# Patient Record
Sex: FEMALE | Race: WHITE | Employment: OTHER | URBAN - METROPOLITAN AREA
[De-identification: names, ages, dates, MRNs, and addresses within clinical notes are randomized per-mention and may not be internally consistent; named-entity substitution may affect disease eponyms.]

---

## 2017-01-10 ENCOUNTER — TELEPHONE (OUTPATIENT)
Dept: OPHTHALMOLOGY | Facility: CLINIC | Age: 77
End: 2017-01-10

## 2017-01-10 NOTE — TELEPHONE ENCOUNTER
Pt current symptoms: redness inside and outside lower lid, left eye. and slight discharge, slight irritation. LOV: 11/22. Pt requesting to spk to Rn, please advise, thank you.

## 2017-01-10 NOTE — TELEPHONE ENCOUNTER
Spoke to pt and states that she was seen 11/2016 due to a chalazion RUL; pt used warm compresses and states that it resolved. Pt states that now she has the same thing LLL with redness, slight swelling with a bump over the past few days.  Advised pt to cont

## 2017-01-14 PROBLEM — Z99.2 HEMODIALYSIS ACCESS SITE WITH ARTERIOVENOUS GRAFT (HCC): Status: ACTIVE | Noted: 2017-01-14

## 2017-01-16 ENCOUNTER — TELEPHONE (OUTPATIENT)
Dept: OPHTHALMOLOGY | Facility: CLINIC | Age: 77
End: 2017-01-16

## 2017-01-16 NOTE — TELEPHONE ENCOUNTER
See TE from 1/10, pt states her eye has not improved, pt requesting appt sooner than next available. Pls call thank you.

## 2017-01-19 ENCOUNTER — OFFICE VISIT (OUTPATIENT)
Dept: OPHTHALMOLOGY | Facility: CLINIC | Age: 77
End: 2017-01-19

## 2017-01-19 DIAGNOSIS — H01.00B BLEPHARITIS OF UPPER AND LOWER EYELIDS OF BOTH EYES, UNSPECIFIED TYPE: ICD-10-CM

## 2017-01-19 DIAGNOSIS — H00.15 CHALAZION LEFT LOWER EYELID: ICD-10-CM

## 2017-01-19 DIAGNOSIS — H01.00A BLEPHARITIS OF UPPER AND LOWER EYELIDS OF BOTH EYES, UNSPECIFIED TYPE: ICD-10-CM

## 2017-01-19 DIAGNOSIS — H00.12 CHALAZION RIGHT LOWER EYELID: Primary | ICD-10-CM

## 2017-01-19 PROCEDURE — 99213 OFFICE O/P EST LOW 20 MIN: CPT | Performed by: OPHTHALMOLOGY

## 2017-01-19 PROCEDURE — G0463 HOSPITAL OUTPT CLINIC VISIT: HCPCS | Performed by: OPHTHALMOLOGY

## 2017-01-19 NOTE — ASSESSMENT & PLAN NOTE
Recommend aggressive warm compresses; she should use them 20-30 times per day. Information on chalazia given. Told patient that it is not big enough to excise at this time.   Discussed with patient that since is still in the early stages, it may be able

## 2017-01-19 NOTE — ASSESSMENT & PLAN NOTE
Patient is instructed to use warm compresses twice a day to both eyelids forever for ocular comfort. Blepharitis info given.

## 2017-01-19 NOTE — PROGRESS NOTES
Sarah Craig is a 77year old female. HPI:     HPI     Pt was seen in November due to a bump on her RUL and went away after about 4 weeks by using warm compresses 20+ times a day. Pt called on 1/16/17 due to noticing a new bump on her RLL x 10 days. Smokeless Status: Never Used                        Alcohol Use: Yes                Comment: occasionally      Medications:    Current Outpatient Prescriptions:  tobramycin-dexamethasone 0.3-0.1 % Ophthalmic Ointment Apply to lower lids in b 20/25 -2 20/20       Correction:  Glasses      Pupils      Pupils   Right PERRL   Left PERRL               Slit Lamp and Fundus Exam     Slit Lamp Exam      Right Left    Lids/Lashes Dermatochalasis, Meibomian gland dysfunction, 1+ Scurf, small chalazion R instructions:  Return for 1-2 months for complete exam .    1/19/2017  Scribed by: Carlitos Luu MD

## 2017-01-19 NOTE — PATIENT INSTRUCTIONS
Chalazion right lower eyelid  Recommend aggressive warm compresses; she should use them 20-30 times per day. Information on chalazia given. Told patient that it is not big enough to excise at this time.   Discussed with patient that since is still in the care  If your healthcare provider finds that a chalazion is infected, he or she may prescribe an antibiotic drop or ointment. Use the medicine as directed. · Wash your hands carefully with soap and warm water before and after caring for your eye(s).  This [facility]) or higher, or as directed by your healthcare provider  Date Last Reviewed: 10/9/2015  © 2568-6526 [address], [address] [place] [address]. All rights reserved.  This information is not intended as a substitute for ess

## 2017-01-30 PROBLEM — K21.00 GASTROESOPHAGEAL REFLUX DISEASE WITH ESOPHAGITIS: Status: ACTIVE | Noted: 2017-01-30

## 2017-02-07 ENCOUNTER — OFFICE VISIT (OUTPATIENT)
Dept: OPHTHALMOLOGY | Facility: CLINIC | Age: 77
End: 2017-02-07

## 2017-02-07 DIAGNOSIS — H31.001 CHORIORETINAL SCAR OF RIGHT EYE: ICD-10-CM

## 2017-02-07 DIAGNOSIS — H01.00B BLEPHARITIS OF UPPER AND LOWER EYELIDS OF BOTH EYES, UNSPECIFIED TYPE: ICD-10-CM

## 2017-02-07 DIAGNOSIS — H43.393 VITREOUS FLOATERS OF BOTH EYES: ICD-10-CM

## 2017-02-07 DIAGNOSIS — H25.13 AGE-RELATED NUCLEAR CATARACT OF BOTH EYES: Primary | ICD-10-CM

## 2017-02-07 DIAGNOSIS — H01.00A BLEPHARITIS OF UPPER AND LOWER EYELIDS OF BOTH EYES, UNSPECIFIED TYPE: ICD-10-CM

## 2017-02-07 PROBLEM — H00.15 CHALAZION LEFT LOWER EYELID: Status: RESOLVED | Noted: 2017-01-19 | Resolved: 2017-02-07

## 2017-02-07 PROBLEM — H00.12 CHALAZION RIGHT LOWER EYELID: Status: RESOLVED | Noted: 2017-01-19 | Resolved: 2017-02-07

## 2017-02-07 PROCEDURE — 92014 COMPRE OPH EXAM EST PT 1/>: CPT | Performed by: OPHTHALMOLOGY

## 2017-02-07 PROCEDURE — 92015 DETERMINE REFRACTIVE STATE: CPT | Performed by: OPHTHALMOLOGY

## 2017-02-07 NOTE — PROGRESS NOTES
Sarah Craig is a 77year old female. HPI:     HPI     Patient is here for a complete eye exam.  Patient states that she has a decrease in her near vision for the past few months.        Last edited by Hill Abraham O.T. on 2/7/2017 10:38 AM. tablet by mouth 2 (two) times daily. X 10 days Disp: 20 tablet Rfl: 0   Esomeprazole Magnesium 20 MG Oral Capsule Delayed Release Take 1 capsule (20 mg total) by mouth daily.  Before meal Disp: 90 capsule Rfl: 3   RENVELA 800 MG Oral Tab TAKE 3 TABLETS WITH Dilation     Both eyes:  1.0% Mydriacyl and 2.5% Artemio Synephrine @ 10:56 AM    x2 Myd 1% & Artemio 2.55 added OU at 11:00am/nw            Slit Lamp and Fundus Exam     Slit Lamp Exam      Right Left    Lids/Lashes Dermatochalasis, Meibomian gland dysfunction, Follow up instructions:  Return in about 1 year (around 2/7/2018) for Complete eye exam.    2/7/2017  Scribed by: Elizabeth Santos MD

## 2017-02-07 NOTE — PATIENT INSTRUCTIONS
Age-related nuclear cataract of both eyes  Discussed early cataracts with patient. No treatment recommended at this time. New glasses RX written for both eyes.           Blepharitis of upper and lower eyelids of both eyes  Patient is instructed to use war professional's instructions. Treating Blepharitis: Self-Care  To treat the problem, keep your eyelids clean. Warm compresses can reduce redness and swelling, and help clean your eyelids, too.  You may also need to wash the area gently with an eyelid

## 2017-02-07 NOTE — ASSESSMENT & PLAN NOTE
Discussed early cataracts with patient. No treatment recommended at this time. New glasses RX written for both eyes.

## 2017-03-03 PROBLEM — S93.602A FOOT SPRAIN, LEFT, INITIAL ENCOUNTER: Status: ACTIVE | Noted: 2017-03-03

## 2017-03-14 ENCOUNTER — TELEPHONE (OUTPATIENT)
Dept: NEPHROLOGY | Facility: CLINIC | Age: 77
End: 2017-03-14

## 2017-03-14 ENCOUNTER — HOSPITAL ENCOUNTER (INPATIENT)
Facility: HOSPITAL | Age: 77
LOS: 2 days | Discharge: HOME | DRG: 371 | End: 2017-03-16
Attending: HOSPITALIST | Admitting: HOSPITALIST
Payer: MEDICARE

## 2017-03-14 ENCOUNTER — APPOINTMENT (OUTPATIENT)
Dept: CT IMAGING | Facility: HOSPITAL | Age: 77
DRG: 371 | End: 2017-03-14
Attending: HOSPITALIST
Payer: MEDICARE

## 2017-03-14 PROBLEM — K85.90 PANCREATITIS: Status: ACTIVE | Noted: 2017-03-14

## 2017-03-14 PROBLEM — K85.90 PANCREATITIS (HCC): Status: ACTIVE | Noted: 2017-03-14

## 2017-03-14 LAB
ADENOVIRUS F 40/41 PCR: NEGATIVE
ASTROVIRUS PCR: NEGATIVE
C CAYETANENSIS DNA SPEC QL NAA+PROBE: NEGATIVE
C. DIFFICILE TOXIN A/B PCR: POSITIVE
CAMPY SP DNA.DIARRHEA STL QL NAA+PROBE: NEGATIVE
CRYPTOSP DNA SPEC QL NAA+PROBE: NEGATIVE
E COLI DNA SPEC QL NAA+PROBE: NEGATIVE
EC STX1+STX2 + H7 FLIC SPEC NAA+PROBE: NEGATIVE
ENTAMOEBA HISTOLYTICA PCR: NEGATIVE
GIARDIA LAMBLIA PCR: NEGATIVE
NOROVIRUS GI/GII PCR: NEGATIVE
P SHIGELLOIDES DNA STL QL NAA+PROBE: NEGATIVE
ROTAVIRUS A PCR: POSITIVE
SALMONELLA DNA SPEC QL NAA+PROBE: NEGATIVE
SAPOVIRUS PCR: NEGATIVE
V CHOLERAE DNA SPEC QL NAA+PROBE: NEGATIVE
VIBRIO DNA SPEC NAA+PROBE: NEGATIVE
YERSINIA DNA SPEC NAA+PROBE: NEGATIVE

## 2017-03-14 PROCEDURE — 74160 CT ABDOMEN W/CONTRAST: CPT

## 2017-03-14 PROCEDURE — 99223 1ST HOSP IP/OBS HIGH 75: CPT | Performed by: INTERNAL MEDICINE

## 2017-03-14 RX ORDER — SEVELAMER CARBONATE 800 MG/1
2400 TABLET, FILM COATED ORAL
Status: DISCONTINUED | OUTPATIENT
Start: 2017-03-14 | End: 2017-03-16

## 2017-03-14 RX ORDER — AMIODARONE HYDROCHLORIDE 200 MG/1
100 TABLET ORAL
Status: DISCONTINUED | OUTPATIENT
Start: 2017-03-14 | End: 2017-03-15

## 2017-03-14 RX ORDER — METRONIDAZOLE 500 MG/100ML
500 INJECTION, SOLUTION INTRAVENOUS EVERY 8 HOURS
Status: DISCONTINUED | OUTPATIENT
Start: 2017-03-14 | End: 2017-03-15

## 2017-03-14 RX ORDER — PANTOPRAZOLE SODIUM 40 MG/1
40 TABLET, DELAYED RELEASE ORAL
Status: DISCONTINUED | OUTPATIENT
Start: 2017-03-15 | End: 2017-03-16

## 2017-03-14 RX ORDER — ONDANSETRON 2 MG/ML
4 INJECTION INTRAMUSCULAR; INTRAVENOUS EVERY 6 HOURS PRN
Status: DISCONTINUED | OUTPATIENT
Start: 2017-03-14 | End: 2017-03-16

## 2017-03-14 RX ORDER — ASPIRIN 81 MG/1
81 TABLET, CHEWABLE ORAL DAILY
Status: DISCONTINUED | OUTPATIENT
Start: 2017-03-14 | End: 2017-03-16

## 2017-03-14 RX ORDER — MORPHINE SULFATE 2 MG/ML
1 INJECTION, SOLUTION INTRAMUSCULAR; INTRAVENOUS EVERY 4 HOURS PRN
Status: DISCONTINUED | OUTPATIENT
Start: 2017-03-14 | End: 2017-03-16

## 2017-03-14 RX ORDER — METOCLOPRAMIDE HYDROCHLORIDE 5 MG/ML
5 INJECTION INTRAMUSCULAR; INTRAVENOUS EVERY 8 HOURS PRN
Status: DISCONTINUED | OUTPATIENT
Start: 2017-03-14 | End: 2017-03-16

## 2017-03-14 RX ORDER — SODIUM CHLORIDE 9 MG/ML
INJECTION, SOLUTION INTRAVENOUS CONTINUOUS
Status: DISCONTINUED | OUTPATIENT
Start: 2017-03-14 | End: 2017-03-15

## 2017-03-14 RX ORDER — METOCLOPRAMIDE HYDROCHLORIDE 5 MG/ML
10 INJECTION INTRAMUSCULAR; INTRAVENOUS EVERY 8 HOURS PRN
Status: DISCONTINUED | OUTPATIENT
Start: 2017-03-14 | End: 2017-03-14

## 2017-03-14 RX ORDER — METOPROLOL TARTRATE 50 MG/1
50 TABLET, FILM COATED ORAL 2 TIMES DAILY
Status: DISCONTINUED | OUTPATIENT
Start: 2017-03-14 | End: 2017-03-16

## 2017-03-14 RX ORDER — 0.9 % SODIUM CHLORIDE 0.9 %
VIAL (ML) INJECTION
Status: DISPENSED
Start: 2017-03-14 | End: 2017-03-15

## 2017-03-14 RX ORDER — BIOTIN 10 MG
10 TABLET ORAL DAILY
Status: DISCONTINUED | OUTPATIENT
Start: 2017-03-14 | End: 2017-03-14

## 2017-03-14 RX ORDER — ATORVASTATIN CALCIUM 20 MG/1
20 TABLET, FILM COATED ORAL NIGHTLY
Status: DISCONTINUED | OUTPATIENT
Start: 2017-03-14 | End: 2017-03-16

## 2017-03-14 RX ORDER — FLUTICASONE PROPIONATE 50 MCG
2 SPRAY, SUSPENSION (ML) NASAL DAILY
Status: DISCONTINUED | OUTPATIENT
Start: 2017-03-14 | End: 2017-03-16

## 2017-03-14 RX ORDER — ACETAMINOPHEN 325 MG/1
650 TABLET ORAL EVERY 6 HOURS PRN
Status: DISCONTINUED | OUTPATIENT
Start: 2017-03-14 | End: 2017-03-16

## 2017-03-14 RX ORDER — HEPARIN SODIUM 5000 [USP'U]/ML
5000 INJECTION, SOLUTION INTRAVENOUS; SUBCUTANEOUS EVERY 12 HOURS
Status: DISCONTINUED | OUTPATIENT
Start: 2017-03-14 | End: 2017-03-14

## 2017-03-14 RX ORDER — HEPARIN SODIUM 5000 [USP'U]/ML
5000 INJECTION, SOLUTION INTRAVENOUS; SUBCUTANEOUS EVERY 12 HOURS SCHEDULED
Status: DISCONTINUED | OUTPATIENT
Start: 2017-03-15 | End: 2017-03-16

## 2017-03-14 NOTE — CONSULTS
REFERRING PHYSICIAN: Dr. Baker ref. provider found    HPI:         Thank you very much for requesting me to see the patient.     As you know, Sarah Loza is a 69year old female who presents today with 1 day sx: n/v/diarrhea -- \"every hour to hour -- \" low-attenuation mass at the body the pancreas    although this could be artifactual related to artifact from bilateral   renal artery coils.  MRI might be limited due to metallic artifact.    Recommend initial targeted ultrasound examination of the pancreas Chloride 0.9 % solution      Metoclopramide HCl (REGLAN) injection 5 mg 5 mg Intravenous Q8H PRN     Facility-Administered Medications Ordered in Other Encounters:  ertapenem (INVANZ) 500 mg in sodium chloride 0.9 % 100 mL IVPB 500 mg Intravenous Daily

## 2017-03-14 NOTE — PROGRESS NOTES
[facility] Pharmacy Note:  Renal Dose Adjustment    Eulalio Espana has been prescribed metoclopramide (REGLAN) 10 mg intravenously every 8 hours. Estimated Creatinine Clearance: 4.6 mL/min (based on Cr of 8.53).     Her calculated creatinine clearance is <30

## 2017-03-14 NOTE — TELEPHONE ENCOUNTER
PATIENT IS A DIRECT ADMIT TODAY AND WILL BE HAVING DIALYSIS TOMORROW  DR PERDOMO IS ASKING FOR ORDERS AND TO NOTIFIED DR Brook Hawkins

## 2017-03-14 NOTE — PROGRESS NOTES
Pharmacy Note: Dietary Supplement Discontinuation Per Policy    BIOTIN has been discontinued on Helen A Celler per policy. This supplement may be restarted upon discharge using the medication reconciliation process.     Thank you,   Daniel Paiz, PharmD

## 2017-03-14 NOTE — PROGRESS NOTES
Pt seen and examined. Consult dictate. Pt with esrd on HD on a M, W, Fri scheduled now with 1 day hx of epigastric abd pain associated with N&V and diarrhea. Lipase 927. Going for CT scan.  Routine HD in am.

## 2017-03-15 ENCOUNTER — APPOINTMENT (OUTPATIENT)
Dept: GENERAL RADIOLOGY | Facility: HOSPITAL | Age: 77
DRG: 371 | End: 2017-03-15
Attending: INTERNAL MEDICINE
Payer: MEDICARE

## 2017-03-15 LAB
ALBUMIN SERPL BCP-MCNC: 2.9 G/DL (ref 3.5–4.8)
ALBUMIN/GLOB SERPL: 1.3 {RATIO} (ref 1–2)
ALP SERPL-CCNC: 67 U/L (ref 32–100)
ALT SERPL-CCNC: 20 U/L (ref 14–54)
ANION GAP SERPL CALC-SCNC: 16 MMOL/L (ref 0–18)
AST SERPL-CCNC: 24 U/L (ref 15–41)
BASOPHILS # BLD: 0 K/UL (ref 0–0.2)
BASOPHILS NFR BLD: 0 %
BILIRUB SERPL-MCNC: 0.9 MG/DL (ref 0.3–1.2)
BUN SERPL-MCNC: 78 MG/DL (ref 8–20)
BUN/CREAT SERPL: 8.1 (ref 10–20)
CALCIUM SERPL-MCNC: 8.3 MG/DL (ref 8.5–10.5)
CHLORIDE SERPL-SCNC: 101 MMOL/L (ref 95–110)
CO2 SERPL-SCNC: 22 MMOL/L (ref 22–32)
CREAT SERPL-MCNC: 9.64 MG/DL (ref 0.5–1.5)
EOSINOPHIL # BLD: 0 K/UL (ref 0–0.7)
EOSINOPHIL NFR BLD: 0 %
ERYTHROCYTE [DISTWIDTH] IN BLOOD BY AUTOMATED COUNT: 15.7 % (ref 11–15)
GLOBULIN PLAS-MCNC: 2.3 G/DL (ref 2.5–3.7)
GLUCOSE SERPL-MCNC: 98 MG/DL (ref 70–99)
HCT VFR BLD AUTO: 33.8 % (ref 35–48)
HGB BLD-MCNC: 11.2 G/DL (ref 12–16)
LIPASE SERPL-CCNC: 26 U/L (ref 22–51)
LYMPHOCYTES # BLD: 0.5 K/UL (ref 1–4)
LYMPHOCYTES NFR BLD: 8 %
MAGNESIUM SERPL-MCNC: 2 MG/DL (ref 1.8–2.5)
MCH RBC QN AUTO: 33.5 PG (ref 27–32)
MCHC RBC AUTO-ENTMCNC: 33 G/DL (ref 32–37)
MCV RBC AUTO: 101.6 FL (ref 80–100)
MONOCYTES # BLD: 0.6 K/UL (ref 0–1)
MONOCYTES NFR BLD: 11 %
NEUTROPHILS # BLD AUTO: 4.8 K/UL (ref 1.8–7.7)
NEUTROPHILS NFR BLD: 81 %
OSMOLALITY UR CALC.SUM OF ELEC: 311 MOSM/KG (ref 275–295)
PHOSPHATE SERPL-MCNC: 7.5 MG/DL (ref 2.4–4.7)
PLATELET # BLD AUTO: 104 K/UL (ref 140–400)
PMV BLD AUTO: 8.1 FL (ref 7.4–10.3)
POTASSIUM SERPL-SCNC: 5.1 MMOL/L (ref 3.3–5.1)
PROT SERPL-MCNC: 5.2 G/DL (ref 5.9–8.4)
RBC # BLD AUTO: 3.33 M/UL (ref 3.7–5.4)
SODIUM SERPL-SCNC: 139 MMOL/L (ref 136–144)
WBC # BLD AUTO: 5.9 K/UL (ref 4–11)

## 2017-03-15 PROCEDURE — 71010 XR CHEST AP PORTABLE  (CPT=71010): CPT

## 2017-03-15 PROCEDURE — 5A1D00Z PERFORMANCE OF URINARY FILTRATION, SINGLE: ICD-10-PCS | Performed by: INTERNAL MEDICINE

## 2017-03-15 PROCEDURE — 99233 SBSQ HOSP IP/OBS HIGH 50: CPT | Performed by: INTERNAL MEDICINE

## 2017-03-15 RX ORDER — AMIODARONE HYDROCHLORIDE 200 MG/1
100 TABLET ORAL DAILY
Status: COMPLETED | OUTPATIENT
Start: 2017-03-15 | End: 2017-03-15

## 2017-03-15 RX ORDER — METRONIDAZOLE 500 MG/1
500 TABLET ORAL EVERY 8 HOURS SCHEDULED
Status: DISCONTINUED | OUTPATIENT
Start: 2017-03-15 | End: 2017-03-16

## 2017-03-15 RX ORDER — 0.9 % SODIUM CHLORIDE 0.9 %
VIAL (ML) INJECTION
Status: DISPENSED
Start: 2017-03-15 | End: 2017-03-15

## 2017-03-15 RX ORDER — AMIODARONE HYDROCHLORIDE 200 MG/1
200 TABLET ORAL DAILY
Status: DISCONTINUED | OUTPATIENT
Start: 2017-03-15 | End: 2017-03-16

## 2017-03-15 RX ORDER — SODIUM CHLORIDE 9 MG/ML
INJECTION, SOLUTION INTRAVENOUS
Status: DISPENSED
Start: 2017-03-15 | End: 2017-03-16

## 2017-03-15 NOTE — DISCHARGE PLANNING
BECKY met with the pt. At bedside. The [place]. [place] The pt. Reports being independent prior to admission with adls, ambulation and driving. The pt. Has 4 children all in the general area. The pt.  Is an HD patient and goes

## 2017-03-15 NOTE — PHYSICAL THERAPY NOTE
PHYSICAL THERAPY QUICK EVALUATION - INPATIENT    Room Number: 450/450-A  Evaluation Date: 3/15/2017      Problem List  Active Problems:    Pancreatitis    ESRD on hemodialysis [facility])      Past Medical History  Past Medical History   Diagnosis Date   • ARIS does the patient currently have. ..  -   Turning over in bed (including adjusting bedclothes, sheets and blankets)?: None   -   Sitting down on and standing up from a chair with arms (e.g., wheelchair, bedside commode, etc.): None   -   Moving from lying on further skilled PT intervention but will be placed on the restorative rehab to decrease and limit the effects of decrease mobilities. She will be seen for gt training and AROM exe while seated.  Also discussed with nursing to walk pt several times on daily b

## 2017-03-15 NOTE — OCCUPATIONAL THERAPY NOTE
OCCUPATIONAL THERAPY EVALUATION - INPATIENT     Room Number: 450/450-A  Evaluation Date: 3/15/2017  Type of Evaluation: Initial  Presenting Problem:  (C-Diff, Rotavirus)    Physician Order: IP Consult to Occupational Therapy  Reason for Therapy: ADL/IADL D Dr. Tonie Crigler  Type of Home: House  Home Layout:  [place]  Lives With: Spouse     Toilet and Equipment: Comfort height toilet  Shower/Tub and Equipment: Tub-shower combo    Drives: No       Stairs in Home: 1 stair to enter home / 7 Shower Transfer: Supervision  Chair Transfer: Supervision    Bedroom Mobility: Supervision      FUNCTIONAL ADL ASSESSMENT  Grooming: Supervision - oral hygiene and facial hygiene standing at sink x4 minutes  Bathing: Not completed  Kacie-care: Supervision

## 2017-03-15 NOTE — CONSULTS
[facility]    PATIENT'S NAME: Amrita Nguyen   ATTENDING PHYSICIAN: Shu Almonte MD   CONSULTING PHYSICIAN: Nic Harrington MD   PATIENT ACCOUNT#:   [de-identified]    LOCATION:  [address] [place] [address] Est #:   [id number]       DATE OF BI without JVD or lymphadenopathy. LUNGS:  Clear to auscultation and percussion. HEART:  Regular rate and rhythm with an S4 but no other gallops or murmurs. ABDOMEN:  Soft, flat.   Mild tenderness in the epigastric region but no obvious masses or organomega

## 2017-03-15 NOTE — PROGRESS NOTES
[facility]  Nephrology Daily Progress Note    Salena Burnett  [id number]  69year old      HPI:   Salena Burnett is a 69year old female. N&V have resolved.  3 watery BMs this am.      ROS:     Constitutional:  Negative for decreased ac age    Labs:    Lab Results  Component Value Date   WBC 5.9 03/15/2017   HGB 11.2 03/15/2017   HCT 33.8 03/15/2017    03/15/2017   CREATSERUM 9.64 03/15/2017   BUN 78 03/15/2017    03/15/2017   K 5.1 03/15/2017    03/15/2017   CO2 22 03/ acetaminophen (TYLENOL) tab 650 mg, 650 mg, Oral, Q6H PRN  •  ondansetron HCl (ZOFRAN) injection 4 mg, 4 mg, Intravenous, Q6H PRN  •  morphINE sulfate (PF) 2 MG/ML injection 1 mg, 1 mg, Intravenous, Q4H PRN  •  aspirin chewable tab 81 mg, 81 mg, Oral, Andrea Vitreous floaters of both eyes     Chorioretinal scar of right eye     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

## 2017-03-15 NOTE — PROGRESS NOTES
[facility] Hospitalist Progress Note     CC: Hospital Follow up    PCP: Ashli Barrera MD       Assessment/Plan:     Active Problems:    Pancreatitis    ESRD on hemodialysis [facility])      Thomas Bonilla is a 69year old female with PMH sig for HTN, HL, ESRD (po opportunity to ask questions and note understanding and agreeing with therapeutic plan as outlined    Yasemin Campbell MD  [facility] Hospitalist    Answering Service number: [phone]       Subjective:     Still having diarrhea, no N/V    OBJECTIVE:    Blood and kidneys, compatible with polycystic kidney disease.   There is stable appearance of a peripherally enhancing cystic focus within the right kidney which may represent a mildly complex Bosniak 2 F cyst. 2.  Pancreas has a grossly normal CT appearance with

## 2017-03-16 VITALS
HEART RATE: 75 BPM | SYSTOLIC BLOOD PRESSURE: 98 MMHG | TEMPERATURE: 98 F | WEIGHT: 153.5 LBS | OXYGEN SATURATION: 93 % | DIASTOLIC BLOOD PRESSURE: 43 MMHG | RESPIRATION RATE: 18 BRPM | BODY MASS INDEX: 27 KG/M2

## 2017-03-16 PROBLEM — K85.90 PANCREATITIS: Status: RESOLVED | Noted: 2017-03-14 | Resolved: 2017-03-16

## 2017-03-16 PROBLEM — K85.90 PANCREATITIS (HCC): Status: RESOLVED | Noted: 2017-03-14 | Resolved: 2017-03-16

## 2017-03-16 PROBLEM — A04.72 C. DIFFICILE DIARRHEA: Status: ACTIVE | Noted: 2017-03-16

## 2017-03-16 LAB
ALBUMIN SERPL BCP-MCNC: 2.8 G/DL (ref 3.5–4.8)
ANION GAP SERPL CALC-SCNC: 10 MMOL/L (ref 0–18)
BUN SERPL-MCNC: 37 MG/DL (ref 8–20)
BUN/CREAT SERPL: 5.8 (ref 10–20)
CALCIUM SERPL-MCNC: 8.5 MG/DL (ref 8.5–10.5)
CHLORIDE SERPL-SCNC: 103 MMOL/L (ref 95–110)
CO2 SERPL-SCNC: 26 MMOL/L (ref 22–32)
CREAT SERPL-MCNC: 6.43 MG/DL (ref 0.5–1.5)
ERYTHROCYTE [DISTWIDTH] IN BLOOD BY AUTOMATED COUNT: 15.8 % (ref 11–15)
GLUCOSE SERPL-MCNC: 88 MG/DL (ref 70–99)
HBV SURFACE AG SERPL QL IA: NONREACTIVE
HCT VFR BLD AUTO: 32.8 % (ref 35–48)
HGB BLD-MCNC: 11 G/DL (ref 12–16)
MAGNESIUM SERPL-MCNC: 2.1 MG/DL (ref 1.8–2.5)
MCH RBC QN AUTO: 33.8 PG (ref 27–32)
MCHC RBC AUTO-ENTMCNC: 33.4 G/DL (ref 32–37)
MCV RBC AUTO: 101.2 FL (ref 80–100)
OSMOLALITY UR CALC.SUM OF ELEC: 296 MOSM/KG (ref 275–295)
PHOSPHATE SERPL-MCNC: 5.9 MG/DL (ref 2.4–4.7)
PLATELET # BLD AUTO: 101 K/UL (ref 140–400)
PMV BLD AUTO: 8.4 FL (ref 7.4–10.3)
POTASSIUM SERPL-SCNC: 4.3 MMOL/L (ref 3.3–5.1)
RBC # BLD AUTO: 3.25 M/UL (ref 3.7–5.4)
SODIUM SERPL-SCNC: 139 MMOL/L (ref 136–144)
WBC # BLD AUTO: 3.8 K/UL (ref 4–11)

## 2017-03-16 PROCEDURE — 99232 SBSQ HOSP IP/OBS MODERATE 35: CPT | Performed by: INTERNAL MEDICINE

## 2017-03-16 RX ORDER — METRONIDAZOLE 500 MG/1
500 TABLET ORAL EVERY 8 HOURS SCHEDULED
Qty: 27 TABLET | Refills: 0 | Status: SHIPPED | OUTPATIENT
Start: 2017-03-16 | End: 2017-04-03 | Stop reason: ALTCHOICE

## 2017-03-16 RX ORDER — DICYCLOMINE HYDROCHLORIDE 10 MG/1
10 CAPSULE ORAL 3 TIMES DAILY PRN
Status: DISCONTINUED | OUTPATIENT
Start: 2017-03-16 | End: 2017-03-16

## 2017-03-16 NOTE — RESTORATIVE THERAPY
RESTORATIVE CARE TREATMENT NOTE    Presenting Problem     Presenting Problem:  (C-Diff, Rotavirus)       Precautions  Precautions:  (Enteric Contact Isolation)       Weight Bearing Restriction                      SUNDAY MONDAY TUESDAY WEDNESDAY THURSDAY F

## 2017-03-16 NOTE — PROGRESS NOTES
GI  PROGRESS NOTE    SUBJECTIVE: diarrhea improved but 5 BM's today; nausea improved. Tolerating diet.        OBJECTIVE:  Temp:  [97.8 °F (36.6 °C)-99.8 °F (37.7 °C)] 97.8 °F (36.6 °C)  Pulse:  [64-90] 64  Resp:  [17-18] 18  BP: ()/(43-53) 99/43 mmH

## 2017-03-16 NOTE — PLAN OF CARE
Problem: Patient/Family Goals  Goal: Patient/Family Long Term Goal  Patient’s Long Term Goal: No more nausea or diarrhea    Interventions:  - Send down stool specimen  - Administer antiemetic medications  - Find out underlying problem  - See additional Car growth factors as ordered  - Implement neutropenic guidelines   Outcome: Progressing    Comments:   Patient tolerated HD, 1L off. Morphine given for pain at hs. PO Flagyl Q 12 hours, no loose stools overnight, renal diet in a.m.  Safety plan in placezeke

## 2017-03-16 NOTE — PROGRESS NOTES
[facility]    Progress Note      Subjective:     No new complaints - denies any diarrhea, tolerating diet well       Review of Systems:     Constitutional: negative for fevers and weight loss  Eyes: negative for irritation, redness and vi metRONIDAZOLE (FLAGYL) tab 500 mg 500 mg Oral Q8H [address]   amiodarone HCl (PACERONE) tab 200 mg 200 mg Oral Daily   acetaminophen (TYLENOL) tab 650 mg 650 mg Oral Q6H PRN   ondansetron HCl (ZOFRAN) injection 4 mg 4 mg Intravenous Q6H PRN   aspirin chewable antagonist therapy:                   Standard Dose (moderate intensity                                  therapeutic range):       2.0 - 3.0                   Higher intensity therapeutic range       2.5 - 3.5   ----------  No results for input(s): BN MD  3/16/2017

## 2017-05-11 ENCOUNTER — APPOINTMENT (OUTPATIENT)
Dept: LAB | Age: 77
End: 2017-05-11
Attending: SPECIALIST
Payer: MEDICARE

## 2017-05-11 ENCOUNTER — HOSPITAL (OUTPATIENT)
Dept: OTHER | Age: 77
End: 2017-05-11
Attending: SPECIALIST

## 2017-05-11 DIAGNOSIS — I48.20 CHRONIC ATRIAL FIBRILLATION (HCC): ICD-10-CM

## 2017-05-11 DIAGNOSIS — E78.00 PURE HYPERCHOLESTEROLEMIA: ICD-10-CM

## 2017-05-11 DIAGNOSIS — N18.5 CHRONIC KIDNEY DISEASE (CKD), STAGE V (HCC): ICD-10-CM

## 2017-05-11 LAB
ALBUMIN SERPL-MCNC: 3.7 GM/DL (ref 3.6–5.1)
ALBUMIN/GLOB SERPL: 1.2 {RATIO} (ref 1–2.4)
ALP SERPL-CCNC: 123 UNIT/L (ref 45–117)
ALT SERPL-CCNC: 26 UNIT/L
ANION GAP SERPL CALC-SCNC: 13 MMOL/L (ref 10–20)
AST SERPL-CCNC: 17 UNIT/L
BILIRUB SERPL-MCNC: 0.6 MG/DL (ref 0.2–1)
BUN SERPL-MCNC: 47 MG/DL (ref 6–20)
BUN/CREAT SERPL: 7 (ref 7–25)
CALCIUM SERPL-MCNC: 9.5 MG/DL (ref 8.4–10.2)
CHLORIDE: 103 MMOL/L (ref 98–107)
CHOLEST SERPL-MCNC: 122 MG/DL
CHOLEST/HDLC SERPL: 1.9 {RATIO}
CK SERPL-CCNC: 27 UNIT/L (ref 26–192)
CO2 SERPL-SCNC: 30 MMOL/L (ref 21–32)
CREAT SERPL-MCNC: 6.86 MG/DL (ref 0.51–0.95)
GLOBULIN SER-MCNC: 3.1 GM/DL (ref 2–4)
GLUCOSE SERPL-MCNC: 103 MG/DL (ref 65–99)
HDLC SERPL-MCNC: 64 MG/DL
LDLC SERPL CALC-MCNC: 33 MG/DL
LENGTH OF FAST TIME PATIENT: ABNORMAL HR
LENGTH OF FAST TIME PATIENT: NORMAL HR
NONHDLC SERPL-MCNC: 58 MG/DL
POTASSIUM SERPL-SCNC: 4.9 MMOL/L (ref 3.4–5.1)
PROT SERPL-MCNC: 6.8 GM/DL (ref 6.4–8.2)
SODIUM SERPL-SCNC: 141 MMOL/L (ref 135–145)
TRIGLYCERIDE (TRIGP): 126 MG/DL
TSH SERPL-ACNC: 1.55 MCUNIT/ML (ref 0.35–5)

## 2017-05-15 PROBLEM — S92.355A CLOSED NONDISPLACED FRACTURE OF FIFTH METATARSAL BONE OF LEFT FOOT, INITIAL ENCOUNTER: Status: ACTIVE | Noted: 2017-05-15

## 2017-06-26 PROBLEM — S92.355D CLOSED NONDISPLACED FRACTURE OF FIFTH METATARSAL BONE OF LEFT FOOT WITH ROUTINE HEALING, SUBSEQUENT ENCOUNTER: Status: ACTIVE | Noted: 2017-06-26

## 2017-07-11 ENCOUNTER — OFFICE VISIT (OUTPATIENT)
Dept: OTOLARYNGOLOGY | Facility: CLINIC | Age: 77
End: 2017-07-11

## 2017-07-11 VITALS — TEMPERATURE: 98 F | DIASTOLIC BLOOD PRESSURE: 80 MMHG | SYSTOLIC BLOOD PRESSURE: 124 MMHG

## 2017-07-11 DIAGNOSIS — H91.21 SUDDEN HEARING LOSS, RIGHT: ICD-10-CM

## 2017-07-11 DIAGNOSIS — H61.21 IMPACTED CERUMEN OF RIGHT EAR: Primary | ICD-10-CM

## 2017-07-11 PROCEDURE — G0463 HOSPITAL OUTPT CLINIC VISIT: HCPCS | Performed by: OTOLARYNGOLOGY

## 2017-07-11 PROCEDURE — 99203 OFFICE O/P NEW LOW 30 MIN: CPT | Performed by: OTOLARYNGOLOGY

## 2017-07-11 NOTE — PROGRESS NOTES
Keiko Priest is a 69year old female. Patient presents with:  Hearing Loss: Pt states followed up with audiologist last week and denizves the wax in her ear is interferring with her hearing aid. Pt states still not able to hear good, low hearing.      H HYPERTENSION    • Osteoporosis    • OTHER DISEASES     polycystic kidney disease familial      Social History:  Smoking status: Never Smoker                                                              Smokeless tobacco: Never Used                      Alc with a repeat office visit for any problems. 2. Sudden hearing loss, right  sudden hearing loss from 3-4 years ago.  He feels that her hearing it is helping her to some degree and I recommended that she continue for now    The patient indicates [place]

## 2017-10-05 ENCOUNTER — OFFICE VISIT (OUTPATIENT)
Dept: OTOLARYNGOLOGY | Facility: CLINIC | Age: 77
End: 2017-10-05

## 2017-10-05 VITALS
SYSTOLIC BLOOD PRESSURE: 141 MMHG | BODY MASS INDEX: 27.64 KG/M2 | DIASTOLIC BLOOD PRESSURE: 63 MMHG | HEIGHT: 63 IN | WEIGHT: 156 LBS | TEMPERATURE: 100 F

## 2017-10-05 DIAGNOSIS — J01.91 ACUTE RECURRENT SINUSITIS, UNSPECIFIED LOCATION: Primary | ICD-10-CM

## 2017-10-05 PROCEDURE — G0463 HOSPITAL OUTPT CLINIC VISIT: HCPCS | Performed by: OTOLARYNGOLOGY

## 2017-10-05 PROCEDURE — 99213 OFFICE O/P EST LOW 20 MIN: CPT | Performed by: OTOLARYNGOLOGY

## 2017-10-05 RX ORDER — METHYLPREDNISOLONE 4 MG/1
TABLET ORAL
Qty: 1 PACKAGE | Refills: 0 | Status: SHIPPED | OUTPATIENT
Start: 2017-10-05 | End: 2017-11-02

## 2017-10-06 NOTE — PROGRESS NOTES
Sierra Bansal is a 69year old female. Patient presents with:  Sinus Problem: sinus headache,sinus congestion for a month    HPI:   She has facial pressure and congestion with low-grade headache for the last week and has had congestion even prior.     [facility])    • Floaters 2001    OU   • HYPERLIPIDEMIA    • HYPERTENSION    • Osteoporosis    • OTHER DISEASES     polycystic kidney disease familial      Social History:  Smoking status: Never Smoker her problem is related to allergies. I have recommended that she continue her allergy medication.  I have recommended a course of oral steroids and with all of the difficulty that she has with antibiotics, I have recommended that we avoid any antibiotics fo

## 2017-10-09 ENCOUNTER — TELEPHONE (OUTPATIENT)
Dept: OTOLARYNGOLOGY | Facility: CLINIC | Age: 77
End: 2017-10-09

## 2017-10-09 NOTE — TELEPHONE ENCOUNTER
I would advise her to perhaps change her antihistamine to Allegra or Zyrtec and I would recommenda trial of some Mucinex over-the-counter

## 2017-10-09 NOTE — TELEPHONE ENCOUNTER
Pt informed of 's recommendation to try Allegra or Zyrtec, and trial of Mucinex. Pt verbalized understanding.

## 2017-10-09 NOTE — TELEPHONE ENCOUNTER
Per pt she started taking medrol dosepak 10/6; pt did take steroid dose for 10/9. Pt still c/o headaches, congestion. Pt is taking Tylenol for headache and is taking her OTC allergy medication (Claritin) along with her medrol dosepak.   Pt would like to k

## 2017-10-09 NOTE — TELEPHONE ENCOUNTER
pt called. Seen 10/05/17 with . Meds dont seem to be working. Not feeling any better. Please advise.

## 2017-10-12 PROBLEM — M25.531 RIGHT WRIST PAIN: Status: ACTIVE | Noted: 2017-10-12

## 2017-10-12 PROBLEM — I77.0 AVF (ARTERIOVENOUS FISTULA) (HCC): Status: ACTIVE | Noted: 2017-10-12

## 2017-12-02 ENCOUNTER — HOSPITAL ENCOUNTER (EMERGENCY)
Facility: HOSPITAL | Age: 77
Discharge: HOME | End: 2017-12-02
Attending: EMERGENCY MEDICINE
Payer: MEDICARE

## 2017-12-02 ENCOUNTER — APPOINTMENT (OUTPATIENT)
Dept: GENERAL RADIOLOGY | Facility: HOSPITAL | Age: 77
End: 2017-12-02
Attending: EMERGENCY MEDICINE
Payer: MEDICARE

## 2017-12-02 VITALS
SYSTOLIC BLOOD PRESSURE: 130 MMHG | RESPIRATION RATE: 18 BRPM | HEART RATE: 69 BPM | DIASTOLIC BLOOD PRESSURE: 65 MMHG | OXYGEN SATURATION: 99 % | TEMPERATURE: 98 F

## 2017-12-02 DIAGNOSIS — M19.031 ARTHRITIS OF RIGHT WRIST: ICD-10-CM

## 2017-12-02 DIAGNOSIS — S63.501A SPRAIN OF RIGHT WRIST, INITIAL ENCOUNTER: Primary | ICD-10-CM

## 2017-12-02 PROCEDURE — 99283 EMERGENCY DEPT VISIT LOW MDM: CPT

## 2017-12-02 PROCEDURE — 73110 X-RAY EXAM OF WRIST: CPT | Performed by: EMERGENCY MEDICINE

## 2017-12-02 RX ORDER — HYDROCODONE BITARTRATE AND ACETAMINOPHEN 5; 325 MG/1; MG/1
1 TABLET ORAL ONCE
Status: COMPLETED | OUTPATIENT
Start: 2017-12-02 | End: 2017-12-02

## 2017-12-02 RX ORDER — HYDROCODONE BITARTRATE AND ACETAMINOPHEN 5; 325 MG/1; MG/1
1 TABLET ORAL EVERY 4 HOURS PRN
Qty: 12 TABLET | Refills: 0 | Status: SHIPPED | OUTPATIENT
Start: 2017-12-02 | End: 2017-12-09

## 2017-12-02 NOTE — ED NOTES
Pt fell today while putting on [employer]iggyations. +mechanical fall. Now w/ right hand/wrist pain. Pt denies head injury. Pt aox4 w/ full rom, speaking in complete sentences.

## 2017-12-11 PROBLEM — M70.61 TROCHANTERIC BURSITIS OF RIGHT HIP: Status: ACTIVE | Noted: 2017-12-11

## 2018-01-30 PROBLEM — S92.355D CLOSED NONDISPLACED FRACTURE OF FIFTH METATARSAL BONE OF LEFT FOOT WITH ROUTINE HEALING, SUBSEQUENT ENCOUNTER: Status: RESOLVED | Noted: 2017-06-26 | Resolved: 2018-01-30

## 2018-01-30 PROBLEM — S93.602A FOOT SPRAIN, LEFT, INITIAL ENCOUNTER: Status: RESOLVED | Noted: 2017-03-03 | Resolved: 2018-01-30

## 2018-01-30 PROBLEM — M25.531 RIGHT WRIST PAIN: Status: RESOLVED | Noted: 2017-10-12 | Resolved: 2018-01-30

## 2018-01-30 PROBLEM — A04.72 C. DIFFICILE DIARRHEA: Status: RESOLVED | Noted: 2017-03-16 | Resolved: 2018-01-30

## 2018-01-30 PROBLEM — S92.355A CLOSED NONDISPLACED FRACTURE OF FIFTH METATARSAL BONE OF LEFT FOOT, INITIAL ENCOUNTER: Status: RESOLVED | Noted: 2017-05-15 | Resolved: 2018-01-30

## 2018-02-06 PROCEDURE — 87493 C DIFF AMPLIFIED PROBE: CPT | Performed by: INTERNAL MEDICINE

## 2018-02-18 ENCOUNTER — HOSPITAL ENCOUNTER (EMERGENCY)
Facility: HOSPITAL | Age: 78
Discharge: HOME | End: 2018-02-18
Attending: EMERGENCY MEDICINE
Payer: MEDICARE

## 2018-02-18 ENCOUNTER — APPOINTMENT (OUTPATIENT)
Dept: GENERAL RADIOLOGY | Facility: HOSPITAL | Age: 78
End: 2018-02-18
Attending: EMERGENCY MEDICINE
Payer: MEDICARE

## 2018-02-18 VITALS
HEIGHT: 63 IN | SYSTOLIC BLOOD PRESSURE: 139 MMHG | BODY MASS INDEX: 27.64 KG/M2 | DIASTOLIC BLOOD PRESSURE: 76 MMHG | WEIGHT: 156 LBS | TEMPERATURE: 99 F | HEART RATE: 67 BPM | OXYGEN SATURATION: 98 % | RESPIRATION RATE: 10 BRPM

## 2018-02-18 DIAGNOSIS — M66.321 NONTRAUMATIC RUPTURE OF RIGHT LONG HEAD BICEPS TENDON: Primary | ICD-10-CM

## 2018-02-18 PROCEDURE — 73030 X-RAY EXAM OF SHOULDER: CPT | Performed by: EMERGENCY MEDICINE

## 2018-02-18 PROCEDURE — 93010 ELECTROCARDIOGRAM REPORT: CPT | Performed by: EMERGENCY MEDICINE

## 2018-02-18 PROCEDURE — 99284 EMERGENCY DEPT VISIT MOD MDM: CPT

## 2018-02-18 PROCEDURE — 93005 ELECTROCARDIOGRAM TRACING: CPT

## 2018-02-19 NOTE — ED PROVIDER NOTES
Patient Seen in: [facility] AND [facility] Emergency Department    History   Patient presents with:  Pain (neurologic)    Stated Complaint: R side arm pain started 20 min ago    HPI    Patient is a 52-year-old female who presents to the emergency department with Smokeless tobacco: Never Used                      Alcohol use: Yes              Comment: occasionally      Review of Systems    Positive for stated complaint: R side arm pain started 20 min ago  Other systems are as noted in HPI.   Constitutional and vital Bertha Murphy, [address] [place] [address]  [phone]    Schedule an appointment as soon as possible for a visit      We recommend that you schedule follow up care with a primary care provider within the next three months to kattya

## 2018-02-20 PROBLEM — S46.211A BICEPS RUPTURE, PROXIMAL, RIGHT, INITIAL ENCOUNTER: Status: ACTIVE | Noted: 2018-02-20

## 2018-04-12 ENCOUNTER — HOSPITAL (OUTPATIENT)
Dept: OTHER | Age: 78
End: 2018-04-12
Attending: SPECIALIST

## 2018-04-12 LAB
CHOLEST SERPL-MCNC: 103 MG/DL
CHOLEST/HDLC SERPL: 1.7 {RATIO}
FREE T4: 3.1 NG/DL (ref 0.8–1.5)
HDLC SERPL-MCNC: 59 MG/DL
LDLC SERPL CALC-MCNC: 27 MG/DL
LENGTH OF FAST TIME PATIENT: NORMAL HR
NONHDLC SERPL-MCNC: 44 MG/DL
TRIGLYCERIDE (TRIGP): 83 MG/DL
TSH SERPL-ACNC: 0.17 MCUNIT/ML (ref 0.35–5)

## 2018-04-27 ENCOUNTER — OFFICE VISIT (OUTPATIENT)
Dept: OTOLARYNGOLOGY | Facility: CLINIC | Age: 78
End: 2018-04-27

## 2018-04-27 VITALS
WEIGHT: 148 LBS | DIASTOLIC BLOOD PRESSURE: 69 MMHG | SYSTOLIC BLOOD PRESSURE: 163 MMHG | TEMPERATURE: 98 F | HEIGHT: 63 IN | BODY MASS INDEX: 26.22 KG/M2

## 2018-04-27 DIAGNOSIS — J01.91 ACUTE RECURRENT SINUSITIS, UNSPECIFIED LOCATION: Primary | ICD-10-CM

## 2018-04-27 PROCEDURE — 99213 OFFICE O/P EST LOW 20 MIN: CPT | Performed by: OTOLARYNGOLOGY

## 2018-04-27 PROCEDURE — G0463 HOSPITAL OUTPT CLINIC VISIT: HCPCS | Performed by: OTOLARYNGOLOGY

## 2018-04-27 RX ORDER — MONTELUKAST SODIUM 10 MG/1
10 TABLET ORAL NIGHTLY
Qty: 30 TABLET | Refills: 3 | Status: SHIPPED | OUTPATIENT
Start: 2018-04-27 | End: 2018-06-21

## 2018-04-28 NOTE — PROGRESS NOTES
Antonio Drake is a 69year old female. Patient presents with:  Nose Problem: pt reports, has had recurring sinus infection, sinus pressure, headaches    HPI:   She has had 2 courses of antibiotics in the last few months for sinusitis.   She does have pro HYPERTENSION    • Osteoporosis    • OTHER DISEASES     polycystic kidney disease familial   • Right wrist pain 10/12/2017      Social History:  Smoking status: Never Smoker                                                              Smokeless tobacco: Nev recommended the regular use of antihistamines, nasal steroids, sinus irrigation and I have added Singulair to her regimen.   She is to return to see me again in 1 month if problems persist.  If that is the case, we may need to consider imaging of her sinuse

## 2018-05-08 ENCOUNTER — OFFICE VISIT (OUTPATIENT)
Dept: OPHTHALMOLOGY | Facility: CLINIC | Age: 78
End: 2018-05-08

## 2018-05-08 DIAGNOSIS — H25.13 AGE-RELATED NUCLEAR CATARACT OF BOTH EYES: Primary | ICD-10-CM

## 2018-05-08 DIAGNOSIS — H31.001 CHORIORETINAL SCAR OF RIGHT EYE: ICD-10-CM

## 2018-05-08 DIAGNOSIS — H43.393 VITREOUS FLOATERS OF BOTH EYES: ICD-10-CM

## 2018-05-08 PROCEDURE — 92014 COMPRE OPH EXAM EST PT 1/>: CPT | Performed by: OPHTHALMOLOGY

## 2018-05-08 PROCEDURE — 92015 DETERMINE REFRACTIVE STATE: CPT | Performed by: OPHTHALMOLOGY

## 2018-05-08 NOTE — PROGRESS NOTES
Madeline Baker is a 69year old female. HPI:     HPI     Pt complains of blurred vision OU at distance with her glasses and would like an updated Rx.      Last edited by Tammy Penny O.T. on 5/8/2018  7:55 AM. (History)        Patient History:  P Medications:    Current Outpatient Prescriptions:  Montelukast Sodium 10 MG Oral Tab Take 1 tablet (10 mg total) by mouth nightly.  Disp: 30 tablet Rfl: 3   metRONIDAZOLE 500 MG Oral Tab 1 tab 3x/day for another 10 days then 1 tab 2x/day x 7 days then Tonometry (Applanation, 8:07 AM)       Right Left    Pressure 15 16          Pupils       Pupils    Right PERRL    Left PERRL          Visual Fields       Left Right     Full Full          Extraocular Movement       Right Left     Full, Ortho Full Visit:    No prescriptions requested or ordered in this encounter     Follow up instructions:  Return in about 1 year (around 5/8/2019) for Complete eye exam.    5/8/2018  Scribed by: Jone Rincon MD

## 2018-05-08 NOTE — PATIENT INSTRUCTIONS
Age-related nuclear cataract of both eyes  Discussed early cataracts with patient. No treatment recommended at this time. New glasses RX written for both eyes. Vitreous floaters of both eyes  No treatment.      Chorioretinal scar of right eye  Stable

## 2018-06-19 RX ORDER — MONTELUKAST SODIUM 10 MG/1
10 TABLET ORAL NIGHTLY
Qty: 90 TABLET | Refills: 1 | Status: SHIPPED | OUTPATIENT
Start: 2018-06-19 | End: 2018-09-07

## 2018-06-19 NOTE — TELEPHONE ENCOUNTER
•  Montelukast Sodium 10 MG Oral Tab, Take 1 tablet (10 mg total) by mouth nightly., Disp: 90 tablet, Rfl: 3    90 day supply RX received from [facility]    LOV:  4-27-18    Please advise

## 2018-06-21 ENCOUNTER — OFFICE VISIT (OUTPATIENT)
Dept: OTOLARYNGOLOGY | Facility: CLINIC | Age: 78
End: 2018-06-21

## 2018-06-21 VITALS
TEMPERATURE: 99 F | DIASTOLIC BLOOD PRESSURE: 67 MMHG | WEIGHT: 150 LBS | HEIGHT: 63 IN | SYSTOLIC BLOOD PRESSURE: 154 MMHG | BODY MASS INDEX: 26.58 KG/M2

## 2018-06-21 DIAGNOSIS — J01.91 ACUTE RECURRENT SINUSITIS, UNSPECIFIED LOCATION: Primary | ICD-10-CM

## 2018-06-21 PROCEDURE — G0463 HOSPITAL OUTPT CLINIC VISIT: HCPCS | Performed by: OTOLARYNGOLOGY

## 2018-06-21 PROCEDURE — 99213 OFFICE O/P EST LOW 20 MIN: CPT | Performed by: OTOLARYNGOLOGY

## 2018-06-21 RX ORDER — ATORVASTATIN CALCIUM 10 MG/1
5 TABLET, FILM COATED ORAL
COMMUNITY
Start: 2018-06-14

## 2018-06-21 RX ORDER — MONTELUKAST SODIUM 10 MG/1
10 TABLET ORAL NIGHTLY
Qty: 90 TABLET | Refills: 3 | Status: SHIPPED | OUTPATIENT
Start: 2018-06-21 | End: 2020-04-28

## 2018-06-21 NOTE — PROGRESS NOTES
Galileo Tejeda is a 69year old female. Patient presents with: Follow - Up: regarding recurrent sinusitis, slight improvement in symptoms     HPI:   She was doing very well and then started to have problems again after she was exposed to a dog.     Antelmo Rachel Social History:  Smoking status: Never Smoker                                                              Smokeless tobacco: Never Used                      Alcohol use:  No                 REVIEW OF SYSTEMS:   GENERAL HEALTH: feels well otherwise  GENER MD  6/21/2018  2:47 PM

## 2018-09-04 PROCEDURE — 88305 TISSUE EXAM BY PATHOLOGIST: CPT | Performed by: RADIOLOGY

## 2018-09-04 PROCEDURE — 88360 TUMOR IMMUNOHISTOCHEM/MANUAL: CPT | Performed by: RADIOLOGY

## 2018-09-19 ENCOUNTER — TELEPHONE (OUTPATIENT)
Dept: NEPHROLOGY | Facility: CLINIC | Age: 78
End: 2018-09-19

## 2018-09-19 NOTE — TELEPHONE ENCOUNTER
Tonya Randall that Dr. Nadya Mitchell is out of the office this week. Will return on Monday 9/24/18. We will send him this encounter and call patient with his advice.

## 2018-09-19 NOTE — TELEPHONE ENCOUNTER
Pt states she has orders to have MRI of breast with contrast and wanted to know if this ws ok to schedule. Pls call. Thank you.

## 2018-09-24 NOTE — TELEPHONE ENCOUNTER
Contacted pt and notified her of MKK's message below.  She will contact her breast surgeon and notify her that she cannot do MRI with contrast.

## 2018-09-24 NOTE — TELEPHONE ENCOUNTER
The radiologist will not give her contrast once they know her kidney function is as the contrast can cause a life threatening rxn in pt's with chronic kidney disease.   She absolutely can not receive contrast with the MRI

## 2018-09-24 NOTE — TELEPHONE ENCOUNTER
Contacted pt and notified her MKK said she cannot receive contrast so she should do MRI of breast without contrast. She states she was told that they will not do MRI without contrast. She's wondering if she could to the MRI on a Tuesday and then have dialy

## 2018-09-27 PROCEDURE — 36415 COLL VENOUS BLD VENIPUNCTURE: CPT | Performed by: INTERNAL MEDICINE

## 2018-09-27 PROCEDURE — 86003 ALLG SPEC IGE CRUDE XTRC EA: CPT | Performed by: INTERNAL MEDICINE

## 2018-11-02 ENCOUNTER — TELEPHONE (OUTPATIENT)
Dept: NEPHROLOGY | Facility: CLINIC | Age: 78
End: 2018-11-02

## 2018-11-02 NOTE — TELEPHONE ENCOUNTER
Pt states she is having surgery on 11/15/18. Pt states per Dr. Sara Ruiz email was sent to [facility] regarding surgery.  Please call thank you [phone]

## 2018-11-02 NOTE — TELEPHONE ENCOUNTER
Pt contacted. She states she has no questions just wanted to check that you received email from Dr. Tim Mann to make sure everything was ok.

## 2018-12-31 RX ORDER — SEVELAMER CARBONATE 800 MG/1
TABLET, FILM COATED ORAL
Qty: 810 TABLET | Refills: 3 | Status: SHIPPED | OUTPATIENT
Start: 2018-12-31

## 2019-01-22 PROBLEM — C50.919 BREAST CANCER IN FEMALE (HCC): Status: ACTIVE | Noted: 2018-12-20

## 2019-01-24 ENCOUNTER — MED REC SCAN ONLY (OUTPATIENT)
Dept: NEPHROLOGY | Facility: CLINIC | Age: 79
End: 2019-01-24

## 2019-02-09 ENCOUNTER — OFFICE VISIT (OUTPATIENT)
Dept: OTOLARYNGOLOGY | Facility: CLINIC | Age: 79
End: 2019-02-09
Payer: MEDICARE

## 2019-02-09 VITALS — DIASTOLIC BLOOD PRESSURE: 70 MMHG | SYSTOLIC BLOOD PRESSURE: 130 MMHG | HEART RATE: 65 BPM | TEMPERATURE: 97 F

## 2019-02-09 DIAGNOSIS — H92.02 LEFT EAR PAIN: Primary | ICD-10-CM

## 2019-02-09 PROCEDURE — G0463 HOSPITAL OUTPT CLINIC VISIT: HCPCS | Performed by: OTOLARYNGOLOGY

## 2019-02-09 PROCEDURE — 99213 OFFICE O/P EST LOW 20 MIN: CPT | Performed by: OTOLARYNGOLOGY

## 2019-02-09 NOTE — PROGRESS NOTES
Home Gross is a 69year old female.  Patient presents with:  Ear Problem: pt c/o of pain in left ear, has hearing aid and states hearing echo, no discharge     HPI:   She has been experiencing pain in her left ear intermittently with some fullness and 10/12/2017      Social History:  Social History    Tobacco Use      Smoking status: Never Smoker      Smokeless tobacco: Never Used    Alcohol use: No    Drug use: No       REVIEW OF SYSTEMS:   GENERAL HEALTH: feels well otherwise  GENERAL : denies fever,

## 2019-02-21 ENCOUNTER — TELEPHONE (OUTPATIENT)
Dept: OPHTHALMOLOGY | Facility: CLINIC | Age: 79
End: 2019-02-21

## 2019-02-21 NOTE — TELEPHONE ENCOUNTER
Pt called stating pt is having the same problem as before. Left eye, swollen lower lid and painful.  Please call to advise

## 2019-02-21 NOTE — TELEPHONE ENCOUNTER
Lower lid swollen and painful to touch x 1 day. No conjunctival injection OS. Discussed with patient that it sounds like an early chalazion/ stye and she should use aggressive warm compresses.   I told her if it worsens or does not improve in a few days,

## 2019-02-22 ENCOUNTER — OFFICE VISIT (OUTPATIENT)
Dept: OPHTHALMOLOGY | Facility: CLINIC | Age: 79
End: 2019-02-22
Payer: MEDICARE

## 2019-02-22 DIAGNOSIS — H01.02B SQUAMOUS BLEPHARITIS OF UPPER AND LOWER EYELIDS OF BOTH EYES: ICD-10-CM

## 2019-02-22 DIAGNOSIS — H01.02A SQUAMOUS BLEPHARITIS OF UPPER AND LOWER EYELIDS OF BOTH EYES: ICD-10-CM

## 2019-02-22 DIAGNOSIS — H00.15 CHALAZION OF LEFT LOWER EYELID: Primary | ICD-10-CM

## 2019-02-22 PROCEDURE — 92012 INTRM OPH EXAM EST PATIENT: CPT | Performed by: OPHTHALMOLOGY

## 2019-02-22 RX ORDER — ERYTHROMYCIN 5 MG/G
1 OINTMENT OPHTHALMIC 2 TIMES DAILY
Qty: 1 TUBE | Refills: 1 | Status: SHIPPED | OUTPATIENT
Start: 2019-02-22 | End: 2019-03-01

## 2019-02-22 NOTE — PATIENT INSTRUCTIONS
Chalazion of left lower eyelid  Recommend aggressive warm compresses; patient should use them 4 to 6 times per day. Lid hygiene  And erythromicin ointment 2 times a day in left eye for 1 week. Information on chalazia given.       Squamous blepharitis of

## 2019-02-22 NOTE — PROGRESS NOTES
Davida Miner is a 67year old female. HPI:     HPI     EP/ 67 yr old here for an evaluation of swelling on her LLL. Pt complains of swelling on her LLL and pain tender to touch x 1 week. Pt has been using warm compresses but denies any improvement. dis   • Hypertension Mother    • Heart Disorder Mother         stroke   • Cataracts Mother    • Other (Other) Daughter         thyroid   • Other (Other) Sister         Cushing's disease   • Diabetes Sister    • Hypertension Sister    • Breast Cancer Matern Allergies:    Docosahexaenoic Aci*    HIVES  Eicosapentaenoic Ac*    HIVES  Fig                     HIVES  Levofloxacin            HIVES  Prolia                    Vitamin E               HIVES  Zithromax [Zithroma*        ROS:       PHYSICAL EXAM: Follow up instructions:  Return in about 3 months (around 5/22/2019), or if symptoms worsen or fail to improve, for EE with Dr. Pierre Cottrell . 2/22/2019  Scribed by: Chu Toscano MD

## 2019-02-22 NOTE — ASSESSMENT & PLAN NOTE
Recommend aggressive warm compresses; patient should use them 4 to 6 times per day. Lid hygiene  And erythromicin ointment 2 times a day in left eye for 1 week. Information on chalazia given.

## 2019-05-16 ENCOUNTER — OFFICE VISIT (OUTPATIENT)
Dept: OPHTHALMOLOGY | Facility: CLINIC | Age: 79
End: 2019-05-16
Payer: MEDICARE

## 2019-05-16 DIAGNOSIS — H25.13 AGE-RELATED NUCLEAR CATARACT OF BOTH EYES: Primary | ICD-10-CM

## 2019-05-16 DIAGNOSIS — H43.393 VITREOUS FLOATERS OF BOTH EYES: ICD-10-CM

## 2019-05-16 DIAGNOSIS — H31.001 CHORIORETINAL SCAR OF RIGHT EYE: ICD-10-CM

## 2019-05-16 PROCEDURE — 92014 COMPRE OPH EXAM EST PT 1/>: CPT | Performed by: OPHTHALMOLOGY

## 2019-05-16 NOTE — ASSESSMENT & PLAN NOTE
Discussed moderate cataracts with patient. No treatment recommended at this time. Continue same glasses.

## 2019-05-16 NOTE — PROGRESS NOTES
Alison Bradley is a 67year old female. HPI:     HPI     Patient is here for a complete exam.  Patient was seen on 2/22/19 for a chalazion LLL which has since resolved. Patient states her vision is good. She has no ocular complaints.       Last edite Cushing's disease   • Diabetes Sister    • Hypertension Sister    • Breast Cancer Maternal Aunt 48        x2 ages 80 and in her 52's   • Macular degeneration Other         Aunt   • Glaucoma Neg         family h/o       Social History: Social History    Tob Linear)       Right Left    Dist cc 20/30 +1 20/30 +2    Dist ph cc NI NI    Near cc 20/30 20/20    Correction:  Glasses          Tonometry (Applanation, 1:08 PM)       Right Left    Pressure 16 16          Pupils       Pupils    Right PERRL    Left PERRL year (around 5/16/2020) for Complete eye exam.    5/16/2019  Scribed by: Kellie Patino MD

## 2019-05-16 NOTE — PATIENT INSTRUCTIONS
Age-related nuclear cataract of both eyes  Discussed moderate cataracts with patient. No treatment recommended at this time. Continue same glasses. Vitreous floaters of both eyes  No treatment.      Chorioretinal scar of right eye  Stable; no treatmen

## 2019-05-22 PROBLEM — M23.92 INTERNAL DERANGEMENT OF LEFT KNEE: Status: ACTIVE | Noted: 2019-05-22

## 2019-05-28 ENCOUNTER — HOSPITAL (OUTPATIENT)
Dept: OTHER | Age: 79
End: 2019-05-28
Attending: SPECIALIST

## 2019-05-28 LAB
ALBUMIN SERPL-MCNC: 4.1 G/DL (ref 3.6–5.1)
ALBUMIN/GLOB SERPL: 1.8 {RATIO} (ref 1–2.4)
ALP SERPL-CCNC: 93 UNITS/L (ref 45–117)
ALT SERPL-CCNC: 24 UNITS/L
ANION GAP SERPL CALC-SCNC: 15 MMOL/L (ref 10–20)
AST SERPL-CCNC: 16 UNITS/L
BILIRUB SERPL-MCNC: 0.6 MG/DL (ref 0.2–1)
BUN SERPL-MCNC: 60 MG/DL (ref 6–20)
BUN/CREAT SERPL: 7 (ref 7–25)
CALCIUM SERPL-MCNC: 9.5 MG/DL (ref 8.4–10.2)
CHLORIDE SERPL-SCNC: 98 MMOL/L (ref 98–107)
CHOLEST SERPL-MCNC: 105 MG/DL
CHOLEST SERPL-MCNC: NORMAL MG/DL
CHOLEST/HDLC SERPL: 1.7 {RATIO}
CO2 SERPL-SCNC: 31 MMOL/L (ref 21–32)
CREAT SERPL-MCNC: 8.32 MG/DL (ref 0.51–0.95)
GLOBULIN SER-MCNC: 2.3 G/DL (ref 2–4)
GLUCOSE SERPL-MCNC: 93 MG/DL (ref 65–99)
HDLC SERPL-MCNC: 61 MG/DL
HDLC SERPL-MCNC: NORMAL MG/DL
LDLC SERPL CALC-MCNC: 24 MG/DL
LDLC SERPL CALC-MCNC: NORMAL MG/DL
LENGTH OF FAST TIME PATIENT: ABNORMAL HRS
LENGTH OF FAST TIME PATIENT: NORMAL HRS
MAGNESIUM SERPL-MCNC: 3 MG/DL (ref 1.7–2.4)
NONHDLC SERPL-MCNC: 44 MG/DL
NONHDLC SERPL-MCNC: NORMAL MG/DL
POTASSIUM SERPL-SCNC: 5.1 MMOL/L (ref 3.4–5.1)
PROT SERPL-MCNC: 6.4 G/DL (ref 6.4–8.2)
SODIUM SERPL-SCNC: 139 MMOL/L (ref 135–145)
T3 SERPL-MCNC: 0.7 NG/ML (ref 0.6–1.81)
T4 FREE SERPL-MCNC: 7.4 NG/DL (ref 0.8–1.5)
TRIGL SERPL-MCNC: 98 MG/DL
TRIGL SERPL-MCNC: NORMAL MG/DL
TSH SERPL-ACNC: 0.04 MCUNITS/ML (ref 0.35–5)

## 2019-06-06 PROBLEM — T46.2X5A HYPERTHYROIDISM DUE TO AMIODARONE: Status: ACTIVE | Noted: 2019-06-06

## 2019-06-06 PROBLEM — E05.80 HYPERTHYROIDISM DUE TO AMIODARONE: Status: ACTIVE | Noted: 2019-06-06

## 2019-06-28 PROCEDURE — 83520 IMMUNOASSAY QUANT NOS NONAB: CPT | Performed by: INTERNAL MEDICINE

## 2019-06-28 PROCEDURE — 86376 MICROSOMAL ANTIBODY EACH: CPT | Performed by: INTERNAL MEDICINE

## 2019-08-27 PROCEDURE — 83520 IMMUNOASSAY QUANT NOS NONAB: CPT | Performed by: INTERNAL MEDICINE

## 2019-09-11 ENCOUNTER — HOSPITAL (OUTPATIENT)
Dept: OTHER | Age: 79
End: 2019-09-11
Attending: SPECIALIST

## 2019-10-02 ENCOUNTER — HOSPITAL (OUTPATIENT)
Dept: OTHER | Age: 79
End: 2019-10-02

## 2019-10-14 ENCOUNTER — TELEPHONE (OUTPATIENT)
Dept: OTOLARYNGOLOGY | Facility: CLINIC | Age: 79
End: 2019-10-14

## 2019-10-14 RX ORDER — MONTELUKAST SODIUM 10 MG/1
10 TABLET ORAL NIGHTLY
Qty: 30 TABLET | Refills: 3 | Status: SHIPPED | OUTPATIENT
Start: 2019-10-14 | End: 2020-01-06

## 2020-01-06 RX ORDER — MONTELUKAST SODIUM 10 MG/1
TABLET ORAL
Qty: 90 TABLET | Refills: 1 | Status: SHIPPED | OUTPATIENT
Start: 2020-01-06 | End: 2020-04-28

## 2020-04-28 PROBLEM — J30.1 SEASONAL ALLERGIC RHINITIS DUE TO POLLEN: Status: ACTIVE | Noted: 2020-04-28

## 2020-05-02 ENCOUNTER — APPOINTMENT (OUTPATIENT)
Dept: CT IMAGING | Facility: HOSPITAL | Age: 80
DRG: 871 | End: 2020-05-02
Attending: EMERGENCY MEDICINE
Payer: MEDICARE

## 2020-05-02 ENCOUNTER — APPOINTMENT (OUTPATIENT)
Dept: GENERAL RADIOLOGY | Facility: HOSPITAL | Age: 80
DRG: 871 | End: 2020-05-02
Attending: EMERGENCY MEDICINE
Payer: MEDICARE

## 2020-05-02 ENCOUNTER — HOSPITAL ENCOUNTER (INPATIENT)
Facility: HOSPITAL | Age: 80
LOS: 3 days | Discharge: HOME HEALTH | DRG: 871 | End: 2020-05-06
Attending: EMERGENCY MEDICINE | Admitting: INTERNAL MEDICINE
Payer: MEDICARE

## 2020-05-02 DIAGNOSIS — A41.9 SEPSIS DUE TO UNDETERMINED ORGANISM (HCC): Primary | ICD-10-CM

## 2020-05-02 DIAGNOSIS — R11.2 NAUSEA AND VOMITING IN ADULT: ICD-10-CM

## 2020-05-02 DIAGNOSIS — E87.6 HYPOKALEMIA: ICD-10-CM

## 2020-05-02 PROCEDURE — 71045 X-RAY EXAM CHEST 1 VIEW: CPT | Performed by: EMERGENCY MEDICINE

## 2020-05-02 PROCEDURE — 74176 CT ABD & PELVIS W/O CONTRAST: CPT | Performed by: EMERGENCY MEDICINE

## 2020-05-02 RX ORDER — MORPHINE SULFATE 4 MG/ML
4 INJECTION, SOLUTION INTRAMUSCULAR; INTRAVENOUS ONCE
Status: COMPLETED | OUTPATIENT
Start: 2020-05-02 | End: 2020-05-02

## 2020-05-02 RX ORDER — POTASSIUM CHLORIDE 14.9 MG/ML
20 INJECTION INTRAVENOUS ONCE
Status: COMPLETED | OUTPATIENT
Start: 2020-05-02 | End: 2020-05-03

## 2020-05-02 RX ORDER — ACETAMINOPHEN 500 MG
1000 TABLET ORAL ONCE
Status: COMPLETED | OUTPATIENT
Start: 2020-05-02 | End: 2020-05-03

## 2020-05-03 PROBLEM — E87.6 HYPOKALEMIA: Status: ACTIVE | Noted: 2020-05-03

## 2020-05-03 PROBLEM — R11.2 NAUSEA AND VOMITING IN ADULT: Status: ACTIVE | Noted: 2020-05-03

## 2020-05-03 PROCEDURE — 99223 1ST HOSP IP/OBS HIGH 75: CPT | Performed by: INTERNAL MEDICINE

## 2020-05-03 RX ORDER — METOCLOPRAMIDE HYDROCHLORIDE 5 MG/ML
10 INJECTION INTRAMUSCULAR; INTRAVENOUS EVERY 8 HOURS PRN
Status: DISCONTINUED | OUTPATIENT
Start: 2020-05-03 | End: 2020-05-03

## 2020-05-03 RX ORDER — METOCLOPRAMIDE HYDROCHLORIDE 5 MG/ML
5 INJECTION INTRAMUSCULAR; INTRAVENOUS EVERY 8 HOURS PRN
Status: DISCONTINUED | OUTPATIENT
Start: 2020-05-03 | End: 2020-05-06

## 2020-05-03 RX ORDER — ASPIRIN 81 MG/1
81 TABLET ORAL DAILY
Status: DISCONTINUED | OUTPATIENT
Start: 2020-05-03 | End: 2020-05-06

## 2020-05-03 RX ORDER — MONTELUKAST SODIUM 10 MG/1
10 TABLET ORAL NIGHTLY
Status: DISCONTINUED | OUTPATIENT
Start: 2020-05-03 | End: 2020-05-06

## 2020-05-03 RX ORDER — LIDOCAINE AND PRILOCAINE 25; 25 MG/G; MG/G
CREAM TOPICAL AS NEEDED
Status: DISCONTINUED | OUTPATIENT
Start: 2020-05-03 | End: 2020-05-06

## 2020-05-03 RX ORDER — FLUTICASONE PROPIONATE 50 MCG
2 SPRAY, SUSPENSION (ML) NASAL DAILY
Status: DISCONTINUED | OUTPATIENT
Start: 2020-05-03 | End: 2020-05-06

## 2020-05-03 RX ORDER — METOPROLOL TARTRATE 50 MG/1
50 TABLET, FILM COATED ORAL 2 TIMES DAILY
Status: DISCONTINUED | OUTPATIENT
Start: 2020-05-03 | End: 2020-05-06

## 2020-05-03 RX ORDER — AZELASTINE 1 MG/ML
1-2 SPRAY, METERED NASAL 2 TIMES DAILY
Status: DISCONTINUED | OUTPATIENT
Start: 2020-05-03 | End: 2020-05-06

## 2020-05-03 RX ORDER — SEVELAMER CARBONATE 800 MG/1
2400 TABLET, FILM COATED ORAL
Status: DISCONTINUED | OUTPATIENT
Start: 2020-05-03 | End: 2020-05-05

## 2020-05-03 RX ORDER — ALBUMIN (HUMAN) 12.5 G/50ML
100 SOLUTION INTRAVENOUS AS NEEDED
Status: DISCONTINUED | OUTPATIENT
Start: 2020-05-03 | End: 2020-05-06

## 2020-05-03 RX ORDER — ZOLPIDEM TARTRATE 5 MG/1
5 TABLET ORAL NIGHTLY PRN
Status: DISCONTINUED | OUTPATIENT
Start: 2020-05-03 | End: 2020-05-06

## 2020-05-03 RX ORDER — ACETAMINOPHEN 325 MG/1
650 TABLET ORAL EVERY 6 HOURS PRN
Status: DISCONTINUED | OUTPATIENT
Start: 2020-05-03 | End: 2020-05-05

## 2020-05-03 RX ORDER — PANTOPRAZOLE SODIUM 40 MG/1
40 TABLET, DELAYED RELEASE ORAL
Status: DISCONTINUED | OUTPATIENT
Start: 2020-05-03 | End: 2020-05-06

## 2020-05-03 RX ORDER — VANCOMYCIN HYDROCHLORIDE 125 MG/1
125 CAPSULE ORAL DAILY
Status: DISCONTINUED | OUTPATIENT
Start: 2020-05-03 | End: 2020-05-06

## 2020-05-03 RX ORDER — AMIODARONE HYDROCHLORIDE 200 MG/1
200 TABLET ORAL
Status: DISCONTINUED | OUTPATIENT
Start: 2020-05-03 | End: 2020-05-06

## 2020-05-03 RX ORDER — HEPARIN SODIUM 1000 [USP'U]/ML
1.5 INJECTION, SOLUTION INTRAVENOUS; SUBCUTANEOUS ONCE
Status: DISCONTINUED | OUTPATIENT
Start: 2020-05-04 | End: 2020-05-06

## 2020-05-03 RX ORDER — HEPARIN SODIUM 5000 [USP'U]/ML
5000 INJECTION, SOLUTION INTRAVENOUS; SUBCUTANEOUS EVERY 8 HOURS SCHEDULED
Status: DISCONTINUED | OUTPATIENT
Start: 2020-05-03 | End: 2020-05-06

## 2020-05-03 RX ORDER — ONDANSETRON 2 MG/ML
4 INJECTION INTRAMUSCULAR; INTRAVENOUS EVERY 6 HOURS PRN
Status: DISCONTINUED | OUTPATIENT
Start: 2020-05-03 | End: 2020-05-06

## 2020-05-03 RX ORDER — SODIUM CHLORIDE 0.9 % (FLUSH) 0.9 %
3 SYRINGE (ML) INJECTION AS NEEDED
Status: DISCONTINUED | OUTPATIENT
Start: 2020-05-03 | End: 2020-05-06

## 2020-05-03 RX ORDER — ATORVASTATIN CALCIUM 10 MG/1
10 TABLET, FILM COATED ORAL DAILY
Status: DISCONTINUED | OUTPATIENT
Start: 2020-05-03 | End: 2020-05-06

## 2020-05-03 NOTE — PROGRESS NOTES
[facility]    CDI Prediction Tool Protocol (Vancomycin Initiated)    OVP (oral vancomycin prophylaxis) 125 mg PO Daily is being started in this patient based on a score of 20.1.       Score Breakdown:  History of CDI > 1 year ago AND high risk an

## 2020-05-03 NOTE — PROGRESS NOTES
[facility] Pharmacy Note:  Renal Dose Adjustment for Metoclopramide (REGLAN)    Lauri Paige has been prescribed Metoclopramide (REGLAN) 10 mg every 8 hours as needed for n/v.    Estimated Creatinine Clearance: 5.3 mL/min (A) (based on SCr of 7.08 mg/dL (H)).

## 2020-05-03 NOTE — ED NOTES
Per patient - hearing aids are in. They need to charge overnight. Rectangular box in patient's belongings includes the charging base for the hearing aids and the charging cable. Pt will require assistance.

## 2020-05-03 NOTE — ED NOTES
Orders for admission, patient is aware of plan and ready to go upstairs. Any questions, please call ED RN Ely  at extension [phone]. Pt is AOx4. Dialysis patient MWF - right mastectomy and right arm AV fistula - RIGHT ARM PRECAUTIONS. Room air. Anuric.

## 2020-05-03 NOTE — H&P
[facility] Hospitalist H&P     CC: Patient presents with:  Nausea/Vomiting/Diarrhea     PCP: Pop Azul MD    Date of Admission: 5/2/2020  6:44 PM    ASSESSMENT / PLAN:     Ms. Pranay Key is a 78 yo F with PMH of breast CA, ESRD 2/2 PCKD on HD, HTN, HL who DISEASES     polycystic kidney disease familial   • Right wrist pain 10/12/2017        PSH  Past Surgical History:   Procedure Laterality Date   • CHOLECYSTECTOMY  12/04    laparoscopic   • COLONOSCOPY  6/07    c. diff colitis-Ali   • ECHO 2D DP/CLRFL, CAR 0  aspirin 81 MG Oral Tab, Take 81 mg by mouth daily. , Disp: , Rfl:   Cholecalciferol (VITAMIN D) 2000 UNITS Oral Tab, Take 2 tablets by mouth daily. , Disp: 30 tablet, Rfl: 11  Biotin 10 MG Oral Tab, Take 10 mg by mouth daily. , Disp: , Rfl:   Metoprolol Ta Recent Labs   Lab 05/02/20  1907 05/03/20  0502   * 84   BUN 23* 29*   CREATSERUM 6.49* 7.08*   GFRAA 6* 6*   GFRNAA 6* 5*   CA 7.9* 7.7*    139   K 2.7* 3.2*   CL 96* 98   CO2 31.0 29.0     Lab Results   Component Value Date    WBC 20 and left pericolic gutters. Moderate thoracolumbar  scoliosis. Mild splenomegaly.      Dictated by (CST): Moira Ferreira MD on 5/02/2020 at 8:34 PM     Finalized by (CST): Moira Ferreira MD on 5/02/2020 at 8:50 PM          Xr Chest Ap Portable  (cpt=[id number]

## 2020-05-03 NOTE — ED NOTES
Per pt - she would like her daughter Daxa Edwards to be the first to be called with updates, as opposed to her . Pt states her  may be too confused to understand what is going on.     Daughter - Daxa Edwards [phone] Ana Paula Wilcox phone]

## 2020-05-03 NOTE — CONSULTS
[facility] Infectious Disease  Report of Consultation    Thomas Bonilla Patient Status:  Inpatient    1940 MRN [id number]   Location [facility] 4W/SW/SE Attending Bolivar Alaniz MD   Hosp Day # 0 PCP Nir Reeder MD 2018    [facility]: left breaswt seed loc partial mastectomy    • MASTECTOMY RIGHT     • NEEDLE BIOPSY LEFT     •      • OTHER SURGICAL HISTORY      right modified mastectomy   • OTHER SURGICAL HISTORY  12    cysto-Dr. Kaitlin Ordonez HCl (PACERONE) tab 200 mg, 200 mg, Oral, Daily  •  aspirin EC tab 81 mg, 81 mg, Oral, Daily  •  atorvastatin (LIPITOR) tab 10 mg, 10 mg, Oral, Daily  •  Pantoprazole Sodium (PROTONIX) EC tab 40 mg, 40 mg, Oral, QAM AC  •  zolpidem (AMBIEN) tab 5 mg, 5 mg, 8 oz (66.9 kg)   05/02/20 2300 130/64 — — 68 16 97 % — —   05/02/20 2200 125/64 — — 65 16 98 % — —   05/02/20 2130 134/63 — — 66 16 99 % — —   05/02/20 2005 — 99.8 °F (37.7 °C) Oral — — — — —   05/02/20 2000 131/62 — — 71 18 98 % — —   05/02/20 1838 143/78 is a 2.2 x 2.8 cm in size high-density cyst in the lateral segment of the left lobe which may be a hemorrhagic cyst.    The inflammatory reaction in the fat may suggest some complication or infection of the hepatic cysts versus possible omental infarction

## 2020-05-03 NOTE — ED PROVIDER NOTES
Patient Seen in: [facility] AND [facility] Emergency Department      History   Patient presents with:  Nausea/Vomiting/Diarrhea    Stated Complaint:     HPI    78-year-old female who is anuric on dialysis for the past 10 years secondary to polycystic kidney dise EPIDURAL STEROID INJECTION N/A 8/11/2016    Performed by Jammie Barrera DO at [facility]   • MASTECTOMY PARTIAL  12/20/2018    [facility]: left breaswt seed loc partial mastectomy    • MASTECTOMY RIGHT  1989   • NEEDLE BIOPSY LEFT  2016 Cardiovascular:      Rate and Rhythm: Normal rate and regular rhythm. Pulses: Normal pulses. Radial pulses are 2+ on the right side and 2+ on the left side. Dorsalis pedis pulses are 2+ on the right side and 2+ on the left side. - Abnormal; Notable for the following components:    WBC 22.8 (*)     RBC 3.10 (*)     HGB 9.1 (*)     HCT 29.1 (*)     RDW-SD 55.6 (*)     RDW 16.3 (*)     Neutrophil Absolute Prelim 20.04 (*)     Neutrophil Absolute 20.04 (*)     Lymphocyte Absolute 0.89 not meet any severe sepsis or septic shock criteria.     Imaging:   Ct Abdomen+pelvis(cpt=74176)    Result Date: 5/2/2020  PROCEDURE:   CT ABDOMEN+PELVIS(CPT=74176)  COMPARISON:   [address] [place] [address] Shruthi, 2/04/2015, 6:24 PM.  E STOMACH: No gross gastric mass, obstruction or focal abnormality. Duodenum unremarkable. PANCREAS: No lesion, fluid collection, ductal dilatation, or atrophy. ADRENALS: No defined mass or abnormal enlargement.   KIDNEYS: Again noted are multiple cysts thr appears to be unrelated to bowel in the upper abdomen. Correlate clinically and short-term follow-up study is advised. There is no drainable fluid collection or abscess. 2. Uncomplicated diverticulosis of the sigmoid colon.   Ileocolic anastomosis in the records, obtaining information from the family, and speaking with consultants, admitting doctors, nurses and medics and excludes any time spent on procedures.           Admission disposition: 5/2/2020  9:51 PM                   Disposition and Plan     Clin

## 2020-05-03 NOTE — PLAN OF CARE
Problem: PAIN - ADULT  Goal: Verbalizes/displays adequate comfort level or patient's stated pain goal  Description  INTERVENTIONS:  - Encourage pt to monitor pain and request assistance  - Assess pain using appropriate pain scale  - Administer analgesics patient's ability to be responsible for managing their own health  - Refer to Case Management Department for coordinating discharge planning if the patient needs post-hospital services based on physician/LIP order or complex needs related to functional sta Fluid restriction as ordered  - Instruct patient on fluid and nutrition restrictions as appropriate  Outcome: Progressing  Goal: Hemodynamic stability and optimal renal function maintained  Description  INTERVENTIONS:  - Monitor labs and assess for signs a

## 2020-05-04 PROCEDURE — 99233 SBSQ HOSP IP/OBS HIGH 50: CPT | Performed by: INTERNAL MEDICINE

## 2020-05-04 RX ORDER — POTASSIUM CHLORIDE 20 MEQ/1
40 TABLET, EXTENDED RELEASE ORAL ONCE
Status: COMPLETED | OUTPATIENT
Start: 2020-05-04 | End: 2020-05-04

## 2020-05-04 NOTE — PROGRESS NOTES
[facility] Hospitalist Progress Note     CC: Hospital Follow up    PCP: Cyndy Gusman MD       Assessment/Plan:     Principal Problem:    Sepsis due to undetermined organism [facility])  Active Problems:    Hypokalemia    Nausea and vomiting in adult    ESRD (end 1838 : 150 lb (68 kg)  04/28/20 1323 : 150 lb (68 kg)  11/15/19 1321 : 158 lb 3.2 oz (71.8 kg)      Exam   GEN: female in NAD  HEENT: EOMI  Neck: Supple  Pulm: CTAB, no crackles or wheezes  CV: RRR, no murmurs  ABD: Soft, epigastric TTP, non-distended, +BS advised. There is no drainable fluid collection or abscess. 2. Uncomplicated diverticulosis of the sigmoid colon. Ileocolic anastomosis in the right upper quadrant. No bowel obstruction.   Small amount of free pelvic fluid, as small amount of fluid in t

## 2020-05-04 NOTE — PLAN OF CARE
Problem: Patient Centered Care  Goal: Patient preferences are identified and integrated in the patient's plan of care  Description  Interventions:  - What would you like us to know as we care for you?   - Provide timely, complete, and accurate informatio increased pain with activity and pre-medicate as appropriate  5/4/2020 0335 by David Amaro RN  Outcome: Progressing  5/4/2020 0331 by David Amaro RN  Outcome: Progressing     Problem: SAFETY ADULT - FALL  Goal: Free from fall injury  Descriptio Achieves optimal ventilation and oxygenation  Description  INTERVENTIONS:  - Assess for changes in respiratory status  - Assess for changes in mentation and behavior  - Position to facilitate oxygenation and minimize respiratory effort  - Oxygen supplement Progressing  5/4/2020 0331 by Roberta Mcneal RN  Outcome: Progressing  Goal: Hemodynamic stability and optimal renal function maintained  Description  INTERVENTIONS:  - Monitor labs and assess for signs and symptoms of volume excess or deficit  - Monito

## 2020-05-04 NOTE — CONSULTS
[facility]    PATIENT'S NAME: Myra Guillory   ATTENDING PHYSICIAN: Bri Hanna DO   CONSULTING PHYSICIAN: Jose Corcoran.  Rodney Miner MD   PATIENT ACCOUNT#:   [id number]    LOCATION:  [address] [place] [address] #:   [id number]       DATE OF DANA pressure is 103/51. She is afebrile, pulse 64. HEENT:  Conjunctivae is pale. Sclerae anicteric. SKIN:  Clear and dry, although she does look pale. NECK:  Supple. No adenopathy. LUNGS:  Clear. HEART:  Sinus rhythm, 2/6 murmur. ABDOMEN:  Soft.   Sh

## 2020-05-04 NOTE — PROGRESS NOTES
[facility]  Nephrology Daily Progress Note    Salena Burnett  [id number]  79year old      HPI:   Salena Burnett is a 79year old female. HD in progress. VSS. Still with some abd discomfort. No diarrhea and is tolerating liquids. appropriate for age    Labs:  Lab Results   Component Value Date    WBC 15.8 05/04/2020    HGB 8.8 05/04/2020    HCT 28.7 05/04/2020    .0 05/04/2020    CREATSERUM 8.62 05/04/2020    BUN 41 05/04/2020     05/04/2020    K 3.3 05/04/2020    CL 9 obstruction. Small amount of free pelvic fluid, as small amount of fluid in the right and left pericolic gutters. Moderate thoracolumbar  scoliosis. Mild splenomegaly.      Dictated by (CST): Kylie Fernánedz MD on 5/02/2020 at 8:34 PM     Finalized by (CS Q8H PRN  •  heparin sodium 1000 UNIT/ML injection 1,500 Units, 1.5 mL, Intravenous, Once  •  lidocaine-prilocaine (EMLA) cream, , Topical, PRN  •  Albumin Human (ALBUMINAR) 25 % solution 100 mL, 100 mL, Intravenous, PRN    Allergies:    Docosahexaenoic Aci Nausea and vomiting in adult     ESRD (end stage renal disease) ([facility] [address]      Stable from renal stand point. Replace K. Next HD on Wed.   Now afeb and cultures so far are neg.  ? Ruptured liver cyst.   CPM.              5/4/2020  Madeline Dumont MD

## 2020-05-04 NOTE — PROGRESS NOTES
[facility] AND [facility] Infectious Disease  Progress Note    Ciarra Andrade Patient Status:  Inpatient    1940 MRN [id number]   Location [facility] 4W/SW/SE Attending Esteban Carmichael MD   Hosp Day # 1 PCP MD Alvaro Resendiz reaction in the fat may suggest some complication or infection of the hepatic cysts versus possible omental infarction or surrounding mesenteritis.  This appears to be unrelated to bowel in the upper abdomen.  Correlate clinically and short-term follow-up

## 2020-05-04 NOTE — HOME CARE LIAISON
Patient has history of home health services with [facility]. I spoke with patient regarding beginning home health services again at discharge. Patient would like to think about this idea, asked if I could contact her again closer to discharge.

## 2020-05-04 NOTE — CM/SW NOTE
BECKY spoke with the pt's dtrKeren Diaz via telephone. The pt. Lives with her  in a single family home with 8 steps to get to the main level where the pt. And her  reside. There are about 6-8 down to the basement where the laundry is. The pt.  R

## 2020-05-05 PROCEDURE — 99232 SBSQ HOSP IP/OBS MODERATE 35: CPT | Performed by: INTERNAL MEDICINE

## 2020-05-05 RX ORDER — MORPHINE SULFATE 2 MG/ML
2 INJECTION, SOLUTION INTRAMUSCULAR; INTRAVENOUS EVERY 2 HOUR PRN
Status: DISCONTINUED | OUTPATIENT
Start: 2020-05-05 | End: 2020-05-06

## 2020-05-05 RX ORDER — POLYETHYLENE GLYCOL 3350 17 G/17G
17 POWDER, FOR SOLUTION ORAL DAILY
Status: DISCONTINUED | OUTPATIENT
Start: 2020-05-05 | End: 2020-05-06

## 2020-05-05 RX ORDER — HYDROCODONE BITARTRATE AND ACETAMINOPHEN 5; 325 MG/1; MG/1
2 TABLET ORAL EVERY 6 HOURS PRN
Status: DISCONTINUED | OUTPATIENT
Start: 2020-05-05 | End: 2020-05-06

## 2020-05-05 RX ORDER — HYDROCODONE BITARTRATE AND ACETAMINOPHEN 5; 325 MG/1; MG/1
1 TABLET ORAL EVERY 4 HOURS PRN
Status: DISCONTINUED | OUTPATIENT
Start: 2020-05-05 | End: 2020-05-06

## 2020-05-05 RX ORDER — ACETAMINOPHEN 325 MG/1
650 TABLET ORAL EVERY 4 HOURS PRN
Status: DISCONTINUED | OUTPATIENT
Start: 2020-05-05 | End: 2020-05-06

## 2020-05-05 NOTE — PROGRESS NOTES
[facility] Hospitalist Progress Note   CC: follow-up hospital admission    SUBJECTIVE:  Interval History:   - RUQ pain returned, 7/10 pain, radiates across towards left side, same as on presentation  - reports feeling ill for 4 weeks, initially w/ diarrhea, decre neurological deficits. Musculoskeletal: Full range of motion of all extremities. No swelling noted. 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 No erythema. Psychiatric: Appropriate mood and affect.     Data Review:   CBC:   Recent Labs

## 2020-05-05 NOTE — PROGRESS NOTES
[facility]  Nephrology Daily Progress Note    Walker Singh  [id number]  79year old      HPI:   Walker Singh is a 79year old female. Still; with upper abd pain. Not hungry but tolerating liquids. No vomiting. Feels tired. age    Labs:  Lab Results   Component Value Date    WBC 12.4 05/05/2020    WBC 12.4 05/05/2020    HGB 9.0 05/05/2020    HGB 9.0 05/05/2020    HCT 30.0 05/05/2020    HCT 30.0 05/05/2020    .0 05/05/2020    .0 05/05/2020    CREATSERUM 5.28 05/0 lobe which may be a hemorrhagic cyst.  The inflammatory reaction in the fat may suggest some complication or infection of the hepatic cysts versus possible omental infarction or surrounding mesenteritis.   This appears to be unrelated to bowel in the upper ADD-vantage, 3.375 g, Intravenous, Q12H  •  amiodarone HCl (PACERONE) tab 200 mg, 200 mg, Oral, Daily  •  aspirin EC tab 81 mg, 81 mg, Oral, Daily  •  atorvastatin (LIPITOR) tab 10 mg, 10 mg, Oral, Daily  •  Pantoprazole Sodium (PROTONIX) EC tab 40 mg, 40 endocrine disorder     Complete tear of right rotator cuff     Scoliosis of lumbosacral spine, unspecified scoliosis type     Chronic left-sided low back pain with left-sided sciatica     Blepharitis of upper and lower eyelids of both eyes     Hemodialysis

## 2020-05-05 NOTE — PROGRESS NOTES
[facility] AND [facility] Infectious Disease  Progress Note    Pillo Dumas Patient Status:  Inpatient    1940 MRN [id number]   [facility] 5SW/SE Attending Kym Woo, [address] [facility] Day # 2 PCP MD Josefina Butler kidneys compatible with polycystic kidney disease and polycystic liver disease.  Of note, there is moderate inflammatory reaction in the perihepatic fat about the left   lobe of the liver laterally which is new from previous studies, and this appears to ab p.o. augmentin to complete a ~14-21 day total course. D/w patient. Erlinda Perez [facility] Infectious Disease  [phone]    5/5/2020  1:18 PM

## 2020-05-05 NOTE — PLAN OF CARE
Problem: Patient Centered Care  Goal: Patient preferences are identified and integrated in the patient's plan of care  Description  Interventions:    - Provide timely, complete, and accurate information to patient/family  - Incorporate patient and family Discharge to home or other facility with appropriate resources  Description  INTERVENTIONS:  - Identify barriers to discharge w/pt and caregiver  - Include patient/family/discharge partner in discharge planning  - Arrange for needed discharge resources and measures as appropriate  - Advance diet as tolerated, if ordered  - Obtain nutritional consult as needed  - Evaluate fluid balance  Outcome: Progressing     Problem: METABOLIC/FLUID AND ELECTROLYTES - ADULT  Goal: Electrolytes maintained within normal limi

## 2020-05-05 NOTE — CONSULTS
[facility]    Report of Consultation    Madina Neeta Patient Status:  Inpatient    1940 MRN [id number]   Location [facility] 5SW/SE Attending Sruthi Navarro, [address] [facility] Day # 2 PCP Roxanna Nugent MD     Date of Admis pain 10/12/2017       Past Surgical History  Past Surgical History:   Procedure Laterality Date   • CHOLECYSTECTOMY  12/04    laparoscopic   • COLONOSCOPY  6/07    c. diff colitis-Ali   • ECHO 2D DP/CLRFL, CARDIO [facility])  [facility]    mod MR/LAE tablet, 1 tablet, Oral, Q4H PRN    Or  HYDROcodone-acetaminophen (NORCO) 5-325 MG per tab 2 tablet, 2 tablet, Oral, Q6H PRN  morphINE sulfate (PF) 2 MG/ML injection 2 mg, 2 mg, Intravenous, Q2H PRN  PEG 3350 (MIRALAX) powder packet 17 g, 17 g, Oral, Daily Dental Gel, Use as directed  atorvastatin 10 MG Oral Tab,   loratadine 10 MG Oral Tab, Take 10 mg by mouth. amiodarone HCl 200 MG Oral Tab, Take 200 mg by mouth once daily. aspirin 81 MG Oral Tab, Take 81 mg by mouth daily.   Cholecalciferol (VITAMIN D) 05/05/2020    BUN 22 (H) 05/05/2020    BUN 22 (H) 05/05/2020     05/05/2020     05/05/2020    K 3.4 (L) 05/05/2020    K 3.4 (L) 05/05/2020    CL 98 05/05/2020    CL 98 05/05/2020    CO2 30.0 05/05/2020    CO2 30.0 05/05/2020    GLU 92 05/05/202

## 2020-05-06 VITALS
OXYGEN SATURATION: 96 % | HEIGHT: 63 IN | SYSTOLIC BLOOD PRESSURE: 127 MMHG | RESPIRATION RATE: 20 BRPM | TEMPERATURE: 98 F | DIASTOLIC BLOOD PRESSURE: 53 MMHG | WEIGHT: 148.88 LBS | HEART RATE: 78 BPM | BODY MASS INDEX: 26.38 KG/M2

## 2020-05-06 PROCEDURE — 90935 HEMODIALYSIS ONE EVALUATION: CPT | Performed by: INTERNAL MEDICINE

## 2020-05-06 RX ORDER — ACETAMINOPHEN 325 MG/1
650 TABLET ORAL EVERY 6 HOURS PRN
Refills: 0 | Status: SHIPPED | COMMUNITY
Start: 2020-05-06 | End: 2021-01-20

## 2020-05-06 RX ORDER — AMOXICILLIN AND CLAVULANATE POTASSIUM 500; 125 MG/1; MG/1
500 TABLET, FILM COATED ORAL DAILY
Status: DISCONTINUED | OUTPATIENT
Start: 2020-05-06 | End: 2020-05-06

## 2020-05-06 RX ORDER — AMOXICILLIN AND CLAVULANATE POTASSIUM 500; 125 MG/1; MG/1
500 TABLET, FILM COATED ORAL DAILY
Qty: 14 TABLET | Refills: 0 | Status: ON HOLD | OUTPATIENT
Start: 2020-05-06 | End: 2020-12-24 | Stop reason: ALTCHOICE

## 2020-05-06 RX ORDER — HYDROCODONE BITARTRATE AND ACETAMINOPHEN 5; 325 MG/1; MG/1
1 TABLET ORAL EVERY 6 HOURS PRN
Qty: 20 TABLET | Refills: 0 | Status: SHIPPED | OUTPATIENT
Start: 2020-05-06 | End: 2021-01-20

## 2020-05-06 NOTE — CM/SW NOTE
12:34pm Update- HHC orders obtained. SW completed Face to Face and sent to MD to cosign. Update given to [facility] to alert of new orders and dc to home today. --  12:18pm-DC order was entered.  Previous CM notes indicate a tentative referral w

## 2020-05-06 NOTE — PROGRESS NOTES
[facility]    Progress Note    Rosa Jose Patient Status:  Inpatient    1940 MRN [id number]   [facility] 5SW/SE Attending Uzma De La Cruz, [address] [facility] [address] Day # 3 PCP Juana Villanueva MD        Subjective:   Ave Distance Cysts: ?Hemorrhagic Cyst:  - Patient presents with left sided abdominal pain and diarrhea. Diarrhea has resolved and pain is improving.   Imaging with possible infected hepatic cyst and she presented with WBC 22.8 which has been improving while on antibio

## 2020-05-06 NOTE — PROGRESS NOTES
[facility] Infectious Disease  Progress Note    Candis Carver Patient Status:  Inpatient    1940 MRN [id number]   [facility] 5SW/SE Attending Nilay Hawthorne, [address] [facility] Day # 3 PCP MD Aram England compatible with polycystic kidney disease and polycystic liver disease.  Of note, there is moderate inflammatory reaction in the perihepatic fat about the left   lobe of the liver laterally which is new from previous studies, and this appears to abut many complete a ~21 day total course. D/w patient. Erlinda Perez [facility] Infectious Disease  [phone]    5/6/2020  10:58 AM

## 2020-05-06 NOTE — PLAN OF CARE
Patient alert and oriented and able to make her needs known. Norco given for pain upon reassessment patient was sleeping. Hemodialysis ordered for today.    Problem: Patient Centered Care  Goal: Patient preferences are identified and integrated in the patie Problem: SAFETY ADULT - FALL  Goal: Free from fall injury  Description  INTERVENTIONS:  - Assess pt frequently for physical needs  - Identify cognitive and physical deficits and behaviors that affect risk of falls.   - [place] fall precautions as indica hygiene including cough, deep breathe, Incentive Spirometry  - Assess the need for suctioning and perform as needed  - Assess and instruct to report SOB or any respiratory difficulty  - Respiratory Therapy support as indicated  - Manage/alleviate anxiety

## 2020-05-06 NOTE — DISCHARGE SUMMARY
[facility] - [facility]    Discharge Summary    Bradley Hines Patient Status:  Inpatient    1940 MRN [id number]   Location [facility] 5SW/SE Attending Ace James, [address] [facility] Day # 3 PCP Francisco Elliott MD     Date of Admission: prn  - - bowel regimen w/ miralax daily      ESRD on HD  - HD per renal   - fistula RUE w/ aneurysm w/ some skin thinning - vascular eval - ok to use, cont varying sites     HL  - lipitor      Consultants     Provider Role Specialty    Martin Turner MD Co Unchanged    TURMERIC OR  Take by mouth. Fluticasone Propionate 50 MCG/ACT Nasal Suspension  2 sprays by Each Nare route daily. Esomeprazole Magnesium 40 MG Oral Capsule Delayed Release  Take 1 capsule (40 mg total) by mouth daily.  Before meal    LELIA

## 2020-05-06 NOTE — PROGRESS NOTES
[place] [facility]  Nephrology Daily Progress Note    Georgiana Sorensen  [id number]  79year old      HPI:   Georgiana Sorensen is a 79year old female. HD in progress. VSS. Overall feels better. Eating better and abd pain improved.   In  better spi appropriate for age    Labs:  Lab Results   Component Value Date    WBC 12.0 05/06/2020    WBC 12.0 05/06/2020    HGB 8.8 05/06/2020    HGB 8.8 05/06/2020    HCT 28.3 05/06/2020    HCT 28.3 05/06/2020    .0 05/06/2020    .0 05/06/2020    CREA (PF) 2 MG/ML injection 2 mg, 2 mg, Intravenous, Q2H PRN  •  PEG 3350 (MIRALAX) powder packet 17 g, 17 g, Oral, Daily  •  Normal Saline Flush 0.9 % injection 3 mL, 3 mL, Intravenous, PRN  •  Heparin Sodium (Porcine) 5000 UNIT/ML injection 5,000 Units, 5,000 kidney, autosomal dominant     Mixed hyperlipidemia     Paroxysmal atrial fibrillation (HCC)     Left knee DJD     Spondylosis of lumbar region without myelopathy or radiculopathy     Chronic kidney disease (CKD), stage V (HCC)     Age-related nuclear evelio

## 2020-05-06 NOTE — CONSULTS
Samer F. Claudetta Sexton, [address] [facility]  Vascular and Endovascular Surgery  [address]     VASCULAR SURGERY   INPATIENT CONSULT NOTE      Name: Cecilio Cordova   :   1940  [id number]     Date of Consultation: 2020       SERGIO • OTHER SURGICAL HISTORY  6-20-12    cysto-Dr. Titi Palacios   • OTHER SURGICAL HISTORY  1/21/15     lap sigmoid colectomy    • OTHER SURGICAL HISTORY  9/21/16    Right Hemicolectomy by Dr. Roman Quinterochapin       8/2009       MEDICATIONS: •  Albumin Human (ALBUMINAR) 25 % solution 100 mL, 100 mL, Intravenous, PRN    ALLERGIES:    She is allergic to docosahexaenoic acid, dha; eicosapentaenoic acid, epa; fig; levofloxacin; prolia; vitamin e; and zithromax [zithromax].     SOCIAL HISTORY:    Pa Diagnostic Data:      LABS:  Recent Labs   Lab 05/02/20  1907 05/03/20  0502 05/04/20  0556 05/05/20  0702   WBC 22.8* 20.6* 15.8* 12.4*  12.4*   HGB 9.1* 8.6* 8.8* 9.0*  9.0*   MCV 93.9 94.9 94.4 95.5  95.5   .0 320.0 303.0 279.0  279.0       Recen PROCEDURE:   CT ABDOMEN+PELVIS(CPT=74176)  COMPARISON:   [facility], [address], 2/04/2015, 6:24 PM.  [facility], CT ABDOMEN (CONTRAST ONLY) (CPT=74160), 3/14/2017, 6:37 PM.  INDICATIONS:   Patient is having l collection, ductal dilatation, or atrophy. ADRENALS: No defined mass or abnormal enlargement. KIDNEYS: Again noted are multiple cysts throughout the bilateral kidneys of varying sizes and attenuation compatible with polycystic kidney disease.   There is n CONCLUSION:  1. There are multiple cysts redemonstrated throughout the liver and in the bilateral kidneys compatible with polycystic kidney disease and polycystic liver disease.   Of note, there is moderate inflammatory reaction in the perihepatic fat about PROCEDURE: XR CHEST AP PORTABLE  (CPT=71045) TIME: 1857 hours. COMPARISON: [facility], XR CHEST AP PORTABLE (CPT=71010), 3/15/2017, 6:27 AM.  INDICATIONS: Nausea and vomiting x1 month, abdominal pain worse today. Fever today.   TECHNIQUE: The patient is a 79year old female who who has a functioning right forearm arteriovenous fistula. There has been concern that she has enlarged areas of the fistula.   The patient has developed some areas of aneurysmal degeneration with some thinning of th

## 2020-05-20 ENCOUNTER — OFFICE VISIT (OUTPATIENT)
Dept: OPHTHALMOLOGY | Facility: CLINIC | Age: 80
End: 2020-05-20
Payer: MEDICARE

## 2020-05-20 DIAGNOSIS — H25.13 AGE-RELATED NUCLEAR CATARACT OF BOTH EYES: Primary | ICD-10-CM

## 2020-05-20 DIAGNOSIS — H43.393 VITREOUS FLOATERS OF BOTH EYES: ICD-10-CM

## 2020-05-20 DIAGNOSIS — H31.001 CHORIORETINAL SCAR OF RIGHT EYE: ICD-10-CM

## 2020-05-20 PROCEDURE — 92014 COMPRE OPH EXAM EST PT 1/>: CPT | Performed by: OPHTHALMOLOGY

## 2020-05-20 PROCEDURE — 92015 DETERMINE REFRACTIVE STATE: CPT | Performed by: OPHTHALMOLOGY

## 2020-05-20 NOTE — ASSESSMENT & PLAN NOTE
Discussed advanced cataracts in both eyes with patient. Patient is happy with their vision and is not interested in cataract surgery at this time. New glasses Rx given. Not recommended to change.

## 2020-05-20 NOTE — PROGRESS NOTES
Job Hall is a 79year old female. HPI:     HPI     Pt here for a complete exam. Pt complains of decrease in NVA with glasses on since last year. Pt would like an updated glasses Rx today.      Last edited by Seferino Ortiz OT on 5/20/2020  1:08 P disease   • Diabetes Sister    • Hypertension Sister    • Kidney Disease Brother         PCKD    • Breast Cancer Maternal Aunt 48        x2 ages 80 and in her 52's   • Macular degeneration Other         Aunt   • Glaucoma Neg         family h/o       Social Allergies:    Adhesive Tape           SWELLING  Docosahexaenoic Aci*    HIVES  Eicosapentaenoic Ac*    HIVES  Fig                     HIVES  Levofloxacin            HIVES  Prolia                    Vitamin E               HIVES  Zithromax [Zithroma* +3.00 20/30    Left +2.50 +1.75 180 20/25- +3.00 20/20    Type:  Progressive bifocal                 ASSESSMENT/PLAN:     Diagnoses and Plan:     Age-related nuclear cataract of both eyes  Discussed advanced cataracts in both eyes with patient.    Patient i

## 2020-06-04 ENCOUNTER — HOSPITAL ENCOUNTER (OUTPATIENT)
Dept: LAB | Age: 80
Discharge: HOME | End: 2020-06-04
Attending: SPECIALIST

## 2020-06-04 DIAGNOSIS — I10 ESSENTIAL HYPERTENSION, MALIGNANT: ICD-10-CM

## 2020-06-04 DIAGNOSIS — N18.4 CHRONIC KIDNEY DISEASE, STAGE IV (SEVERE) (CMD): Primary | ICD-10-CM

## 2020-06-04 DIAGNOSIS — E78.5 HYPERLIPIDEMIA, UNSPECIFIED HYPERLIPIDEMIA TYPE: ICD-10-CM

## 2020-06-04 LAB
ALBUMIN SERPL-MCNC: 2.5 G/DL (ref 3.6–5.1)
ALBUMIN/GLOB SERPL: 0.8 {RATIO} (ref 1–2.4)
ALP SERPL-CCNC: 145 UNITS/L (ref 45–117)
ALT SERPL-CCNC: 19 UNITS/L
ANION GAP SERPL CALC-SCNC: 14 MMOL/L (ref 10–20)
AST SERPL-CCNC: 19 UNITS/L
BILIRUB SERPL-MCNC: 0.6 MG/DL (ref 0.2–1)
BUN SERPL-MCNC: 30 MG/DL (ref 6–20)
BUN/CREAT SERPL: 6 (ref 7–25)
CALCIUM SERPL-MCNC: 8.4 MG/DL (ref 8.4–10.2)
CHLORIDE SERPL-SCNC: 102 MMOL/L (ref 98–107)
CHOLEST SERPL-MCNC: 110 MG/DL
CHOLEST/HDLC SERPL: 1.5 {RATIO}
CO2 SERPL-SCNC: 32 MMOL/L (ref 21–32)
CREAT SERPL-MCNC: 5.16 MG/DL (ref 0.51–0.95)
ERYTHROCYTE [DISTWIDTH] IN BLOOD: 17.8 % (ref 11–15)
GLOBULIN SER-MCNC: 3 G/DL (ref 2–4)
GLUCOSE SERPL-MCNC: 92 MG/DL (ref 65–99)
HCT VFR BLD CALC: 37.6 % (ref 36–46.5)
HDLC SERPL-MCNC: 73 MG/DL
HGB BLD-MCNC: 11.4 G/DL (ref 12–15.5)
LDLC SERPL CALC-MCNC: 17 MG/DL
MCH RBC QN AUTO: 28.9 PG (ref 26–34)
MCHC RBC AUTO-ENTMCNC: 30.3 G/DL (ref 32–36.5)
MCV RBC AUTO: 95.4 FL (ref 78–100)
NONHDLC SERPL-MCNC: 37 MG/DL
NRBC BLD MANUAL-RTO: 0 /100 WBC
PLATELET # BLD: 185 K/MCL (ref 140–450)
POTASSIUM SERPL-SCNC: 3.8 MMOL/L (ref 3.4–5.1)
PROT SERPL-MCNC: 5.5 G/DL (ref 6.4–8.2)
RBC # BLD: 3.94 MIL/MCL (ref 4–5.2)
SODIUM SERPL-SCNC: 144 MMOL/L (ref 135–145)
TRIGL SERPL-MCNC: 102 MG/DL
WBC # BLD: 7.1 K/MCL (ref 4.2–11)

## 2020-06-04 PROCEDURE — 80061 LIPID PANEL: CPT

## 2020-06-04 PROCEDURE — 36415 COLL VENOUS BLD VENIPUNCTURE: CPT

## 2020-06-04 PROCEDURE — 80053 COMPREHEN METABOLIC PANEL: CPT

## 2020-06-04 PROCEDURE — 85027 COMPLETE CBC AUTOMATED: CPT

## 2020-06-30 ENCOUNTER — PRIOR ORIGINAL RECORDS (OUTPATIENT)
Dept: HEALTH INFORMATION MANAGEMENT | Facility: OTHER | Age: 80
End: 2020-06-30

## 2020-09-01 ENCOUNTER — HOSPITAL ENCOUNTER (OUTPATIENT)
Dept: GENERAL RADIOLOGY | Age: 80
Discharge: HOME | End: 2020-09-01

## 2020-09-01 DIAGNOSIS — T46.2X5A: ICD-10-CM

## 2020-09-01 PROCEDURE — 71046 X-RAY EXAM CHEST 2 VIEWS: CPT

## 2020-09-23 ENCOUNTER — TELEPHONE (OUTPATIENT)
Dept: NEPHROLOGY | Facility: CLINIC | Age: 80
End: 2020-09-23

## 2020-09-23 NOTE — TELEPHONE ENCOUNTER
Phone call conversation. She is having excess bleeding from the fistula after needles are withdrawn. Agree that she should go to the access center. She is not allergic to IV contrast.  Okay to proceed.

## 2020-09-23 NOTE — TELEPHONE ENCOUNTER
Pt states she was referred to the Access clinic to receive contrast by the dialysis center due to bleeding. Pt requesting to speak with Dr. Pedro Velazquez.  Please call [phone]

## 2020-10-19 ENCOUNTER — HOSPITAL ENCOUNTER (OUTPATIENT)
Dept: CT IMAGING | Age: 80
Discharge: HOME | End: 2020-10-19
Attending: INTERNAL MEDICINE

## 2020-10-19 DIAGNOSIS — J98.4 LUNG DENSITY ON X-RAY: ICD-10-CM

## 2020-10-19 PROCEDURE — 71250 CT THORAX DX C-: CPT

## 2020-10-30 NOTE — H&P
[facility] Hospitalist H&P     CC: No chief complaint on file.        PCP: Rupali Gaffney MD      Assessment and Plan     Shahzad Chen is a 69year old female with PMH sig for HTN, HL, ESRD (polycystic kidney disease) on HD with Dr Dr Celia Sanchez MWF, paroxsym You,  Cecelia Gonzalez MD  [facility] Hospitalist    Answering Service number: [phone]    HPI     Bradley Hines is a 69year old female with PMH sig for HTN, HL, ESRD (polycystic kidney disease) on HD with Dr Dr Mohsen Sharma MW, paroxsymal afib on aspirin, div cysto-Dr. Hollie Newsome    OTHER SURGICAL HISTORY  1/21/15     Comment lap sigmoid colectomy     OTHER SURGICAL HISTORY  9/21/16    Comment Right Hemicolectomy by Dr. Taiwo Patino        ALL:    Docosahexaenoic Aci*    Hives  Eicosapentaenoic Ac*    Hives  Fig Father      MI   • Hypertension Mother    • Heart Disorder Mother      stroke   • Other [OTHER] Daughter      thyroid   • Other Uriel Dy Sister      Cushing's disease   • Diabetes Sister    • Hypertension Sister    • Breast Cancer Maternal Aunt      x2 ages Negative

## 2020-12-24 ENCOUNTER — HOSPITAL ENCOUNTER (OUTPATIENT)
Facility: HOSPITAL | Age: 80
Setting detail: OBSERVATION
Discharge: HOME | End: 2020-12-24
Attending: EMERGENCY MEDICINE | Admitting: HOSPITALIST
Payer: MEDICARE

## 2020-12-24 ENCOUNTER — APPOINTMENT (OUTPATIENT)
Dept: GENERAL RADIOLOGY | Facility: HOSPITAL | Age: 80
End: 2020-12-24
Attending: EMERGENCY MEDICINE
Payer: MEDICARE

## 2020-12-24 VITALS
SYSTOLIC BLOOD PRESSURE: 144 MMHG | HEART RATE: 62 BPM | WEIGHT: 134.69 LBS | OXYGEN SATURATION: 96 % | BODY MASS INDEX: 23.86 KG/M2 | DIASTOLIC BLOOD PRESSURE: 88 MMHG | RESPIRATION RATE: 18 BRPM | HEIGHT: 63 IN | TEMPERATURE: 99 F

## 2020-12-24 DIAGNOSIS — Z99.2 ESRD ON HEMODIALYSIS (HCC): ICD-10-CM

## 2020-12-24 DIAGNOSIS — R07.9 ACUTE CHEST PAIN: Primary | ICD-10-CM

## 2020-12-24 DIAGNOSIS — N18.6 ESRD ON HEMODIALYSIS (HCC): ICD-10-CM

## 2020-12-24 PROCEDURE — 71045 X-RAY EXAM CHEST 1 VIEW: CPT | Performed by: EMERGENCY MEDICINE

## 2020-12-24 PROCEDURE — 99215 OFFICE O/P EST HI 40 MIN: CPT | Performed by: INTERNAL MEDICINE

## 2020-12-24 RX ORDER — ACETAMINOPHEN 325 MG/1
650 TABLET ORAL EVERY 6 HOURS PRN
Status: DISCONTINUED | OUTPATIENT
Start: 2020-12-24 | End: 2020-12-24

## 2020-12-24 RX ORDER — AMIODARONE HYDROCHLORIDE 200 MG/1
200 TABLET ORAL
Status: DISCONTINUED | OUTPATIENT
Start: 2020-12-24 | End: 2020-12-24

## 2020-12-24 RX ORDER — MONTELUKAST SODIUM 10 MG/1
10 TABLET ORAL NIGHTLY
Status: DISCONTINUED | OUTPATIENT
Start: 2020-12-24 | End: 2020-12-24

## 2020-12-24 RX ORDER — ASPIRIN 81 MG/1
81 TABLET ORAL DAILY
Status: DISCONTINUED | OUTPATIENT
Start: 2020-12-25 | End: 2020-12-24

## 2020-12-24 RX ORDER — SEVELAMER CARBONATE 800 MG/1
800 TABLET, FILM COATED ORAL
Status: DISCONTINUED | OUTPATIENT
Start: 2020-12-24 | End: 2020-12-24

## 2020-12-24 RX ORDER — HEPARIN SODIUM 5000 [USP'U]/ML
5000 INJECTION, SOLUTION INTRAVENOUS; SUBCUTANEOUS EVERY 8 HOURS SCHEDULED
Status: DISCONTINUED | OUTPATIENT
Start: 2020-12-24 | End: 2020-12-24

## 2020-12-24 RX ORDER — PANTOPRAZOLE SODIUM 40 MG/1
40 TABLET, DELAYED RELEASE ORAL
Refills: 1 | Status: DISCONTINUED | OUTPATIENT
Start: 2020-12-24 | End: 2020-12-24

## 2020-12-24 RX ORDER — ONDANSETRON 2 MG/ML
4 INJECTION INTRAMUSCULAR; INTRAVENOUS EVERY 6 HOURS PRN
Status: DISCONTINUED | OUTPATIENT
Start: 2020-12-24 | End: 2020-12-24

## 2020-12-24 RX ORDER — SEVELAMER CARBONATE 800 MG/1
2400 TABLET, FILM COATED ORAL
Status: DISCONTINUED | OUTPATIENT
Start: 2020-12-24 | End: 2020-12-24

## 2020-12-24 RX ORDER — METOCLOPRAMIDE HYDROCHLORIDE 5 MG/ML
5 INJECTION INTRAMUSCULAR; INTRAVENOUS EVERY 8 HOURS PRN
Status: DISCONTINUED | OUTPATIENT
Start: 2020-12-24 | End: 2020-12-24

## 2020-12-24 RX ORDER — ATORVASTATIN CALCIUM 10 MG/1
5 TABLET, FILM COATED ORAL NIGHTLY
Status: DISCONTINUED | OUTPATIENT
Start: 2020-12-24 | End: 2020-12-24

## 2020-12-24 NOTE — ED NOTES
Pt's daughter at bedside. Daughter reports pt has been under a lot of stress lately related to 's health.

## 2020-12-24 NOTE — ED INITIAL ASSESSMENT (HPI)
Pt arrived via EMS from dialysis, she was there for 10mins when she began feeling chest pressure not pain, the pressure went up to her chin, upon arrival pt is no longer feeling this pressure.  EMS gave pt 4 baby ASA in route, held nitro due to pts BP

## 2020-12-24 NOTE — PROGRESS NOTES
Patient discharged home with daughter. RN gave DC papers and reviewed them with the patient. IV and tele box removed. Vital signs stable. RN wheeled patient down via wheelchair to meet daughter and ER entrance.

## 2020-12-24 NOTE — CONSULTS
ASSESSMENT/PLAN:     Impression: 1. Chest pain in a 15-year-old female while receiving dialysis. Her EKG and troponins are normal.  2.  Chronic atrial fibrillation on amiodarone and metoprolol. She is not anticoagulated.   3.  Chronic kidney injury du Olopatadine HCl 0.6 % Nasal Solution 1 spray by Nasal route daily. 3 Bottle 3   • acetaminophen (TYLENOL) 325 MG Oral Tab Take 2 tablets (650 mg total) by mouth every 6 (six) hours as needed.   0   • HYDROcodone-acetaminophen 5-325 MG Oral Tab Take 1 tablet • Other (Other) Father         polycystic kidney dis   • Hypertension Mother    • Heart Disorder Mother         stroke   • Cataracts Mother    • Other (Other) Daughter         thyroid   • Other (Other) Sister         Cushing's disease   • Diabetes Sister edema.      LABORATORY DATA:   EKG atrial fibrillation left axis.   No acute change  Labs reviewed: Jessika Contreras MD

## 2020-12-24 NOTE — H&P
[facility] Hospitalist H&P       CC: Patient presents with:  Chest Pain Angina       PCP: Alexandra Tucker MD    Date of Admission: 12/24/2020  5:48 AM    ASSESSMENT / PLAN:       Ms. 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 , Disp: , Rfl: 0    •  HYDROcodone-acetaminophen 5-325 MG Oral Tab, Take 1 tablet by mouth every 6 (six) hours as needed. , Disp: 20 tablet, Rfl: 0    •  TURMERIC OR, Take by mouth., Disp: , Rfl:     •  Montelukast Sod Resp 16   Ht 5' 3\" (1.6 m)   Wt 134 lb 11.2 oz (61.1 kg)   SpO2 98%   BMI 23.86 kg/m²     GEN: female in NAD  HEENT: EOMI, PERRLA  Neck: Supple  Pulm: CTAB, no crackles or wheezes  CV: RRR, no murmurs,  ABD: Soft, non-tender, non-distended, +BS  Neuro: Gr

## 2020-12-24 NOTE — CONSULTS
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 Matthias Whaley   • OTHER SURGICAL HISTORY  1/21/15     lap sigmoid colectomy    • OTHER SURGICAL HISTORY  16    Right H 40 mg, 40 mg, Oral, QAM AC    •  metoprolol Tartrate (LOPRESSOR) tab 25 mg, 25 mg, Oral, BID    •  Montelukast Sodium (SINGULAIR) tab 10 mg, 10 mg, Oral, Nightly    •  sevelamer (RENVELA) tab 800 mg, 2,400 mg, Oral, TID CC        Allergies    Adhesive Tape and cooperative  Head: Normocephalic, atraumatic  Eyes: conjunctivae/corneas clear  Throat: lips, mucosa, and tongue normal; teeth and gums normal  Neck:  no JVD, supple, symmetrical  Pulmonary:  clear to auscultation bilaterally  Cardiovascular: S1, S2 no

## 2020-12-24 NOTE — ED NOTES
Orders for admission, patient is aware of plan and ready to go upstairs. Any questions, please call ED RN Gretel Dobson  at extension [phone]. Pt comes from dialysis this morning with c/o chest pressure that radiated up to chin.    Pt is AOx4, lives at home, is in

## 2020-12-24 NOTE — DISCHARGE SUMMARY
General Medicine Discharge Summary     Patient ID:  Walker Singh  79year old  2/13/1940    Admit date: 12/24/2020    Discharge date and time: 12/24/20    Attending Physician: Aurelio Epperson MD     Primary Care Physician: Giovani Mendoza MD Atherosclerosis. 4. Scarring/atelectasis. 5. Demineralization. 6. Scoliosis. 7. Osteoarthritis. 8. Postop changes right axilla/left breast. 9. Embolization coils in the abdomen. Dictated by (CST):  Sarahy Montaño MD on 12/24/2020 at 7:38 AM     Viviana Lubin Care: Greater than 30 minutes    Patient had opportunity to ask questions and state understand and agree with therapeutic plan as outlined      Clare De Guzman MD  [facility] Hospitalist

## 2020-12-24 NOTE — ED PROVIDER NOTES
Patient Seen in: [facility] AND [facility] Emergency Department      History   Patient presents with:  Chest Pain Angina    Stated Complaint: chest pressure    HPI    The patient is a 67-year-old female with a history of breast cancer and chronic kidney disease mastectomy    • MASTECTOMY RIGHT     • NEEDLE BIOPSY LEFT     •      • OTHER SURGICAL HISTORY      right modified mastectomy   • OTHER SURGICAL HISTORY  12    cysto-Dr. Abel Elaine   • OTHER SURGICAL HISTORY  1/21/15     lap sigmoid colecto Palpations: Abdomen is soft. There is no mass. Tenderness: There is no abdominal tenderness. There is no guarding or rebound. Musculoskeletal: Normal range of motion. Right lower leg: She exhibits no tenderness. No edema.       Left lower leg: S GOLD     EKG    Rate, intervals and axes as noted on EKG Report.   Rate: 59  Rhythm: Atrial Fibrillation  Reading: Atrial fibrillation rate controlled  EKG #2  Rate rhythm and axes as noted on EKG report  Rate: 63  Rhythm: Sinus with frequent PACs  Reading: Present on Admission  Date Reviewed: 9/11/2020          ICD-10-CM Noted POA    Acute chest pain R07.9 12/24/2020 Unknown

## 2021-01-13 ENCOUNTER — HOSPITAL ENCOUNTER (EMERGENCY)
Facility: HOSPITAL | Age: 81
Discharge: HOME | End: 2021-01-13
Attending: EMERGENCY MEDICINE
Payer: MEDICARE

## 2021-01-13 VITALS
OXYGEN SATURATION: 100 % | RESPIRATION RATE: 16 BRPM | TEMPERATURE: 98 F | HEART RATE: 60 BPM | DIASTOLIC BLOOD PRESSURE: 62 MMHG | BODY MASS INDEX: 24.45 KG/M2 | SYSTOLIC BLOOD PRESSURE: 173 MMHG | WEIGHT: 138 LBS | HEIGHT: 63 IN

## 2021-01-13 DIAGNOSIS — T82.9XXA COMPLICATION OF ARTERIOVENOUS DIALYSIS FISTULA, INITIAL ENCOUNTER: Primary | ICD-10-CM

## 2021-01-13 PROCEDURE — 99283 EMERGENCY DEPT VISIT LOW MDM: CPT

## 2021-01-13 NOTE — ED PROVIDER NOTES
Patient Seen in: [facility] AND [facility] Emergency Department      History   Patient presents with:  Bleeding    Stated Complaint:     HPI/Subjective:   HPI    Patient presents to the emergency department complaining of bleeding from her right AV fistula after BIOPSY LEFT     •      • OTHER SURGICAL HISTORY      right modified mastectomy   • OTHER SURGICAL HISTORY  12    cysto-Dr. Kaitlin Ordonez   • OTHER SURGICAL HISTORY  1/21/15     lap sigmoid colectomy    • OTHER SURGICAL HISTORY  16    Right H Findings: No rash. Neurological:      General: No focal deficit present. Mental Status: She is alert and oriented to person, place, and time.                ED Course   Labs Reviewed - No data to display                MDM      Pulse Ox: 100%, Normal

## 2021-01-13 NOTE — ED INITIAL ASSESSMENT (HPI)
Patient arrived via EMS from home for bleeding from her AV fistula. Patient completed hemodialysis this morning at 8 am and when she woke up from a nap this afternoon noted bleeding at the fistula site. Patient denies pain at this time.

## 2021-01-20 RX ORDER — SODIUM CHLORIDE, SODIUM LACTATE, POTASSIUM CHLORIDE, CALCIUM CHLORIDE 600; 310; 30; 20 MG/100ML; MG/100ML; MG/100ML; MG/100ML
INJECTION, SOLUTION INTRAVENOUS CONTINUOUS
Status: CANCELLED | OUTPATIENT
Start: 2021-01-20

## 2021-01-21 ENCOUNTER — LAB ENCOUNTER (OUTPATIENT)
Dept: LAB | Age: 81
End: 2021-01-21
Attending: SURGERY
Payer: MEDICARE

## 2021-01-21 DIAGNOSIS — Z01.818 PREOP TESTING: ICD-10-CM

## 2021-01-21 LAB
ANTIBODY SCREEN: NEGATIVE
RH BLOOD TYPE: NEGATIVE
SARS-COV-2 RNA RESP QL NAA+PROBE: NOT DETECTED

## 2021-01-21 PROCEDURE — 86901 BLOOD TYPING SEROLOGIC RH(D): CPT

## 2021-01-21 PROCEDURE — 86850 RBC ANTIBODY SCREEN: CPT

## 2021-01-21 PROCEDURE — 86900 BLOOD TYPING SEROLOGIC ABO: CPT

## 2021-01-21 PROCEDURE — 36415 COLL VENOUS BLD VENIPUNCTURE: CPT

## 2021-01-23 ENCOUNTER — ANESTHESIA (OUTPATIENT)
Dept: SURGERY | Facility: HOSPITAL | Age: 81
End: 2021-01-23
Payer: MEDICARE

## 2021-01-23 ENCOUNTER — ANESTHESIA EVENT (OUTPATIENT)
Dept: SURGERY | Facility: HOSPITAL | Age: 81
End: 2021-01-23
Payer: MEDICARE

## 2021-01-23 ENCOUNTER — HOSPITAL ENCOUNTER (OUTPATIENT)
Facility: HOSPITAL | Age: 81
Discharge: HOME | End: 2021-01-23
Attending: SURGERY | Admitting: SURGERY
Payer: MEDICARE

## 2021-01-23 VITALS
WEIGHT: 132 LBS | HEIGHT: 63 IN | RESPIRATION RATE: 18 BRPM | DIASTOLIC BLOOD PRESSURE: 59 MMHG | TEMPERATURE: 98 F | SYSTOLIC BLOOD PRESSURE: 158 MMHG | BODY MASS INDEX: 23.39 KG/M2 | OXYGEN SATURATION: 94 % | HEART RATE: 66 BPM

## 2021-01-23 DIAGNOSIS — Z01.818 PREOP TESTING: Primary | ICD-10-CM

## 2021-01-23 PROBLEM — T82.838A BLEEDING PSEUDOANEURYSM OF LEFT BRACHIOCEPHALIC AV FISTULA (HCC): Status: ACTIVE | Noted: 2021-01-23

## 2021-01-23 PROBLEM — T82.858A AV FISTULA STENOSIS (HCC): Status: ACTIVE | Noted: 2021-01-23

## 2021-01-23 PROBLEM — T82.898A HEMODIALYSIS AV FISTULA ANEURYSM (HCC): Status: ACTIVE | Noted: 2021-01-23

## 2021-01-23 LAB — POTASSIUM SERPL-SCNC: 4.4 MMOL/L (ref 3.5–5.1)

## 2021-01-23 PROCEDURE — 84132 ASSAY OF SERUM POTASSIUM: CPT | Performed by: SURGERY

## 2021-01-23 PROCEDURE — 031B0ZF BYPASS RIGHT RADIAL ARTERY TO LOWER ARM VEIN, OPEN APPROACH: ICD-10-PCS | Performed by: SURGERY

## 2021-01-23 PROCEDURE — 88304 TISSUE EXAM BY PATHOLOGIST: CPT | Performed by: SURGERY

## 2021-01-23 PROCEDURE — 36415 COLL VENOUS BLD VENIPUNCTURE: CPT | Performed by: SURGERY

## 2021-01-23 RX ORDER — HYDROCODONE BITARTRATE AND ACETAMINOPHEN 5; 325 MG/1; MG/1
1 TABLET ORAL AS NEEDED
Status: DISCONTINUED | OUTPATIENT
Start: 2021-01-23 | End: 2021-01-23 | Stop reason: HOSPADM

## 2021-01-23 RX ORDER — METOPROLOL TARTRATE 5 MG/5ML
2.5 INJECTION INTRAVENOUS ONCE
Status: DISCONTINUED | OUTPATIENT
Start: 2021-01-23 | End: 2021-01-23 | Stop reason: HOSPADM

## 2021-01-23 RX ORDER — LIDOCAINE HYDROCHLORIDE 10 MG/ML
INJECTION, SOLUTION EPIDURAL; INFILTRATION; INTRACAUDAL; PERINEURAL AS NEEDED
Status: DISCONTINUED | OUTPATIENT
Start: 2021-01-23 | End: 2021-01-23 | Stop reason: SURG

## 2021-01-23 RX ORDER — HYDROCODONE BITARTRATE AND ACETAMINOPHEN 5; 325 MG/1; MG/1
2 TABLET ORAL AS NEEDED
Status: DISCONTINUED | OUTPATIENT
Start: 2021-01-23 | End: 2021-01-23 | Stop reason: HOSPADM

## 2021-01-23 RX ORDER — ONDANSETRON 2 MG/ML
4 INJECTION INTRAMUSCULAR; INTRAVENOUS ONCE AS NEEDED
Status: DISCONTINUED | OUTPATIENT
Start: 2021-01-23 | End: 2021-01-23 | Stop reason: HOSPADM

## 2021-01-23 RX ORDER — ONDANSETRON 2 MG/ML
INJECTION INTRAMUSCULAR; INTRAVENOUS AS NEEDED
Status: DISCONTINUED | OUTPATIENT
Start: 2021-01-23 | End: 2021-01-23 | Stop reason: SURG

## 2021-01-23 RX ORDER — SODIUM CHLORIDE, SODIUM LACTATE, POTASSIUM CHLORIDE, CALCIUM CHLORIDE 600; 310; 30; 20 MG/100ML; MG/100ML; MG/100ML; MG/100ML
INJECTION, SOLUTION INTRAVENOUS CONTINUOUS
Status: DISCONTINUED | OUTPATIENT
Start: 2021-01-23 | End: 2021-01-23 | Stop reason: HOSPADM

## 2021-01-23 RX ORDER — HYDROCODONE BITARTRATE AND ACETAMINOPHEN 10; 325 MG/1; MG/1
1 TABLET ORAL EVERY 4 HOURS PRN
Status: DISCONTINUED | OUTPATIENT
Start: 2021-01-23 | End: 2021-01-24

## 2021-01-23 RX ORDER — EPHEDRINE SULFATE 50 MG/ML
INJECTION INTRAVENOUS AS NEEDED
Status: DISCONTINUED | OUTPATIENT
Start: 2021-01-23 | End: 2021-01-23 | Stop reason: SURG

## 2021-01-23 RX ORDER — HYDROMORPHONE HYDROCHLORIDE 1 MG/ML
0.4 INJECTION, SOLUTION INTRAMUSCULAR; INTRAVENOUS; SUBCUTANEOUS EVERY 5 MIN PRN
Status: DISCONTINUED | OUTPATIENT
Start: 2021-01-23 | End: 2021-01-23 | Stop reason: HOSPADM

## 2021-01-23 RX ORDER — SODIUM CHLORIDE 9 MG/ML
INJECTION, SOLUTION INTRAVENOUS CONTINUOUS
Status: DISCONTINUED | OUTPATIENT
Start: 2021-01-23 | End: 2021-01-24

## 2021-01-23 RX ORDER — ONDANSETRON 2 MG/ML
4 INJECTION INTRAMUSCULAR; INTRAVENOUS EVERY 6 HOURS PRN
Status: CANCELLED | OUTPATIENT
Start: 2021-01-23

## 2021-01-23 RX ORDER — DEXAMETHASONE SODIUM PHOSPHATE 4 MG/ML
VIAL (ML) INJECTION AS NEEDED
Status: DISCONTINUED | OUTPATIENT
Start: 2021-01-23 | End: 2021-01-23 | Stop reason: SURG

## 2021-01-23 RX ORDER — MORPHINE SULFATE 4 MG/ML
4 INJECTION, SOLUTION INTRAMUSCULAR; INTRAVENOUS EVERY 10 MIN PRN
Status: DISCONTINUED | OUTPATIENT
Start: 2021-01-23 | End: 2021-01-23 | Stop reason: HOSPADM

## 2021-01-23 RX ORDER — MORPHINE SULFATE 4 MG/ML
2 INJECTION, SOLUTION INTRAMUSCULAR; INTRAVENOUS EVERY 10 MIN PRN
Status: DISCONTINUED | OUTPATIENT
Start: 2021-01-23 | End: 2021-01-23 | Stop reason: HOSPADM

## 2021-01-23 RX ORDER — ACETAMINOPHEN 500 MG
1000 TABLET ORAL ONCE
Status: COMPLETED | OUTPATIENT
Start: 2021-01-23 | End: 2021-01-23

## 2021-01-23 RX ORDER — HYDROCODONE BITARTRATE AND ACETAMINOPHEN 10; 325 MG/1; MG/1
1 TABLET ORAL EVERY 6 HOURS PRN
Qty: 20 TABLET | Refills: 0 | Status: SHIPPED | OUTPATIENT
Start: 2021-01-23 | End: 2021-01-28

## 2021-01-23 RX ORDER — HYDROMORPHONE HYDROCHLORIDE 1 MG/ML
0.2 INJECTION, SOLUTION INTRAMUSCULAR; INTRAVENOUS; SUBCUTANEOUS EVERY 5 MIN PRN
Status: DISCONTINUED | OUTPATIENT
Start: 2021-01-23 | End: 2021-01-23 | Stop reason: HOSPADM

## 2021-01-23 RX ORDER — HEPARIN SODIUM 1000 [USP'U]/ML
INJECTION, SOLUTION INTRAVENOUS; SUBCUTANEOUS AS NEEDED
Status: DISCONTINUED | OUTPATIENT
Start: 2021-01-23 | End: 2021-01-23 | Stop reason: SURG

## 2021-01-23 RX ORDER — MORPHINE SULFATE 10 MG/ML
6 INJECTION, SOLUTION INTRAMUSCULAR; INTRAVENOUS EVERY 10 MIN PRN
Status: DISCONTINUED | OUTPATIENT
Start: 2021-01-23 | End: 2021-01-23 | Stop reason: HOSPADM

## 2021-01-23 RX ORDER — NALOXONE HYDROCHLORIDE 0.4 MG/ML
80 INJECTION, SOLUTION INTRAMUSCULAR; INTRAVENOUS; SUBCUTANEOUS AS NEEDED
Status: DISCONTINUED | OUTPATIENT
Start: 2021-01-23 | End: 2021-01-23 | Stop reason: HOSPADM

## 2021-01-23 RX ORDER — HYDROMORPHONE HYDROCHLORIDE 1 MG/ML
0.6 INJECTION, SOLUTION INTRAMUSCULAR; INTRAVENOUS; SUBCUTANEOUS EVERY 5 MIN PRN
Status: DISCONTINUED | OUTPATIENT
Start: 2021-01-23 | End: 2021-01-23 | Stop reason: HOSPADM

## 2021-01-23 RX ORDER — BUPIVACAINE HYDROCHLORIDE 2.5 MG/ML
INJECTION, SOLUTION EPIDURAL; INFILTRATION; INTRACAUDAL AS NEEDED
Status: DISCONTINUED | OUTPATIENT
Start: 2021-01-23 | End: 2021-01-23 | Stop reason: HOSPADM

## 2021-01-23 RX ADMIN — EPHEDRINE SULFATE 5 MG: 50 INJECTION INTRAVENOUS at 19:17:00

## 2021-01-23 RX ADMIN — HEPARIN SODIUM 3000 UNITS: 1000 INJECTION, SOLUTION INTRAVENOUS; SUBCUTANEOUS at 19:09:00

## 2021-01-23 RX ADMIN — SODIUM CHLORIDE: 9 INJECTION, SOLUTION INTRAVENOUS at 20:32:00

## 2021-01-23 RX ADMIN — LIDOCAINE HYDROCHLORIDE 50 MG: 10 INJECTION, SOLUTION EPIDURAL; INFILTRATION; INTRACAUDAL; PERINEURAL at 18:46:00

## 2021-01-23 RX ADMIN — EPHEDRINE SULFATE 5 MG: 50 INJECTION INTRAVENOUS at 19:47:00

## 2021-01-23 RX ADMIN — EPHEDRINE SULFATE 5 MG: 50 INJECTION INTRAVENOUS at 20:34:00

## 2021-01-23 RX ADMIN — ONDANSETRON 4 MG: 2 INJECTION INTRAMUSCULAR; INTRAVENOUS at 20:30:00

## 2021-01-23 RX ADMIN — DEXAMETHASONE SODIUM PHOSPHATE 4 MG: 4 MG/ML VIAL (ML) INJECTION at 19:32:00

## 2021-01-23 RX ADMIN — EPHEDRINE SULFATE 5 MG: 50 INJECTION INTRAVENOUS at 19:08:00

## 2021-01-23 NOTE — OR PREOP
Dr. Mirian Malagon notified of patients blood pressure in pre-op and reviewed patients home medications. No new orders.

## 2021-01-24 NOTE — H&P
History & Physical Examination    Patient Name: Tracey Modi  MRN: [id number]  [facility]: [id number]  YOB: 1940    Diagnosis: complication of dialysis access device end stage renal disease    History Present Illness: Patient is a 79year old Oral Tab, Take 2 tablets by mouth daily. , Disp: 30 tablet, Rfl: 11, 1/22/2021 at 2100    •  Metoprolol Tartrate (LOPRESSOR) 50 MG Oral Tab, Take 25 mg by mouth 2 (two) times daily.   , Disp: , Rfl: , 1/23/2021 at 0700    •  TURMERIC OR, Take by mouth., Disp 2016    Performed by Mara Rey DO at [facility]   • LUMPECTOMY RIGHT     • MASTECTOMY PARTIAL  2018    u [facility]: left breaswt seed loc partial mastectomy    • MASTECTOMY RIGHT     •      • OTHER SURGICAL HIS outcomes. The patient acknowledges the risks of the procedure.       Agustin Lucia  1/23/2021  6:20 PM

## 2021-01-24 NOTE — ANESTHESIA PREPROCEDURE EVALUATION
Anesthesia PreOp Note    HPI:     Garrison Graves is a 79year old female who presents for preoperative consultation requested by: Marisol Tripathi MD    Date of Surgery: 1/23/2021    Procedure(s):  A.V. FISTULA  Indication: complication of dialysis acces Blepharitis of upper and lower eyelids of both eyes         Date Noted: 11/22/2016      Scoliosis of lumbosacral spine, unspecified scoliosis type         Date Noted: 08/05/2016      Chronic left-sided low back pain with left-sided sciatica         Date • CHOLECYSTECTOMY  12/04    laparoscopic   • COLONOSCOPY  6/07    c. diff colitis-Ali   • ECHO 2D DP/CLRFL, CARDIO (INTERNAL)  2016 [facility]    mod MR/LAE; border systolic LVEF   • ECHO 2D, CARDIO ([facility])  06/30/2020    [facility] cardio: no change except mod m •  Cholecalciferol (VITAMIN D) 2000 UNITS Oral Tab, Take 2 tablets by mouth daily. , Disp: 30 tablet, Rfl: 11, 1/22/2021 at 2100    •  Metoprolol Tartrate (LOPRESSOR) 50 MG Oral Tab, Take 25 mg by mouth 2 (two) times daily.   , Disp: , Rfl: , 1/23/2021 at 07 Occupation: retired real estate    Social Needs      Financial resource strain: Not on file      Food insecurity        Worry: Not on file        Inability: Not on file      Transportation needs        Medical: Not on file        Non-medical: Not on fi MCHC 30.6 (L) 12/24/2020    RDW 16.9 (H) 12/24/2020    .0 12/24/2020     Lab Results   Component Value Date     12/24/2020    K 4.4 01/23/2021     12/24/2020    CO2 31.0 12/24/2020    BUN 44 (H) 12/24/2020    CREATSERUM 7.14 (H) 12/24/2 I have informed Shelba Shoulders and/or legal guardian or family member of the nature of the anesthetic plan, benefits, risks including possible dental damage if relevant, major complications, and any alternative forms of anesthetic management.    All of

## 2021-01-24 NOTE — ANESTHESIA PROCEDURE NOTES
Airway  Date/Time: 1/23/2021 6:51 PM  Urgency: elective    Airway not difficult    General Information and Staff    Patient location during procedure: OR  Anesthesiologist: Neli Hawthorne MD  Performed: anesthesiologist     Indications and Patient Condi

## 2021-01-24 NOTE — BRIEF OP NOTE
[facility] POST ANESTHESIA CARE UNIT  Brief Op Note       Patients Name: Panda Hook  Attending Physician: Rishi Leija MD  Operating Physician: Annie Kendall MD  CSN: [id number]     Location:  OR  MRN: [id number]    YOB: 1940

## 2021-01-24 NOTE — PROGRESS NOTES
Patient received from PACU. Patient  alert and oriented X4, son at bedside. Vital signs stable. Patient tolerating general diet. Patient denies any nausea, no vomiting. Patient denies any pain. Belongs at bedside. Discharge instructions given.

## 2021-01-24 NOTE — ANESTHESIA POSTPROCEDURE EVALUATION
Patient: Jared Espana    Procedure Summary     Date: 01/23/21 Room / Location: [address] [place] [address] MAIN OR 04 / 300 [facility] MAIN OR    Anesthesia Start: 0136 Anesthesia Stop: 2056    Procedure:  A.V. FISTULA (Right ) Diagnosis: (complication of dialysis access device end sta

## 2021-01-25 NOTE — OPERATIVE REPORT
[facility]    PATIENT'S NAME: Xander Mercer   ATTENDING PHYSICIAN: Ephraim Mascorro MD   OPERATING PHYSICIAN: Ephraim Mascorro MD   PATIENT ACCOUNT#:   [id number]    LOCATION:  [address] [place] [address] #:   [id number]       DATE OF BIR for further operations were discussed with the patient and she told us to proceed. OPERATIVE TECHNIQUE:  The patient was identified and properly marked and brought to the operating room and placed in the supine position. General anesthesia was given. hypertrophied ridge and then opened the vein length about 4 cm. I then used this section of the AV fistula vein from the aneurysm and used this as a vein patch to repair this high-grade stenosis with standard technique and 6-0 Prolene suture.   I flushed t

## 2021-02-01 ENCOUNTER — HOSPITAL ENCOUNTER (EMERGENCY)
Facility: HOSPITAL | Age: 81
Discharge: HOME | End: 2021-02-01
Attending: EMERGENCY MEDICINE
Payer: MEDICARE

## 2021-02-01 ENCOUNTER — APPOINTMENT (OUTPATIENT)
Dept: GENERAL RADIOLOGY | Facility: HOSPITAL | Age: 81
End: 2021-02-01
Payer: MEDICARE

## 2021-02-01 VITALS
HEIGHT: 63 IN | OXYGEN SATURATION: 92 % | TEMPERATURE: 97 F | WEIGHT: 135 LBS | DIASTOLIC BLOOD PRESSURE: 79 MMHG | BODY MASS INDEX: 23.92 KG/M2 | RESPIRATION RATE: 23 BRPM | SYSTOLIC BLOOD PRESSURE: 191 MMHG | HEART RATE: 58 BPM

## 2021-02-01 DIAGNOSIS — J81.0 ACUTE PULMONARY EDEMA (HCC): ICD-10-CM

## 2021-02-01 DIAGNOSIS — Z99.2 ESRD ON HEMODIALYSIS (HCC): Primary | ICD-10-CM

## 2021-02-01 DIAGNOSIS — N18.6 ESRD ON HEMODIALYSIS (HCC): Primary | ICD-10-CM

## 2021-02-01 LAB
ALBUMIN SERPL-MCNC: 3.5 G/DL (ref 3.4–5)
ALBUMIN/GLOB SERPL: 1 {RATIO} (ref 1–2)
ALP LIVER SERPL-CCNC: 216 U/L
ALT SERPL-CCNC: 20 U/L
ANION GAP SERPL CALC-SCNC: 9 MMOL/L (ref 0–18)
AST SERPL-CCNC: 14 U/L (ref 15–37)
BASOPHILS # BLD AUTO: 0.05 X10(3) UL (ref 0–0.2)
BASOPHILS NFR BLD AUTO: 0.3 %
BILIRUB SERPL-MCNC: 0.5 MG/DL (ref 0.1–2)
BUN BLD-MCNC: 67 MG/DL (ref 7–18)
BUN/CREAT SERPL: 6.8 (ref 10–20)
CALCIUM BLD-MCNC: 7.3 MG/DL (ref 8.5–10.1)
CHLORIDE SERPL-SCNC: 100 MMOL/L (ref 98–112)
CO2 SERPL-SCNC: 29 MMOL/L (ref 21–32)
CREAT BLD-MCNC: 9.83 MG/DL
DEPRECATED RDW RBC AUTO: 57.9 FL (ref 35.1–46.3)
EOSINOPHIL # BLD AUTO: 0.28 X10(3) UL (ref 0–0.7)
EOSINOPHIL NFR BLD AUTO: 1.9 %
ERYTHROCYTE [DISTWIDTH] IN BLOOD BY AUTOMATED COUNT: 17 % (ref 11–15)
GLOBULIN PLAS-MCNC: 3.4 G/DL (ref 2.8–4.4)
GLUCOSE BLD-MCNC: 118 MG/DL (ref 70–99)
HAV IGM SER QL: 2.6 MG/DL (ref 1.6–2.6)
HCT VFR BLD AUTO: 37.6 %
HGB BLD-MCNC: 11.3 G/DL
IMM GRANULOCYTES # BLD AUTO: 0.04 X10(3) UL (ref 0–1)
IMM GRANULOCYTES NFR BLD: 0.3 %
LYMPHOCYTES # BLD AUTO: 1.26 X10(3) UL (ref 1–4)
LYMPHOCYTES NFR BLD AUTO: 8.8 %
M PROTEIN MFR SERPL ELPH: 6.9 G/DL (ref 6.4–8.2)
MCH RBC QN AUTO: 28.4 PG (ref 26–34)
MCHC RBC AUTO-ENTMCNC: 30.1 G/DL (ref 31–37)
MCV RBC AUTO: 94.5 FL
MONOCYTES # BLD AUTO: 1 X10(3) UL (ref 0.1–1)
MONOCYTES NFR BLD AUTO: 6.9 %
NEUTROPHILS # BLD AUTO: 11.76 X10 (3) UL (ref 1.5–7.7)
NEUTROPHILS # BLD AUTO: 11.76 X10(3) UL (ref 1.5–7.7)
NEUTROPHILS NFR BLD AUTO: 81.8 %
NT-PROBNP SERPL-MCNC: ABNORMAL PG/ML (ref ?–450)
OSMOLALITY SERPL CALC.SUM OF ELEC: 306 MOSM/KG (ref 275–295)
PLATELET # BLD AUTO: 183 10(3)UL (ref 150–450)
POTASSIUM SERPL-SCNC: 5.5 MMOL/L (ref 3.5–5.1)
RBC # BLD AUTO: 3.98 X10(6)UL
SARS-COV-2 RNA RESP QL NAA+PROBE: NOT DETECTED
SODIUM SERPL-SCNC: 138 MMOL/L (ref 136–145)
TROPONIN I SERPL-MCNC: <0.045 NG/ML (ref ?–0.04)
WBC # BLD AUTO: 14.4 X10(3) UL (ref 4–11)

## 2021-02-01 PROCEDURE — 83735 ASSAY OF MAGNESIUM: CPT | Performed by: EMERGENCY MEDICINE

## 2021-02-01 PROCEDURE — 71045 X-RAY EXAM CHEST 1 VIEW: CPT | Performed by: EMERGENCY MEDICINE

## 2021-02-01 PROCEDURE — 85025 COMPLETE CBC W/AUTO DIFF WBC: CPT | Performed by: EMERGENCY MEDICINE

## 2021-02-01 PROCEDURE — 93010 ELECTROCARDIOGRAM REPORT: CPT | Performed by: EMERGENCY MEDICINE

## 2021-02-01 PROCEDURE — 93005 ELECTROCARDIOGRAM TRACING: CPT

## 2021-02-01 PROCEDURE — 80053 COMPREHEN METABOLIC PANEL: CPT | Performed by: EMERGENCY MEDICINE

## 2021-02-01 PROCEDURE — 96374 THER/PROPH/DIAG INJ IV PUSH: CPT

## 2021-02-01 PROCEDURE — 83880 ASSAY OF NATRIURETIC PEPTIDE: CPT | Performed by: EMERGENCY MEDICINE

## 2021-02-01 PROCEDURE — 99285 EMERGENCY DEPT VISIT HI MDM: CPT

## 2021-02-01 PROCEDURE — 84484 ASSAY OF TROPONIN QUANT: CPT | Performed by: EMERGENCY MEDICINE

## 2021-02-01 RX ORDER — FUROSEMIDE 10 MG/ML
40 INJECTION INTRAMUSCULAR; INTRAVENOUS ONCE
Status: DISCONTINUED | OUTPATIENT
Start: 2021-02-01 | End: 2021-02-01

## 2021-02-01 RX ORDER — NITROGLYCERIN 0.4 MG/1
0.4 TABLET SUBLINGUAL ONCE
Status: COMPLETED | OUTPATIENT
Start: 2021-02-01 | End: 2021-02-01

## 2021-02-01 NOTE — ED PROVIDER NOTES
Patient Seen in: [facility] Emergency Department    History   Patient presents with:  Difficulty Breathing      HPI    The patient presents to the ED after feeling short of breath when she woke this morning.   She called 911 and was found to be 90% LUMPECTOMY RIGHT  2016   • MASTECTOMY PARTIAL  2018    [facility]: left breaswt seed loc partial mastectomy    • MASTECTOMY RIGHT     •      • OTHER SURGICAL HISTORY      right modified mastectomy   • OTHER SURGICAL HISTORY  12 Concern: Yes        Special Diet: Yes        Seat Belt: Yes        Self-Exams: Yes      ROS  Pertinent Positives: Shortness of breath  All other organ systems are reviewed and are negative. Constitutional and vital signs reviewed.       Social History an following components:       Result Value    Glucose 118 (*)     Potassium 5.5 (*)     BUN 67 (*)     Creatinine 9.83 (*)     BUN/CREA Ratio 6.8 (*)     Calcium, Total 7.3 (*)     Calculated Osmolality 306 (*)     GFR, Non- 3 (*)     GFR, Af [facility].     Dictated by (CST): Jaylen Bautista MD on 2/01/2021 at 6:26 AM     Finalized by (CST): Jaylen Bautista MD on 2/01/2021 at 6:27 AM          ED Medications Administered:   Medications   furosemide (LASIX) injection 40 mg ( patient and/or caregiver.       Condition upon leaving the department: Stable    Disposition and Plan     Clinical Impression:  ESRD on hemodialysis ([facility] [address]  (primary encounter diagnosis)  Acute pulmonary edema ([facility] [address]    Disposition:  Discharge    Follow-up:

## 2021-02-01 NOTE — ED NOTES
Pt provided and explained d/c instructions, at-home care, follow-up. Pt in nad at this time. Iv access d/c. Vss. Payan. Ambulatory. A&ox3. Belongings with pt. All questions and concerns addressed.

## 2021-02-01 NOTE — ED INITIAL ASSESSMENT (HPI)
Pt awoken from sleep with sob. Pt 90% on room air with ems, placed on 4l nc with max spo2 of 95-96%. Pt 88% on room air at bedside. Pt placed on 2l nc for support.

## 2021-02-02 DIAGNOSIS — Z23 NEED FOR VACCINATION: ICD-10-CM

## 2021-02-02 PROBLEM — M70.61 TROCHANTERIC BURSITIS OF RIGHT HIP: Status: RESOLVED | Noted: 2017-12-11 | Resolved: 2021-02-02

## 2021-02-02 PROBLEM — E87.6 HYPOKALEMIA: Status: RESOLVED | Noted: 2020-05-03 | Resolved: 2021-02-02

## 2021-02-02 PROBLEM — T82.858A AV FISTULA STENOSIS (HCC): Status: RESOLVED | Noted: 2021-01-23 | Resolved: 2021-02-02

## 2021-02-02 PROBLEM — S46.211A BICEPS RUPTURE, PROXIMAL, RIGHT, INITIAL ENCOUNTER: Status: RESOLVED | Noted: 2018-02-20 | Resolved: 2021-02-02

## 2021-02-02 PROBLEM — A41.9 SEPSIS DUE TO UNDETERMINED ORGANISM (HCC): Status: RESOLVED | Noted: 2020-05-02 | Resolved: 2021-02-02

## 2021-02-09 ENCOUNTER — IMMUNIZATION (OUTPATIENT)
Dept: LAB | Age: 81
End: 2021-02-09
Attending: HOSPITALIST
Payer: MEDICARE

## 2021-02-09 DIAGNOSIS — Z23 NEED FOR VACCINATION: Primary | ICD-10-CM

## 2021-02-09 PROCEDURE — 0001A SARSCOV2 VAC 30MCG/0.3ML IM: CPT

## 2021-02-11 ENCOUNTER — PRIOR ORIGINAL RECORDS (OUTPATIENT)
Dept: HEALTH INFORMATION MANAGEMENT | Facility: OTHER | Age: 81
End: 2021-02-11

## 2021-03-02 ENCOUNTER — IMMUNIZATION (OUTPATIENT)
Dept: LAB | Age: 81
End: 2021-03-02
Attending: HOSPITALIST
Payer: MEDICARE

## 2021-03-02 DIAGNOSIS — Z23 NEED FOR VACCINATION: Primary | ICD-10-CM

## 2021-03-02 PROCEDURE — 0002A SARSCOV2 VAC 30MCG/0.3ML IM: CPT

## 2021-03-28 NOTE — ASSESSMENT & PLAN NOTE
Discussed early cataracts with patient. No treatment recommended at this time. New glasses RX written for both eyes.
No treatment.
Stable; no treatment.
90

## 2021-07-02 ENCOUNTER — HOSPITAL ENCOUNTER (EMERGENCY)
Facility: HOSPITAL | Age: 81
Discharge: HOME | End: 2021-07-02
Attending: EMERGENCY MEDICINE
Payer: MEDICARE

## 2021-07-02 VITALS
HEART RATE: 60 BPM | SYSTOLIC BLOOD PRESSURE: 168 MMHG | DIASTOLIC BLOOD PRESSURE: 60 MMHG | RESPIRATION RATE: 18 BRPM | TEMPERATURE: 98 F | OXYGEN SATURATION: 99 % | WEIGHT: 134.5 LBS | BODY MASS INDEX: 24 KG/M2

## 2021-07-02 DIAGNOSIS — T82.838A BLEEDING DUE TO DIALYSIS CATHETER PLACEMENT, INITIAL ENCOUNTER (HCC): Primary | ICD-10-CM

## 2021-07-02 PROCEDURE — 99282 EMERGENCY DEPT VISIT SF MDM: CPT

## 2021-07-02 NOTE — ED PROVIDER NOTES
Patient Seen in: [facility] AND [facility] Emergency Department    History   Patient presents with:  Bleeding    Stated Complaint: bleeding fistula    HPI    Patient presents to the emergency department with complaint of bleeding to fistula.   She has been gettin regurg mild now   • HX BREAST CANCER     • LEXISCAN NUC STRESS TST, CARDIO (INTERNAL)  [facility] ; [facility] 54%   • LEXISCAN NUC STRESS TST, CARDIO (INTERNAL)  02/11/2021    [place] cardiology; EF57%; normal wall motion; fixed perf complaint: bleeding fistula  Other systems are as noted in HPI. Constitutional and vital signs reviewed. All other systems reviewed and negative except as noted above.       Physical Exam     ED Triage Vitals [07/02/21 1321]   BP (!) 174/66   Pulse 66 well.    Patient later after a couple of hours observation had no bleeding. I discussed with her to take great care with her arm and she was clear on understanding on follow-up.     ED Course   Labs Reviewed - No data to display     MDM     98% Normal  Puls

## 2021-07-08 ENCOUNTER — OFFICE VISIT (OUTPATIENT)
Dept: OPHTHALMOLOGY | Facility: CLINIC | Age: 81
End: 2021-07-08
Payer: MEDICARE

## 2021-07-08 DIAGNOSIS — H43.393 VITREOUS FLOATERS OF BOTH EYES: ICD-10-CM

## 2021-07-08 DIAGNOSIS — H25.13 AGE-RELATED NUCLEAR CATARACT OF BOTH EYES: Primary | ICD-10-CM

## 2021-07-08 PROCEDURE — 92014 COMPRE OPH EXAM EST PT 1/>: CPT | Performed by: OPHTHALMOLOGY

## 2021-07-08 PROCEDURE — 92015 DETERMINE REFRACTIVE STATE: CPT | Performed by: OPHTHALMOLOGY

## 2021-07-08 NOTE — PROGRESS NOTES
Ana Rosa Hernandez is a [de-identified]year old female. HPI:     HPI     Here for an EE and refraction. Pt complains of blurred vision at distance when driving. Pt states that her glasses are 3years old. Pt is not using any eye drops.      Last edited by Fernando Monae mod mitral regurg mild now); lumpectomy right (2016); and lexiscan nuc stress tst, cardio (internal) (02/11/2021) [facility] [place] [facility] INPAT[facility] cardiology; EF57%; normal wall motion; fixed perfusion defect inf. wall).     Family History   Problem Relation Age of Onset   • Heart Tartrate (LOPRESSOR) 50 MG Oral Tab Take 25 mg by mouth 2 (two) times daily. • Fluticasone Propionate 50 MCG/ACT Nasal Suspension 2 sprays by Each Nare route daily.  3 Bottle 3       Allergies:    Adhesive Tape           SWELLING  Docosahexaenoic Aci* Vessels Normal Normal    Periphery Normal Normal            Refraction     Wearing Rx       Sphere Cylinder Axis Add    Right +3.25 +1.25 015 +3.00    Left +2.75 +1.50 180 +3.00    Type: Progressive bifocal          Manifest Refraction       Sphere Cyli

## 2021-07-08 NOTE — PATIENT INSTRUCTIONS
Vitreous floaters of both eyes  No treatment. Age-related nuclear cataract of both eyes  Discussed with patient that cataracts in both eyes are advanced enough at this time to consider surgery; it would be patient's choice.   Discussed options such as s

## 2021-07-13 ENCOUNTER — LAB ENCOUNTER (OUTPATIENT)
Dept: LAB | Age: 81
End: 2021-07-13
Attending: SURGERY
Payer: MEDICARE

## 2021-07-13 DIAGNOSIS — Z01.818 PRE-OP TESTING: ICD-10-CM

## 2021-07-13 LAB
ALBUMIN SERPL-MCNC: 4.1 G/DL (ref 3.4–5)
ALBUMIN/GLOB SERPL: 1.2 {RATIO} (ref 1–2)
ALP LIVER SERPL-CCNC: 143 U/L
ALT SERPL-CCNC: 26 U/L
ANION GAP SERPL CALC-SCNC: 13 MMOL/L (ref 0–18)
AST SERPL-CCNC: 20 U/L (ref 15–37)
BILIRUB SERPL-MCNC: 0.5 MG/DL (ref 0.1–2)
BUN BLD-MCNC: 71 MG/DL (ref 7–18)
BUN/CREAT SERPL: 8.5 (ref 10–20)
CALCIUM BLD-MCNC: 7.8 MG/DL (ref 8.5–10.1)
CHLORIDE SERPL-SCNC: 95 MMOL/L (ref 98–112)
CO2 SERPL-SCNC: 28 MMOL/L (ref 21–32)
CREAT BLD-MCNC: 8.38 MG/DL
GLOBULIN PLAS-MCNC: 3.3 G/DL (ref 2.8–4.4)
GLUCOSE BLD-MCNC: 100 MG/DL (ref 70–99)
M PROTEIN MFR SERPL ELPH: 7.4 G/DL (ref 6.4–8.2)
OSMOLALITY SERPL CALC.SUM OF ELEC: 303 MOSM/KG (ref 275–295)
POTASSIUM SERPL-SCNC: 4.7 MMOL/L (ref 3.5–5.1)
SODIUM SERPL-SCNC: 136 MMOL/L (ref 136–145)

## 2021-07-13 PROCEDURE — 36415 COLL VENOUS BLD VENIPUNCTURE: CPT

## 2021-07-13 PROCEDURE — 80053 COMPREHEN METABOLIC PANEL: CPT

## 2021-07-17 ENCOUNTER — ANESTHESIA EVENT (OUTPATIENT)
Dept: SURGERY | Facility: HOSPITAL | Age: 81
End: 2021-07-17
Payer: MEDICARE

## 2021-07-17 ENCOUNTER — ANESTHESIA (OUTPATIENT)
Dept: SURGERY | Facility: HOSPITAL | Age: 81
End: 2021-07-17
Payer: MEDICARE

## 2021-07-17 ENCOUNTER — HOSPITAL ENCOUNTER (OUTPATIENT)
Facility: HOSPITAL | Age: 81
Setting detail: HOSPITAL OUTPATIENT SURGERY
Discharge: HOME | End: 2021-07-17
Attending: SURGERY | Admitting: SURGERY
Payer: MEDICARE

## 2021-07-17 VITALS
OXYGEN SATURATION: 94 % | SYSTOLIC BLOOD PRESSURE: 115 MMHG | HEIGHT: 63 IN | RESPIRATION RATE: 16 BRPM | TEMPERATURE: 97 F | DIASTOLIC BLOOD PRESSURE: 43 MMHG | HEART RATE: 61 BPM | BODY MASS INDEX: 23.39 KG/M2 | WEIGHT: 132 LBS

## 2021-07-17 DIAGNOSIS — Z01.818 PRE-OP TESTING: Primary | ICD-10-CM

## 2021-07-17 PROBLEM — T82.858A AV FISTULA STENOSIS (HCC): Status: ACTIVE | Noted: 2021-07-17

## 2021-07-17 LAB
BASOPHILS # BLD AUTO: 0.04 X10(3) UL (ref 0–0.2)
BASOPHILS NFR BLD AUTO: 0.5 %
DEPRECATED RDW RBC AUTO: 61.2 FL (ref 35.1–46.3)
EOSINOPHIL # BLD AUTO: 0.1 X10(3) UL (ref 0–0.7)
EOSINOPHIL NFR BLD AUTO: 1.1 %
ERYTHROCYTE [DISTWIDTH] IN BLOOD BY AUTOMATED COUNT: 17.1 % (ref 11–15)
HCT VFR BLD AUTO: 41.7 %
HGB BLD-MCNC: 13 G/DL
IMM GRANULOCYTES # BLD AUTO: 0.03 X10(3) UL (ref 0–1)
IMM GRANULOCYTES NFR BLD: 0.3 %
LYMPHOCYTES # BLD AUTO: 1.37 X10(3) UL (ref 1–4)
LYMPHOCYTES NFR BLD AUTO: 15.7 %
MCH RBC QN AUTO: 30.1 PG (ref 26–34)
MCHC RBC AUTO-ENTMCNC: 31.2 G/DL (ref 31–37)
MCV RBC AUTO: 96.5 FL
MONOCYTES # BLD AUTO: 1.06 X10(3) UL (ref 0.1–1)
MONOCYTES NFR BLD AUTO: 12.1 %
NEUTROPHILS # BLD AUTO: 6.15 X10 (3) UL (ref 1.5–7.7)
NEUTROPHILS # BLD AUTO: 6.15 X10(3) UL (ref 1.5–7.7)
NEUTROPHILS NFR BLD AUTO: 70.3 %
PLATELET # BLD AUTO: 199 10(3)UL (ref 150–450)
POTASSIUM SERPL-SCNC: 4.6 MMOL/L (ref 3.5–5.1)
RBC # BLD AUTO: 4.32 X10(6)UL
WBC # BLD AUTO: 8.8 X10(3) UL (ref 4–11)

## 2021-07-17 PROCEDURE — 84132 ASSAY OF SERUM POTASSIUM: CPT | Performed by: SURGERY

## 2021-07-17 PROCEDURE — 05WY07Z REVISION OF AUTOLOGOUS TISSUE SUBSTITUTE IN UPPER VEIN, OPEN APPROACH: ICD-10-PCS | Performed by: SURGERY

## 2021-07-17 PROCEDURE — 85025 COMPLETE CBC W/AUTO DIFF WBC: CPT | Performed by: SURGERY

## 2021-07-17 PROCEDURE — 88304 TISSUE EXAM BY PATHOLOGIST: CPT | Performed by: SURGERY

## 2021-07-17 PROCEDURE — 36415 COLL VENOUS BLD VENIPUNCTURE: CPT | Performed by: SURGERY

## 2021-07-17 RX ORDER — MORPHINE SULFATE 4 MG/ML
4 INJECTION, SOLUTION INTRAMUSCULAR; INTRAVENOUS EVERY 10 MIN PRN
Status: DISCONTINUED | OUTPATIENT
Start: 2021-07-17 | End: 2021-07-17

## 2021-07-17 RX ORDER — PROCHLORPERAZINE EDISYLATE 5 MG/ML
5 INJECTION INTRAMUSCULAR; INTRAVENOUS ONCE AS NEEDED
Status: DISCONTINUED | OUTPATIENT
Start: 2021-07-17 | End: 2021-07-17

## 2021-07-17 RX ORDER — SODIUM CHLORIDE, SODIUM LACTATE, POTASSIUM CHLORIDE, CALCIUM CHLORIDE 600; 310; 30; 20 MG/100ML; MG/100ML; MG/100ML; MG/100ML
INJECTION, SOLUTION INTRAVENOUS CONTINUOUS
Status: DISCONTINUED | OUTPATIENT
Start: 2021-07-17 | End: 2021-07-17

## 2021-07-17 RX ORDER — LIDOCAINE HYDROCHLORIDE 10 MG/ML
INJECTION, SOLUTION EPIDURAL; INFILTRATION; INTRACAUDAL; PERINEURAL AS NEEDED
Status: DISCONTINUED | OUTPATIENT
Start: 2021-07-17 | End: 2021-07-17 | Stop reason: SURG

## 2021-07-17 RX ORDER — EPHEDRINE SULFATE 50 MG/ML
INJECTION INTRAVENOUS AS NEEDED
Status: DISCONTINUED | OUTPATIENT
Start: 2021-07-17 | End: 2021-07-17 | Stop reason: SURG

## 2021-07-17 RX ORDER — HYDROCODONE BITARTRATE AND ACETAMINOPHEN 10; 325 MG/1; MG/1
1 TABLET ORAL EVERY 6 HOURS PRN
Qty: 20 TABLET | Refills: 0 | Status: SHIPPED | OUTPATIENT
Start: 2021-07-17 | End: 2021-07-22

## 2021-07-17 RX ORDER — HYDROMORPHONE HYDROCHLORIDE 1 MG/ML
0.6 INJECTION, SOLUTION INTRAMUSCULAR; INTRAVENOUS; SUBCUTANEOUS EVERY 5 MIN PRN
Status: DISCONTINUED | OUTPATIENT
Start: 2021-07-17 | End: 2021-07-17

## 2021-07-17 RX ORDER — ACETAMINOPHEN 500 MG
1000 TABLET ORAL ONCE
Status: COMPLETED | OUTPATIENT
Start: 2021-07-17 | End: 2021-07-17

## 2021-07-17 RX ORDER — MORPHINE SULFATE 10 MG/ML
6 INJECTION, SOLUTION INTRAMUSCULAR; INTRAVENOUS EVERY 10 MIN PRN
Status: DISCONTINUED | OUTPATIENT
Start: 2021-07-17 | End: 2021-07-17

## 2021-07-17 RX ORDER — HEPARIN SODIUM 1000 [USP'U]/ML
INJECTION, SOLUTION INTRAVENOUS; SUBCUTANEOUS AS NEEDED
Status: DISCONTINUED | OUTPATIENT
Start: 2021-07-17 | End: 2021-07-17 | Stop reason: SURG

## 2021-07-17 RX ORDER — SODIUM CHLORIDE 9 MG/ML
INJECTION, SOLUTION INTRAVENOUS ONCE
Status: COMPLETED | OUTPATIENT
Start: 2021-07-17 | End: 2021-07-17

## 2021-07-17 RX ORDER — BUPIVACAINE HYDROCHLORIDE 2.5 MG/ML
INJECTION, SOLUTION EPIDURAL; INFILTRATION; INTRACAUDAL AS NEEDED
Status: DISCONTINUED | OUTPATIENT
Start: 2021-07-17 | End: 2021-07-17 | Stop reason: HOSPADM

## 2021-07-17 RX ORDER — HYDROCODONE BITARTRATE AND ACETAMINOPHEN 10; 325 MG/1; MG/1
1 TABLET ORAL EVERY 4 HOURS PRN
Status: DISCONTINUED | OUTPATIENT
Start: 2021-07-17 | End: 2021-07-17

## 2021-07-17 RX ORDER — MORPHINE SULFATE 4 MG/ML
2 INJECTION, SOLUTION INTRAMUSCULAR; INTRAVENOUS EVERY 10 MIN PRN
Status: DISCONTINUED | OUTPATIENT
Start: 2021-07-17 | End: 2021-07-17

## 2021-07-17 RX ORDER — HYDROCODONE BITARTRATE AND ACETAMINOPHEN 5; 325 MG/1; MG/1
2 TABLET ORAL AS NEEDED
Status: DISCONTINUED | OUTPATIENT
Start: 2021-07-17 | End: 2021-07-17

## 2021-07-17 RX ORDER — HYDROCODONE BITARTRATE AND ACETAMINOPHEN 5; 325 MG/1; MG/1
1 TABLET ORAL AS NEEDED
Status: DISCONTINUED | OUTPATIENT
Start: 2021-07-17 | End: 2021-07-17

## 2021-07-17 RX ORDER — HALOPERIDOL 5 MG/ML
0.25 INJECTION INTRAMUSCULAR ONCE AS NEEDED
Status: DISCONTINUED | OUTPATIENT
Start: 2021-07-17 | End: 2021-07-17

## 2021-07-17 RX ORDER — HYDROMORPHONE HYDROCHLORIDE 1 MG/ML
0.4 INJECTION, SOLUTION INTRAMUSCULAR; INTRAVENOUS; SUBCUTANEOUS EVERY 5 MIN PRN
Status: DISCONTINUED | OUTPATIENT
Start: 2021-07-17 | End: 2021-07-17

## 2021-07-17 RX ORDER — NALOXONE HYDROCHLORIDE 0.4 MG/ML
80 INJECTION, SOLUTION INTRAMUSCULAR; INTRAVENOUS; SUBCUTANEOUS AS NEEDED
Status: DISCONTINUED | OUTPATIENT
Start: 2021-07-17 | End: 2021-07-17

## 2021-07-17 RX ORDER — ONDANSETRON 2 MG/ML
4 INJECTION INTRAMUSCULAR; INTRAVENOUS ONCE AS NEEDED
Status: COMPLETED | OUTPATIENT
Start: 2021-07-17 | End: 2021-07-17

## 2021-07-17 RX ORDER — HYDROMORPHONE HYDROCHLORIDE 1 MG/ML
0.2 INJECTION, SOLUTION INTRAMUSCULAR; INTRAVENOUS; SUBCUTANEOUS EVERY 5 MIN PRN
Status: DISCONTINUED | OUTPATIENT
Start: 2021-07-17 | End: 2021-07-17

## 2021-07-17 RX ORDER — SODIUM CHLORIDE 9 MG/ML
INJECTION, SOLUTION INTRAVENOUS CONTINUOUS PRN
Status: DISCONTINUED | OUTPATIENT
Start: 2021-07-17 | End: 2021-07-17 | Stop reason: SURG

## 2021-07-17 RX ORDER — ONDANSETRON 2 MG/ML
4 INJECTION INTRAMUSCULAR; INTRAVENOUS EVERY 6 HOURS PRN
Status: CANCELLED | OUTPATIENT
Start: 2021-07-17

## 2021-07-17 RX ORDER — METOPROLOL TARTRATE 5 MG/5ML
2.5 INJECTION INTRAVENOUS ONCE
Status: DISCONTINUED | OUTPATIENT
Start: 2021-07-17 | End: 2021-07-17

## 2021-07-17 RX ADMIN — SODIUM CHLORIDE: 9 INJECTION, SOLUTION INTRAVENOUS at 08:09:00

## 2021-07-17 RX ADMIN — EPHEDRINE SULFATE 10 MG: 50 INJECTION INTRAVENOUS at 09:32:00

## 2021-07-17 RX ADMIN — SODIUM CHLORIDE: 9 INJECTION, SOLUTION INTRAVENOUS at 10:01:00

## 2021-07-17 RX ADMIN — EPHEDRINE SULFATE 10 MG: 50 INJECTION INTRAVENOUS at 08:09:00

## 2021-07-17 RX ADMIN — LIDOCAINE HYDROCHLORIDE 50 MG: 10 INJECTION, SOLUTION EPIDURAL; INFILTRATION; INTRACAUDAL; PERINEURAL at 08:05:00

## 2021-07-17 RX ADMIN — HEPARIN SODIUM 3000 UNITS: 1000 INJECTION, SOLUTION INTRAVENOUS; SUBCUTANEOUS at 09:05:00

## 2021-07-17 RX ADMIN — EPHEDRINE SULFATE 10 MG: 50 INJECTION INTRAVENOUS at 08:20:00

## 2021-07-17 NOTE — ANESTHESIA PREPROCEDURE EVALUATION
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 cardio: no change except mod mitral regurg mild now   • HX BREAST CANCER     • LEXISCAN NUC STRESS TST, CARDIO (INTERNAL)  05/15 Tartrate (LOPRESSOR) 50 MG Oral Tab, Take 25 mg by mouth 2 (two) times daily.   , Disp: , Rfl: , 7/17/2021 at 0400      lactated ringers infusion, , Intravenous, Continuous, Em Muro MD  metoprolol tartrate (LOPRESSOR) tab 25 mg, 25 mg, Oral, Once PRN Substance and Sexual Activity      Alcohol use: No      Drug use: No      Sexual activity: Yes        Partners: Male        Comment:  december 2020-he had CHF     Other Topics      Concerns:         Service: No        Blood Transfusions:  Yes BUN 71 (H) 07/13/2021    CREATSERUM 8.38 (H) 07/13/2021     (H) 07/13/2021    CA 7.8 (L) 07/13/2021          Vital Signs: Body mass index is 23.38 kg/m². height is 1.6 m (5' 3\") and weight is 59.9 kg (132 lb).  Her oral temperature is 97.7 °F (36

## 2021-07-17 NOTE — ANESTHESIA PROCEDURE NOTES
Airway  Date/Time: 7/17/2021 8:05 AM  Urgency: Elective      General Information and Staff    Patient location during procedure: OR  Anesthesiologist: Hasmukh Carrasco MD  Performed: anesthesiologist     Indications and Patient Condition  Indications for ai

## 2021-07-17 NOTE — BRIEF OP NOTE
[facility] POST ANESTHESIA CARE UNIT  Brief Op Note       Patients Name: Panda Hook  Attending Physician: Rishi Leija MD  Operating Physician: Annie Kendall MD  CSN: [id number]     Location:  OR  MRN: [id number]    YOB: 1940

## 2021-07-17 NOTE — H&P
History & Physical Examination    Patient Name: Lori Mohr  MRN: [id number]  CSN: [id number]  YOB: 1940    Diagnosis: stenosis and aneurysm right arm, arteriovenous fistula, end stage renal disease, dialysis dependent    History Prese daily.  , Disp: , Rfl: 0, 7/17/2021 at 0400  aspirin 81 MG Oral Tab, Take 81 mg by mouth every other day.  , Disp: , Rfl: , 7/15/2021  Cholecalciferol (VITAMIN D) 2000 UNITS Oral Tab, Take 2 tablets by mouth daily. , Disp: 30 tablet, Rfl: 11, 7/16/2021 at 0 • ECHO 2D, CARDIO (DMG)  06/30/2020    [facility] cardio: no change except mod mitral regurg mild now   • HX BREAST CANCER     • LEXISCAN NUC STRESS TST, CARDIO (INTERNAL)  05/15/2018    [place] cardiology; normal ; EF 54%   • LEXISCAN NUC STRESS TST, CARDIO (I further operation with the patient, and the patient desires to proceed. I have made no guarantees or promises of outcomes. The patient acknowledges the risks of the procedure.  Plan repair of av fistula aneurysm and stenosis right forearm      Rowan Pringle

## 2021-07-17 NOTE — ANESTHESIA POSTPROCEDURE EVALUATION
Patient: Antwan [place] Malorie    Procedure Summary     Date: 07/17/21 Room / Location: [address] [place] [address] MAIN OR 05 / 300 [facility] OR    Anesthesia Start: 8328 Anesthesia Stop:     Procedure: repair and resection right arteriovenous fistula aneurysm and right arteriovenous st

## 2021-07-19 NOTE — OPERATIVE REPORT
[facility]    PATIENT'S NAME: Felicity Alfred BAILEY   ATTENDING PHYSICIAN: Manav Charles MD   OPERATING PHYSICIAN: Manav Charles MD   PATIENT ACCOUNT#:   [de-identified]    LOCATION:  [place] [address]  MEDICAL RECORD #:   [id number]       DATE midforearm. We made an incision obliquely over the aneurysm, exposed it, dissected it free, ligated the side branches to the point where it was relatively normal size. I had initially given 3000 units of heparin IV.   We then exposed the area of the steno in stable condition. Good thrill in the fistula. Good flow to the hand.      Dictated By Solitario Aragon MD  d: 07/17/2021 11:15:15  t: 07/17/2021 22:05:11  Job [id number]  FSW/

## 2021-09-07 ENCOUNTER — OFFICE VISIT (OUTPATIENT)
Dept: OTOLARYNGOLOGY | Facility: CLINIC | Age: 81
End: 2021-09-07
Payer: MEDICARE

## 2021-09-07 VITALS — HEIGHT: 63 IN | WEIGHT: 138 LBS | TEMPERATURE: 99 F | BODY MASS INDEX: 24.45 KG/M2

## 2021-09-07 DIAGNOSIS — H61.23 BILATERAL IMPACTED CERUMEN: Primary | ICD-10-CM

## 2021-09-07 PROCEDURE — 99213 OFFICE O/P EST LOW 20 MIN: CPT | Performed by: OTOLARYNGOLOGY

## 2021-09-07 NOTE — PROGRESS NOTES
Dong Gonzales is a [de-identified]year old female. Patient presents with:  Cerumen Impaction: patient is here for ear cleaning        HISTORY OF PRESENT ILLNESS  9/7/2021   Here for evaluation of   hearing loss.  Patient feels this has worsened over the las months Exercise per Session:   Stress:       Feeling of Stress :   Social Connections:       Frequency of Communication with Friends and Family:       Frequency of Social Gatherings with Friends and Family:       Attends [employer] Services:       Active Member of 12/04    laparoscopic   • COLONOSCOPY  6/07    c. diff colitis-Ali   • ECHO 2D DP/CLRFL, CARDIO (INTERNAL)  2016 [facility] estee    mod MR/LAE; border systolic LVEF   • ECHO 2D, CARDIO (DMG)  06/30/2020    [facility] cardio: no change except mod mitral regurg mild n Cranial nerves II through XII grossly intact. Neck Exam Normal  normal to inspection   Psychiatric Normal   Appropriate mood and affect. Eyes Normal Conjunctiva - Right: Normal, Left: Normal. Pupil - Right: Normal, Left: Normal. EOMI.  COY   Ear PLAN  1. Bilateral impacted cerumen  Patient noted improved hearing  to baseline after wax was removed      This note was partially prepared using [employer] voice recognition dictation software. As a result, errors may occur.  When identified, these er

## 2021-10-07 PROBLEM — R11.2 NAUSEA AND VOMITING IN ADULT: Status: RESOLVED | Noted: 2020-05-03 | Resolved: 2021-10-07

## 2021-10-07 PROBLEM — T82.858A AV FISTULA STENOSIS (HCC): Status: RESOLVED | Noted: 2021-07-17 | Resolved: 2021-10-07

## 2021-10-07 PROBLEM — R07.9 ACUTE CHEST PAIN: Status: RESOLVED | Noted: 2020-12-24 | Resolved: 2021-10-07

## 2021-10-07 PROBLEM — T82.838A BLEEDING PSEUDOANEURYSM OF LEFT BRACHIOCEPHALIC AV FISTULA (HCC): Status: RESOLVED | Noted: 2021-01-23 | Resolved: 2021-10-07

## 2021-10-07 PROBLEM — C50.412 MALIGNANT NEOPLASM OF UPPER-OUTER QUADRANT OF LEFT BREAST IN FEMALE, ESTROGEN RECEPTOR POSITIVE (HCC): Status: ACTIVE | Noted: 2018-12-20

## 2021-10-07 PROBLEM — Z17.0 MALIGNANT NEOPLASM OF UPPER-OUTER QUADRANT OF LEFT BREAST IN FEMALE, ESTROGEN RECEPTOR POSITIVE (HCC): Status: ACTIVE | Noted: 2018-12-20

## 2021-10-07 PROBLEM — H00.15 CHALAZION OF LEFT LOWER EYELID: Status: RESOLVED | Noted: 2017-01-19 | Resolved: 2021-10-07

## 2021-10-07 PROBLEM — Z17.0 MALIGNANT NEOPLASM OF UPPER-OUTER QUADRANT OF LEFT BREAST IN FEMALE, ESTROGEN RECEPTOR POSITIVE: Status: ACTIVE | Noted: 2018-12-20

## 2021-10-07 PROBLEM — Z17.0 MALIGNANT NEOPLASM OF UPPER-OUTER QUADRANT OF LEFT BREAST IN FEMALE, ESTROGEN RECEPTOR POSITIVE  (HCC): Status: ACTIVE | Noted: 2018-12-20

## 2021-10-07 PROBLEM — C50.412 MALIGNANT NEOPLASM OF UPPER-OUTER QUADRANT OF LEFT BREAST IN FEMALE, ESTROGEN RECEPTOR POSITIVE: Status: ACTIVE | Noted: 2018-12-20

## 2021-10-07 PROBLEM — C50.412 MALIGNANT NEOPLASM OF UPPER-OUTER QUADRANT OF LEFT BREAST IN FEMALE, ESTROGEN RECEPTOR POSITIVE  (HCC): Status: ACTIVE | Noted: 2018-12-20

## 2021-10-07 PROBLEM — T82.898A HEMODIALYSIS AV FISTULA ANEURYSM (HCC): Status: RESOLVED | Noted: 2021-01-23 | Resolved: 2021-10-07

## 2021-10-30 ENCOUNTER — LAB REQUISITION (OUTPATIENT)
Dept: SURGERY | Age: 81
End: 2021-10-30
Payer: MEDICARE

## 2021-10-30 DIAGNOSIS — Z01.818 PREOP EXAMINATION: ICD-10-CM

## 2021-11-02 ENCOUNTER — LAB REQUISITION (OUTPATIENT)
Dept: LAB | Facility: HOSPITAL | Age: 81
End: 2021-11-02
Payer: MEDICARE

## 2021-11-02 ENCOUNTER — LAB ENCOUNTER (OUTPATIENT)
Dept: LAB | Facility: HOSPITAL | Age: 81
End: 2021-11-02
Attending: ANESTHESIOLOGY
Payer: MEDICARE

## 2021-11-02 DIAGNOSIS — Z20.828 CONTACT WITH AND (SUSPECTED) EXPOSURE TO OTHER VIRAL COMMUNICABLE DISEASES: ICD-10-CM

## 2021-11-02 DIAGNOSIS — Z01.810 PREOP CARDIOVASCULAR EXAM: Primary | ICD-10-CM

## 2021-11-02 PROCEDURE — 36415 COLL VENOUS BLD VENIPUNCTURE: CPT

## 2021-11-02 PROCEDURE — 84132 ASSAY OF SERUM POTASSIUM: CPT

## 2021-12-26 ENCOUNTER — LAB REQUISITION (OUTPATIENT)
Dept: SURGERY | Age: 81
End: 2021-12-26
Payer: MEDICARE

## 2021-12-26 DIAGNOSIS — Z01.818 PREOP EXAMINATION: ICD-10-CM

## 2021-12-28 ENCOUNTER — LAB REQUISITION (OUTPATIENT)
Dept: LAB | Facility: HOSPITAL | Age: 81
End: 2021-12-28
Payer: MEDICARE

## 2021-12-28 DIAGNOSIS — Z20.828 CONTACT WITH AND (SUSPECTED) EXPOSURE TO OTHER VIRAL COMMUNICABLE DISEASES: ICD-10-CM

## 2021-12-28 LAB — POTASSIUM SERPL-SCNC: 4.8 MMOL/L (ref 3.5–5.1)

## 2021-12-28 PROCEDURE — 84132 ASSAY OF SERUM POTASSIUM: CPT

## 2022-05-10 ENCOUNTER — APPOINTMENT (OUTPATIENT)
Dept: ULTRASOUND IMAGING | Age: 82
DRG: 871 | End: 2022-05-10
Attending: HOSPITALIST

## 2022-05-10 ENCOUNTER — HOSPITAL ENCOUNTER (INPATIENT)
Age: 82
LOS: 3 days | Discharge: HOME | DRG: 871 | End: 2022-05-13
Attending: EMERGENCY MEDICINE | Admitting: HOSPITALIST

## 2022-05-10 ENCOUNTER — APPOINTMENT (OUTPATIENT)
Dept: GENERAL RADIOLOGY | Age: 82
DRG: 871 | End: 2022-05-10
Attending: EMERGENCY MEDICINE

## 2022-05-10 DIAGNOSIS — J22 ACUTE LOWER RESPIRATORY INFECTION: ICD-10-CM

## 2022-05-10 DIAGNOSIS — N18.6 END STAGE RENAL DISEASE (CMD): ICD-10-CM

## 2022-05-10 DIAGNOSIS — R79.89 ELEVATED LACTIC ACID LEVEL: Primary | ICD-10-CM

## 2022-05-10 LAB
ALBUMIN SERPL-MCNC: 3.6 G/DL (ref 3.6–5.1)
ALBUMIN/GLOB SERPL: 1 {RATIO} (ref 1–2.4)
ALP SERPL-CCNC: 302 UNITS/L (ref 45–117)
ALT SERPL-CCNC: 110 UNITS/L
ANION GAP SERPL CALC-SCNC: 16 MMOL/L (ref 10–20)
AST SERPL-CCNC: 88 UNITS/L
ATRIAL RATE (BPM): 82
BASOPHILS # BLD: 0 K/MCL (ref 0–0.3)
BASOPHILS NFR BLD: 0 %
BILIRUB SERPL-MCNC: 1.4 MG/DL (ref 0.2–1)
BUN SERPL-MCNC: 42 MG/DL (ref 6–20)
BUN/CREAT SERPL: 6 (ref 7–25)
CALCIUM SERPL-MCNC: 8.4 MG/DL (ref 8.4–10.2)
CHLORIDE SERPL-SCNC: 93 MMOL/L (ref 98–107)
CO2 SERPL-SCNC: 29 MMOL/L (ref 21–32)
CREAT SERPL-MCNC: 7.05 MG/DL (ref 0.51–0.95)
DEPRECATED RDW RBC: 60 FL (ref 39–50)
EOSINOPHIL # BLD: 0 K/MCL (ref 0–0.5)
EOSINOPHIL NFR BLD: 0 %
ERYTHROCYTE [DISTWIDTH] IN BLOOD: 17.2 % (ref 11–15)
FASTING DURATION TIME PATIENT: ABNORMAL H
FLUAV RNA RESP QL NAA+PROBE: NOT DETECTED
FLUBV RNA RESP QL NAA+PROBE: NOT DETECTED
GFR SERPLBLD BASED ON 1.73 SQ M-ARVRAT: 5 ML/MIN
GLOBULIN SER-MCNC: 3.7 G/DL (ref 2–4)
GLUCOSE SERPL-MCNC: 124 MG/DL (ref 70–99)
HCT VFR BLD CALC: 37.9 % (ref 36–46.5)
HGB BLD-MCNC: 11.6 G/DL (ref 12–15.5)
IMM GRANULOCYTES # BLD AUTO: 0.1 K/MCL (ref 0–0.2)
IMM GRANULOCYTES # BLD: 1 %
LACTATE BLDV-SCNC: 2 MMOL/L (ref 0–2)
LACTATE BLDV-SCNC: 2.4 MMOL/L
LYMPHOCYTES # BLD: 0.3 K/MCL (ref 1–4)
LYMPHOCYTES NFR BLD: 2 %
MCH RBC QN AUTO: 29.1 PG (ref 26–34)
MCHC RBC AUTO-ENTMCNC: 30.6 G/DL (ref 32–36.5)
MCV RBC AUTO: 95.2 FL (ref 78–100)
MONOCYTES # BLD: 1.1 K/MCL (ref 0.3–0.9)
MONOCYTES NFR BLD: 7 %
NEUTROPHILS # BLD: 13.1 K/MCL (ref 1.8–7.7)
NEUTROPHILS NFR BLD: 90 %
NRBC BLD MANUAL-RTO: 0 /100 WBC
P AXIS (DEGREES): 47
PLATELET # BLD AUTO: 145 K/MCL (ref 140–450)
POTASSIUM SERPL-SCNC: 5.9 MMOL/L (ref 3.4–5.1)
PR-INTERVAL (MSEC): 110
PROCALCITONIN SERPL IA-MCNC: 6.98 NG/ML
PROT SERPL-MCNC: 7.3 G/DL (ref 6.4–8.2)
QRS-INTERVAL (MSEC): 124
QT-INTERVAL (MSEC): 452
QTC: 528
R AXIS (DEGREES): -55
RAINBOW EXTRA TUBES HOLD SPECIMEN: NORMAL
RAINBOW EXTRA TUBES HOLD SPECIMEN: NORMAL
RBC # BLD: 3.98 MIL/MCL (ref 4–5.2)
REPORT TEXT: NORMAL
RSV AG NPH QL IA.RAPID: NOT DETECTED
SARS-COV-2 RNA RESP QL NAA+PROBE: NOT DETECTED
SERVICE CMNT-IMP: NORMAL
SERVICE CMNT-IMP: NORMAL
SODIUM SERPL-SCNC: 132 MMOL/L (ref 135–145)
T AXIS (DEGREES): 62
TROPONIN I SERPL DL<=0.01 NG/ML-MCNC: 37 NG/L
VENTRICULAR RATE EKG/MIN (BPM): 82
WBC # BLD: 14.6 K/MCL (ref 4.2–11)

## 2022-05-10 PROCEDURE — 10002800 HB RX 250 W HCPCS: Performed by: EMERGENCY MEDICINE

## 2022-05-10 PROCEDURE — 96365 THER/PROPH/DIAG IV INF INIT: CPT

## 2022-05-10 PROCEDURE — 10002807 HB RX 258: Performed by: INTERNAL MEDICINE

## 2022-05-10 PROCEDURE — 10002801 HB RX 250 W/O HCPCS: Performed by: EMERGENCY MEDICINE

## 2022-05-10 PROCEDURE — 10002803 HB RX 637: Performed by: HOSPITALIST

## 2022-05-10 PROCEDURE — 71045 X-RAY EXAM CHEST 1 VIEW: CPT

## 2022-05-10 PROCEDURE — 84484 ASSAY OF TROPONIN QUANT: CPT | Performed by: EMERGENCY MEDICINE

## 2022-05-10 PROCEDURE — 10003585 HB ROOM CHARGE INTERMEDIATE CARE

## 2022-05-10 PROCEDURE — 87340 HEPATITIS B SURFACE AG IA: CPT | Performed by: INTERNAL MEDICINE

## 2022-05-10 PROCEDURE — 10002807 HB RX 258: Performed by: EMERGENCY MEDICINE

## 2022-05-10 PROCEDURE — 85025 COMPLETE CBC W/AUTO DIFF WBC: CPT | Performed by: EMERGENCY MEDICINE

## 2022-05-10 PROCEDURE — 83605 ASSAY OF LACTIC ACID: CPT

## 2022-05-10 PROCEDURE — 76705 ECHO EXAM OF ABDOMEN: CPT

## 2022-05-10 PROCEDURE — 36415 COLL VENOUS BLD VENIPUNCTURE: CPT

## 2022-05-10 PROCEDURE — 84145 PROCALCITONIN (PCT): CPT | Performed by: HOSPITALIST

## 2022-05-10 PROCEDURE — 10004651 HB RX, NO CHARGE ITEM: Performed by: HOSPITALIST

## 2022-05-10 PROCEDURE — 90935 HEMODIALYSIS ONE EVALUATION: CPT

## 2022-05-10 PROCEDURE — 93005 ELECTROCARDIOGRAM TRACING: CPT | Performed by: EMERGENCY MEDICINE

## 2022-05-10 PROCEDURE — 5A1D70Z PERFORMANCE OF URINARY FILTRATION, INTERMITTENT, LESS THAN 6 HOURS PER DAY: ICD-10-PCS | Performed by: INTERNAL MEDICINE

## 2022-05-10 PROCEDURE — 87040 BLOOD CULTURE FOR BACTERIA: CPT | Performed by: EMERGENCY MEDICINE

## 2022-05-10 PROCEDURE — 10002800 HB RX 250 W HCPCS: Performed by: HOSPITALIST

## 2022-05-10 PROCEDURE — 10002801 HB RX 250 W/O HCPCS: Performed by: HOSPITALIST

## 2022-05-10 PROCEDURE — 80053 COMPREHEN METABOLIC PANEL: CPT | Performed by: EMERGENCY MEDICINE

## 2022-05-10 PROCEDURE — 99284 EMERGENCY DEPT VISIT MOD MDM: CPT

## 2022-05-10 PROCEDURE — 96375 TX/PRO/DX INJ NEW DRUG ADDON: CPT

## 2022-05-10 PROCEDURE — C9803 HOPD COVID-19 SPEC COLLECT: HCPCS

## 2022-05-10 PROCEDURE — 0241U COVID/FLU/RSV PANEL: CPT | Performed by: EMERGENCY MEDICINE

## 2022-05-10 PROCEDURE — 84443 ASSAY THYROID STIM HORMONE: CPT | Performed by: SPECIALIST

## 2022-05-10 RX ORDER — POLYETHYLENE GLYCOL 3350 17 G/17G
17 POWDER, FOR SOLUTION ORAL DAILY PRN
Status: DISCONTINUED | OUTPATIENT
Start: 2022-05-10 | End: 2022-05-13 | Stop reason: HOSPADM

## 2022-05-10 RX ORDER — ESOMEPRAZOLE MAGNESIUM 40 MG/1
40 CAPSULE, DELAYED RELEASE ORAL
COMMUNITY
Start: 2022-04-07

## 2022-05-10 RX ORDER — LORATADINE 10 MG/1
10 TABLET ORAL DAILY
Status: DISCONTINUED | OUTPATIENT
Start: 2022-05-10 | End: 2022-05-13 | Stop reason: HOSPADM

## 2022-05-10 RX ORDER — ACETAMINOPHEN 500 MG
1000 TABLET ORAL EVERY 6 HOURS PRN
COMMUNITY

## 2022-05-10 RX ORDER — HEPARIN SODIUM 5000 [USP'U]/ML
5000 INJECTION, SOLUTION INTRAVENOUS; SUBCUTANEOUS EVERY 8 HOURS SCHEDULED
Status: DISCONTINUED | OUTPATIENT
Start: 2022-05-10 | End: 2022-05-13

## 2022-05-10 RX ORDER — WARFARIN SODIUM 1 MG/1
2 TABLET ORAL
COMMUNITY
End: 2022-05-10

## 2022-05-10 RX ORDER — MAGNESIUM HYDROXIDE/ALUMINUM HYDROXICE/SIMETHICONE 120; 1200; 1200 MG/30ML; MG/30ML; MG/30ML
30 SUSPENSION ORAL EVERY 4 HOURS PRN
Status: DISCONTINUED | OUTPATIENT
Start: 2022-05-10 | End: 2022-05-13 | Stop reason: HOSPADM

## 2022-05-10 RX ORDER — ACETAMINOPHEN 500 MG
1000 TABLET ORAL EVERY 6 HOURS PRN
Status: DISCONTINUED | OUTPATIENT
Start: 2022-05-10 | End: 2022-05-13 | Stop reason: HOSPADM

## 2022-05-10 RX ORDER — ONDANSETRON 2 MG/ML
4 INJECTION INTRAMUSCULAR; INTRAVENOUS ONCE
Status: COMPLETED | OUTPATIENT
Start: 2022-05-10 | End: 2022-05-10

## 2022-05-10 RX ORDER — ATORVASTATIN CALCIUM 10 MG/1
5 TABLET, FILM COATED ORAL DAILY
COMMUNITY
End: 2022-06-14 | Stop reason: SDUPTHER

## 2022-05-10 RX ORDER — SEVELAMER CARBONATE 800 MG/1
1600 TABLET, FILM COATED ORAL
Status: DISCONTINUED | OUTPATIENT
Start: 2022-05-10 | End: 2022-05-13 | Stop reason: HOSPADM

## 2022-05-10 RX ORDER — 0.9 % SODIUM CHLORIDE 0.9 %
2 VIAL (ML) INJECTION EVERY 12 HOURS SCHEDULED
Status: DISCONTINUED | OUTPATIENT
Start: 2022-05-10 | End: 2022-05-13 | Stop reason: HOSPADM

## 2022-05-10 RX ORDER — AMIODARONE HYDROCHLORIDE 200 MG/1
200 TABLET ORAL DAILY
Status: ON HOLD | COMMUNITY
End: 2022-05-13 | Stop reason: HOSPADM

## 2022-05-10 RX ORDER — CHOLECALCIFEROL (VITAMIN D3) 125 MCG
50 TABLET ORAL DAILY
COMMUNITY

## 2022-05-10 RX ORDER — LORATADINE 10 MG/1
10 TABLET ORAL DAILY
COMMUNITY

## 2022-05-10 RX ORDER — ASPIRIN 81 MG/1
81 TABLET ORAL DAILY
Status: ON HOLD | COMMUNITY
End: 2022-05-13 | Stop reason: HOSPADM

## 2022-05-10 RX ORDER — ONDANSETRON 2 MG/ML
4 INJECTION INTRAMUSCULAR; INTRAVENOUS 2 TIMES DAILY PRN
Status: DISCONTINUED | OUTPATIENT
Start: 2022-05-10 | End: 2022-05-13 | Stop reason: HOSPADM

## 2022-05-10 RX ORDER — AMLODIPINE BESYLATE 5 MG/1
5 TABLET ORAL DAILY
Status: ON HOLD | COMMUNITY
End: 2022-05-13 | Stop reason: HOSPADM

## 2022-05-10 RX ORDER — CEFAZOLIN SODIUM/WATER 1 G/10 ML
1000 SYRINGE (ML) INTRAVENOUS ONCE
Status: COMPLETED | OUTPATIENT
Start: 2022-05-10 | End: 2022-05-10

## 2022-05-10 RX ORDER — AMIODARONE HYDROCHLORIDE 200 MG/1
200 TABLET ORAL DAILY
Status: DISCONTINUED | OUTPATIENT
Start: 2022-05-11 | End: 2022-05-11

## 2022-05-10 RX ORDER — MONTELUKAST SODIUM 10 MG/1
10 TABLET ORAL NIGHTLY
Status: DISCONTINUED | OUTPATIENT
Start: 2022-05-10 | End: 2022-05-13 | Stop reason: HOSPADM

## 2022-05-10 RX ORDER — AMOXICILLIN 250 MG
2 CAPSULE ORAL DAILY PRN
Status: DISCONTINUED | OUTPATIENT
Start: 2022-05-10 | End: 2022-05-13 | Stop reason: HOSPADM

## 2022-05-10 RX ORDER — PANTOPRAZOLE SODIUM 40 MG/1
40 TABLET, DELAYED RELEASE ORAL
Status: DISCONTINUED | OUTPATIENT
Start: 2022-05-11 | End: 2022-05-13 | Stop reason: HOSPADM

## 2022-05-10 RX ORDER — BISACODYL 10 MG
10 SUPPOSITORY, RECTAL RECTAL DAILY PRN
Status: DISCONTINUED | OUTPATIENT
Start: 2022-05-10 | End: 2022-05-13 | Stop reason: HOSPADM

## 2022-05-10 RX ORDER — LEVOTHYROXINE SODIUM 0.03 MG/1
25 TABLET ORAL DAILY
COMMUNITY
End: 2022-08-15

## 2022-05-10 RX ORDER — CEFAZOLIN SODIUM/WATER 1 G/10 ML
1000 SYRINGE (ML) INTRAVENOUS DAILY
Status: DISCONTINUED | OUTPATIENT
Start: 2022-05-11 | End: 2022-05-13 | Stop reason: HOSPADM

## 2022-05-10 RX ORDER — PROCHLORPERAZINE EDISYLATE 5 MG/ML
5 INJECTION INTRAMUSCULAR; INTRAVENOUS EVERY 4 HOURS PRN
Status: DISCONTINUED | OUTPATIENT
Start: 2022-05-10 | End: 2022-05-13 | Stop reason: HOSPADM

## 2022-05-10 RX ORDER — DOXYCYCLINE HYCLATE 100 MG/1
100 CAPSULE ORAL EVERY 12 HOURS SCHEDULED
Status: DISCONTINUED | OUTPATIENT
Start: 2022-05-10 | End: 2022-05-13 | Stop reason: HOSPADM

## 2022-05-10 RX ORDER — SEVELAMER CARBONATE 800 MG/1
1600 TABLET, FILM COATED ORAL
COMMUNITY

## 2022-05-10 RX ORDER — MONTELUKAST SODIUM 10 MG/1
1 TABLET ORAL NIGHTLY
COMMUNITY
Start: 2021-11-16

## 2022-05-10 RX ORDER — LEVOTHYROXINE SODIUM 0.03 MG/1
25 TABLET ORAL
Status: DISCONTINUED | OUTPATIENT
Start: 2022-05-11 | End: 2022-05-13 | Stop reason: HOSPADM

## 2022-05-10 RX ORDER — AMLODIPINE BESYLATE 5 MG/1
5 TABLET ORAL DAILY
Status: DISCONTINUED | OUTPATIENT
Start: 2022-05-10 | End: 2022-05-12

## 2022-05-10 RX ORDER — FLUTICASONE PROPIONATE 44 UG/1
2 AEROSOL, METERED RESPIRATORY (INHALATION) DAILY
COMMUNITY
End: 2022-05-10

## 2022-05-10 RX ORDER — SODIUM CHLORIDE 9 MG/ML
INJECTION, SOLUTION INTRAVENOUS CONTINUOUS
Status: DISCONTINUED | OUTPATIENT
Start: 2022-05-10 | End: 2022-05-11

## 2022-05-10 RX ADMIN — ONDANSETRON 4 MG: 2 INJECTION INTRAMUSCULAR; INTRAVENOUS at 12:10

## 2022-05-10 RX ADMIN — SODIUM CHLORIDE, PRESERVATIVE FREE 2 ML: 5 INJECTION INTRAVENOUS at 21:59

## 2022-05-10 RX ADMIN — ATORVASTATIN CALCIUM 5 MG: 10 TABLET, FILM COATED ORAL at 21:39

## 2022-05-10 RX ADMIN — HEPARIN SODIUM 5000 UNITS: 5000 INJECTION INTRAVENOUS; SUBCUTANEOUS at 21:39

## 2022-05-10 RX ADMIN — CEFTRIAXONE SODIUM 1000 MG: 10 INJECTION, POWDER, FOR SOLUTION INTRAVENOUS at 12:55

## 2022-05-10 RX ADMIN — DOXYCYCLINE 100 MG: 100 INJECTION, POWDER, LYOPHILIZED, FOR SOLUTION INTRAVENOUS at 13:18

## 2022-05-10 RX ADMIN — METOPROLOL TARTRATE 25 MG: 25 TABLET, FILM COATED ORAL at 21:39

## 2022-05-10 RX ADMIN — ACETAMINOPHEN 1000 MG: 500 TABLET ORAL at 21:45

## 2022-05-10 RX ADMIN — DOXYCYCLINE 100 MG: 100 CAPSULE ORAL at 21:39

## 2022-05-10 RX ADMIN — METRONIDAZOLE 500 MG: 500 INJECTION, SOLUTION INTRAVENOUS at 21:52

## 2022-05-10 RX ADMIN — MONTELUKAST SODIUM 10 MG: 10 TABLET, FILM COATED ORAL at 21:39

## 2022-05-10 RX ADMIN — SODIUM CHLORIDE: 9 INJECTION, SOLUTION INTRAVENOUS at 21:50

## 2022-05-10 ASSESSMENT — ACTIVITIES OF DAILY LIVING (ADL)
ADL_BEFORE_ADMISSION: INDEPENDENT
CHRONIC_PAIN_PRESENT: NO
RECENT_DECLINE_ADL: NO
ADL_SCORE: 12
ADL_SHORT_OF_BREATH: YES

## 2022-05-10 ASSESSMENT — COGNITIVE AND FUNCTIONAL STATUS - GENERAL
ARE YOU BLIND OR DO YOU HAVE SERIOUS DIFFICULTY SEEING, EVEN WHEN WEARING GLASSES: NO
DO YOU HAVE DIFFICULTY DRESSING OR BATHING: NO
BECAUSE OF A PHYSICAL, MENTAL, OR EMOTIONAL CONDITION, DO YOU HAVE DIFFICULTY DOING ERRANDS ALONE: NO
BECAUSE OF A PHYSICAL, MENTAL, OR EMOTIONAL CONDITION, DO YOU HAVE SERIOUS DIFFICULTY CONCENTRATING, REMEMBERING OR MAKING DECISIONS: NO
ARE YOU DEAF OR DO YOU HAVE SERIOUS DIFFICULTY  HEARING: YES
DO YOU HAVE SERIOUS DIFFICULTY WALKING OR CLIMBING STAIRS: NO

## 2022-05-10 ASSESSMENT — COLUMBIA-SUICIDE SEVERITY RATING SCALE - C-SSRS
IS THE PATIENT ABLE TO COMPLETE C-SSRS: YES
1. WITHIN THE PAST MONTH, HAVE YOU WISHED YOU WERE DEAD OR WISHED YOU COULD GO TO SLEEP AND NOT WAKE UP?: NO
2. HAVE YOU ACTUALLY HAD ANY THOUGHTS OF KILLING YOURSELF?: NO
6. HAVE YOU EVER DONE ANYTHING, STARTED TO DO ANYTHING, OR PREPARED TO DO ANYTHING TO END YOUR LIFE?: NO

## 2022-05-10 ASSESSMENT — PATIENT HEALTH QUESTIONNAIRE - PHQ9
SUM OF ALL RESPONSES TO PHQ9 QUESTIONS 1 AND 2: 0
IS PATIENT ABLE TO COMPLETE PHQ2 OR PHQ9: YES
1. LITTLE INTEREST OR PLEASURE IN DOING THINGS: NOT AT ALL
SUM OF ALL RESPONSES TO PHQ9 QUESTIONS 1 AND 2: 0
CLINICAL INTERPRETATION OF PHQ2 SCORE: NO FURTHER SCREENING NEEDED
2. FEELING DOWN, DEPRESSED OR HOPELESS: NOT AT ALL

## 2022-05-10 ASSESSMENT — ENCOUNTER SYMPTOMS
ACTIVITY CHANGE: 1
PSYCHIATRIC NEGATIVE: 1
CHILLS: 1
APPETITE CHANGE: 1
COUGH: 1
EYES NEGATIVE: 1
NEUROLOGICAL NEGATIVE: 1
NAUSEA: 1
VOMITING: 1

## 2022-05-10 ASSESSMENT — PAIN SCALES - GENERAL
PAINLEVEL_OUTOF10: 5
PAINLEVEL_OUTOF10: 0
PAINLEVEL_OUTOF10: 0

## 2022-05-10 ASSESSMENT — LIFESTYLE VARIABLES
AUDIT-C TOTAL SCORE: 0
ALCOHOL_USE_STATUS: NO OR LOW RISK WITH VALIDATED TOOL
HOW OFTEN DO YOU HAVE A DRINK CONTAINING ALCOHOL: NEVER
HOW MANY STANDARD DRINKS CONTAINING ALCOHOL DO YOU HAVE ON A TYPICAL DAY: 0,1 OR 2
HOW OFTEN DO YOU HAVE 6 OR MORE DRINKS ON ONE OCCASION: NEVER

## 2022-05-11 ENCOUNTER — APPOINTMENT (OUTPATIENT)
Dept: CARDIOLOGY | Age: 82
DRG: 871 | End: 2022-05-11
Attending: SPECIALIST

## 2022-05-11 ENCOUNTER — APPOINTMENT (OUTPATIENT)
Dept: DIALYSIS | Age: 82
DRG: 871 | End: 2022-05-11
Attending: INTERNAL MEDICINE

## 2022-05-11 ENCOUNTER — APPOINTMENT (OUTPATIENT)
Dept: GENERAL RADIOLOGY | Age: 82
DRG: 871 | End: 2022-05-11
Attending: HOSPITALIST

## 2022-05-11 LAB
ALBUMIN SERPL-MCNC: 2.8 G/DL (ref 3.6–5.1)
ALBUMIN/GLOB SERPL: 0.8 {RATIO} (ref 1–2.4)
ALP SERPL-CCNC: 220 UNITS/L (ref 45–117)
ALT SERPL-CCNC: 70 UNITS/L
ANION GAP SERPL CALC-SCNC: 11 MMOL/L (ref 10–20)
AST SERPL-CCNC: 45 UNITS/L
BASOPHILS # BLD: 0 K/MCL (ref 0–0.3)
BASOPHILS NFR BLD: 0 %
BILIRUB SERPL-MCNC: 0.8 MG/DL (ref 0.2–1)
BUN SERPL-MCNC: 25 MG/DL (ref 6–20)
BUN/CREAT SERPL: 5 (ref 7–25)
CALCIUM SERPL-MCNC: 8.2 MG/DL (ref 8.4–10.2)
CHLORIDE SERPL-SCNC: 103 MMOL/L (ref 98–107)
CO2 SERPL-SCNC: 29 MMOL/L (ref 21–32)
CREAT SERPL-MCNC: 4.58 MG/DL (ref 0.51–0.95)
DEPRECATED RDW RBC: 62.4 FL (ref 39–50)
EOSINOPHIL # BLD: 0 K/MCL (ref 0–0.5)
EOSINOPHIL NFR BLD: 0 %
ERYTHROCYTE [DISTWIDTH] IN BLOOD: 17.3 % (ref 11–15)
FASTING DURATION TIME PATIENT: ABNORMAL H
GFR SERPLBLD BASED ON 1.73 SQ M-ARVRAT: 9 ML/MIN
GLOBULIN SER-MCNC: 3.4 G/DL (ref 2–4)
GLUCOSE SERPL-MCNC: 114 MG/DL (ref 70–99)
HBV SURFACE AG SER QL: NEGATIVE
HCT VFR BLD CALC: 36.1 % (ref 36–46.5)
HGB BLD-MCNC: 10.7 G/DL (ref 12–15.5)
IMM GRANULOCYTES # BLD AUTO: 0.1 K/MCL (ref 0–0.2)
IMM GRANULOCYTES # BLD: 1 %
LACTATE BLDV-SCNC: 1.3 MMOL/L (ref 0–2)
LYMPHOCYTES # BLD: 0.6 K/MCL (ref 1–4)
LYMPHOCYTES NFR BLD: 7 %
MAGNESIUM SERPL-MCNC: 2.2 MG/DL (ref 1.7–2.4)
MCH RBC QN AUTO: 28.9 PG (ref 26–34)
MCHC RBC AUTO-ENTMCNC: 29.6 G/DL (ref 32–36.5)
MCV RBC AUTO: 97.6 FL (ref 78–100)
MONOCYTES # BLD: 1.1 K/MCL (ref 0.3–0.9)
MONOCYTES NFR BLD: 13 %
NEUTROPHILS # BLD: 6.9 K/MCL (ref 1.8–7.7)
NEUTROPHILS NFR BLD: 79 %
NRBC BLD MANUAL-RTO: 0 /100 WBC
PLATELET # BLD AUTO: 104 K/MCL (ref 140–450)
POTASSIUM SERPL-SCNC: 4.7 MMOL/L (ref 3.4–5.1)
PROCALCITONIN SERPL IA-MCNC: 36.67 NG/ML
PROT SERPL-MCNC: 6.2 G/DL (ref 6.4–8.2)
RBC # BLD: 3.7 MIL/MCL (ref 4–5.2)
SODIUM SERPL-SCNC: 138 MMOL/L (ref 135–145)
WBC # BLD: 8.7 K/MCL (ref 4.2–11)

## 2022-05-11 PROCEDURE — 10002803 HB RX 637: Performed by: HOSPITALIST

## 2022-05-11 PROCEDURE — 80053 COMPREHEN METABOLIC PANEL: CPT | Performed by: HOSPITALIST

## 2022-05-11 PROCEDURE — 83735 ASSAY OF MAGNESIUM: CPT | Performed by: HOSPITALIST

## 2022-05-11 PROCEDURE — 36415 COLL VENOUS BLD VENIPUNCTURE: CPT | Performed by: HOSPITALIST

## 2022-05-11 PROCEDURE — 87641 MR-STAPH DNA AMP PROBE: CPT | Performed by: HOSPITALIST

## 2022-05-11 PROCEDURE — 13003289 HB OXYGEN THERAPY DAILY

## 2022-05-11 PROCEDURE — 84145 PROCALCITONIN (PCT): CPT | Performed by: HOSPITALIST

## 2022-05-11 PROCEDURE — 74230 X-RAY XM SWLNG FUNCJ C+: CPT

## 2022-05-11 PROCEDURE — 10002801 HB RX 250 W/O HCPCS: Performed by: HOSPITALIST

## 2022-05-11 PROCEDURE — 90935 HEMODIALYSIS ONE EVALUATION: CPT

## 2022-05-11 PROCEDURE — 85025 COMPLETE CBC W/AUTO DIFF WBC: CPT | Performed by: HOSPITALIST

## 2022-05-11 PROCEDURE — 10002800 HB RX 250 W HCPCS: Performed by: HOSPITALIST

## 2022-05-11 PROCEDURE — 92610 EVALUATE SWALLOWING FUNCTION: CPT

## 2022-05-11 PROCEDURE — 83605 ASSAY OF LACTIC ACID: CPT | Performed by: HOSPITALIST

## 2022-05-11 PROCEDURE — 10004651 HB RX, NO CHARGE ITEM: Performed by: HOSPITALIST

## 2022-05-11 PROCEDURE — 92611 MOTION FLUOROSCOPY/SWALLOW: CPT

## 2022-05-11 PROCEDURE — 10002803 HB RX 637: Performed by: SPECIALIST

## 2022-05-11 PROCEDURE — 93325 DOPPLER ECHO COLOR FLOW MAPG: CPT

## 2022-05-11 PROCEDURE — 10003585 HB ROOM CHARGE INTERMEDIATE CARE

## 2022-05-11 RX ORDER — METOPROLOL TARTRATE 50 MG/1
50 TABLET, FILM COATED ORAL 2 TIMES DAILY
Status: DISCONTINUED | OUTPATIENT
Start: 2022-05-11 | End: 2022-05-12

## 2022-05-11 RX ADMIN — HEPARIN SODIUM 5000 UNITS: 5000 INJECTION INTRAVENOUS; SUBCUTANEOUS at 22:14

## 2022-05-11 RX ADMIN — METOPROLOL TARTRATE 50 MG: 50 TABLET, FILM COATED ORAL at 11:35

## 2022-05-11 RX ADMIN — CEFTRIAXONE SODIUM 1000 MG: 10 INJECTION, POWDER, FOR SOLUTION INTRAVENOUS at 08:49

## 2022-05-11 RX ADMIN — DOXYCYCLINE 100 MG: 100 CAPSULE ORAL at 20:02

## 2022-05-11 RX ADMIN — DOXYCYCLINE 100 MG: 100 CAPSULE ORAL at 08:50

## 2022-05-11 RX ADMIN — MONTELUKAST SODIUM 10 MG: 10 TABLET, FILM COATED ORAL at 20:02

## 2022-05-11 RX ADMIN — METOPROLOL TARTRATE 50 MG: 50 TABLET, FILM COATED ORAL at 20:02

## 2022-05-11 RX ADMIN — METRONIDAZOLE 500 MG: 500 INJECTION, SOLUTION INTRAVENOUS at 06:08

## 2022-05-11 RX ADMIN — ACETAMINOPHEN 1000 MG: 500 TABLET ORAL at 08:49

## 2022-05-11 RX ADMIN — SODIUM CHLORIDE, PRESERVATIVE FREE 2 ML: 5 INJECTION INTRAVENOUS at 08:52

## 2022-05-11 RX ADMIN — AMLODIPINE BESYLATE 5 MG: 5 TABLET ORAL at 10:04

## 2022-05-11 RX ADMIN — ASPIRIN 81 MG: 81 TABLET, COATED ORAL at 08:50

## 2022-05-11 RX ADMIN — LORATADINE 10 MG: 10 TABLET ORAL at 08:50

## 2022-05-11 RX ADMIN — ATORVASTATIN CALCIUM 5 MG: 10 TABLET, FILM COATED ORAL at 20:02

## 2022-05-11 RX ADMIN — SEVELAMER CARBONATE 1600 MG: 800 TABLET, FILM COATED ORAL at 18:26

## 2022-05-11 RX ADMIN — SODIUM CHLORIDE, PRESERVATIVE FREE 2 ML: 5 INJECTION INTRAVENOUS at 20:02

## 2022-05-11 RX ADMIN — METRONIDAZOLE 500 MG: 500 INJECTION, SOLUTION INTRAVENOUS at 22:14

## 2022-05-11 RX ADMIN — SEVELAMER CARBONATE 1600 MG: 800 TABLET, FILM COATED ORAL at 12:14

## 2022-05-11 RX ADMIN — ACETAMINOPHEN 1000 MG: 500 TABLET ORAL at 19:52

## 2022-05-11 RX ADMIN — METRONIDAZOLE 500 MG: 500 INJECTION, SOLUTION INTRAVENOUS at 15:39

## 2022-05-11 ASSESSMENT — COGNITIVE AND FUNCTIONAL STATUS - GENERAL
APPLIED_COGNITIVE_RAW_SCORE: 22
UNDERSTANDING 10 TO 15 MIN SPEECH: A LITTLE
FOLLOWS FAMILIAR CONVERSATION: A LITTLE
APPLIED_COGNITIVE_CONVERTED_SCORE: 47.83

## 2022-05-11 ASSESSMENT — PAIN SCALES - GENERAL
PAINLEVEL_OUTOF10: 0
PAINLEVEL_OUTOF10: 7
PAINLEVEL_OUTOF10: 1
PAINLEVEL_OUTOF10: 7

## 2022-05-12 ENCOUNTER — CASE MANAGEMENT (OUTPATIENT)
Dept: CARE COORDINATION | Age: 82
End: 2022-05-12

## 2022-05-12 ENCOUNTER — APPOINTMENT (OUTPATIENT)
Dept: GENERAL RADIOLOGY | Age: 82
DRG: 871 | End: 2022-05-12
Attending: HOSPITALIST

## 2022-05-12 LAB
ANION GAP SERPL CALC-SCNC: 11 MMOL/L (ref 10–20)
BASOPHILS # BLD: 0 K/MCL (ref 0–0.3)
BASOPHILS NFR BLD: 0 %
BUN SERPL-MCNC: 23 MG/DL (ref 6–20)
BUN/CREAT SERPL: 6 (ref 7–25)
CALCIUM SERPL-MCNC: 8.6 MG/DL (ref 8.4–10.2)
CHLORIDE SERPL-SCNC: 102 MMOL/L (ref 98–107)
CO2 SERPL-SCNC: 29 MMOL/L (ref 21–32)
CREAT SERPL-MCNC: 3.63 MG/DL (ref 0.51–0.95)
DEPRECATED RDW RBC: 60.7 FL (ref 39–50)
EOSINOPHIL # BLD: 0.1 K/MCL (ref 0–0.5)
EOSINOPHIL NFR BLD: 2 %
ERYTHROCYTE [DISTWIDTH] IN BLOOD: 16.9 % (ref 11–15)
FASTING DURATION TIME PATIENT: ABNORMAL H
GFR SERPLBLD BASED ON 1.73 SQ M-ARVRAT: 12 ML/MIN
GLUCOSE SERPL-MCNC: 117 MG/DL (ref 70–99)
HCT VFR BLD CALC: 35.2 % (ref 36–46.5)
HGB BLD-MCNC: 10.4 G/DL (ref 12–15.5)
IMM GRANULOCYTES # BLD AUTO: 0 K/MCL (ref 0–0.2)
IMM GRANULOCYTES # BLD: 1 %
LYMPHOCYTES # BLD: 0.5 K/MCL (ref 1–4)
LYMPHOCYTES NFR BLD: 8 %
MCH RBC QN AUTO: 28.5 PG (ref 26–34)
MCHC RBC AUTO-ENTMCNC: 29.5 G/DL (ref 32–36.5)
MCV RBC AUTO: 96.4 FL (ref 78–100)
MONOCYTES # BLD: 0.9 K/MCL (ref 0.3–0.9)
MONOCYTES NFR BLD: 12 %
MRSA DNA SPEC QL NAA+PROBE: NOT DETECTED
NEUTROPHILS # BLD: 5.5 K/MCL (ref 1.8–7.7)
NEUTROPHILS NFR BLD: 77 %
NRBC BLD MANUAL-RTO: 0 /100 WBC
PLATELET # BLD AUTO: 120 K/MCL (ref 140–450)
POTASSIUM SERPL-SCNC: 4 MMOL/L (ref 3.4–5.1)
PROCALCITONIN SERPL IA-MCNC: 38.73 NG/ML
RBC # BLD: 3.65 MIL/MCL (ref 4–5.2)
SODIUM SERPL-SCNC: 138 MMOL/L (ref 135–145)
WBC # BLD: 7.1 K/MCL (ref 4.2–11)

## 2022-05-12 PROCEDURE — 10002803 HB RX 637: Performed by: HOSPITALIST

## 2022-05-12 PROCEDURE — 10002800 HB RX 250 W HCPCS: Performed by: HOSPITALIST

## 2022-05-12 PROCEDURE — 71046 X-RAY EXAM CHEST 2 VIEWS: CPT

## 2022-05-12 PROCEDURE — 92526 ORAL FUNCTION THERAPY: CPT

## 2022-05-12 PROCEDURE — 84145 PROCALCITONIN (PCT): CPT | Performed by: HOSPITALIST

## 2022-05-12 PROCEDURE — 36415 COLL VENOUS BLD VENIPUNCTURE: CPT | Performed by: HOSPITALIST

## 2022-05-12 PROCEDURE — 10002803 HB RX 637: Performed by: SPECIALIST

## 2022-05-12 PROCEDURE — 10003585 HB ROOM CHARGE INTERMEDIATE CARE

## 2022-05-12 PROCEDURE — 13003289 HB OXYGEN THERAPY DAILY

## 2022-05-12 PROCEDURE — 10004651 HB RX, NO CHARGE ITEM: Performed by: HOSPITALIST

## 2022-05-12 PROCEDURE — 85025 COMPLETE CBC W/AUTO DIFF WBC: CPT | Performed by: HOSPITALIST

## 2022-05-12 PROCEDURE — 80048 BASIC METABOLIC PNL TOTAL CA: CPT | Performed by: HOSPITALIST

## 2022-05-12 PROCEDURE — 10002801 HB RX 250 W/O HCPCS: Performed by: HOSPITALIST

## 2022-05-12 RX ORDER — CODEINE PHOSPHATE AND GUAIFENESIN 10; 100 MG/5ML; MG/5ML
5 SOLUTION ORAL EVERY 8 HOURS PRN
Status: DISCONTINUED | OUTPATIENT
Start: 2022-05-12 | End: 2022-05-13 | Stop reason: HOSPADM

## 2022-05-12 RX ORDER — GUAIFENESIN 600 MG/1
600 TABLET, EXTENDED RELEASE ORAL EVERY 12 HOURS SCHEDULED
Status: DISCONTINUED | OUTPATIENT
Start: 2022-05-12 | End: 2022-05-13 | Stop reason: HOSPADM

## 2022-05-12 RX ORDER — LISINOPRIL 5 MG/1
2.5 TABLET ORAL DAILY
Status: DISCONTINUED | OUTPATIENT
Start: 2022-05-12 | End: 2022-05-13

## 2022-05-12 RX ORDER — BENZONATATE 100 MG/1
100 CAPSULE ORAL 3 TIMES DAILY PRN
Status: DISCONTINUED | OUTPATIENT
Start: 2022-05-12 | End: 2022-05-13 | Stop reason: HOSPADM

## 2022-05-12 RX ADMIN — BENZONATATE 100 MG: 100 CAPSULE ORAL at 11:17

## 2022-05-12 RX ADMIN — METOPROLOL TARTRATE 75 MG: 25 TABLET, FILM COATED ORAL at 20:51

## 2022-05-12 RX ADMIN — ASPIRIN 81 MG: 81 TABLET, COATED ORAL at 08:26

## 2022-05-12 RX ADMIN — DOXYCYCLINE 100 MG: 100 CAPSULE ORAL at 08:26

## 2022-05-12 RX ADMIN — PANTOPRAZOLE SODIUM 40 MG: 40 TABLET, DELAYED RELEASE ORAL at 05:11

## 2022-05-12 RX ADMIN — GUAIFENESIN 600 MG: 600 TABLET, EXTENDED RELEASE ORAL at 11:17

## 2022-05-12 RX ADMIN — CEFTRIAXONE SODIUM 1000 MG: 10 INJECTION, POWDER, FOR SOLUTION INTRAVENOUS at 08:29

## 2022-05-12 RX ADMIN — LISINOPRIL 2.5 MG: 5 TABLET ORAL at 12:25

## 2022-05-12 RX ADMIN — SEVELAMER CARBONATE 1600 MG: 800 TABLET, FILM COATED ORAL at 17:10

## 2022-05-12 RX ADMIN — HEPARIN SODIUM 5000 UNITS: 5000 INJECTION INTRAVENOUS; SUBCUTANEOUS at 13:47

## 2022-05-12 RX ADMIN — AMLODIPINE BESYLATE 5 MG: 5 TABLET ORAL at 08:26

## 2022-05-12 RX ADMIN — SODIUM CHLORIDE, PRESERVATIVE FREE 2 ML: 5 INJECTION INTRAVENOUS at 20:53

## 2022-05-12 RX ADMIN — SEVELAMER CARBONATE 1600 MG: 800 TABLET, FILM COATED ORAL at 12:25

## 2022-05-12 RX ADMIN — LEVOTHYROXINE SODIUM 25 MCG: 0.03 TABLET ORAL at 05:11

## 2022-05-12 RX ADMIN — ATORVASTATIN CALCIUM 5 MG: 10 TABLET, FILM COATED ORAL at 20:51

## 2022-05-12 RX ADMIN — SODIUM CHLORIDE, PRESERVATIVE FREE 2 ML: 5 INJECTION INTRAVENOUS at 08:27

## 2022-05-12 RX ADMIN — ACETAMINOPHEN 1000 MG: 500 TABLET ORAL at 14:15

## 2022-05-12 RX ADMIN — GUAIFENESIN 600 MG: 600 TABLET, EXTENDED RELEASE ORAL at 20:50

## 2022-05-12 RX ADMIN — ACETAMINOPHEN 1000 MG: 500 TABLET ORAL at 20:49

## 2022-05-12 RX ADMIN — HEPARIN SODIUM 5000 UNITS: 5000 INJECTION INTRAVENOUS; SUBCUTANEOUS at 06:32

## 2022-05-12 RX ADMIN — METRONIDAZOLE 500 MG: 500 INJECTION, SOLUTION INTRAVENOUS at 05:15

## 2022-05-12 RX ADMIN — HEPARIN SODIUM 5000 UNITS: 5000 INJECTION INTRAVENOUS; SUBCUTANEOUS at 21:46

## 2022-05-12 RX ADMIN — MONTELUKAST SODIUM 10 MG: 10 TABLET, FILM COATED ORAL at 20:50

## 2022-05-12 RX ADMIN — DOXYCYCLINE 100 MG: 100 CAPSULE ORAL at 20:50

## 2022-05-12 RX ADMIN — LORATADINE 10 MG: 10 TABLET ORAL at 08:26

## 2022-05-12 RX ADMIN — METOPROLOL TARTRATE 25 MG: 25 TABLET, FILM COATED ORAL at 11:17

## 2022-05-12 RX ADMIN — SEVELAMER CARBONATE 1600 MG: 800 TABLET, FILM COATED ORAL at 08:26

## 2022-05-12 RX ADMIN — METOPROLOL TARTRATE 50 MG: 50 TABLET, FILM COATED ORAL at 08:26

## 2022-05-12 ASSESSMENT — PAIN SCALES - GENERAL
PAINLEVEL_OUTOF10: 0
PAINLEVEL_OUTOF10: 0
PAINLEVEL_OUTOF10: 5
PAINLEVEL_OUTOF10: 4
PAINLEVEL_OUTOF10: 7
PAINLEVEL_OUTOF10: 0

## 2022-05-12 ASSESSMENT — PAIN SCALES - WONG BAKER
WONGBAKER_NUMERICALRESPONSE: 1
WONGBAKER_NUMERICALRESPONSE: 0

## 2022-05-13 ENCOUNTER — APPOINTMENT (OUTPATIENT)
Dept: DIALYSIS | Age: 82
DRG: 871 | End: 2022-05-13
Attending: INTERNAL MEDICINE

## 2022-05-13 VITALS
HEART RATE: 71 BPM | SYSTOLIC BLOOD PRESSURE: 117 MMHG | OXYGEN SATURATION: 97 % | DIASTOLIC BLOOD PRESSURE: 79 MMHG | HEIGHT: 63 IN | RESPIRATION RATE: 15 BRPM | TEMPERATURE: 97.2 F | WEIGHT: 131.39 LBS | BODY MASS INDEX: 23.28 KG/M2

## 2022-05-13 LAB
ANION GAP SERPL CALC-SCNC: 13 MMOL/L (ref 10–20)
BASOPHILS # BLD: 0 K/MCL (ref 0–0.3)
BASOPHILS NFR BLD: 1 %
BUN SERPL-MCNC: 41 MG/DL (ref 6–20)
BUN/CREAT SERPL: 8 (ref 7–25)
CALCIUM SERPL-MCNC: 8.4 MG/DL (ref 8.4–10.2)
CHLORIDE SERPL-SCNC: 100 MMOL/L (ref 98–107)
CO2 SERPL-SCNC: 25 MMOL/L (ref 21–32)
CREAT SERPL-MCNC: 5.37 MG/DL (ref 0.51–0.95)
DEPRECATED RDW RBC: 59.1 FL (ref 39–50)
EOSINOPHIL # BLD: 0.2 K/MCL (ref 0–0.5)
EOSINOPHIL NFR BLD: 3 %
ERYTHROCYTE [DISTWIDTH] IN BLOOD: 16.9 % (ref 11–15)
FASTING DURATION TIME PATIENT: ABNORMAL H
GFR SERPLBLD BASED ON 1.73 SQ M-ARVRAT: 8 ML/MIN
GLUCOSE SERPL-MCNC: 111 MG/DL (ref 70–99)
HCT VFR BLD CALC: 36.2 % (ref 36–46.5)
HGB BLD-MCNC: 10.8 G/DL (ref 12–15.5)
IMM GRANULOCYTES # BLD AUTO: 0 K/MCL (ref 0–0.2)
IMM GRANULOCYTES # BLD: 1 %
LYMPHOCYTES # BLD: 0.9 K/MCL (ref 1–4)
LYMPHOCYTES NFR BLD: 13 %
MCH RBC QN AUTO: 28.4 PG (ref 26–34)
MCHC RBC AUTO-ENTMCNC: 29.8 G/DL (ref 32–36.5)
MCV RBC AUTO: 95.3 FL (ref 78–100)
MONOCYTES # BLD: 0.8 K/MCL (ref 0.3–0.9)
MONOCYTES NFR BLD: 13 %
NEUTROPHILS # BLD: 4.5 K/MCL (ref 1.8–7.7)
NEUTROPHILS NFR BLD: 69 %
NRBC BLD MANUAL-RTO: 0 /100 WBC
PLATELET # BLD AUTO: 127 K/MCL (ref 140–450)
POTASSIUM SERPL-SCNC: 4.1 MMOL/L (ref 3.4–5.1)
PROCALCITONIN SERPL IA-MCNC: 30.84 NG/ML
RBC # BLD: 3.8 MIL/MCL (ref 4–5.2)
SODIUM SERPL-SCNC: 134 MMOL/L (ref 135–145)
TSH SERPL-ACNC: 1.98 MCUNITS/ML (ref 0.35–5)
WBC # BLD: 6.4 K/MCL (ref 4.2–11)

## 2022-05-13 PROCEDURE — 10002800 HB RX 250 W HCPCS: Performed by: HOSPITALIST

## 2022-05-13 PROCEDURE — 84145 PROCALCITONIN (PCT): CPT | Performed by: HOSPITALIST

## 2022-05-13 PROCEDURE — 90935 HEMODIALYSIS ONE EVALUATION: CPT

## 2022-05-13 PROCEDURE — 36415 COLL VENOUS BLD VENIPUNCTURE: CPT | Performed by: HOSPITALIST

## 2022-05-13 PROCEDURE — 10002803 HB RX 637: Performed by: SPECIALIST

## 2022-05-13 PROCEDURE — 10004651 HB RX, NO CHARGE ITEM: Performed by: HOSPITALIST

## 2022-05-13 PROCEDURE — 80048 BASIC METABOLIC PNL TOTAL CA: CPT | Performed by: HOSPITALIST

## 2022-05-13 PROCEDURE — 85025 COMPLETE CBC W/AUTO DIFF WBC: CPT | Performed by: HOSPITALIST

## 2022-05-13 PROCEDURE — 10002803 HB RX 637: Performed by: HOSPITALIST

## 2022-05-13 RX ORDER — METOPROLOL TARTRATE 50 MG/1
50 TABLET, FILM COATED ORAL 3 TIMES DAILY
Qty: 90 TABLET | Refills: 1 | Status: SHIPPED | OUTPATIENT
Start: 2022-05-13 | End: 2022-06-14 | Stop reason: SDUPTHER

## 2022-05-13 RX ORDER — GUAIFENESIN 600 MG/1
600 TABLET, EXTENDED RELEASE ORAL EVERY 12 HOURS SCHEDULED
Qty: 14 TABLET | Refills: 0 | Status: SHIPPED | OUTPATIENT
Start: 2022-05-13 | End: 2022-05-20

## 2022-05-13 RX ORDER — DOXYCYCLINE HYCLATE 100 MG/1
100 CAPSULE ORAL EVERY 12 HOURS SCHEDULED
Qty: 8 CAPSULE | Refills: 0 | Status: SHIPPED | OUTPATIENT
Start: 2022-05-13 | End: 2022-05-17

## 2022-05-13 RX ORDER — CEFDINIR 300 MG/1
300 CAPSULE ORAL DAILY
Qty: 4 CAPSULE | Refills: 0 | Status: SHIPPED | OUTPATIENT
Start: 2022-05-13 | End: 2022-05-17

## 2022-05-13 RX ORDER — BENZONATATE 100 MG/1
100 CAPSULE ORAL 3 TIMES DAILY PRN
Qty: 30 CAPSULE | Refills: 0 | Status: SHIPPED | OUTPATIENT
Start: 2022-05-13 | End: 2023-06-13

## 2022-05-13 RX ADMIN — BENZONATATE 100 MG: 100 CAPSULE ORAL at 05:43

## 2022-05-13 RX ADMIN — ACETAMINOPHEN 1000 MG: 500 TABLET ORAL at 07:32

## 2022-05-13 RX ADMIN — LORATADINE 20 MG: 10 TABLET ORAL at 14:10

## 2022-05-13 RX ADMIN — HEPARIN SODIUM 5000 UNITS: 5000 INJECTION INTRAVENOUS; SUBCUTANEOUS at 05:36

## 2022-05-13 RX ADMIN — PANTOPRAZOLE SODIUM 40 MG: 40 TABLET, DELAYED RELEASE ORAL at 05:35

## 2022-05-13 RX ADMIN — SEVELAMER CARBONATE 1600 MG: 800 TABLET, FILM COATED ORAL at 14:09

## 2022-05-13 RX ADMIN — LEVOTHYROXINE SODIUM 25 MCG: 0.03 TABLET ORAL at 05:35

## 2022-05-13 RX ADMIN — SODIUM CHLORIDE, PRESERVATIVE FREE 2 ML: 5 INJECTION INTRAVENOUS at 08:59

## 2022-05-13 RX ADMIN — CEFTRIAXONE SODIUM 1000 MG: 10 INJECTION, POWDER, FOR SOLUTION INTRAVENOUS at 14:11

## 2022-05-13 RX ADMIN — APIXABAN 2.5 MG: 2.5 TABLET, FILM COATED ORAL at 14:09

## 2022-05-13 ASSESSMENT — PAIN SCALES - WONG BAKER: WONGBAKER_NUMERICALRESPONSE: 0

## 2022-05-13 ASSESSMENT — PAIN SCALES - GENERAL
PAINLEVEL_OUTOF10: 7
PAINLEVEL_OUTOF10: 8

## 2022-05-15 LAB
BACTERIA BLD CULT: NORMAL
BACTERIA BLD CULT: NORMAL

## 2022-05-16 ENCOUNTER — CASE MANAGEMENT (OUTPATIENT)
Dept: CARE COORDINATION | Age: 82
End: 2022-05-16

## 2022-05-23 ENCOUNTER — PRIOR ORIGINAL RECORDS (OUTPATIENT)
Dept: HEALTH INFORMATION MANAGEMENT | Facility: OTHER | Age: 82
End: 2022-05-23

## 2022-05-23 RX ORDER — SEVELAMER CARBONATE 800 MG/1
TABLET, FILM COATED ORAL
Qty: 810 TABLET | Refills: 4 | Status: SHIPPED | OUTPATIENT
Start: 2022-05-23

## 2022-06-06 PROBLEM — E78.5 DYSLIPIDEMIA: Status: ACTIVE | Noted: 2022-06-06

## 2022-06-06 PROBLEM — I10 HYPERTENSION: Status: ACTIVE | Noted: 2022-06-06

## 2022-06-06 PROBLEM — Z86.79 HISTORY OF CHF (CONGESTIVE HEART FAILURE): Status: ACTIVE | Noted: 2022-06-06

## 2022-06-06 PROBLEM — Z99.2 STAGE 5 CHRONIC KIDNEY DISEASE ON CHRONIC DIALYSIS (CMD): Status: RESOLVED | Noted: 2022-06-06 | Resolved: 2022-06-06

## 2022-06-06 PROBLEM — I65.29 CAROTID STENOSIS: Status: ACTIVE | Noted: 2022-06-06

## 2022-06-06 PROBLEM — N18.6 STAGE 5 CHRONIC KIDNEY DISEASE ON CHRONIC DIALYSIS (CMD): Status: ACTIVE | Noted: 2022-06-06

## 2022-06-06 PROBLEM — Z86.79 HISTORY OF ATRIAL FIBRILLATION: Status: ACTIVE | Noted: 2022-06-06

## 2022-06-06 PROBLEM — Z99.2 STAGE 5 CHRONIC KIDNEY DISEASE ON CHRONIC DIALYSIS (CMD): Status: ACTIVE | Noted: 2022-06-06

## 2022-06-06 PROBLEM — N18.9 CKD (CHRONIC KIDNEY DISEASE): Status: ACTIVE | Noted: 2022-06-06

## 2022-06-06 PROBLEM — I34.0 MITRAL REGURGITATION: Status: ACTIVE | Noted: 2022-06-06

## 2022-06-06 PROBLEM — N18.6 STAGE 5 CHRONIC KIDNEY DISEASE ON CHRONIC DIALYSIS (CMD): Status: RESOLVED | Noted: 2022-06-06 | Resolved: 2022-06-06

## 2022-06-06 RX ORDER — AMIODARONE HYDROCHLORIDE 200 MG/1
200 TABLET ORAL DAILY
COMMUNITY
End: 2022-06-22 | Stop reason: ALTCHOICE

## 2022-06-14 ENCOUNTER — OFFICE VISIT (OUTPATIENT)
Dept: CARDIOLOGY | Age: 82
End: 2022-06-14

## 2022-06-14 VITALS
WEIGHT: 131 LBS | HEART RATE: 80 BPM | DIASTOLIC BLOOD PRESSURE: 70 MMHG | SYSTOLIC BLOOD PRESSURE: 110 MMHG | HEIGHT: 63 IN | BODY MASS INDEX: 23.21 KG/M2

## 2022-06-14 DIAGNOSIS — I34.0 NONRHEUMATIC MITRAL VALVE REGURGITATION: ICD-10-CM

## 2022-06-14 DIAGNOSIS — Z86.79 HISTORY OF ATRIAL FIBRILLATION: Primary | ICD-10-CM

## 2022-06-14 DIAGNOSIS — E78.5 DYSLIPIDEMIA: ICD-10-CM

## 2022-06-14 DIAGNOSIS — I10 PRIMARY HYPERTENSION: ICD-10-CM

## 2022-06-14 PROBLEM — E03.8 OTHER SPECIFIED HYPOTHYROIDISM: Status: ACTIVE | Noted: 2022-06-14

## 2022-06-14 PROBLEM — N18.6 STAGE 5 CHRONIC KIDNEY DISEASE ON CHRONIC DIALYSIS  (CMD): Status: RESOLVED | Noted: 2022-06-06 | Resolved: 2022-06-14

## 2022-06-14 PROBLEM — Z99.2 STAGE 5 CHRONIC KIDNEY DISEASE ON CHRONIC DIALYSIS (CMD): Status: RESOLVED | Noted: 2022-06-06 | Resolved: 2022-06-14

## 2022-06-14 PROCEDURE — 99214 OFFICE O/P EST MOD 30 MIN: CPT | Performed by: SPECIALIST

## 2022-06-14 PROCEDURE — 93000 ELECTROCARDIOGRAM COMPLETE: CPT | Performed by: SPECIALIST

## 2022-06-14 RX ORDER — METOPROLOL TARTRATE 50 MG/1
50 TABLET, FILM COATED ORAL 3 TIMES DAILY
Qty: 90 TABLET | Refills: 1 | Status: SHIPPED | OUTPATIENT
Start: 2022-06-14 | End: 2022-08-01 | Stop reason: SDUPTHER

## 2022-06-14 RX ORDER — ATORVASTATIN CALCIUM 10 MG/1
5 TABLET, FILM COATED ORAL DAILY
Qty: 15 TABLET | Refills: 2 | Status: SHIPPED | OUTPATIENT
Start: 2022-06-14 | End: 2022-09-28 | Stop reason: SDUPTHER

## 2022-06-14 SDOH — HEALTH STABILITY: PHYSICAL HEALTH: ON AVERAGE, HOW MANY DAYS PER WEEK DO YOU ENGAGE IN MODERATE TO STRENUOUS EXERCISE (LIKE A BRISK WALK)?: 7 DAYS

## 2022-06-14 SDOH — HEALTH STABILITY: PHYSICAL HEALTH: ON AVERAGE, HOW MANY MINUTES DO YOU ENGAGE IN EXERCISE AT THIS LEVEL?: 60 MIN

## 2022-06-14 ASSESSMENT — PATIENT HEALTH QUESTIONNAIRE - PHQ9
1. LITTLE INTEREST OR PLEASURE IN DOING THINGS: NOT AT ALL
SUM OF ALL RESPONSES TO PHQ9 QUESTIONS 1 AND 2: 0
2. FEELING DOWN, DEPRESSED OR HOPELESS: NOT AT ALL
CLINICAL INTERPRETATION OF PHQ2 SCORE: NO FURTHER SCREENING NEEDED
SUM OF ALL RESPONSES TO PHQ9 QUESTIONS 1 AND 2: 0

## 2022-06-22 ENCOUNTER — TELEPHONE (OUTPATIENT)
Dept: CARDIOLOGY | Age: 82
End: 2022-06-22

## 2022-06-22 RX ORDER — AMLODIPINE BESYLATE 5 MG/1
5 TABLET ORAL PRN
COMMUNITY
End: 2022-10-20 | Stop reason: SDUPTHER

## 2022-07-05 ENCOUNTER — APPOINTMENT (OUTPATIENT)
Dept: CARDIOLOGY | Age: 82
End: 2022-07-05

## 2022-07-12 ENCOUNTER — OFFICE VISIT (OUTPATIENT)
Dept: OTOLARYNGOLOGY | Facility: CLINIC | Age: 82
End: 2022-07-12
Payer: MEDICARE

## 2022-07-12 VITALS — BODY MASS INDEX: 24.45 KG/M2 | WEIGHT: 138 LBS | HEIGHT: 63 IN

## 2022-07-12 DIAGNOSIS — H61.23 BILATERAL IMPACTED CERUMEN: Primary | ICD-10-CM

## 2022-07-12 PROCEDURE — 69210 REMOVE IMPACTED EAR WAX UNI: CPT | Performed by: SPECIALIST

## 2022-07-12 RX ORDER — AMLODIPINE BESYLATE 5 MG/1
5 TABLET ORAL DAILY
COMMUNITY
Start: 2022-04-19

## 2022-07-12 RX ORDER — APIXABAN 2.5 MG/1
2.5 TABLET, FILM COATED ORAL EVERY 12 HOURS
COMMUNITY
Start: 2022-07-05

## 2022-07-12 NOTE — PATIENT INSTRUCTIONS
Cerumen was fully cleaned from both your ears. Follow-up if there are any additional questions or issues.

## 2022-07-12 NOTE — PROGRESS NOTES
Ears = bilateral cerumen occlussions. Fully cleaned under microscope using instrumentation and suctioning. Normal tympanic membranes. Follow-up if there are additional questions or issues.

## 2022-08-01 RX ORDER — METOPROLOL TARTRATE 50 MG/1
50 TABLET, FILM COATED ORAL 3 TIMES DAILY
Qty: 270 TABLET | Refills: 2 | Status: SHIPPED | OUTPATIENT
Start: 2022-08-01 | End: 2022-11-16 | Stop reason: DRUGHIGH

## 2022-08-01 RX ORDER — METOPROLOL TARTRATE 50 MG/1
50 TABLET, FILM COATED ORAL 3 TIMES DAILY
Qty: 90 TABLET | Refills: 1 | Status: CANCELLED | OUTPATIENT
Start: 2022-08-01 | End: 2022-09-30

## 2022-08-15 ENCOUNTER — OFFICE VISIT (OUTPATIENT)
Dept: CARDIOLOGY | Age: 82
End: 2022-08-15

## 2022-08-15 VITALS
BODY MASS INDEX: 22.27 KG/M2 | HEART RATE: 74 BPM | WEIGHT: 125.66 LBS | DIASTOLIC BLOOD PRESSURE: 70 MMHG | SYSTOLIC BLOOD PRESSURE: 115 MMHG | HEIGHT: 63 IN

## 2022-08-15 DIAGNOSIS — I10 PRIMARY HYPERTENSION: ICD-10-CM

## 2022-08-15 DIAGNOSIS — E78.5 DYSLIPIDEMIA: ICD-10-CM

## 2022-08-15 DIAGNOSIS — I48.0 PAROXYSMAL ATRIAL FIBRILLATION (CMD): ICD-10-CM

## 2022-08-15 DIAGNOSIS — N18.4 STAGE 4 CHRONIC KIDNEY DISEASE (CMD): ICD-10-CM

## 2022-08-15 DIAGNOSIS — I34.0 NONRHEUMATIC MITRAL VALVE REGURGITATION: ICD-10-CM

## 2022-08-15 DIAGNOSIS — Z86.79 HISTORY OF CHF (CONGESTIVE HEART FAILURE): ICD-10-CM

## 2022-08-15 DIAGNOSIS — E03.8 OTHER SPECIFIED HYPOTHYROIDISM: ICD-10-CM

## 2022-08-15 DIAGNOSIS — R00.2 PALPITATIONS: Primary | ICD-10-CM

## 2022-08-15 PROCEDURE — 99214 OFFICE O/P EST MOD 30 MIN: CPT | Performed by: SPECIALIST

## 2022-08-15 SDOH — HEALTH STABILITY: PHYSICAL HEALTH: ON AVERAGE, HOW MANY MINUTES DO YOU ENGAGE IN EXERCISE AT THIS LEVEL?: 30 MIN

## 2022-08-15 SDOH — HEALTH STABILITY: PHYSICAL HEALTH: ON AVERAGE, HOW MANY DAYS PER WEEK DO YOU ENGAGE IN MODERATE TO STRENUOUS EXERCISE (LIKE A BRISK WALK)?: 2 DAYS

## 2022-08-15 ASSESSMENT — PATIENT HEALTH QUESTIONNAIRE - PHQ9
1. LITTLE INTEREST OR PLEASURE IN DOING THINGS: NOT AT ALL
2. FEELING DOWN, DEPRESSED OR HOPELESS: NOT AT ALL
SUM OF ALL RESPONSES TO PHQ9 QUESTIONS 1 AND 2: 0
SUM OF ALL RESPONSES TO PHQ9 QUESTIONS 1 AND 2: 0
CLINICAL INTERPRETATION OF PHQ2 SCORE: NO FURTHER SCREENING NEEDED

## 2022-08-16 ENCOUNTER — CASE MANAGEMENT (OUTPATIENT)
Dept: CARE COORDINATION | Age: 82
End: 2022-08-16

## 2022-08-27 ENCOUNTER — TELEPHONE (OUTPATIENT)
Dept: CARDIOLOGY | Age: 82
End: 2022-08-27

## 2022-09-28 RX ORDER — ATORVASTATIN CALCIUM 10 MG/1
5 TABLET, FILM COATED ORAL DAILY
Qty: 45 TABLET | Refills: 1 | Status: SHIPPED | OUTPATIENT
Start: 2022-09-28 | End: 2023-02-14 | Stop reason: SDUPTHER

## 2022-10-04 ENCOUNTER — OFFICE VISIT (OUTPATIENT)
Dept: CARDIOLOGY | Age: 82
End: 2022-10-04

## 2022-10-04 VITALS
BODY MASS INDEX: 21.88 KG/M2 | SYSTOLIC BLOOD PRESSURE: 128 MMHG | HEIGHT: 63 IN | HEART RATE: 70 BPM | WEIGHT: 123.46 LBS | DIASTOLIC BLOOD PRESSURE: 75 MMHG

## 2022-10-04 DIAGNOSIS — Z86.79 HISTORY OF CHF (CONGESTIVE HEART FAILURE): ICD-10-CM

## 2022-10-04 DIAGNOSIS — Z99.2 STAGE 5 CHRONIC KIDNEY DISEASE ON CHRONIC DIALYSIS (CMD): ICD-10-CM

## 2022-10-04 DIAGNOSIS — Z86.79 HISTORY OF ATRIAL FIBRILLATION: ICD-10-CM

## 2022-10-04 DIAGNOSIS — I34.0 NONRHEUMATIC MITRAL VALVE REGURGITATION: ICD-10-CM

## 2022-10-04 DIAGNOSIS — I65.29 STENOSIS OF CAROTID ARTERY, UNSPECIFIED LATERALITY: ICD-10-CM

## 2022-10-04 DIAGNOSIS — N18.6 STAGE 5 CHRONIC KIDNEY DISEASE ON CHRONIC DIALYSIS (CMD): ICD-10-CM

## 2022-10-04 DIAGNOSIS — E78.5 DYSLIPIDEMIA: ICD-10-CM

## 2022-10-04 DIAGNOSIS — I48.0 PAROXYSMAL ATRIAL FIBRILLATION (CMD): ICD-10-CM

## 2022-10-04 DIAGNOSIS — I10 PRIMARY HYPERTENSION: Primary | ICD-10-CM

## 2022-10-04 PROCEDURE — 99214 OFFICE O/P EST MOD 30 MIN: CPT | Performed by: SPECIALIST

## 2022-10-04 SDOH — HEALTH STABILITY: PHYSICAL HEALTH: ON AVERAGE, HOW MANY MINUTES DO YOU ENGAGE IN EXERCISE AT THIS LEVEL?: 30 MIN

## 2022-10-04 SDOH — HEALTH STABILITY: PHYSICAL HEALTH: ON AVERAGE, HOW MANY DAYS PER WEEK DO YOU ENGAGE IN MODERATE TO STRENUOUS EXERCISE (LIKE A BRISK WALK)?: 2 DAYS

## 2022-10-04 ASSESSMENT — PATIENT HEALTH QUESTIONNAIRE - PHQ9
SUM OF ALL RESPONSES TO PHQ9 QUESTIONS 1 AND 2: 0
2. FEELING DOWN, DEPRESSED OR HOPELESS: NOT AT ALL
1. LITTLE INTEREST OR PLEASURE IN DOING THINGS: NOT AT ALL
CLINICAL INTERPRETATION OF PHQ2 SCORE: NO FURTHER SCREENING NEEDED
SUM OF ALL RESPONSES TO PHQ9 QUESTIONS 1 AND 2: 0

## 2022-10-20 RX ORDER — AMLODIPINE BESYLATE 5 MG/1
5 TABLET ORAL PRN
Qty: 90 TABLET | Refills: 1 | Status: SHIPPED | OUTPATIENT
Start: 2022-10-20 | End: 2023-02-14 | Stop reason: SDUPTHER

## 2022-10-25 ENCOUNTER — OFFICE VISIT (OUTPATIENT)
Dept: OTOLARYNGOLOGY | Facility: CLINIC | Age: 82
End: 2022-10-25
Payer: MEDICARE

## 2022-10-25 VITALS — BODY MASS INDEX: 24.45 KG/M2 | TEMPERATURE: 98 F | HEIGHT: 63 IN | WEIGHT: 138 LBS

## 2022-10-25 DIAGNOSIS — H61.23 BILATERAL IMPACTED CERUMEN: Primary | ICD-10-CM

## 2022-10-25 PROCEDURE — 69210 REMOVE IMPACTED EAR WAX UNI: CPT | Performed by: SPECIALIST

## 2022-10-25 NOTE — PATIENT INSTRUCTIONS
Cerumen was fully cleaned from both of your ears. Follow-up in 3 to 4 months time, sooner if problems.

## 2022-10-25 NOTE — PROGRESS NOTES
Ears = bilateral cerumen occlussions. Fully cleaned under microscope using instrumentation and suctioning. Normal tympanic membranes. Follow-up in 3 to 4 months time, sooner if problems.

## 2022-11-16 ENCOUNTER — TELEPHONE (OUTPATIENT)
Dept: CARDIOLOGY | Age: 82
End: 2022-11-16

## 2022-11-16 RX ORDER — METOPROLOL TARTRATE 100 MG/1
100 TABLET ORAL 2 TIMES DAILY
Qty: 60 TABLET | Refills: 4 | Status: SHIPPED | OUTPATIENT
Start: 2022-11-16 | End: 2022-12-08 | Stop reason: SDUPTHER

## 2022-11-25 NOTE — DISCHARGE SUMMARY
General Medicine Discharge Summary     Patient ID:  Yenny Ayers  69year old  2/13/1940    Admit date: 3/14/2017    Discharge date and time: 03/16/2017    Attending Physician: Maria Teresa Degroot MD     Primary Care Physician: Rosaura Gardiner MD     R QUICK CARE VIDEO VISIT PROGRESS NOTE      CHIEF COMPLAINT  Chief Complaint   Patient presents with   • Fever   • Video Visit   • Video Visit         HISTORY OF PRESENT ILLNESS:   Tamia Smallwood is a 32 year old female who is requesting medical consultation and care by technological facilitation, Video Visit (V-Visit), for evaluation of a fever of 102.1F, headaches, rhinorrhea, nasal congestion, decreased appetite, shortness of breath with coughing, a non-productive cough, and body aches since yesterday. She reports taking Maryam-seltzer with minimal improvement. She denies having any chest pain, nausea, vomiting, diarrhea, or loss of taste or smell. She states her son and mother in law were recently ill. She states she took an at home COVID-19 test yesterday which was negative. She states her mother in law tested positive for influenza. She is vaccinated against influenza. She denies any chance of pregnancy and is not breastfeeding.       The patient denies any chest pain, palpitations, shortness of breath or abdominal pain.     The patient has not had two negative COVID tests 48 hours apart.      [facility] [employer] COVID-19 Questionnaire    1. Has the patient been in close contact with a person known to have a positive test for COVID-19? No      2. When was the onset of the patient's symptoms?  11/24/2022    3. Treated for COVID in the lat 90 days? no    4..  Does the patient have a chronic health condition for which they are being actively treated for (e.g., Asthma, COPD, Heart Disease, Diabetes, Cancer, Autoimmune Disease, or Pregnancy) yes, asthma and rheumatoid arthritis      Oral Antiviral Questionnaire:  Response   Positive COVID test in the last 5 days?   No   Patient meets qualifications for oral antiviral treatment and is at risk for developing severe COVID? yes criteria: asthma, RA, elevated BMI   Pregnant of breastfeeding? (if yes consider paxlovid)  No   Currently taking birth control? (Paxlovid decreases  2/2 C.  Diff and rotavirus; started on metronidazole; will complete 10 day course of flagyl, follow-up PCP  - CLD, advance as tolerated  - GI consulted, appreciate recs  - CT with normal pancreas, no enteritis/colitis  - antiemetics    Mild epigastric pain effectiveness of birth control, use barrier contraception while taking paxlovid. Molnupiravir -Women-avoid getting pregnant for 4 days after taking the last dose of this medication.-Men-use contraception for at least 3 months after last dose).  Yes   History of HIV or concern about potentially having HIV?    The use of Paxlovid may lead to a risk for resistance for HIV treatment in those with uncontrolled or undiagnosed HIV.     No       Current Patient Symptoms:    Symptom Response   Fever > 100.0F or >37.8C?   yes   Headache?  yes   Runny Nose or Nasal Congestion?   yes   Sore throat? yes   Myalgias?  yes   Fatigue?  yes   Cough?  yes   If cough is present, is it dry?  yes   Dyspnea at Rest? no   Evidence of Respiratory Distress? no   Nausea/Vomiting? no   Diarrhea? no         REVIEW OF SYSTEMS:  A complete 10 pt. Review of systems was done and was NEG except for what was mentioned in the HPI.     HISTORIES  I have personally reviewed and updated the following electronic medical record sections: Current medications, Allergies, Problem list, Past Medical History, Past Surgical   History, and Social History    ALLERGIES:   ALLERGIES:   Allergen Reactions   • Penicillins HEADACHES and VISUAL DISTURBANCE          MEDICATIONS:   Current Outpatient Medications   Medication Sig   • pantoprazole (PROTONIX) 40 MG tablet Take 1 tablet by mouth daily.   • metoPROLOL succinate (TOPROL-XL) 25 MG 24 hr tablet TAKE 1 TABLET BY MOUTH EVERY DAY   • Vitamin D, Ergocalciferol, 1.25 mg (50,000 units) capsule Take 1 capsule by mouth 1 day a week.   • SUMAtriptan (IMITREX) 50 MG tablet Take 1 tablet by mouth daily as needed for Migraine. Take 1 tablet by mouth at onset of migraine. May repeat after 2 hours if needed.   • medroxyPROGESTERone (DEPO-PROVERA) 150 MG/ML injectable suspension Inject 1 mL into the muscle every 3 months.   • acetaminophen (TYLENOL) 325 MG tablet Take 2 tablets by mouth every 4 hours as needed for Pain (pain).    • ProAir  (90 Base) MCG/ACT inhaler Inhale 2 puffs into the lungs 4 times daily.   • ipratropium-albuterol (DUONEB) 0.5-2.5 (3) MG/3ML nebulizer solution Take 3 mLs by nebulization every 6 hours as needed for Wheezing.   • budesonide-formoterol (SYMBICORT) 160-4.5 MCG/ACT inhaler Inhale 2 puffs into the lungs 2 times daily.     No current facility-administered medications for this visit.          OBJECTIVE:    PHYSICAL EXAM:   Information acquired with patient assistance, demonstration and feedback due to two-way video visit method of visit.    CONSTITUATIONAL: Alert and oriented. Appears well-developed and well-nourished. No acute distress and non-ill appearing.     HENT: Lips appear moist. Pupils are equal, round. Conjunctivae are not injected.      LUNGS: Effort is normal. Able to speak in full sentences. No evidence of respiratory distress.    SKIN: Appears pink and dry.     NEUROLOGICAL: Speech is fluent, coordination grossly intact.       ASSESSMENT:   Tamia was seen today for fever, video visit and video visit.    Diagnoses and all orders for this visit:    Viral URI with cough  -     POST VIRTUAL VISIT TESTING; Future  -     POCT INFLUENZA A/B; Future    Suspected COVID-19 virus infection  -     POST VIRTUAL VISIT TESTING; Future  -     2019 NOVEL CORONAVIRUS (SARS-COV-2); Future    Rest and push fluids   Tylenol/ibuprofen as needed for pain/fevers   Zyrtec daily   Flonase nasal spray, 2 sprays each nostril daily x 10 days  Nasal sinus rinse daily   Warm compresses over the sinuses   Steam inhalation   Cepacol throat lozenges  Chloraseptic sore throat spray   Tea with honey       PLAN:      After evaluating the patient, the presentation seems to be suggestive of a viral illness including but not limited to the common cold, viral sinusitis, viral URI, influenza, or COVID-19.    Patient was referred for Post Virtual Visit testing for COVID-19 and Influenza.Scheduling instructions given.  She was  site. 5.  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 Xr Chest Ap Portable  (cpt=71010)    3/15/2017  CONCLUSION:  1. Unchanged chest. No acute appearing disease.            Disposition: home    Home Medication Changes:          Medication List      START taking Hospitalist advised to isolate until she knows her COVID-19 results and if positive for 5 days from the onset of symptoms and continue wearing a mask for an additional 5 days after isolation ends.  Supportive treatment measures discussed as listed above.  I advised if she develops any chest pain or difficulty breathing to be evaluated in person immediately.  _______________________________________________________________________________________________________________________________________      Patient would like to be treated with oral antiviral treatment if test is positive yes     If the patient is positive for influenza she would like to start Tamiflu.    Covid 19 oral antiviral treatment was discussed and EUA fact sheet has been included in the AVS.     Last GFR : N/A    Medication interaction noted between Paxlovid  yes medication: Depo Provera (may decrease effectiveness), Symbicort (may increase serum concentration of budesonide)    FOLLOW UP  Return for As needed .    PATIENT INSTRUCTIONS: Attached in after visit summary.     The patient was advised to follow up with primary physician or to go to the ER sooner if symptoms get worse or if new symptoms not discussed during today's visit appear.    An understanding of the diagnosis was indicated, and agreement with the plan of care. The patient has no questions, comments, or concerns. I verified there were no other concerns, questions, or comments that they wanted to address at today's visit.Contact the [employer] RN with any questions at [phone].    CONSENT:  This visit is being performed virtually via Video visit using [employer].     Clinical Location: Home  Replaced by [facility] location [place] and is physically present in   the [facility] at the time of this visit.     ORLANDO Horner  11/25/2022  10:21 AM

## 2022-11-29 ENCOUNTER — TELEPHONE (OUTPATIENT)
Dept: CARDIOLOGY | Age: 82
End: 2022-11-29

## 2022-11-29 DIAGNOSIS — I48.0 PAROXYSMAL ATRIAL FIBRILLATION (CMD): ICD-10-CM

## 2022-11-29 DIAGNOSIS — Z86.79 HISTORY OF ATRIAL FIBRILLATION: Primary | ICD-10-CM

## 2022-12-02 ENCOUNTER — ANCILLARY PROCEDURE (OUTPATIENT)
Dept: CARDIOLOGY | Age: 82
End: 2022-12-02
Attending: SPECIALIST

## 2022-12-02 DIAGNOSIS — Z86.79 HISTORY OF ATRIAL FIBRILLATION: ICD-10-CM

## 2022-12-02 DIAGNOSIS — I48.0 PAROXYSMAL ATRIAL FIBRILLATION (CMD): ICD-10-CM

## 2022-12-02 PROCEDURE — 93000 ELECTROCARDIOGRAM COMPLETE: CPT | Performed by: SPECIALIST

## 2022-12-08 RX ORDER — METOPROLOL TARTRATE 100 MG/1
100 TABLET ORAL 2 TIMES DAILY
Qty: 180 TABLET | Refills: 1 | Status: SHIPPED | OUTPATIENT
Start: 2022-12-08 | End: 2023-06-09 | Stop reason: SDUPTHER

## 2022-12-15 ENCOUNTER — TELEPHONE (OUTPATIENT)
Dept: CARDIOLOGY | Age: 82
End: 2022-12-15

## 2023-02-04 ENCOUNTER — LAB SERVICES (OUTPATIENT)
Dept: LAB | Age: 83
End: 2023-02-04

## 2023-02-04 DIAGNOSIS — Z99.2 STAGE 5 CHRONIC KIDNEY DISEASE ON CHRONIC DIALYSIS (CMD): ICD-10-CM

## 2023-02-04 DIAGNOSIS — I65.29 STENOSIS OF CAROTID ARTERY, UNSPECIFIED LATERALITY: ICD-10-CM

## 2023-02-04 DIAGNOSIS — I34.0 NONRHEUMATIC MITRAL VALVE REGURGITATION: ICD-10-CM

## 2023-02-04 DIAGNOSIS — I10 PRIMARY HYPERTENSION: ICD-10-CM

## 2023-02-04 DIAGNOSIS — N18.6 STAGE 5 CHRONIC KIDNEY DISEASE ON CHRONIC DIALYSIS (CMD): ICD-10-CM

## 2023-02-04 DIAGNOSIS — E78.5 DYSLIPIDEMIA: ICD-10-CM

## 2023-02-04 DIAGNOSIS — Z86.79 HISTORY OF CHF (CONGESTIVE HEART FAILURE): ICD-10-CM

## 2023-02-04 DIAGNOSIS — Z86.79 HISTORY OF ATRIAL FIBRILLATION: ICD-10-CM

## 2023-02-04 DIAGNOSIS — I48.0 PAROXYSMAL ATRIAL FIBRILLATION (CMD): ICD-10-CM

## 2023-02-04 LAB
ALBUMIN SERPL-MCNC: 4 G/DL (ref 3.6–5.1)
ALBUMIN/GLOB SERPL: 1.4 {RATIO} (ref 1–2.4)
ALP SERPL-CCNC: 214 UNITS/L (ref 45–117)
ALT SERPL-CCNC: 25 UNITS/L
ANION GAP SERPL CALC-SCNC: 13 MMOL/L (ref 7–19)
AST SERPL-CCNC: 20 UNITS/L
BASOPHILS # BLD: 0 K/MCL (ref 0–0.3)
BASOPHILS NFR BLD: 1 %
BILIRUB SERPL-MCNC: 0.6 MG/DL (ref 0.2–1)
BUN SERPL-MCNC: 52 MG/DL (ref 6–20)
BUN/CREAT SERPL: 11 (ref 7–25)
CALCIUM SERPL-MCNC: 8.3 MG/DL (ref 8.4–10.2)
CHLORIDE SERPL-SCNC: 97 MMOL/L (ref 97–110)
CHOLEST SERPL-MCNC: 140 MG/DL
CHOLEST/HDLC SERPL: 2.1 {RATIO}
CO2 SERPL-SCNC: 33 MMOL/L (ref 21–32)
CREAT SERPL-MCNC: 4.8 MG/DL (ref 0.51–0.95)
DEPRECATED RDW RBC: 59.7 FL (ref 39–50)
EOSINOPHIL # BLD: 0.1 K/MCL (ref 0–0.5)
EOSINOPHIL NFR BLD: 1 %
ERYTHROCYTE [DISTWIDTH] IN BLOOD: 15.8 % (ref 11–15)
FASTING DURATION TIME PATIENT: 12 HOURS (ref 0–999)
FASTING DURATION TIME PATIENT: 12 HOURS (ref 0–999)
GFR SERPLBLD BASED ON 1.73 SQ M-ARVRAT: 9 ML/MIN
GLOBULIN SER-MCNC: 2.8 G/DL (ref 2–4)
GLUCOSE SERPL-MCNC: 119 MG/DL (ref 70–99)
HCT VFR BLD CALC: 38.2 % (ref 36–46.5)
HDLC SERPL-MCNC: 67 MG/DL
HGB BLD-MCNC: 11.5 G/DL (ref 12–15.5)
IMM GRANULOCYTES # BLD AUTO: 0 K/MCL (ref 0–0.2)
IMM GRANULOCYTES # BLD: 0 %
LDLC SERPL CALC-MCNC: 51 MG/DL
LYMPHOCYTES # BLD: 1.4 K/MCL (ref 1–4)
LYMPHOCYTES NFR BLD: 18 %
MCH RBC QN AUTO: 30.8 PG (ref 26–34)
MCHC RBC AUTO-ENTMCNC: 30.1 G/DL (ref 32–36.5)
MCV RBC AUTO: 102.4 FL (ref 78–100)
MONOCYTES # BLD: 0.9 K/MCL (ref 0.3–0.9)
MONOCYTES NFR BLD: 11 %
NEUTROPHILS # BLD: 5.7 K/MCL (ref 1.8–7.7)
NEUTROPHILS NFR BLD: 69 %
NONHDLC SERPL-MCNC: 73 MG/DL
NRBC BLD MANUAL-RTO: 0 /100 WBC
PLATELET # BLD AUTO: 184 K/MCL (ref 140–450)
POTASSIUM SERPL-SCNC: 4.6 MMOL/L (ref 3.4–5.1)
PROT SERPL-MCNC: 6.8 G/DL (ref 6.4–8.2)
RBC # BLD: 3.73 MIL/MCL (ref 4–5.2)
SODIUM SERPL-SCNC: 138 MMOL/L (ref 135–145)
TRIGL SERPL-MCNC: 110 MG/DL
WBC # BLD: 8.2 K/MCL (ref 4.2–11)

## 2023-02-04 PROCEDURE — 80053 COMPREHEN METABOLIC PANEL: CPT | Performed by: CLINICAL MEDICAL LABORATORY

## 2023-02-04 PROCEDURE — 80061 LIPID PANEL: CPT | Performed by: CLINICAL MEDICAL LABORATORY

## 2023-02-04 PROCEDURE — 85025 COMPLETE CBC W/AUTO DIFF WBC: CPT | Performed by: CLINICAL MEDICAL LABORATORY

## 2023-02-04 PROCEDURE — 36415 COLL VENOUS BLD VENIPUNCTURE: CPT | Performed by: CLINICAL MEDICAL LABORATORY

## 2023-02-14 ENCOUNTER — OFFICE VISIT (OUTPATIENT)
Dept: CARDIOLOGY | Age: 83
End: 2023-02-14

## 2023-02-14 VITALS
BODY MASS INDEX: 23.05 KG/M2 | HEIGHT: 63 IN | DIASTOLIC BLOOD PRESSURE: 78 MMHG | HEART RATE: 90 BPM | OXYGEN SATURATION: 100 % | SYSTOLIC BLOOD PRESSURE: 120 MMHG | WEIGHT: 130.07 LBS

## 2023-02-14 DIAGNOSIS — E03.8 OTHER SPECIFIED HYPOTHYROIDISM: ICD-10-CM

## 2023-02-14 DIAGNOSIS — I65.29 STENOSIS OF CAROTID ARTERY, UNSPECIFIED LATERALITY: ICD-10-CM

## 2023-02-14 DIAGNOSIS — Z86.79 HISTORY OF CHF (CONGESTIVE HEART FAILURE): ICD-10-CM

## 2023-02-14 DIAGNOSIS — E78.5 DYSLIPIDEMIA: ICD-10-CM

## 2023-02-14 DIAGNOSIS — I48.0 PAROXYSMAL ATRIAL FIBRILLATION (CMD): ICD-10-CM

## 2023-02-14 DIAGNOSIS — I34.0 NONRHEUMATIC MITRAL VALVE REGURGITATION: ICD-10-CM

## 2023-02-14 DIAGNOSIS — I10 PRIMARY HYPERTENSION: Primary | ICD-10-CM

## 2023-02-14 PROCEDURE — 99214 OFFICE O/P EST MOD 30 MIN: CPT | Performed by: SPECIALIST

## 2023-02-14 RX ORDER — AMLODIPINE BESYLATE 5 MG/1
5 TABLET ORAL PRN
Qty: 90 TABLET | Refills: 1 | Status: SHIPPED | OUTPATIENT
Start: 2023-02-14 | End: 2023-07-27 | Stop reason: ALTCHOICE

## 2023-02-14 RX ORDER — ATORVASTATIN CALCIUM 10 MG/1
5 TABLET, FILM COATED ORAL DAILY
Qty: 45 TABLET | Refills: 1 | Status: SHIPPED | OUTPATIENT
Start: 2023-02-14 | End: 2023-06-13 | Stop reason: SDUPTHER

## 2023-02-14 SDOH — HEALTH STABILITY: PHYSICAL HEALTH: ON AVERAGE, HOW MANY DAYS PER WEEK DO YOU ENGAGE IN MODERATE TO STRENUOUS EXERCISE (LIKE A BRISK WALK)?: 0 DAYS

## 2023-02-14 SDOH — HEALTH STABILITY: PHYSICAL HEALTH: ON AVERAGE, HOW MANY MINUTES DO YOU ENGAGE IN EXERCISE AT THIS LEVEL?: 0 MIN

## 2023-02-14 ASSESSMENT — PATIENT HEALTH QUESTIONNAIRE - PHQ9
SUM OF ALL RESPONSES TO PHQ9 QUESTIONS 1 AND 2: 0
1. LITTLE INTEREST OR PLEASURE IN DOING THINGS: NOT AT ALL
SUM OF ALL RESPONSES TO PHQ9 QUESTIONS 1 AND 2: 0
2. FEELING DOWN, DEPRESSED OR HOPELESS: NOT AT ALL
CLINICAL INTERPRETATION OF PHQ2 SCORE: NO FURTHER SCREENING NEEDED

## 2023-03-16 ENCOUNTER — ANESTHESIA (OUTPATIENT)
Dept: GASTROENTEROLOGY | Age: 83
End: 2023-03-16

## 2023-03-16 ENCOUNTER — ANESTHESIA EVENT (OUTPATIENT)
Dept: GASTROENTEROLOGY | Age: 83
End: 2023-03-16

## 2023-03-16 ENCOUNTER — HOSPITAL ENCOUNTER (OUTPATIENT)
Dept: GASTROENTEROLOGY | Age: 83
Discharge: HOME | End: 2023-03-16
Attending: INTERNAL MEDICINE

## 2023-03-16 VITALS
DIASTOLIC BLOOD PRESSURE: 56 MMHG | HEIGHT: 63 IN | TEMPERATURE: 97.7 F | WEIGHT: 131.84 LBS | RESPIRATION RATE: 7 BRPM | OXYGEN SATURATION: 96 % | HEART RATE: 98 BPM | SYSTOLIC BLOOD PRESSURE: 113 MMHG | BODY MASS INDEX: 23.36 KG/M2

## 2023-03-16 DIAGNOSIS — K44.9 HIATAL HERNIA: ICD-10-CM

## 2023-03-16 DIAGNOSIS — R13.10 DYSPHAGIA: ICD-10-CM

## 2023-03-16 PROCEDURE — 88305 TISSUE EXAM BY PATHOLOGIST: CPT | Performed by: INTERNAL MEDICINE

## 2023-03-16 PROCEDURE — 13000028 HB GI MAJOR COMPLEX CASE S/U + 1ST 15 MIN

## 2023-03-16 PROCEDURE — C1726 CATH, BAL DIL, NON-VASCULAR: HCPCS

## 2023-03-16 PROCEDURE — 13000008 HB ANESTHESIA MAC OUTSIDE OR

## 2023-03-16 PROCEDURE — 10002801 HB RX 250 W/O HCPCS

## 2023-03-16 PROCEDURE — 10004451 HB PACU RECOVERY 1ST 30 MINUTES

## 2023-03-16 PROCEDURE — 10002800 HB RX 250 W HCPCS

## 2023-03-16 PROCEDURE — 13000001 HB PHASE II RECOVERY EA 30 MINUTES

## 2023-03-16 PROCEDURE — 10002807 HB RX 258: Performed by: INTERNAL MEDICINE

## 2023-03-16 PROCEDURE — 10006023 HB SUPPLY 272

## 2023-03-16 RX ORDER — SODIUM CHLORIDE 9 MG/ML
INJECTION, SOLUTION INTRAVENOUS CONTINUOUS
Status: DISCONTINUED | OUTPATIENT
Start: 2023-03-16 | End: 2023-03-17 | Stop reason: HOSPADM

## 2023-03-16 RX ORDER — GLYCOPYRROLATE 0.2 MG/ML
INJECTION, SOLUTION INTRAMUSCULAR; INTRAVENOUS PRN
Status: DISCONTINUED | OUTPATIENT
Start: 2023-03-16 | End: 2023-03-16

## 2023-03-16 RX ORDER — PROPOFOL 10 MG/ML
INJECTION, EMULSION INTRAVENOUS PRN
Status: DISCONTINUED | OUTPATIENT
Start: 2023-03-16 | End: 2023-03-16

## 2023-03-16 RX ADMIN — PROPOFOL 30 MG: 10 INJECTION, EMULSION INTRAVENOUS at 07:04

## 2023-03-16 RX ADMIN — GLYCOPYRROLATE 0.2 MG: 0.2 INJECTION, SOLUTION INTRAMUSCULAR; INTRAVENOUS at 07:03

## 2023-03-16 RX ADMIN — PROPOFOL 25 MCG/KG/MIN: 10 INJECTION, EMULSION INTRAVENOUS at 07:04

## 2023-03-16 RX ADMIN — SODIUM CHLORIDE: 9 INJECTION, SOLUTION INTRAVENOUS at 06:43

## 2023-03-16 SDOH — SOCIAL STABILITY: SOCIAL INSECURITY: RISK FACTORS: AGE

## 2023-03-16 ASSESSMENT — ACTIVITIES OF DAILY LIVING (ADL)
ADL_SCORE: 12
ADL_BEFORE_ADMISSION: INDEPENDENT
HISTORY OF FALLING IN THE LAST YEAR (PRIOR TO ADMISSION): NO
CHRONIC_PAIN_PRESENT: NO
SENSORY_SUPPORT_DEVICES: EYEGLASSES
RECENT_DECLINE_ADL: NO
NEEDS_ASSIST: NO
ADL_SHORT_OF_BREATH: NO

## 2023-03-16 ASSESSMENT — PAIN SCALES - GENERAL
PAINLEVEL_OUTOF10: 0

## 2023-03-16 ASSESSMENT — PAIN SCALES - WONG BAKER
WONGBAKER_NUMERICALRESPONSE: 0
WONGBAKER_NUMERICALRESPONSE: 0

## 2023-03-16 ASSESSMENT — COGNITIVE AND FUNCTIONAL STATUS - GENERAL
ARE YOU DEAF OR DO YOU HAVE SERIOUS DIFFICULTY  HEARING: NO
ARE YOU BLIND OR DO YOU HAVE SERIOUS DIFFICULTY SEEING, EVEN WHEN WEARING GLASSES: NO

## 2023-03-17 LAB
ASR DISCLAIMER: NORMAL
CASE RPRT: NORMAL
CLINICAL INFO: NORMAL
PATH REPORT.FINAL DX SPEC: NORMAL
PATH REPORT.GROSS SPEC: NORMAL

## 2023-06-09 RX ORDER — METOPROLOL TARTRATE 100 MG/1
100 TABLET ORAL 2 TIMES DAILY
Qty: 180 TABLET | Refills: 1 | Status: SHIPPED | OUTPATIENT
Start: 2023-06-09 | End: 2023-07-27 | Stop reason: SDUPTHER

## 2023-06-13 ENCOUNTER — OFFICE VISIT (OUTPATIENT)
Dept: CARDIOLOGY | Age: 83
End: 2023-06-13

## 2023-06-13 VITALS
SYSTOLIC BLOOD PRESSURE: 103 MMHG | WEIGHT: 130.07 LBS | DIASTOLIC BLOOD PRESSURE: 68 MMHG | HEIGHT: 63 IN | HEART RATE: 78 BPM | BODY MASS INDEX: 23.05 KG/M2

## 2023-06-13 DIAGNOSIS — I48.20 CHRONIC ATRIAL FIBRILLATION (CMD): ICD-10-CM

## 2023-06-13 DIAGNOSIS — I10 PRIMARY HYPERTENSION: Primary | ICD-10-CM

## 2023-06-13 DIAGNOSIS — Z86.79 HISTORY OF CHF (CONGESTIVE HEART FAILURE): ICD-10-CM

## 2023-06-13 DIAGNOSIS — I65.29 STENOSIS OF CAROTID ARTERY, UNSPECIFIED LATERALITY: ICD-10-CM

## 2023-06-13 DIAGNOSIS — Z99.2 STAGE 5 CHRONIC KIDNEY DISEASE ON CHRONIC DIALYSIS (CMD): ICD-10-CM

## 2023-06-13 DIAGNOSIS — N18.6 STAGE 5 CHRONIC KIDNEY DISEASE ON CHRONIC DIALYSIS (CMD): ICD-10-CM

## 2023-06-13 DIAGNOSIS — E78.5 DYSLIPIDEMIA: ICD-10-CM

## 2023-06-13 PROCEDURE — 99214 OFFICE O/P EST MOD 30 MIN: CPT | Performed by: SPECIALIST

## 2023-06-13 RX ORDER — ATORVASTATIN CALCIUM 10 MG/1
5 TABLET, FILM COATED ORAL DAILY
Qty: 45 TABLET | Refills: 1 | Status: SHIPPED | OUTPATIENT
Start: 2023-06-13 | End: 2023-11-16

## 2023-06-13 SDOH — HEALTH STABILITY: PHYSICAL HEALTH: ON AVERAGE, HOW MANY DAYS PER WEEK DO YOU ENGAGE IN MODERATE TO STRENUOUS EXERCISE (LIKE A BRISK WALK)?: 5 DAYS

## 2023-06-13 SDOH — HEALTH STABILITY: PHYSICAL HEALTH: ON AVERAGE, HOW MANY MINUTES DO YOU ENGAGE IN EXERCISE AT THIS LEVEL?: 30 MIN

## 2023-06-13 ASSESSMENT — PATIENT HEALTH QUESTIONNAIRE - PHQ9
2. FEELING DOWN, DEPRESSED OR HOPELESS: NOT AT ALL
SUM OF ALL RESPONSES TO PHQ9 QUESTIONS 1 AND 2: 0
SUM OF ALL RESPONSES TO PHQ9 QUESTIONS 1 AND 2: 0
1. LITTLE INTEREST OR PLEASURE IN DOING THINGS: NOT AT ALL
CLINICAL INTERPRETATION OF PHQ2 SCORE: NO FURTHER SCREENING NEEDED

## 2023-06-20 ENCOUNTER — OFFICE VISIT (OUTPATIENT)
Dept: OTOLARYNGOLOGY | Facility: CLINIC | Age: 83
End: 2023-06-20

## 2023-06-20 VITALS — WEIGHT: 135 LBS | HEIGHT: 63 IN | BODY MASS INDEX: 23.92 KG/M2

## 2023-06-20 DIAGNOSIS — H61.23 CERUMEN DEBRIS ON TYMPANIC MEMBRANE OF BOTH EARS: Primary | ICD-10-CM

## 2023-06-20 PROCEDURE — 69210 REMOVE IMPACTED EAR WAX UNI: CPT | Performed by: SPECIALIST

## 2023-06-22 ENCOUNTER — ANCILLARY PROCEDURE (OUTPATIENT)
Dept: CARDIOLOGY | Age: 83
End: 2023-06-22
Attending: SPECIALIST

## 2023-06-22 DIAGNOSIS — N18.6 STAGE 5 CHRONIC KIDNEY DISEASE ON CHRONIC DIALYSIS (CMD): ICD-10-CM

## 2023-06-22 DIAGNOSIS — I10 PRIMARY HYPERTENSION: ICD-10-CM

## 2023-06-22 DIAGNOSIS — Z99.2 STAGE 5 CHRONIC KIDNEY DISEASE ON CHRONIC DIALYSIS (CMD): ICD-10-CM

## 2023-06-22 DIAGNOSIS — E78.5 DYSLIPIDEMIA: ICD-10-CM

## 2023-06-22 DIAGNOSIS — I48.20 CHRONIC ATRIAL FIBRILLATION (CMD): ICD-10-CM

## 2023-06-22 DIAGNOSIS — I65.29 STENOSIS OF CAROTID ARTERY, UNSPECIFIED LATERALITY: ICD-10-CM

## 2023-06-22 DIAGNOSIS — Z86.79 HISTORY OF CHF (CONGESTIVE HEART FAILURE): ICD-10-CM

## 2023-06-28 PROCEDURE — 93227 XTRNL ECG REC<48 HR R&I: CPT | Performed by: INTERNAL MEDICINE

## 2023-06-29 ENCOUNTER — TELEPHONE (OUTPATIENT)
Dept: CARDIOLOGY | Age: 83
End: 2023-06-29

## 2023-06-29 RX ORDER — MIDODRINE HYDROCHLORIDE 5 MG/1
5 TABLET ORAL 3 TIMES DAILY
Qty: 270 TABLET | Refills: 1 | Status: SHIPPED | OUTPATIENT
Start: 2023-06-29

## 2023-06-30 ENCOUNTER — TELEPHONE (OUTPATIENT)
Dept: CARDIOLOGY | Age: 83
End: 2023-06-30

## 2023-07-13 ENCOUNTER — APPOINTMENT (OUTPATIENT)
Dept: CARDIOLOGY | Age: 83
End: 2023-07-13
Attending: SPECIALIST

## 2023-07-16 ENCOUNTER — APPOINTMENT (OUTPATIENT)
Dept: GENERAL RADIOLOGY | Facility: HOSPITAL | Age: 83
End: 2023-07-16
Attending: STUDENT IN AN ORGANIZED HEALTH CARE EDUCATION/TRAINING PROGRAM
Payer: MEDICARE

## 2023-07-16 ENCOUNTER — HOSPITAL ENCOUNTER (INPATIENT)
Facility: HOSPITAL | Age: 83
LOS: 4 days | Discharge: HOME HEALTH | DRG: 308 | End: 2023-07-20
Attending: STUDENT IN AN ORGANIZED HEALTH CARE EDUCATION/TRAINING PROGRAM | Admitting: INTERNAL MEDICINE
Payer: MEDICARE

## 2023-07-16 ENCOUNTER — APPOINTMENT (OUTPATIENT)
Dept: GENERAL RADIOLOGY | Facility: HOSPITAL | Age: 83
DRG: 308 | End: 2023-07-16
Attending: STUDENT IN AN ORGANIZED HEALTH CARE EDUCATION/TRAINING PROGRAM
Payer: MEDICARE

## 2023-07-16 ENCOUNTER — HOSPITAL ENCOUNTER (INPATIENT)
Facility: HOSPITAL | Age: 83
LOS: 4 days | Discharge: HOME HEALTH | End: 2023-07-20
Attending: STUDENT IN AN ORGANIZED HEALTH CARE EDUCATION/TRAINING PROGRAM | Admitting: INTERNAL MEDICINE
Payer: MEDICARE

## 2023-07-16 DIAGNOSIS — R00.1 SYMPTOMATIC BRADYCARDIA: Primary | ICD-10-CM

## 2023-07-16 LAB
ANION GAP SERPL CALC-SCNC: 11 MMOL/L (ref 0–18)
ATRIAL RATE: 42 BPM
BASE EXCESS BLD CALC-SCNC: 4.1 MMOL/L (ref ?–2)
BASOPHILS # BLD AUTO: 0.03 X10(3) UL (ref 0–0.2)
BASOPHILS NFR BLD AUTO: 0.5 %
BUN BLD-MCNC: 62 MG/DL (ref 7–18)
BUN/CREAT SERPL: 8.2 (ref 10–20)
CA-I BLD-SCNC: 0.92 MMOL/L (ref 0.95–1.32)
CALCIUM BLD-MCNC: 7.5 MG/DL (ref 8.5–10.1)
CHLORIDE SERPL-SCNC: 101 MMOL/L (ref 98–112)
CO2 SERPL-SCNC: 26 MMOL/L (ref 21–32)
COHGB MFR BLD: 2.3 % (ref 0–3)
CREAT BLD-MCNC: 7.52 MG/DL
DEPRECATED RDW RBC AUTO: 57.1 FL (ref 35.1–46.3)
EOSINOPHIL # BLD AUTO: 0.09 X10(3) UL (ref 0–0.7)
EOSINOPHIL NFR BLD AUTO: 1.4 %
ERYTHROCYTE [DISTWIDTH] IN BLOOD BY AUTOMATED COUNT: 15.4 % (ref 11–15)
GFR SERPLBLD BASED ON 1.73 SQ M-ARVRAT: 5 ML/MIN/1.73M2 (ref 60–?)
GLUCOSE BLD-MCNC: 117 MG/DL (ref 70–99)
GLUCOSE BLDC GLUCOMTR-MCNC: 104 MG/DL (ref 70–99)
GLUCOSE BLDC GLUCOMTR-MCNC: 165 MG/DL (ref 70–99)
HCO3 BLDA-SCNC: 28 MEQ/L (ref 21–27)
HCT VFR BLD AUTO: 36.7 %
HGB BLD-MCNC: 11.2 G/DL
HGB BLD-MCNC: 11.8 G/DL
IMM GRANULOCYTES # BLD AUTO: 0.02 X10(3) UL (ref 0–1)
IMM GRANULOCYTES NFR BLD: 0.3 %
LACTATE BLD-SCNC: 1.9 MMOL/L (ref 0.5–2)
LYMPHOCYTES # BLD AUTO: 1.41 X10(3) UL (ref 1–4)
LYMPHOCYTES NFR BLD AUTO: 21.7 %
MCH RBC QN AUTO: 30.8 PG (ref 26–34)
MCHC RBC AUTO-ENTMCNC: 30.5 G/DL (ref 31–37)
MCV RBC AUTO: 100.8 FL
METHGB MFR BLD: 2.1 % SAT (ref 0.4–1.5)
MONOCYTES # BLD AUTO: 1 X10(3) UL (ref 0.1–1)
MONOCYTES NFR BLD AUTO: 15.4 %
NEUTROPHILS # BLD AUTO: 3.96 X10 (3) UL (ref 1.5–7.7)
NEUTROPHILS # BLD AUTO: 3.96 X10(3) UL (ref 1.5–7.7)
NEUTROPHILS NFR BLD AUTO: 60.7 %
O2 CT BLD-SCNC: 15.2 VOL% (ref 15–23)
OSMOLALITY SERPL CALC.SUM OF ELEC: 305 MOSM/KG (ref 275–295)
P AXIS: 18 DEGREES
P-R INTERVAL: 172 MS
PCO2 BLDA: 35 MM HG (ref 35–45)
PH BLDA: 7.5 [PH] (ref 7.35–7.45)
PLATELET # BLD AUTO: 126 10(3)UL (ref 150–450)
PO2 BLDA: 68 MM HG (ref 80–100)
POTASSIUM BLD-SCNC: 6 MMOL/L (ref 3.6–5.1)
POTASSIUM SERPL-SCNC: 5.7 MMOL/L (ref 3.5–5.1)
POTASSIUM SERPL-SCNC: 5.8 MMOL/L (ref 3.5–5.1)
PUNCTURE CHARGE: YES
Q-T INTERVAL: 568 MS
QRS DURATION: 88 MS
QTC CALCULATION (BEZET): 474 MS
R AXIS: -35 DEGREES
RBC # BLD AUTO: 3.64 X10(6)UL
SAO2 % BLDA: 95.8 % (ref 94–100)
SARS-COV-2 RNA RESP QL NAA+PROBE: NOT DETECTED
SODIUM BLD-SCNC: 132 MMOL/L (ref 135–145)
SODIUM SERPL-SCNC: 138 MMOL/L (ref 136–145)
T AXIS: -2 DEGREES
TROPONIN I HIGH SENSITIVITY: 11 NG/L
TSI SER-ACNC: 1.45 MIU/ML (ref 0.36–3.74)
VENTRICULAR RATE: 42 BPM
WBC # BLD AUTO: 6.5 X10(3) UL (ref 4–11)

## 2023-07-16 PROCEDURE — 71045 X-RAY EXAM CHEST 1 VIEW: CPT | Performed by: STUDENT IN AN ORGANIZED HEALTH CARE EDUCATION/TRAINING PROGRAM

## 2023-07-16 PROCEDURE — 99223 1ST HOSP IP/OBS HIGH 75: CPT | Performed by: INTERNAL MEDICINE

## 2023-07-16 RX ORDER — ONDANSETRON 2 MG/ML
4 INJECTION INTRAMUSCULAR; INTRAVENOUS ONCE
Status: COMPLETED | OUTPATIENT
Start: 2023-07-16 | End: 2023-07-16

## 2023-07-16 RX ORDER — ACETAMINOPHEN 500 MG
500 TABLET ORAL EVERY 4 HOURS PRN
Status: DISCONTINUED | OUTPATIENT
Start: 2023-07-16 | End: 2023-07-20

## 2023-07-16 RX ORDER — MONTELUKAST SODIUM 10 MG/1
10 TABLET ORAL NIGHTLY
Status: DISCONTINUED | OUTPATIENT
Start: 2023-07-16 | End: 2023-07-20

## 2023-07-16 RX ORDER — PANTOPRAZOLE SODIUM 40 MG/1
40 TABLET, DELAYED RELEASE ORAL
Status: DISCONTINUED | OUTPATIENT
Start: 2023-07-17 | End: 2023-07-20

## 2023-07-16 RX ORDER — METOPROLOL TARTRATE 100 MG/1
100 TABLET ORAL 2 TIMES DAILY
COMMUNITY
End: 2023-07-20

## 2023-07-16 RX ORDER — ONDANSETRON 2 MG/ML
4 INJECTION INTRAMUSCULAR; INTRAVENOUS EVERY 6 HOURS PRN
Status: DISCONTINUED | OUTPATIENT
Start: 2023-07-16 | End: 2023-07-20

## 2023-07-16 RX ORDER — DEXTROSE MONOHYDRATE 25 G/50ML
50 INJECTION, SOLUTION INTRAVENOUS ONCE
Status: COMPLETED | OUTPATIENT
Start: 2023-07-16 | End: 2023-07-16

## 2023-07-16 RX ORDER — TRAMADOL HYDROCHLORIDE 50 MG/1
50 TABLET ORAL EVERY 12 HOURS PRN
Status: DISCONTINUED | OUTPATIENT
Start: 2023-07-16 | End: 2023-07-20

## 2023-07-16 RX ORDER — ALBUMIN (HUMAN) 12.5 G/50ML
100 SOLUTION INTRAVENOUS AS NEEDED
Status: DISCONTINUED | OUTPATIENT
Start: 2023-07-16 | End: 2023-07-20

## 2023-07-16 RX ORDER — DOPAMINE HYDROCHLORIDE 320 MG/100ML
INJECTION, SOLUTION INTRAVENOUS CONTINUOUS
Status: DISCONTINUED | OUTPATIENT
Start: 2023-07-16 | End: 2023-07-18

## 2023-07-16 RX ORDER — CALCIUM GLUCONATE 10 MG/ML
1 INJECTION, SOLUTION INTRAVENOUS ONCE
Status: COMPLETED | OUTPATIENT
Start: 2023-07-16 | End: 2023-07-16

## 2023-07-16 RX ORDER — SEVELAMER CARBONATE 800 MG/1
2400 TABLET, FILM COATED ORAL 3 TIMES DAILY
Status: DISCONTINUED | OUTPATIENT
Start: 2023-07-16 | End: 2023-07-18

## 2023-07-16 NOTE — ED INITIAL ASSESSMENT (HPI)
82 y/o female here for eval of x 2 hours dizziness. EMS reports upon arrival rhythm was afib, pt was dizzy and hypotensive systolics in 54J. 286JR NS given and bp normotensive. Sinus bradycardia upon arrival to ER. Denies CP/SOB.

## 2023-07-16 NOTE — ED QUICK NOTES
Orders for admission, patient is aware of plan and ready to go upstairs. Any questions, please call ED RN Marie Rush at extension [phone].      Patient Covid vaccination status: Fully vaccinated     COVID Test Ordered in ED: None    COVID Suspicion at Admission: N/A    Running Infusions:  None    Mental Status/LOC at time of transport: AAOx4    Other pertinent information:   CIWA score: N/A   NIH score:  N/A

## 2023-07-17 ENCOUNTER — APPOINTMENT (OUTPATIENT)
Dept: CV DIAGNOSTICS | Facility: HOSPITAL | Age: 83
DRG: 308 | End: 2023-07-17
Attending: INTERNAL MEDICINE
Payer: MEDICARE

## 2023-07-17 ENCOUNTER — APPOINTMENT (OUTPATIENT)
Dept: CV DIAGNOSTICS | Facility: HOSPITAL | Age: 83
End: 2023-07-17
Attending: INTERNAL MEDICINE
Payer: MEDICARE

## 2023-07-17 LAB
ANION GAP SERPL CALC-SCNC: 12 MMOL/L (ref 0–18)
BASOPHILS # BLD AUTO: 0.04 X10(3) UL (ref 0–0.2)
BASOPHILS NFR BLD AUTO: 0.4 %
BUN BLD-MCNC: 65 MG/DL (ref 7–18)
BUN/CREAT SERPL: 7.8 (ref 10–20)
CALCIUM BLD-MCNC: 8.1 MG/DL (ref 8.5–10.1)
CHLORIDE SERPL-SCNC: 97 MMOL/L (ref 98–112)
CO2 SERPL-SCNC: 22 MMOL/L (ref 21–32)
CREAT BLD-MCNC: 8.29 MG/DL
DEPRECATED RDW RBC AUTO: 51.9 FL (ref 35.1–46.3)
EOSINOPHIL # BLD AUTO: 0.03 X10(3) UL (ref 0–0.7)
EOSINOPHIL NFR BLD AUTO: 0.3 %
ERYTHROCYTE [DISTWIDTH] IN BLOOD BY AUTOMATED COUNT: 14.9 % (ref 11–15)
GFR SERPLBLD BASED ON 1.73 SQ M-ARVRAT: 4 ML/MIN/1.73M2 (ref 60–?)
GLUCOSE BLD-MCNC: 114 MG/DL (ref 70–99)
GLUCOSE BLDC GLUCOMTR-MCNC: 147 MG/DL (ref 70–99)
HBV SURFACE AG SER-ACNC: <0.1 [IU]/L
HBV SURFACE AG SERPL QL IA: NONREACTIVE
HCT VFR BLD AUTO: 37.5 %
HGB BLD-MCNC: 12.1 G/DL
IMM GRANULOCYTES # BLD AUTO: 0.02 X10(3) UL (ref 0–1)
IMM GRANULOCYTES NFR BLD: 0.2 %
LYMPHOCYTES # BLD AUTO: 1.01 X10(3) UL (ref 1–4)
LYMPHOCYTES NFR BLD AUTO: 9.6 %
MAGNESIUM SERPL-MCNC: 2.3 MG/DL (ref 1.6–2.6)
MCH RBC QN AUTO: 30.9 PG (ref 26–34)
MCHC RBC AUTO-ENTMCNC: 32.3 G/DL (ref 31–37)
MCV RBC AUTO: 95.9 FL
MONOCYTES # BLD AUTO: 1.67 X10(3) UL (ref 0.1–1)
MONOCYTES NFR BLD AUTO: 16 %
NEUTROPHILS # BLD AUTO: 7.7 X10 (3) UL (ref 1.5–7.7)
NEUTROPHILS # BLD AUTO: 7.7 X10(3) UL (ref 1.5–7.7)
NEUTROPHILS NFR BLD AUTO: 73.5 %
OSMOLALITY SERPL CALC.SUM OF ELEC: 292 MOSM/KG (ref 275–295)
PHOSPHATE SERPL-MCNC: 3.8 MG/DL (ref 2.5–4.9)
PLATELET # BLD AUTO: 163 10(3)UL (ref 150–450)
POTASSIUM SERPL-SCNC: 4.5 MMOL/L (ref 3.5–5.1)
POTASSIUM SERPL-SCNC: 6.3 MMOL/L (ref 3.5–5.1)
RBC # BLD AUTO: 3.91 X10(6)UL
SODIUM SERPL-SCNC: 131 MMOL/L (ref 136–145)
WBC # BLD AUTO: 10.5 X10(3) UL (ref 4–11)

## 2023-07-17 PROCEDURE — 93306 TTE W/DOPPLER COMPLETE: CPT | Performed by: INTERNAL MEDICINE

## 2023-07-17 PROCEDURE — 5A1D70Z PERFORMANCE OF URINARY FILTRATION, INTERMITTENT, LESS THAN 6 HOURS PER DAY: ICD-10-PCS | Performed by: INTERNAL MEDICINE

## 2023-07-17 PROCEDURE — 99233 SBSQ HOSP IP/OBS HIGH 50: CPT | Performed by: INTERNAL MEDICINE

## 2023-07-17 RX ORDER — CETIRIZINE HYDROCHLORIDE 5 MG/1
5 TABLET ORAL DAILY
Status: DISCONTINUED | OUTPATIENT
Start: 2023-07-17 | End: 2023-07-20

## 2023-07-17 NOTE — PLAN OF CARE
Problem: Patient Centered Care  Goal: Patient preferences are identified and integrated in the patient's plan of care  Description: Interventions:  - What would you like us to know as we care for you?   - Provide timely, complete, and accurate information to patient/family  - Incorporate patient and family knowledge, values, beliefs, and cultural backgrounds into the planning and delivery of care  - Encourage patient/family to participate in care and decision-making at the level they choose  - Honor patient and family perspectives and choices  Outcome: Progressing     Problem: Patient/Family Goals  Goal: Patient/Family Long Term Goal  Description: Patient's Long Term Goal:     Interventions:  -   - See additional Care Plan goals for specific interventions  Outcome: Progressing  Goal: Patient/Family Short Term Goal  Description: Patient's Short Term Goal:     Interventions:   -   - See additional Care Plan goals for specific interventions  Outcome: Progressing     Problem: CARDIOVASCULAR - ADULT  Goal: Maintains optimal cardiac output and hemodynamic stability  Description: INTERVENTIONS:  - Monitor vital signs, rhythm, and trends  - Monitor for bleeding, hypotension and signs of decreased cardiac output  - Evaluate effectiveness of vasoactive medications to optimize hemodynamic stability  - Monitor arterial and/or venous puncture sites for bleeding and/or hematoma  - Assess quality of pulses, skin color and temperature  - Assess for signs of decreased coronary artery perfusion - ex.  Angina  - Evaluate fluid balance, assess for edema, trend weights  Outcome: Progressing  Goal: Absence of cardiac arrhythmias or at baseline  Description: INTERVENTIONS:  - Continuous cardiac monitoring, monitor vital signs, obtain 12 lead EKG if indicated  - Evaluate effectiveness of antiarrhythmic and heart rate control medications as ordered  - Initiate emergency measures for life threatening arrhythmias  - Monitor electrolytes and administer replacement therapy as ordered  Outcome: Progressing     Problem: RESPIRATORY - ADULT  Goal: Achieves optimal ventilation and oxygenation  Description: INTERVENTIONS:  - Assess for changes in respiratory status  - Assess for changes in mentation and behavior  - Position to facilitate oxygenation and minimize respiratory effort  - Oxygen supplementation based on oxygen saturation or ABGs  - Provide Smoking Cessation handout, if applicable  - Encourage broncho-pulmonary hygiene including cough, deep breathe, Incentive Spirometry  - Assess the need for suctioning and perform as needed  - Assess and instruct to report SOB or any respiratory difficulty  - Respiratory Therapy support as indicated  - Manage/alleviate anxiety  - Monitor for signs/symptoms of CO2 retention  Outcome: Not Progressing   Patient continues on BIPAP, lasix drip, unable to respond. Continue with current tx.

## 2023-07-17 NOTE — PHYSICAL THERAPY NOTE
Attempted to see patient for eval via functional mobility screening. ECHO and lab both at bedside, will follow up as pt status permits. Thank you.        Luci Grullon, PT

## 2023-07-17 NOTE — PLAN OF CARE
Problem: Patient Centered Care  Goal: Patient preferences are identified and integrated in the patient's plan of care  Description: Interventions:  - What would you like us to know as we care for you?   - Provide timely, complete, and accurate information to patient/family  - Incorporate patient and family knowledge, values, beliefs, and cultural backgrounds into the planning and delivery of care  - Encourage patient/family to participate in care and decision-making at the level they choose  - Honor patient and family perspectives and choices  7/17/2023 1837 by Jean Hernandez RN  Outcome: Progressing  7/17/2023 1026 by Jean Hernandez RN  Outcome: Progressing     Problem: Patient/Family Goals  Goal: Patient/Family Long Term Goal  Description: Patient's Long Term Goal:     Interventions:  - - See additional Care Plan goals for specific interventions  7/17/2023 1837 by Jean Hernandez RN  Outcome: Progressing  7/17/2023 1026 by Jean Hernandez RN  Outcome: Progressing  Goal: Patient/Family Short Term Goal  Description: Patient's Short Term Goal:     Interventions:   -   - See additional Care Plan goals for specific interventions  7/17/2023 1837 by Jean Hernandez RN  Outcome: Progressing  7/17/2023 1026 by Jean Hernandez RN  Outcome: Progressing     Problem: CARDIOVASCULAR - ADULT  Goal: Maintains optimal cardiac output and hemodynamic stability  Description: INTERVENTIONS:  - Monitor vital signs, rhythm, and trends  - Monitor for bleeding, hypotension and signs of decreased cardiac output  - Evaluate effectiveness of vasoactive medications to optimize hemodynamic stability  - Monitor arterial and/or venous puncture sites for bleeding and/or hematoma  - Assess quality of pulses, skin color and temperature  - Assess for signs of decreased coronary artery perfusion - ex.  Angina  - Evaluate fluid balance, assess for edema, trend weights  7/17/2023 1837 by Jean Hernandez RN  Outcome: Progressing  7/17/2023 1026 by Tiffanie Banegas RN  Outcome: Progressing  Goal: Absence of cardiac arrhythmias or at baseline  Description: INTERVENTIONS:  - Continuous cardiac monitoring, monitor vital signs, obtain 12 lead EKG if indicated  - Evaluate effectiveness of antiarrhythmic and heart rate control medications as ordered  - Initiate emergency measures for life threatening arrhythmias  - Monitor electrolytes and administer replacement therapy as ordered  7/17/2023 1837 by Tiffanie Banegas RN  Outcome: Progressing  7/17/2023 1026 by Tiffanie Banegas RN  Outcome: Progressing     Problem: RESPIRATORY - ADULT  Goal: Achieves optimal ventilation and oxygenation  Description: INTERVENTIONS:  - Assess for changes in respiratory status  - Assess for changes in mentation and behavior  - Position to facilitate oxygenation and minimize respiratory effort  - Oxygen supplementation based on oxygen saturation or ABGs  - Provide Smoking Cessation handout, if applicable  - Encourage broncho-pulmonary hygiene including cough, deep breathe, Incentive Spirometry  - Assess the need for suctioning and perform as needed  - Assess and instruct to report SOB or any respiratory difficulty  - Respiratory Therapy support as indicated  - Manage/alleviate anxiety  - Monitor for signs/symptoms of CO2 retention  7/17/2023 1837 by Tiffanie Banegas RN  Outcome: Progressing  7/17/2023 1026 by Tiffanie Banegas RN  Outcome: Not Progressing   Patient had dialysis today, 1000 ml removed, Dopamine drip off at 5 pm. Up in a chair.

## 2023-07-17 NOTE — CONSULTS
Spiritual Care Visit Note    Visited With: Patient    Writer report consulting with RN. Patient is alert and decisional. Writer attempted to help patient complete HCPoA. Patient declined to complete HCPoA. Writer mentioned to patient to let RN know if patient wants to complete HCPoA. Follow up as requested. Spiritual Care Taxonomy:    Intended Effects: Establish rapport and connectedness    Methods: Collaborate with care team member;Offer support    Interventions: Active listening; Ask guided questions;Assist someone with Advance Directives;Silent prayer     Papo Estevez, [facility]   [id number]     On call  services [phone]

## 2023-07-17 NOTE — PLAN OF CARE
Received patient from ER at 1800. Patient remains of Dopamine drip. Patient safety maintained. Patient and family updated on plan of care.       Problem: Patient Centered Care  Goal: Patient preferences are identified and integrated in the patient's plan of care  Description: Interventions:  - What would you like us to know as we care for you?  - Provide timely, complete, and accurate information to patient/family  - Incorporate patient and family knowledge, values, beliefs, and cultural backgrounds into the planning and delivery of care  - Encourage patient/family to participate in care and decision-making at the level they choose  - Honor patient and family perspectives and choices  7/16/2023 1901 by Zay Perera RN  Outcome: Progressing  7/16/2023 1901 by Zay Perera RN  Outcome: Progressing     Problem: Patient/Family Goals  Goal: Patient/Family Long Term Goal  Description: Patient's Long Term Goal:     Interventions:  -   - See additional Care Plan goals for specific interventions  7/16/2023 1901 by Zay Perera RN  Outcome: Progressing  7/16/2023 1901 by Zay Perera RN  Outcome: Progressing  Goal: Patient/Family Short Term Goal  Description: Patient's Short Term Goal:     Interventions:   -   - See additional Care Plan goals for specific interventions  7/16/2023 1901 by Zay Perera RN  Outcome: Progressing  7/16/2023 1901 by Zay Perera RN  Outcome: Progressing     Problem: CARDIOVASCULAR - ADULT  Goal: Maintains optimal cardiac output and hemodynamic stability  Description: INTERVENTIONS:  - Monitor vital signs, rhythm, and trends  - Monitor for bleeding, hypotension and signs of decreased cardiac output  - Evaluate effectiveness of vasoactive medications to optimize hemodynamic stability  - Monitor arterial and/or venous puncture sites for bleeding and/or hematoma  - Assess quality of pulses, skin color and temperature  - Assess for signs of decreased coronary artery perfusion - ex.  Angina  - Evaluate fluid balance, assess for edema, trend weights  Outcome: Progressing  Goal: Absence of cardiac arrhythmias or at baseline  Description: INTERVENTIONS:  - Continuous cardiac monitoring, monitor vital signs, obtain 12 lead EKG if indicated  - Evaluate effectiveness of antiarrhythmic and heart rate control medications as ordered  - Initiate emergency measures for life threatening arrhythmias  - Monitor electrolytes and administer replacement therapy as ordered  Outcome: Progressing     Problem: RESPIRATORY - ADULT  Goal: Achieves optimal ventilation and oxygenation  Description: INTERVENTIONS:  - Assess for changes in respiratory status  - Assess for changes in mentation and behavior  - Position to facilitate oxygenation and minimize respiratory effort  - Oxygen supplementation based on oxygen saturation or ABGs  - Provide Smoking Cessation handout, if applicable  - Encourage broncho-pulmonary hygiene including cough, deep breathe, Incentive Spirometry  - Assess the need for suctioning and perform as needed  - Assess and instruct to report SOB or any respiratory difficulty  - Respiratory Therapy support as indicated  - Manage/alleviate anxiety  - Monitor for signs/symptoms of CO2 retention  Outcome: Progressing

## 2023-07-17 NOTE — PLAN OF CARE
Pt remains on dopamine gtt. HR and B/p stabilized throughout the night. Intermittent headache noted with some nausea. PRN pain medication and zofran given with fair relief. Dialysis scheduled this AM.    Problem: Patient Centered Care  Goal: Patient preferences are identified and integrated in the patient's plan of care  Description: Interventions:  - What would you like us to know as we care for you? \"I want to go home\"  - Provide timely, complete, and accurate information to patient/family  - Incorporate patient and family knowledge, values, beliefs, and cultural backgrounds into the planning and delivery of care  - Encourage patient/family to participate in care and decision-making at the level they choose  - Honor patient and family perspectives and choices  Outcome: Progressing     Problem: Patient/Family Goals  Goal: Patient/Family Long Term Goal  Description: Patient's Long Term Goal: Discharge to home    Interventions:  - Follow MD orders  - Improve HR  - See additional Care Plan goals for specific interventions  Outcome: Progressing  Goal: Patient/Family Short Term Goal  Description: Patient's Short Term Goal: Pain management    Interventions:   - PRN medications  - Repositioning as needed  - See additional Care Plan goals for specific interventions  Outcome: Progressing     Problem: CARDIOVASCULAR - ADULT  Goal: Maintains optimal cardiac output and hemodynamic stability  Description: INTERVENTIONS:  - Monitor vital signs, rhythm, and trends  - Monitor for bleeding, hypotension and signs of decreased cardiac output  - Evaluate effectiveness of vasoactive medications to optimize hemodynamic stability  - Monitor arterial and/or venous puncture sites for bleeding and/or hematoma  - Assess quality of pulses, skin color and temperature  - Assess for signs of decreased coronary artery perfusion - ex.  Angina  - Evaluate fluid balance, assess for edema, trend weights  Outcome: Progressing  Goal: Absence of cardiac arrhythmias or at baseline  Description: INTERVENTIONS:  - Continuous cardiac monitoring, monitor vital signs, obtain 12 lead EKG if indicated  - Evaluate effectiveness of antiarrhythmic and heart rate control medications as ordered  - Initiate emergency measures for life threatening arrhythmias  - Monitor electrolytes and administer replacement therapy as ordered  Outcome: Progressing     Problem: RESPIRATORY - ADULT  Goal: Achieves optimal ventilation and oxygenation  Description: INTERVENTIONS:  - Assess for changes in respiratory status  - Assess for changes in mentation and behavior  - Position to facilitate oxygenation and minimize respiratory effort  - Oxygen supplementation based on oxygen saturation or ABGs  - Provide Smoking Cessation handout, if applicable  - Encourage broncho-pulmonary hygiene including cough, deep breathe, Incentive Spirometry  - Assess the need for suctioning and perform as needed  - Assess and instruct to report SOB or any respiratory difficulty  - Respiratory Therapy support as indicated  - Manage/alleviate anxiety  - Monitor for signs/symptoms of CO2 retention  Outcome: Progressing

## 2023-07-17 NOTE — PROGRESS NOTES
[facility] Pharmacy Note:  Renal Dose Adjustment for Cetirizine (ZYRTEC)    Devora Schirmer has been prescribed Cetirizine (Zyrtec) 10 mg orally daily. Estimated Creatinine Clearance: 4.8 mL/min (A) (based on SCr of 7.52 mg/dL (H)). The dose has been changed to 5 mg orally daily per P&T approved protocol. Pharmacy will follow and resume original order if renal function improves.       Thank you,  Ranjith Lamar, PharmD  7/17/2023  8:43 AM  [address]: [phone]

## 2023-07-18 LAB
ALBUMIN SERPL-MCNC: 3.1 G/DL (ref 3.4–5)
ALBUMIN/GLOB SERPL: 1.2 {RATIO} (ref 1–2)
ALP LIVER SERPL-CCNC: 203 U/L
ALT SERPL-CCNC: 15 U/L
ANION GAP SERPL CALC-SCNC: 8 MMOL/L (ref 0–18)
AST SERPL-CCNC: 18 U/L (ref 15–37)
BASOPHILS # BLD AUTO: 0.02 X10(3) UL (ref 0–0.2)
BASOPHILS NFR BLD AUTO: 0.3 %
BILIRUB SERPL-MCNC: 0.8 MG/DL (ref 0.1–2)
BUN BLD-MCNC: 33 MG/DL (ref 7–18)
BUN/CREAT SERPL: 5.9 (ref 10–20)
CALCIUM BLD-MCNC: 7.8 MG/DL (ref 8.5–10.1)
CHLORIDE SERPL-SCNC: 100 MMOL/L (ref 98–112)
CO2 SERPL-SCNC: 28 MMOL/L (ref 21–32)
CREAT BLD-MCNC: 5.6 MG/DL
DEPRECATED RDW RBC AUTO: 54 FL (ref 35.1–46.3)
EOSINOPHIL # BLD AUTO: 0.11 X10(3) UL (ref 0–0.7)
EOSINOPHIL NFR BLD AUTO: 1.7 %
ERYTHROCYTE [DISTWIDTH] IN BLOOD BY AUTOMATED COUNT: 15.1 % (ref 11–15)
GFR SERPLBLD BASED ON 1.73 SQ M-ARVRAT: 7 ML/MIN/1.73M2 (ref 60–?)
GLOBULIN PLAS-MCNC: 2.6 G/DL (ref 2.8–4.4)
GLUCOSE BLD-MCNC: 97 MG/DL (ref 70–99)
HCT VFR BLD AUTO: 35.7 %
HGB BLD-MCNC: 11.2 G/DL
IMM GRANULOCYTES # BLD AUTO: 0.01 X10(3) UL (ref 0–1)
IMM GRANULOCYTES NFR BLD: 0.2 %
LYMPHOCYTES # BLD AUTO: 0.86 X10(3) UL (ref 1–4)
LYMPHOCYTES NFR BLD AUTO: 13.5 %
MAGNESIUM SERPL-MCNC: 2.1 MG/DL (ref 1.6–2.6)
MCH RBC QN AUTO: 30.9 PG (ref 26–34)
MCHC RBC AUTO-ENTMCNC: 31.4 G/DL (ref 31–37)
MCV RBC AUTO: 98.3 FL
MONOCYTES # BLD AUTO: 0.84 X10(3) UL (ref 0.1–1)
MONOCYTES NFR BLD AUTO: 13.2 %
NEUTROPHILS # BLD AUTO: 4.51 X10 (3) UL (ref 1.5–7.7)
NEUTROPHILS # BLD AUTO: 4.51 X10(3) UL (ref 1.5–7.7)
NEUTROPHILS NFR BLD AUTO: 71.1 %
OSMOLALITY SERPL CALC.SUM OF ELEC: 289 MOSM/KG (ref 275–295)
PHOSPHATE SERPL-MCNC: 3.4 MG/DL (ref 2.5–4.9)
PLATELET # BLD AUTO: 128 10(3)UL (ref 150–450)
POTASSIUM SERPL-SCNC: 4.6 MMOL/L (ref 3.5–5.1)
PROT SERPL-MCNC: 5.7 G/DL (ref 6.4–8.2)
RBC # BLD AUTO: 3.63 X10(6)UL
SODIUM SERPL-SCNC: 136 MMOL/L (ref 136–145)
WBC # BLD AUTO: 6.4 X10(3) UL (ref 4–11)

## 2023-07-18 PROCEDURE — 99233 SBSQ HOSP IP/OBS HIGH 50: CPT | Performed by: INTERNAL MEDICINE

## 2023-07-18 RX ORDER — METOPROLOL TARTRATE 50 MG/1
50 TABLET, FILM COATED ORAL
Status: DISCONTINUED | OUTPATIENT
Start: 2023-07-18 | End: 2023-07-20

## 2023-07-18 RX ORDER — SEVELAMER CARBONATE 800 MG/1
2400 TABLET, FILM COATED ORAL 3 TIMES DAILY
Status: DISCONTINUED | OUTPATIENT
Start: 2023-07-18 | End: 2023-07-20

## 2023-07-18 RX ORDER — MIDODRINE HYDROCHLORIDE 5 MG/1
5 TABLET ORAL 3 TIMES DAILY
Status: DISCONTINUED | OUTPATIENT
Start: 2023-07-18 | End: 2023-07-20

## 2023-07-18 NOTE — PLAN OF CARE
Patient A&Ox4. VSS. Afib on monitor- restarted on metoprolol and midodrine for BP support. HD scheduled for tomorrow. Up with PT ambulating in room. Denies any pain. Transferred to room 239- report given to [facility]. Skin intact. Right AV fistula with +bruit/thrill. See assessments. Safety maintained. Problem: Patient Centered Care  Goal: Patient preferences are identified and integrated in the patient's plan of care  Description: Interventions:  - What would you like us to know as we care for you?   - Provide timely, complete, and accurate information to patient/family  - Incorporate patient and family knowledge, values, beliefs, and cultural backgrounds into the planning and delivery of care  - Encourage patient/family to participate in care and decision-making at the level they choose  - Honor patient and family perspectives and choices  Outcome: Progressing          Problem: CARDIOVASCULAR - ADULT  Goal: Maintains optimal cardiac output and hemodynamic stability  Description: INTERVENTIONS:  - Monitor vital signs, rhythm, and trends  - Monitor for bleeding, hypotension and signs of decreased cardiac output  - Evaluate effectiveness of vasoactive medications to optimize hemodynamic stability  - Monitor arterial and/or venous puncture sites for bleeding and/or hematoma  - Assess quality of pulses, skin color and temperature  - Assess for signs of decreased coronary artery perfusion - ex.  Angina  - Evaluate fluid balance, assess for edema, trend weights  Outcome: Progressing  Goal: Absence of cardiac arrhythmias or at baseline  Description: INTERVENTIONS:  - Continuous cardiac monitoring, monitor vital signs, obtain 12 lead EKG if indicated  - Evaluate effectiveness of antiarrhythmic and heart rate control medications as ordered  - Initiate emergency measures for life threatening arrhythmias  - Monitor electrolytes and administer replacement therapy as ordered  Outcome: Progressing     Problem: RESPIRATORY - ADULT  Goal: Achieves optimal ventilation and oxygenation  Description: INTERVENTIONS:  - Assess for changes in respiratory status  - Assess for changes in mentation and behavior  - Position to facilitate oxygenation and minimize respiratory effort  - Oxygen supplementation based on oxygen saturation or ABGs  - Provide Smoking Cessation handout, if applicable  - Encourage broncho-pulmonary hygiene including cough, deep breathe, Incentive Spirometry  - Assess the need for suctioning and perform as needed  - Assess and instruct to report SOB or any respiratory difficulty  - Respiratory Therapy support as indicated  - Manage/alleviate anxiety  - Monitor for signs/symptoms of CO2 retention  Outcome: Progressing

## 2023-07-18 NOTE — PLAN OF CARE
NSR with HR 90s with dopamine gtt turned off. RA. Ambulates with stand by assist. BM x1. Daughter updated on pt's condition. Problem: Patient Centered Care  Goal: Patient preferences are identified and integrated in the patient's plan of care  Description: Interventions:  - What would you like us to know as we care for you? Pt [place], HA x2  - Provide timely, complete, and accurate information to patient/family  - Incorporate patient and family knowledge, values, beliefs, and cultural backgrounds into the planning and delivery of care  - Encourage patient/family to participate in care and decision-making at the level they choose  - Honor patient and family perspectives and choices  Outcome: Progressing     Problem: Patient/Family Goals  Goal: Patient/Family Long Term Goal  Description: Patient's Long Term Goal: med/HD compliance    Interventions:  - support, education  - See additional Care Plan goals for specific interventions  Outcome: Progressing  Goal: Patient/Family Short Term Goal  Description: Patient's Short Term Goal: maintain stable heart rhythm     Interventions:   - monitor tele  - See additional Care Plan goals for specific interventions  Outcome: Progressing     Problem: CARDIOVASCULAR - ADULT  Goal: Maintains optimal cardiac output and hemodynamic stability  Description: INTERVENTIONS:  - Monitor vital signs, rhythm, and trends  - Monitor for bleeding, hypotension and signs of decreased cardiac output  - Evaluate effectiveness of vasoactive medications to optimize hemodynamic stability  - Monitor arterial and/or venous puncture sites for bleeding and/or hematoma  - Assess quality of pulses, skin color and temperature  - Assess for signs of decreased coronary artery perfusion - ex.  Angina  - Evaluate fluid balance, assess for edema, trend weights  Outcome: Progressing  Goal: Absence of cardiac arrhythmias or at baseline  Description: INTERVENTIONS:  - Continuous cardiac monitoring, monitor vital signs, obtain 12 lead EKG if indicated  - Evaluate effectiveness of antiarrhythmic and heart rate control medications as ordered  - Initiate emergency measures for life threatening arrhythmias  - Monitor electrolytes and administer replacement therapy as ordered  Outcome: Progressing     Problem: RESPIRATORY - ADULT  Goal: Achieves optimal ventilation and oxygenation  Description: INTERVENTIONS:  - Assess for changes in respiratory status  - Assess for changes in mentation and behavior  - Position to facilitate oxygenation and minimize respiratory effort  - Oxygen supplementation based on oxygen saturation or ABGs  - Provide Smoking Cessation handout, if applicable  - Encourage broncho-pulmonary hygiene including cough, deep breathe, Incentive Spirometry  - Assess the need for suctioning and perform as needed  - Assess and instruct to report SOB or any respiratory difficulty  - Respiratory Therapy support as indicated  - Manage/alleviate anxiety  - Monitor for signs/symptoms of CO2 retention  Outcome: Progressing

## 2023-07-18 NOTE — PHYSICAL THERAPY NOTE
PHYSICAL THERAPY EVALUATION - INPATIENT     Room Number: 222/222-A  Evaluation Date: 7/18/2023  Type of Evaluation: Initial   Physician Order: PT Eval and Treat    Presenting Problem: symptomatic bradycardia  Reason for Therapy: Mobility Dysfunction and Discharge Planning    PHYSICAL THERAPY ASSESSMENT   Orders received and chart reviewed. GERARDO Robert approved participation in physical therapy. Session coordinated with OT, gait belt donned. PPE worn by therapist: mask and gloves. Patient was not wearing a mask during session. Patient is a 81year old female admitted 7/16/2023 for Presenting Problem: symptomatic bradycardia. Patient presented in bedside chair with 0/10 pain. Education provided on Physical therapy plan of care and physiological benefits of out of bed mobility. Patient with good carryover. Patient on room air, heart rate 118 when ambulating. Patient currently with CGA for mobility during the session with rolling walker, may need one for home. Patient with soreness in her thighs. Patient oxygen sat 97% and heart rate 100. Patient with hearing aids. Patient is currently functioning below baseline with bed mobility, transfers, gait, and stair negotiation as a result of the following impairments: pain and medical status. Patient history and/or personal factors that may impact the plan of care include home accessibility concerns. Based on the physical therapy examination of the noted systems and functional activity/participation limitations, the patient presentation is stable given the patient demonstrates worsening of previously stable condition and demonstrates worsening of co-morbidities. Patient would benefit from continued skilled physical therapy interventions to maximize patient safety and functional independence. Updated nurse on results of session, patient left in bedside chair, all lines intact, all needs met with call light in reach.      Bed mobility:  not tested  Transfers: Contact guard assist  Gait Assistance: Contact guard assist  Distance (ft): 60ft  Assistive Device: Rolling walker             Patient was left in bedside chair at end of session with all needs in reach. Patient's current functional deficits include bed mobility, transfers, gait and stair navigation. Patient will likely have the physical skills to return to prior living environment upon D/C from the hospital. Anticipate patient will benefit from continued skilled physical therapy while in the hospital and upon D/C from the hospital with home health and 24 hour supervision. The patient's Approx Degree of Impairment: 46.58% has been calculated based on documentation in the [facility] '6 clicks' Inpatient Basic Mobility Short Form. Research supports that patients with this level of impairment may benefit from Home with home health PT. RN aware of patient status post session. Patient will benefit from continued IP PT services to address these deficits in preparation for discharge. DISCHARGE RECOMMENDATIONS  PT Discharge Recommendations: 24 hour care/supervision;Home with home health PT    PLAN  PT Treatment Plan: Bed mobility; Body mechanics; Patient education;Gait training;Balance training;Stair training;Transfer training;Strengthening  Rehab Potential : Good  Frequency (Obs): 5x/week       PHYSICAL THERAPY MEDICAL/SOCIAL HISTORY   Problem List  Principal Problem:    Symptomatic bradycardia    Past Medical History  Past Medical History:   Diagnosis Date    Arrhythmia     Afib    AVF (arteriovenous fistula) ([address] 10/12/2017    Biceps rupture, proximal, right, initial encounter 2/20/2018    Breast cancer ([address] 1998    Breast cancer in female [facility]) 12/20/2018    CANCER 1989    right breast mastectomy    Cataract 2001    OU    Clostridioides difficile infection     Cup to disc asymmetry 2001    OU    Dialysis patient ([address]     HD M, W, F    Essential hypertension     Floaters 2001    OU    Hearing impairment     hearing aides 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 Marquez Lorenzo @ [address] [place] [address] Left 2021    Dr. Marquez Lorenzo @ [place] [address]      laparoscopic    COLONOSCOPY      c. diff colitis-Ali    ECHO 2D DP/CLRFL, CARDIO (INTERNAL)   [facility] estee    mod MR/LAE; border systolic LVEF    ECHO 2D, CARDIO (DMG)  2020    [facility] cardio: no change except mod mitral regurg mild now    HX BREAST CANCER      LEXISCAN NUC STRESS TST, CARDIO (INTERNAL)  05/15/2018    [facility]; normal ; EF 54%    LEXISCAN NUC STRESS TST, CARDIO (INTERNAL)  2021    [facility]; EF57%; normal wall motion; fixed perfusion defect inf. wall    LUMPECTOMY RIGHT      MASTECTOMY PARTIAL  2018    u [facility]: left breaswt seed loc partial mastectomy     MASTECTOMY RIGHT            OTHER SURGICAL HISTORY  12    cysto-Dr. Candy Horner    OTHER SURGICAL HISTORY  1/21/15     lap sigmoid colectomy     OTHER SURGICAL HISTORY  17    Right Hemicolectomy by Dr. Yulissa Simpson      2009     HOME SITUATION  Type of Home: House   Home Layout: Two level  Stairs to Enter : 0  Railing: No  Stairs to Bedroom: 7  Railing: Yes  Lives With:  (lives with grandson)  Drives: Yes  Patient Owned Equipment: Rose Mary Arteaga (uses cane out in the community, may need rolling walker for D/C)  Patient Regularly Uses: None    Prior Level of [place]: Patient reports being independent in ADL's and ambulation with cane when out of the home prior to admission to the hospital.    SUBJECTIVE  \"I can use the walker\"    PHYSICAL THERAPY EXAMINATION     OBJECTIVE  Precautions: None  Fall Risk: Standard fall risk    WEIGHT BEARING RESTRICTION  Weight Bearing Restriction: None                PAIN ASSESSMENT  Ratin          COGNITION  Overall Cognitive Status:  WFL - within functional limits    RANGE OF MOTION AND STRENGTH ASSESSMENT    Lower extremity ROM is within functional limits  BLE WNL    Lower extremity strength is within functional limits  BLE WNL    BALANCE  Static Sitting: Good  Dynamic Sitting: Fair +  Static Standing: Fair  Dynamic Standing: Fair -    AM-PAC '6-Clicks' INPATIENT SHORT FORM - BASIC MOBILITY  How much difficulty does the patient currently have. .. Patient Difficulty: Turning over in bed (including adjusting bedclothes, sheets and blankets)?: A Little   Patient Difficulty: Sitting down on and standing up from a chair with arms (e.g., wheelchair, bedside commode, etc.): A Little   Patient Difficulty: Moving from lying on back to sitting on the side of the bed?: A Little   How much help from another person does the patient currently need. .. Help from Another: Moving to and from a bed to a chair (including a wheelchair)?: A Little   Help from Another: Need to walk in hospital room?: A Little   Help from Another: Climbing 3-5 steps with a railing?: A Little     AM-PAC Score:  Raw Score: 18   Approx Degree of Impairment: 46.58%   Standardized Score (AM-PAC Scale): 43.63   CMS Modifier (G-Code): CK    Patient End of Session: Up in chair;Call light within reach; Needs met;RN aware of session/findings; All patient questions and concerns addressed    CURRENT GOALS  Goals to be met by: 23  Patient Goal Patient's self-stated goal is: to go home   Goal #1 Patient is able to demonstrate supine - sit EOB @ level: supervision     Goal #1   Current Status    Goal #2 Patient is able to demonstrate transfers Sit to/from Stand at assistance level: supervision with walker - rolling     Goal #2  Current Status    Goal #3 Patient is able to ambulate 100 feet with assist device: walker - rolling at assistance level: modified independent   Goal #3   Current Status    Goal #4 Patient will negotiate 7 stairs/one curb w/ assistive device and supervision   Goal #4   Current Status    Goal #5 Patient to demonstrate independence with home activity/exercise instructions provided to patient in preparation for discharge.    Goal #5   Current Status    Goal #6    Goal #6  Current Status     Patient Evaluation Complexity Level:  History Low - no personal factors and/or co-morbidities   Examination of body systems Low - addressing 1-2 elements   Clinical Presentation Low - Stable   Clinical Decision Making Low Complexity     Gait Training: 15 minutes

## 2023-07-19 LAB
ALBUMIN SERPL-MCNC: 3.1 G/DL (ref 3.4–5)
ANION GAP SERPL CALC-SCNC: 10 MMOL/L (ref 0–18)
BUN BLD-MCNC: 44 MG/DL (ref 7–18)
BUN/CREAT SERPL: 6 (ref 10–20)
CALCIUM BLD-MCNC: 7.9 MG/DL (ref 8.5–10.1)
CHLORIDE SERPL-SCNC: 98 MMOL/L (ref 98–112)
CO2 SERPL-SCNC: 25 MMOL/L (ref 21–32)
CREAT BLD-MCNC: 7.35 MG/DL
GFR SERPLBLD BASED ON 1.73 SQ M-ARVRAT: 5 ML/MIN/1.73M2 (ref 60–?)
GLUCOSE BLD-MCNC: 98 MG/DL (ref 70–99)
MAGNESIUM SERPL-MCNC: 2.2 MG/DL (ref 1.6–2.6)
OSMOLALITY SERPL CALC.SUM OF ELEC: 287 MOSM/KG (ref 275–295)
PHOSPHATE SERPL-MCNC: 3.9 MG/DL (ref 2.5–4.9)
POTASSIUM SERPL-SCNC: 5.2 MMOL/L (ref 3.5–5.1)
SODIUM SERPL-SCNC: 133 MMOL/L (ref 136–145)

## 2023-07-19 PROCEDURE — 90935 HEMODIALYSIS ONE EVALUATION: CPT | Performed by: INTERNAL MEDICINE

## 2023-07-19 NOTE — CM/SW NOTE
07/19/23 1300   CM/SW Referral Data   Referral Source Social Work (self-referral)   Reason for Referral Discharge planning   Informant Patient   Medical Hx   Does patient have an established PCP? Yes   Significant Past Medical/Mental Health Hx ESRD, Afib, HTN, HLD, CHF   Patient Info   Advanced directives? Yes   Patient's Current Mental Status at Time of Assessment Oriented   Patient's [address]   Number of Levels in Home 2   Number of Stair in Home 7   Patient lives with Grandchild   Patient Status Prior to Admission   Independent with ADLs and Mobility Yes   Services in place prior to admission DME/Supplies at home   Type of DME/Supplies Wheeled [place]   Discharge Needs   Anticipated D/C needs Home health care   Choice of Post-Acute Provider   Informed patient of right to choose their preferred provider Yes   List of appropriate post-acute services provided to patient/family with quality data Yes   Information given to Patient     The pt is a 81year old female admitted for symptomatic bradycardia. The patient lives in a two story home with 1 stair outside and 7 stairs inside. The pts grandson lives with her and he is there for support if needed. The pt is usually independent with all ADLs at baseline. The pt uses a cane and/or walker when she goes out. PT/OT recs are for Home Health. The patient is agreeable to [place] recommendations. Residential  shared the home health list with the patient. The pt has no further questions at this time. SW/CM to remain available for support and/or discharge planning.      Francisco DUONG, [facility]  Discharge Planner [id number]

## 2023-07-19 NOTE — PLAN OF CARE
Problem: Patient Centered Care  Goal: Patient preferences are identified and integrated in the patient's plan of care  Description: Interventions:  - Provide timely, complete, and accurate information to patient/family  - Incorporate patient and family knowledge, values, beliefs, and cultural backgrounds into the planning and delivery of care  - Encourage patient/family to participate in care and decision-making at the level they choose  - Honor patient and family perspectives and choices  Outcome: Progressing     Problem: Patient/Family Goals  Goal: Patient/Family Long Term Goal  Description: Patient's Long Term Goal: to be discharged home    Interventions:  - See additional Care Plan goals for specific interventions  Outcome: Progressing  Goal: Patient/Family Short Term Goal  Description: Patient's Short Term Goal: to get up to the chair during mealtimes    Interventions:   - Assess mobility  - Up to chair at mealtimes as tolerated and as indicate  - Monitor pain management  - See additional Care Plan goals for specific interventions  Outcome: Progressing     Problem: CARDIOVASCULAR - ADULT  Goal: Maintains optimal cardiac output and hemodynamic stability  Description: INTERVENTIONS:  - Monitor vital signs, rhythm, and trends  - Monitor for bleeding, hypotension and signs of decreased cardiac output  - Evaluate effectiveness of vasoactive medications to optimize hemodynamic stability  - Monitor arterial and/or venous puncture sites for bleeding and/or hematoma  - Assess quality of pulses, skin color and temperature  - Assess for signs of decreased coronary artery perfusion - ex.  Angina  - Evaluate fluid balance, assess for edema, trend weights  Outcome: Progressing  Goal: Absence of cardiac arrhythmias or at baseline  Description: INTERVENTIONS:  - Continuous cardiac monitoring, monitor vital signs, obtain 12 lead EKG if indicated  - Evaluate effectiveness of antiarrhythmic and heart rate control medications as ordered  - Initiate emergency measures for life threatening arrhythmias  - Monitor electrolytes and administer replacement therapy as ordered  Outcome: Progressing     Problem: RESPIRATORY - ADULT  Goal: Achieves optimal ventilation and oxygenation  Description: INTERVENTIONS:  - Assess for changes in respiratory status  - Assess for changes in mentation and behavior  - Position to facilitate oxygenation and minimize respiratory effort  - Oxygen supplementation based on oxygen saturation or ABGs  - Provide Smoking Cessation handout, if applicable  - Encourage broncho-pulmonary hygiene including cough, deep breathe, Incentive Spirometry  - Assess the need for suctioning and perform as needed  - Assess and instruct to report SOB or any respiratory difficulty  - Respiratory Therapy support as indicated  - Manage/alleviate anxiety  - Monitor for signs/symptoms of CO2 retention  Outcome: Progressing     Problem: Impaired Functional Mobility  Goal: Achieve highest/safest level of mobility/gait  Description: Interventions:  - Assess patient's functional ability and stability  - Promote increasing activity/tolerance for mobility and gait  - Educate and engage patient/family in tolerated activity level and precautions  - Recommend use of chair position in bed 3 times per day  Outcome: Progressing     Problem: PAIN - ADULT  Goal: Verbalizes/displays adequate comfort level or patient's stated pain goal  Description: INTERVENTIONS:  - Encourage pt to monitor pain and request assistance  - Assess pain using appropriate pain scale  - Administer analgesics based on type and severity of pain and evaluate response  - Implement non-pharmacological measures as appropriate and evaluate response  - Consider cultural and social influences on pain and pain management  - Manage/alleviate anxiety  - Utilize distraction and/or relaxation techniques  - Monitor for opioid side effects  - Notify MD/LIP if interventions unsuccessful or patient reports new pain  - Anticipate increased pain with activity and pre-medicate as appropriate  Outcome: Progressing     Problem: SAFETY ADULT - FALL  Goal: Free from fall injury  Description: INTERVENTIONS:  - Assess pt frequently for physical needs  - Identify cognitive and physical deficits and behaviors that affect risk of falls. - [place] fall precautions as indicated by assessment.  - Educate pt/family on patient safety including physical limitations  - Instruct pt to call for assistance with activity based on assessment  - Modify environment to reduce risk of injury  - Provide assistive devices as appropriate  - Consider OT/PT consult to assist with strengthening/mobility  - Encourage toileting schedule  Outcome: Progressing     Problem: DISCHARGE PLANNING  Goal: Discharge to home or other facility with appropriate resources  Description: INTERVENTIONS:  - Identify barriers to discharge w/pt and caregiver  - Include patient/family/discharge partner in discharge planning  - Arrange for needed discharge resources and transportation as appropriate  - Identify discharge learning needs (meds, wound care, etc)  - Arrange for interpreters to assist at discharge as needed  - Consider post-discharge preferences of patient/family/discharge partner  - Complete POLST form as appropriate  - Assess patient's ability to be responsible for managing their own health  - Refer to Case Management Department for coordinating discharge planning if the patient needs post-hospital services based on physician/LIP order or complex needs related to functional status, cognitive ability or social support system  Outcome: Progressing    Oriented x 3, on room air. Received HD today with 2L removed per Dialysis RN. Up to chair with walker x 1 assist. Tolerating PO diet, denied any pain/discomfort at this time. Anuric. Bed locked and in lowest position, call light within reach. Patient updated in plan of care.

## 2023-07-19 NOTE — CM/SW NOTE
Department  notified of request for [facility], aidin referrals started. Assigned CM/SW to follow up with pt/family on further discharge planning.      Tiera Leiva   July 19, 2023   10:43

## 2023-07-20 VITALS
OXYGEN SATURATION: 96 % | WEIGHT: 133.38 LBS | SYSTOLIC BLOOD PRESSURE: 113 MMHG | HEART RATE: 91 BPM | HEIGHT: 63 IN | DIASTOLIC BLOOD PRESSURE: 70 MMHG | TEMPERATURE: 97 F | RESPIRATION RATE: 18 BRPM | BODY MASS INDEX: 23.63 KG/M2

## 2023-07-20 LAB
ANION GAP SERPL CALC-SCNC: 7 MMOL/L (ref 0–18)
BASOPHILS # BLD AUTO: 0.04 X10(3) UL (ref 0–0.2)
BASOPHILS NFR BLD AUTO: 0.7 %
BUN BLD-MCNC: 30 MG/DL (ref 7–18)
BUN/CREAT SERPL: 5.8 (ref 10–20)
CALCIUM BLD-MCNC: 8.3 MG/DL (ref 8.5–10.1)
CHLORIDE SERPL-SCNC: 99 MMOL/L (ref 98–112)
CO2 SERPL-SCNC: 28 MMOL/L (ref 21–32)
CREAT BLD-MCNC: 5.18 MG/DL
DEPRECATED RDW RBC AUTO: 52.5 FL (ref 35.1–46.3)
EOSINOPHIL # BLD AUTO: 0.14 X10(3) UL (ref 0–0.7)
EOSINOPHIL NFR BLD AUTO: 2.4 %
ERYTHROCYTE [DISTWIDTH] IN BLOOD BY AUTOMATED COUNT: 15.1 % (ref 11–15)
GFR SERPLBLD BASED ON 1.73 SQ M-ARVRAT: 8 ML/MIN/1.73M2 (ref 60–?)
GLUCOSE BLD-MCNC: 87 MG/DL (ref 70–99)
HCT VFR BLD AUTO: 33 %
HGB BLD-MCNC: 10.6 G/DL
IMM GRANULOCYTES # BLD AUTO: 0.02 X10(3) UL (ref 0–1)
IMM GRANULOCYTES NFR BLD: 0.3 %
LYMPHOCYTES # BLD AUTO: 1.29 X10(3) UL (ref 1–4)
LYMPHOCYTES NFR BLD AUTO: 21.9 %
MAGNESIUM SERPL-MCNC: 2.2 MG/DL (ref 1.6–2.6)
MCH RBC QN AUTO: 30.9 PG (ref 26–34)
MCHC RBC AUTO-ENTMCNC: 32.1 G/DL (ref 31–37)
MCV RBC AUTO: 96.2 FL
MONOCYTES # BLD AUTO: 0.81 X10(3) UL (ref 0.1–1)
MONOCYTES NFR BLD AUTO: 13.7 %
NEUTROPHILS # BLD AUTO: 3.6 X10 (3) UL (ref 1.5–7.7)
NEUTROPHILS # BLD AUTO: 3.6 X10(3) UL (ref 1.5–7.7)
NEUTROPHILS NFR BLD AUTO: 61 %
OSMOLALITY SERPL CALC.SUM OF ELEC: 284 MOSM/KG (ref 275–295)
PHOSPHATE SERPL-MCNC: 3.4 MG/DL (ref 2.5–4.9)
PLATELET # BLD AUTO: 153 10(3)UL (ref 150–450)
POTASSIUM SERPL-SCNC: 4.7 MMOL/L (ref 3.5–5.1)
RBC # BLD AUTO: 3.43 X10(6)UL
SODIUM SERPL-SCNC: 134 MMOL/L (ref 136–145)
WBC # BLD AUTO: 5.9 X10(3) UL (ref 4–11)

## 2023-07-20 PROCEDURE — 99232 SBSQ HOSP IP/OBS MODERATE 35: CPT | Performed by: INTERNAL MEDICINE

## 2023-07-20 RX ORDER — MIDODRINE HYDROCHLORIDE 5 MG/1
5 TABLET ORAL 3 TIMES DAILY
Qty: 90 TABLET | Refills: 0 | Status: SHIPPED | OUTPATIENT
Start: 2023-07-20

## 2023-07-20 RX ORDER — METOPROLOL TARTRATE 50 MG/1
50 TABLET, FILM COATED ORAL
Qty: 60 TABLET | Refills: 0 | Status: SHIPPED | OUTPATIENT
Start: 2023-07-20

## 2023-07-20 NOTE — HOME CARE LIAISON
Received referral via Aidin for Home Health services. Spoke w/ patient and provided with list of [facility] providers from [address] [place] [address], choice is [address] (pt has hx of [facility]). Agency reserved in [address] [place] [address] and contact information placed on AVS.Financial interest disclosure provided. Notified CM/Cintia & BECKY/Nati.

## 2023-07-20 NOTE — OCCUPATIONAL THERAPY NOTE
Ongoing OT follow up- will defer treatment; pt is scheduled for DC this afternoon with [facility] services; will leave on OT schedule in case change in d/c occurs.

## 2023-07-20 NOTE — DISCHARGE SUMMARY
General Medicine Discharge Summary     Patient ID:  Lon Sandy  81year old  2/13/1940    Admit date: 7/16/2023    Discharge date and time: No discharge date for patient encounter. 7/20/23    Attending Physician: Angelina Johnson MD     Consults: IP CONSULT TO NEPHROLOGY  IP CONSULT TO CARDIOLOGY  IP CONSULT TO [address]    Primary Care Physician: Chuck Villatoro MD     Reason for admission: Symptomatic bradycardia     Risk For Readmission: low    Discharge Diagnoses: Symptomatic bradycardia [R00.1]  See Additional Discharge Diagnoses in Hospital Course    Discharged Condition: stable    Follow-up with labs/images appointments: PCP, Cardiology     ACO Reach:  PCP follow-up at discharge with care coordinator tempted. Next available in October. High priority sent. Exam  General: Alert, no acute distress  Lungs: clear to ausculation bilaterally  Heart: tachycardic, irregular   Abdomen: soft, non tender  Extremities: No edema  Skin: no new rash, normal color    HPI: per chart  41-year-old female with a history of ESRD, paroxysmal atrial fibrillation, carotid stenosis, hypertension, hyperlipidemia, diastolic congestive heart failure, presents to the hospital for evaluation of dizziness. She states that she woke up this morning feeling dizzy. Yesterday in the last few days she was feeling okay. She did go to dialysis last on Friday. She normally dialyzes on Monday Wednesday Friday. Of note she states that she is currently on metoprolol and diltiazem. Currently she denies any shortness of breath. She does feel nauseated. She denies any chest pain. She denies any worsening lower extremity edema. Hospital Course:   Patient is an 41-year-old female with a history of ESRD, paroxysmal atrial fibrillation, carotid stenosis, hypertension, hyperlipidemia, diastolic congestive heart failure, presents to the hospital for evaluation of dizziness with symptomatic bradycardia which is now resolved. Dobutamine drip completed. Added midodrine for better BP support and lower dose metoprolol added back. Will hold off on diltiazem for now. Follow up with cardiology in 2 weeks. Dizziness  Symptomatic bradycardia, resolved  -Patient with history of paroxysmal atrial fibrillation. She was on rate control agents at home including Lopressor 100 mg twice daily and diltiazem 2-3 times a day  -Given her symptoms she was started on dopamine drip in the emergency room. -weaned off dopamine drip 7/17  -trial lower dose of metoprolol 50mg BID (at home was on 100mg BID)  -was started on midodrine per cardiology. ESRD  -Nephrology consulted  -MWF HD     Paroxysmal atrial fibrillation  -Follows with [facility]  -She is on Eliquis 2.5 mg twice daily  -Normally on metoprolol and diltiazem, holding dilt      Hypertension  -She takes a Norvasc as needed but not daily  -resume lopressor    Operative Procedures:      Imaging: No results found. Disposition: home    Activity: as tolerated   Diet: Renal   Wound Care: no needs  Code Status: Full Code  O2: no needs    Home Medication Changes: as below   All discharge medications have been reconciled with current medication list.     Med list     Medication List        START taking these medications      midodrine 5 MG Tabs  Commonly known as: ProAmatine  Take 1 tablet (5 mg total) by mouth in the morning and 1 tablet (5 mg total) at noon and 1 tablet (5 mg total) in the evening. CHANGE how you take these medications      metoprolol tartrate 50 MG Tabs  Commonly known as: Lopressor  Take 1 tablet (50 mg total) by mouth 2x Daily(Beta Blocker).   What changed:   medication strength  how much to take  when to take this            CONTINUE taking these medications      amLODIPine 5 MG Tabs  Commonly known as: Norvasc     atorvastatin 10 MG Tabs  Commonly known as: Lipitor     Eliquis 2.5 MG Tabs  Generic drug: apixaban     Ergocalciferol 50 MCG (2000 UT) Tabs     Esomeprazole Magnesium 40 MG Cpdr  Commonly known as: NEXIUM  TAKE 1 CAPSULE BY MOUTH DAILY. BEFORE MEAL     loratadine 10 MG Tabs  Commonly known as: Claritin     montelukast 10 MG Tabs  Commonly known as: Singulair  TAKE 1 TABLET BY MOUTH EVERY DAY AT NIGHT     sevelamer carbonate 800 MG Tabs  Commonly known as: Renvela  TAKE 3 TABLETS BY MOUTH 3 TIMES DAILY     TURMERIC OR     Vitamin D 50 MCG (2000 UT) Tabs            STOP taking these medications      dilTIAZem 30 MG Tabs  Commonly known as: cardIZEM               Where to Get Your Medications        These medications were sent to [facility]/pharmacy #[id number] - [place], [place] - [address] AT across from [address], [phone], [phone]  [address], [address] [facility]      Phone: [phone]   metoprolol tartrate 50 MG Tabs  midodrine 5 MG Tabs         FU   Follow-up Information       Zaid Pete MD Follow up in 1 week(s). Specialties: Internal Medicine, PEDIATRICS  Contact information:  [address] [place], [place] [address]               [facility] Follow up in 1 month(s). Specialty: NEPHROLOGY  Contact information:  [address] [phone]               Geovani Daniel MD Follow up in 2 week(s). Specialty: CARDIOLOGY  Contact information:  [address]  [phone]                             DC instructions: Other Discharge Instructions:         Sometimes managing your health at home requires assistance. The [place] team has recognized your preference to use [facility]. They can be reached by phone at [phone]. The fax number for your reference is [phone]. A representative from the home health agency will contact you or your family to schedule your first visit. Keep all follow up appointments and continue current medication changes.            Patient had opportunity to ask questions and state understand and agree with therapeutic plan as outlined    Thank You,    Sherri Moreno. Stanislaw LeighCleveland Clinic [facility]

## 2023-07-20 NOTE — DISCHARGE INSTRUCTIONS
Sometimes managing your health at home requires assistance. The [place] team has recognized your preference to use [facility]. They can be reached by phone at [phone]. The fax number for your reference is [phone]. A representative from the home health agency will contact you or your family to schedule your first visit. Keep all follow up appointments and continue current medication changes.

## 2023-07-20 NOTE — CM/SW NOTE
07/20/23 1300   Discharge disposition   Expected discharge disposition [facility] Provider Home   Discharge transportation Private car     The patient will be getting discharged home via private car. The patient is reserved with [facility]. [facility] is notified of patients discharge. SW/CM to remain available for support and/or discharge planning.        Linh DUONG, [facility]  Discharge Planner [id number]

## 2023-07-27 ENCOUNTER — OFFICE VISIT (OUTPATIENT)
Dept: CARDIOLOGY | Age: 83
End: 2023-07-27

## 2023-07-27 VITALS
WEIGHT: 127.87 LBS | BODY MASS INDEX: 22.66 KG/M2 | HEIGHT: 63 IN | HEART RATE: 88 BPM | SYSTOLIC BLOOD PRESSURE: 106 MMHG | DIASTOLIC BLOOD PRESSURE: 64 MMHG

## 2023-07-27 DIAGNOSIS — N18.6 STAGE 5 CHRONIC KIDNEY DISEASE ON CHRONIC DIALYSIS (CMD): ICD-10-CM

## 2023-07-27 DIAGNOSIS — I95.3 HEMODIALYSIS-ASSOCIATED HYPOTENSION: ICD-10-CM

## 2023-07-27 DIAGNOSIS — E78.5 DYSLIPIDEMIA: ICD-10-CM

## 2023-07-27 DIAGNOSIS — I48.0 PAROXYSMAL ATRIAL FIBRILLATION (CMD): Primary | ICD-10-CM

## 2023-07-27 DIAGNOSIS — Z86.79 HISTORY OF CHF (CONGESTIVE HEART FAILURE): ICD-10-CM

## 2023-07-27 DIAGNOSIS — I34.0 NONRHEUMATIC MITRAL VALVE REGURGITATION: ICD-10-CM

## 2023-07-27 DIAGNOSIS — Z99.2 STAGE 5 CHRONIC KIDNEY DISEASE ON CHRONIC DIALYSIS (CMD): ICD-10-CM

## 2023-07-27 DIAGNOSIS — I10 PRIMARY HYPERTENSION: ICD-10-CM

## 2023-07-27 DIAGNOSIS — I65.29 STENOSIS OF CAROTID ARTERY, UNSPECIFIED LATERALITY: ICD-10-CM

## 2023-07-27 PROCEDURE — 99214 OFFICE O/P EST MOD 30 MIN: CPT | Performed by: SPECIALIST

## 2023-07-27 RX ORDER — METOPROLOL TARTRATE 50 MG/1
50 TABLET, FILM COATED ORAL 3 TIMES DAILY
Qty: 270 TABLET | Refills: 3 | Status: SHIPPED | OUTPATIENT
Start: 2023-07-27

## 2023-07-27 SDOH — HEALTH STABILITY: PHYSICAL HEALTH: ON AVERAGE, HOW MANY MINUTES DO YOU ENGAGE IN EXERCISE AT THIS LEVEL?: 0 MIN

## 2023-07-27 SDOH — HEALTH STABILITY: PHYSICAL HEALTH: ON AVERAGE, HOW MANY DAYS PER WEEK DO YOU ENGAGE IN MODERATE TO STRENUOUS EXERCISE (LIKE A BRISK WALK)?: 0 DAYS

## 2023-07-27 ASSESSMENT — PATIENT HEALTH QUESTIONNAIRE - PHQ9
2. FEELING DOWN, DEPRESSED OR HOPELESS: NOT AT ALL
SUM OF ALL RESPONSES TO PHQ9 QUESTIONS 1 AND 2: 0
CLINICAL INTERPRETATION OF PHQ2 SCORE: NO FURTHER SCREENING NEEDED
SUM OF ALL RESPONSES TO PHQ9 QUESTIONS 1 AND 2: 0
1. LITTLE INTEREST OR PLEASURE IN DOING THINGS: NOT AT ALL

## 2023-11-15 PROBLEM — I48.0 PAROXYSMAL ATRIAL FIBRILLATION  (CMD): Status: ACTIVE | Noted: 2023-11-15

## 2023-11-16 ENCOUNTER — OFFICE VISIT (OUTPATIENT)
Dept: CARDIOLOGY | Age: 83
End: 2023-11-16

## 2023-11-16 VITALS
HEART RATE: 74 BPM | HEIGHT: 63 IN | SYSTOLIC BLOOD PRESSURE: 132 MMHG | WEIGHT: 130.07 LBS | BODY MASS INDEX: 23.05 KG/M2 | DIASTOLIC BLOOD PRESSURE: 74 MMHG

## 2023-11-16 DIAGNOSIS — I95.3 HEMODIALYSIS-ASSOCIATED HYPOTENSION: ICD-10-CM

## 2023-11-16 DIAGNOSIS — Z86.79 HISTORY OF CHF (CONGESTIVE HEART FAILURE): ICD-10-CM

## 2023-11-16 DIAGNOSIS — E78.5 DYSLIPIDEMIA: ICD-10-CM

## 2023-11-16 DIAGNOSIS — I10 PRIMARY HYPERTENSION: Primary | ICD-10-CM

## 2023-11-16 DIAGNOSIS — I65.29 STENOSIS OF CAROTID ARTERY, UNSPECIFIED LATERALITY: ICD-10-CM

## 2023-11-16 DIAGNOSIS — N18.6 STAGE 5 CHRONIC KIDNEY DISEASE ON CHRONIC DIALYSIS (CMD): ICD-10-CM

## 2023-11-16 DIAGNOSIS — I48.20 CHRONIC ATRIAL FIBRILLATION (CMD): ICD-10-CM

## 2023-11-16 DIAGNOSIS — Z99.2 STAGE 5 CHRONIC KIDNEY DISEASE ON CHRONIC DIALYSIS (CMD): ICD-10-CM

## 2023-11-16 DIAGNOSIS — I34.0 NONRHEUMATIC MITRAL VALVE REGURGITATION: ICD-10-CM

## 2023-11-16 PROCEDURE — 99214 OFFICE O/P EST MOD 30 MIN: CPT | Performed by: SPECIALIST

## 2023-11-16 SDOH — HEALTH STABILITY: PHYSICAL HEALTH: ON AVERAGE, HOW MANY MINUTES DO YOU ENGAGE IN EXERCISE AT THIS LEVEL?: 30 MIN

## 2023-11-16 SDOH — HEALTH STABILITY: PHYSICAL HEALTH: ON AVERAGE, HOW MANY DAYS PER WEEK DO YOU ENGAGE IN MODERATE TO STRENUOUS EXERCISE (LIKE A BRISK WALK)?: 3 DAYS

## 2023-11-16 ASSESSMENT — PATIENT HEALTH QUESTIONNAIRE - PHQ9
SUM OF ALL RESPONSES TO PHQ9 QUESTIONS 1 AND 2: 0
2. FEELING DOWN, DEPRESSED OR HOPELESS: NOT AT ALL
SUM OF ALL RESPONSES TO PHQ9 QUESTIONS 1 AND 2: 0
1. LITTLE INTEREST OR PLEASURE IN DOING THINGS: NOT AT ALL
CLINICAL INTERPRETATION OF PHQ2 SCORE: NO FURTHER SCREENING NEEDED

## 2023-12-01 RX ORDER — ATORVASTATIN CALCIUM 10 MG/1
5 TABLET, FILM COATED ORAL DAILY
Qty: 45 TABLET | Refills: 1 | Status: SHIPPED | OUTPATIENT
Start: 2023-12-01 | End: 2024-05-29

## 2023-12-05 RX ORDER — MIDODRINE HYDROCHLORIDE 5 MG/1
5 TABLET ORAL 3 TIMES DAILY
Qty: 270 TABLET | Refills: 3 | Status: SHIPPED | OUTPATIENT
Start: 2023-12-05

## 2024-01-25 ENCOUNTER — HOSPITAL ENCOUNTER (INPATIENT)
Facility: HOSPITAL | Age: 84
LOS: 9 days | Discharge: NURSING, REHAB OR LONG-TERM CARE | End: 2024-02-08
Attending: EMERGENCY MEDICINE | Admitting: HOSPITALIST
Payer: MEDICARE

## 2024-01-25 ENCOUNTER — HOSPITAL ENCOUNTER (INPATIENT)
Facility: HOSPITAL | Age: 84
LOS: 9 days | Discharge: NURSING, REHAB OR LONG-TERM CARE | DRG: 553 | End: 2024-02-08
Attending: EMERGENCY MEDICINE | Admitting: HOSPITALIST
Payer: MEDICARE

## 2024-01-25 DIAGNOSIS — R26.2 INABILITY TO WALK: Primary | ICD-10-CM

## 2024-01-25 DIAGNOSIS — Z99.2 ESRD (END STAGE RENAL DISEASE) ON DIALYSIS (HCC): ICD-10-CM

## 2024-01-25 DIAGNOSIS — S46.011A TRAUMATIC COMPLETE TEAR OF RIGHT ROTATOR CUFF, INITIAL ENCOUNTER: ICD-10-CM

## 2024-01-25 DIAGNOSIS — E34.9 OSTEOPOROSIS OF MULTIPLE SITES ASSOCIATED WITH ENDOCRINE DISORDER: ICD-10-CM

## 2024-01-25 DIAGNOSIS — M81.8 OSTEOPOROSIS OF MULTIPLE SITES ASSOCIATED WITH ENDOCRINE DISORDER: ICD-10-CM

## 2024-01-25 DIAGNOSIS — N18.6 ESRD (END STAGE RENAL DISEASE) ON DIALYSIS (HCC): ICD-10-CM

## 2024-01-25 DIAGNOSIS — M41.9 SCOLIOSIS OF LUMBOSACRAL SPINE, UNSPECIFIED SCOLIOSIS TYPE: ICD-10-CM

## 2024-01-25 RX ORDER — MORPHINE SULFATE 4 MG/ML
4 INJECTION, SOLUTION INTRAMUSCULAR; INTRAVENOUS ONCE
Status: COMPLETED | OUTPATIENT
Start: 2024-01-25 | End: 2024-01-25

## 2024-01-25 RX ORDER — METHYLPREDNISOLONE SODIUM SUCCINATE 40 MG/ML
40 INJECTION, POWDER, LYOPHILIZED, FOR SOLUTION INTRAMUSCULAR; INTRAVENOUS ONCE
Status: COMPLETED | OUTPATIENT
Start: 2024-01-25 | End: 2024-01-25

## 2024-01-26 ENCOUNTER — APPOINTMENT (OUTPATIENT)
Dept: GENERAL RADIOLOGY | Facility: HOSPITAL | Age: 84
DRG: 553 | End: 2024-01-26
Attending: HOSPITALIST
Payer: MEDICARE

## 2024-01-26 ENCOUNTER — APPOINTMENT (OUTPATIENT)
Dept: GENERAL RADIOLOGY | Facility: HOSPITAL | Age: 84
End: 2024-01-26
Attending: STUDENT IN AN ORGANIZED HEALTH CARE EDUCATION/TRAINING PROGRAM
Payer: MEDICARE

## 2024-01-26 ENCOUNTER — APPOINTMENT (OUTPATIENT)
Dept: GENERAL RADIOLOGY | Facility: HOSPITAL | Age: 84
End: 2024-01-26
Attending: HOSPITALIST
Payer: MEDICARE

## 2024-01-26 ENCOUNTER — APPOINTMENT (OUTPATIENT)
Dept: GENERAL RADIOLOGY | Facility: HOSPITAL | Age: 84
DRG: 553 | End: 2024-01-26
Attending: STUDENT IN AN ORGANIZED HEALTH CARE EDUCATION/TRAINING PROGRAM
Payer: MEDICARE

## 2024-01-26 PROBLEM — M16.9 OSTEOARTHRITIS OF HIP: Status: ACTIVE | Noted: 2024-01-26

## 2024-01-26 PROBLEM — N18.6 ANEMIA DUE TO CHRONIC KIDNEY DISEASE, ON CHRONIC DIALYSIS (HCC): Status: ACTIVE | Noted: 2024-01-26

## 2024-01-26 PROBLEM — N18.6 ESRD (END STAGE RENAL DISEASE) ON DIALYSIS (HCC): Status: ACTIVE | Noted: 2024-01-26

## 2024-01-26 PROBLEM — R26.2 INABILITY TO WALK: Status: ACTIVE | Noted: 2024-01-26

## 2024-01-26 PROBLEM — Z99.2 ANEMIA DUE TO CHRONIC KIDNEY DISEASE, ON CHRONIC DIALYSIS (HCC): Status: ACTIVE | Noted: 2024-01-26

## 2024-01-26 PROBLEM — D63.1 ANEMIA DUE TO CHRONIC KIDNEY DISEASE, ON CHRONIC DIALYSIS (HCC): Status: ACTIVE | Noted: 2024-01-26

## 2024-01-26 PROBLEM — Z99.2 ESRD (END STAGE RENAL DISEASE) ON DIALYSIS (HCC): Status: ACTIVE | Noted: 2024-01-26

## 2024-01-26 LAB
ANION GAP SERPL CALC-SCNC: 16 MMOL/L (ref 0–18)
BASOPHILS # BLD AUTO: 0.03 X10(3) UL (ref 0–0.2)
BASOPHILS NFR BLD AUTO: 0.3 %
BUN BLD-MCNC: 46 MG/DL (ref 9–23)
BUN/CREAT SERPL: 8.1 (ref 10–20)
CALCIUM BLD-MCNC: 7.3 MG/DL (ref 8.7–10.4)
CHLORIDE SERPL-SCNC: 101 MMOL/L (ref 98–112)
CO2 SERPL-SCNC: 24 MMOL/L (ref 21–32)
CREAT BLD-MCNC: 5.67 MG/DL
DEPRECATED RDW RBC AUTO: 57.9 FL (ref 35.1–46.3)
EGFRCR SERPLBLD CKD-EPI 2021: 7 ML/MIN/1.73M2 (ref 60–?)
EOSINOPHIL # BLD AUTO: 0.1 X10(3) UL (ref 0–0.7)
EOSINOPHIL NFR BLD AUTO: 1.1 %
ERYTHROCYTE [DISTWIDTH] IN BLOOD BY AUTOMATED COUNT: 16.7 % (ref 11–15)
GLUCOSE BLD-MCNC: 112 MG/DL (ref 70–99)
HBV SURFACE AG SER-ACNC: <0.1 [IU]/L
HBV SURFACE AG SERPL QL IA: NONREACTIVE
HCT VFR BLD AUTO: 33.7 %
HGB BLD-MCNC: 10.8 G/DL
IMM GRANULOCYTES # BLD AUTO: 0.04 X10(3) UL (ref 0–1)
IMM GRANULOCYTES NFR BLD: 0.5 %
LYMPHOCYTES # BLD AUTO: 1.21 X10(3) UL (ref 1–4)
LYMPHOCYTES NFR BLD AUTO: 13.8 %
MCH RBC QN AUTO: 30.9 PG (ref 26–34)
MCHC RBC AUTO-ENTMCNC: 32 G/DL (ref 31–37)
MCV RBC AUTO: 96.3 FL
MONOCYTES # BLD AUTO: 1.15 X10(3) UL (ref 0.1–1)
MONOCYTES NFR BLD AUTO: 13.1 %
NEUTROPHILS # BLD AUTO: 6.24 X10 (3) UL (ref 1.5–7.7)
NEUTROPHILS # BLD AUTO: 6.24 X10(3) UL (ref 1.5–7.7)
NEUTROPHILS NFR BLD AUTO: 71.2 %
OSMOLALITY SERPL CALC.SUM OF ELEC: 305 MOSM/KG (ref 275–295)
PLATELET # BLD AUTO: 124 10(3)UL (ref 150–450)
POTASSIUM SERPL-SCNC: 4.1 MMOL/L (ref 3.5–5.1)
RBC # BLD AUTO: 3.5 X10(6)UL
SODIUM SERPL-SCNC: 141 MMOL/L (ref 136–145)
WBC # BLD AUTO: 8.8 X10(3) UL (ref 4–11)

## 2024-01-26 PROCEDURE — 73502 X-RAY EXAM HIP UNI 2-3 VIEWS: CPT | Performed by: STUDENT IN AN ORGANIZED HEALTH CARE EDUCATION/TRAINING PROGRAM

## 2024-01-26 PROCEDURE — 99223 1ST HOSP IP/OBS HIGH 75: CPT | Performed by: INTERNAL MEDICINE

## 2024-01-26 PROCEDURE — 90935 HEMODIALYSIS ONE EVALUATION: CPT | Performed by: INTERNAL MEDICINE

## 2024-01-26 PROCEDURE — 72110 X-RAY EXAM L-2 SPINE 4/>VWS: CPT | Performed by: HOSPITALIST

## 2024-01-26 RX ORDER — ACETAMINOPHEN 325 MG/1
650 TABLET ORAL EVERY 6 HOURS PRN
Status: DISCONTINUED | OUTPATIENT
Start: 2024-01-26 | End: 2024-02-04

## 2024-01-26 RX ORDER — MORPHINE SULFATE 4 MG/ML
4 INJECTION, SOLUTION INTRAMUSCULAR; INTRAVENOUS EVERY 2 HOUR PRN
Status: DISCONTINUED | OUTPATIENT
Start: 2024-01-26 | End: 2024-01-26

## 2024-01-26 RX ORDER — HYDROCODONE BITARTRATE AND ACETAMINOPHEN 10; 325 MG/1; MG/1
1 TABLET ORAL EVERY 4 HOURS PRN
Status: DISCONTINUED | OUTPATIENT
Start: 2024-01-26 | End: 2024-02-07

## 2024-01-26 RX ORDER — ATORVASTATIN CALCIUM 10 MG/1
5 TABLET, FILM COATED ORAL DAILY
Status: DISCONTINUED | OUTPATIENT
Start: 2024-01-26 | End: 2024-01-26

## 2024-01-26 RX ORDER — POLYETHYLENE GLYCOL 3350 17 G/17G
17 POWDER, FOR SOLUTION ORAL DAILY PRN
Status: DISCONTINUED | OUTPATIENT
Start: 2024-01-26 | End: 2024-02-08

## 2024-01-26 RX ORDER — SEVELAMER CARBONATE 800 MG/1
2400 TABLET, FILM COATED ORAL 3 TIMES DAILY
Status: DISCONTINUED | OUTPATIENT
Start: 2024-01-26 | End: 2024-02-08

## 2024-01-26 RX ORDER — PREDNISONE 20 MG/1
40 TABLET ORAL ONCE
Status: COMPLETED | OUTPATIENT
Start: 2024-01-26 | End: 2024-01-26

## 2024-01-26 RX ORDER — MORPHINE SULFATE 2 MG/ML
1 INJECTION, SOLUTION INTRAMUSCULAR; INTRAVENOUS EVERY 2 HOUR PRN
Status: DISCONTINUED | OUTPATIENT
Start: 2024-01-26 | End: 2024-01-26

## 2024-01-26 RX ORDER — MIDODRINE HYDROCHLORIDE 5 MG/1
5 TABLET ORAL 3 TIMES DAILY
Status: DISCONTINUED | OUTPATIENT
Start: 2024-01-26 | End: 2024-02-05

## 2024-01-26 RX ORDER — SENNOSIDES 8.6 MG
17.2 TABLET ORAL NIGHTLY
Status: DISCONTINUED | OUTPATIENT
Start: 2024-01-26 | End: 2024-02-08

## 2024-01-26 RX ORDER — BISACODYL 10 MG
10 SUPPOSITORY, RECTAL RECTAL
Status: DISCONTINUED | OUTPATIENT
Start: 2024-01-26 | End: 2024-02-08

## 2024-01-26 RX ORDER — HYDROCODONE BITARTRATE AND ACETAMINOPHEN 5; 325 MG/1; MG/1
1 TABLET ORAL EVERY 6 HOURS PRN
Status: DISCONTINUED | OUTPATIENT
Start: 2024-01-26 | End: 2024-01-26

## 2024-01-26 RX ORDER — MORPHINE SULFATE 2 MG/ML
2 INJECTION, SOLUTION INTRAMUSCULAR; INTRAVENOUS EVERY 2 HOUR PRN
Status: DISCONTINUED | OUTPATIENT
Start: 2024-01-26 | End: 2024-01-26

## 2024-01-26 RX ORDER — AMLODIPINE BESYLATE 5 MG/1
5 TABLET ORAL DAILY PRN
Status: DISCONTINUED | OUTPATIENT
Start: 2024-01-26 | End: 2024-02-08

## 2024-01-26 RX ORDER — ALBUMIN (HUMAN) 12.5 G/50ML
25 SOLUTION INTRAVENOUS
Status: DISPENSED | OUTPATIENT
Start: 2024-01-26 | End: 2024-01-28

## 2024-01-26 RX ORDER — ENEMA 19; 7 G/133ML; G/133ML
1 ENEMA RECTAL ONCE AS NEEDED
Status: DISCONTINUED | OUTPATIENT
Start: 2024-01-26 | End: 2024-02-08

## 2024-01-26 RX ORDER — METOPROLOL TARTRATE 50 MG/1
50 TABLET, FILM COATED ORAL
Status: DISCONTINUED | OUTPATIENT
Start: 2024-01-26 | End: 2024-02-01

## 2024-01-26 RX ORDER — SENNOSIDES 8.6 MG
17.2 TABLET ORAL NIGHTLY PRN
Status: DISCONTINUED | OUTPATIENT
Start: 2024-01-26 | End: 2024-01-26

## 2024-01-26 RX ORDER — ATORVASTATIN CALCIUM 10 MG/1
5 TABLET, FILM COATED ORAL NIGHTLY
Status: DISCONTINUED | OUTPATIENT
Start: 2024-01-26 | End: 2024-02-08

## 2024-01-26 RX ORDER — GABAPENTIN 100 MG/1
100 CAPSULE ORAL 2 TIMES DAILY
Status: DISCONTINUED | OUTPATIENT
Start: 2024-01-26 | End: 2024-01-28

## 2024-01-26 RX ORDER — MONTELUKAST SODIUM 10 MG/1
10 TABLET ORAL NIGHTLY
Status: DISCONTINUED | OUTPATIENT
Start: 2024-01-26 | End: 2024-02-08

## 2024-01-26 RX ORDER — MORPHINE SULFATE 4 MG/ML
4 INJECTION, SOLUTION INTRAMUSCULAR; INTRAVENOUS ONCE
Status: COMPLETED | OUTPATIENT
Start: 2024-01-26 | End: 2024-01-26

## 2024-01-26 NOTE — CONSULTS
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 shift.  Last hemodialysis was on Wednesday    Per patient she has worsening pain and difficulty walking for last 2 weeks-she came to the emergency room because she was not getting answers and noticed worsening over last few days.  Denies any fall, trauma, numbness in legs, bladder or bowel incontinence.  Denies any chest pain, dizziness, shortness of breath    Past Medical History  Past Medical History:   Diagnosis Date    Arrhythmia     Afib    AVF (arteriovenous fistula) ([facility]) 10/12/2017    Biceps rupture, proximal, right, initial encounter 2/20/2018    Breast cancer ([facility]) 1998    Breast cancer in female ([facility]) 12/20/2018    CANCER 1989    right breast mastectomy    Cataract 2001    OU    Clostridioides difficile infection     Cup to disc asymmetry 2001    OU    Dialysis patient (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)     Right wrist pain 10/12/2017    Visual impairment     wears glasses       Past Surgical History  Past Surgical History:   Procedure Laterality Date    CATARACT EXTRACTION W/  INTRAOCULAR LENS IMPLANT Right 11/02/2021    Dr. Giordano @ [facility]    CATARACT EXTRACTION W/  INTRAOCULAR LENS IMPLANT Left 12/28/2021     Dr. Giordano @ [facility]    CHOLECYSTECTOMY      laparoscopic    COLONOSCOPY      c. diff colitis-Ali    ECHO 2D DP/CLRFL, CARDIO (INTERNAL)   [facility] estee    mod MR/LAE; border systolic LVEF    ECHO 2D, CARDIO (DMG)  2020    [facility] cardio: no change except mod mitral regurg mild now    HX BREAST CANCER      LEXISCAN NUC STRESS TST, CARDIO (INTERNAL)  05/15/2018    [facility]; normal ; EF 54%    LEXISCAN NUC STRESS TST, CARDIO (INTERNAL)  2021    [facility]; EF57%; normal wall motion; fixed perfusion defect inf. wall    LUMPECTOMY RIGHT  2016    MASTECTOMY PARTIAL  2018    u [facility]: left breaswt seed loc partial mastectomy     MASTECTOMY RIGHT            OTHER SURGICAL HISTORY  12    cysto-Dr. Allred    OTHER SURGICAL HISTORY  1/21/15     lap sigmoid colectomy     OTHER SURGICAL HISTORY  16    Right Hemicolectomy by Dr. Chaudhry    PERITONEAL DIALYSIS SYSTEM      2009       Family History  Family History   Problem Relation Age of Onset    Heart Disorder Father         MI    Diabetes Father     Kidney Disease Father         polycystic kidney disease    Hypertension Mother     Heart Disorder Mother         stroke    Cataracts Mother     Thyroid Disorder Daughter         thyroid    Other (Other) Sister         Cushing's disease    Diabetes Sister     Hypertension Sister     Cancer Sister 69        lung tob     Kidney Disease Son         pckd    Kidney Disease Brother         PCKD     Breast Cancer Maternal Aunt 50        x2 ages 85 and in her 50's    Macular degeneration Other         Aunt    Glaucoma Neg         family h/o       Social History  Pediatric History   Patient Parents    Not on file     Other Topics Concern     Service No    Blood Transfusions Yes    Caffeine Concern Yes     Comment: coffee    Occupational Exposure Not Asked    Hobby Hazards No    Sleep Concern Yes    Stress Concern Yes    Weight Concern Yes    Special Diet Yes    Back Care  Not Asked    Exercise Not Asked    Bike Helmet Not Asked    Seat Belt Yes    Self-Exams Yes   Social History Narrative    Not on file           Current Medications:  Current Facility-Administered Medications   Medication Dose Route Frequency    amLODIPine (Norvasc) tab 5 mg  5 mg Oral Daily PRN    apixaban (Eliquis) tab 2.5 mg  2.5 mg Oral 2 times per day    metoprolol tartrate (Lopressor) tab 50 mg  50 mg Oral 2x Daily(Beta Blocker)    midodrine (ProAmatine) tab 5 mg  5 mg Oral TID    montelukast (Singulair) tab 10 mg  10 mg Oral Nightly    sevelamer carbonate (Renvela) tab 2,400 mg  2,400 mg Oral TID    polyethylene glycol (PEG 3350) (Miralax) 17 g oral packet 17 g  17 g Oral Daily PRN    bisacodyl (Dulcolax) 10 MG rectal suppository 10 mg  10 mg Rectal Daily PRN    fleet enema (Fleet) 7-19 GM/118ML rectal enema 133 mL  1 enema Rectal Once PRN    acetaminophen (Tylenol) tab 650 mg  650 mg Oral Q6H PRN    HYDROcodone-acetaminophen (Norco)  MG per tab 1 tablet  1 tablet Oral Q4H PRN    lidocaine-menthol 4-1 % patch 1 patch  1 patch Transdermal Daily    gabapentin (Neurontin) cap 100 mg  100 mg Oral BID    sennosides (Senokot) tab 17.2 mg  17.2 mg Oral Nightly    sodium chloride 0.9 % IV bolus 100 mL  100 mL Intravenous Q30 Min PRN    And    albumin human (Albumin) 25% injection 25 g  25 g Intravenous PRN Dialysis    atorvastatin (Lipitor) tab 5 mg  5 mg Oral Nightly       Allergies  Allergies   Allergen Reactions    Adhesive Tape SWELLING    Denosumab OTHER (SEE COMMENTS)     hypocalcemia  Lowered calcium level  Lowered calcium level  Lowered calcium level      Docosahexaenoic Acid, Dha HIVES    Ficus HIVES    Penicillins HIVES     Thinks she has tolerated penicillin in the past    Zithromax HIVES    Eicosapentaenoic Acid, Epa HIVES    Fig HIVES    Levofloxacin HIVES    Prolia OTHER (SEE COMMENTS)     Lowered calcium level    Vitamin E HIVES       Review of Systems:   Constitutional: negative for fatigue,  fevers and weight loss  Eyes: negative for irritation, redness and visual disturbance  Ears, nose, mouth, throat, and face: negative for hearing loss and sore throat  Respiratory: negative for cough, hemoptysis and wheezing  Cardiovascular: negative for chest pain, exertional dyspnea, lower extremity edema   Gastrointestinal: negative for abdominal pain, diarrhea and nausea  Genitourinary:negative for dysuria, frequency and hematuria  Hematologic/lymphatic: negative for bleeding and easy bruising  Musculoskeletal: Left leg/hip pain and weakness  Neurological: Inability to walk secondary to above  Behavioral/Psych: negative for anxiety and depression    Physical Exam:   Height: 5' 3\" (160 cm) (01/25 2148)  Weight: 135 lb 12.9 oz (61.6 kg) (01/26 0246)  BSA (Calculated - sq m): 1.59 sq meters (01/25 2148)  Pulse: 103 (01/26 0840)  BP: 126/83 (01/26 0840)  Temp: 98 °F (36.7 °C) (01/26 0601)  Do Not Use - Resp Rate: --  SpO2: 91 % (01/26 0840)  Temp:  [97.4 °F (36.3 °C)-98.8 °F (37.1 °C)] 98 °F (36.7 °C)  Pulse:  [] 103  Resp:  [18-22] 18  BP: (118-142)/(73-88) 126/83  SpO2:  [90 %-95 %] 91 %  SpO2: 91 %   No intake or output data in the 24 hours ending 01/26/24 1016  Wt Readings from Last 5 Encounters:   01/26/24 135 lb 12.9 oz (61.6 kg)   07/18/23 133 lb 6.1 oz (60.5 kg)   06/20/23 135 lb (61.2 kg)   10/25/22 138 lb (62.6 kg)   07/12/22 138 lb (62.6 kg)       General appearance: alert, appears stated age and cooperative  Head: Normocephalic, atraumatic  Eyes: conjunctivae/corneas clear  Throat: lips, mucosa, and tongue normal; teeth and gums normal  Neck:  no JVD, supple  Abdominal: soft, non-tender  Extremities: extremities normal, no edema  Skin: No rashes or lesions  Neurologic: Grossly normal  Psychiatric: calm    Results:     Laboratory Data:  Recent Labs   Lab 01/26/24 0132   RBC 3.50*   HGB 10.8*   HCT 33.7*   MCV 96.3   NEPRELIM 6.24   WBC 8.8   .0*     Recent Labs   Lab 01/26/24 0132   *    BUN 46*   CREATSERUM 5.67*   CA 7.3*      K 4.1      CO2 24.0     PTT   Date Value Ref Range Status   11/06/2018 23.6 (L) 24.0 - 33.7 sec Final     INR   Date Value Ref Range Status   11/06/2018 1.0 0.9 - 1.2 ratio Final     Comment:     An INR of 2.0-3.0 is the usual therapeutic interval.  Some patients (e.g., those with recurrent thrombotic events, a mechanical heart valve) may require more intense therapy with a therapeutic INR interval of 2.5-3.5   08/11/2015 3.0 (A) 0.8 - 1.2 Final     No results for input(s): \"BNP\" in the last 168 hours.         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    Greg Castillo MD  1/26/2024

## 2024-01-26 NOTE — PROGRESS NOTES
RN approving of pt participation in therapy. Contact coordinated w/ PT. Pt received in bed, alert and oriented w/ family at bedside. Pt currently declining oob activity due to leg pain, pt recently medicated but reporting no relief at this time. OT eval deferred per pt.  Importance of oob activity w/ nursing staff stressed. Will re attempt 1/27

## 2024-01-26 NOTE — PHYSICAL THERAPY NOTE
Orders received, chart reviewed. Pt at dialysis. Will reattempt later if able.    Returned at 3:55PM, pt returned from dialysis, ok to be seen per GERARDO Ye. Pt declined participating this afternoon d/t pain. Pt pre-medicated but hadn't felt the pain meds kick in yet. Discussed pt's options for later today or tomorrow for getting up OOB to chair with nursing staff. Pt endorsed understanding, supportive family at bedside.

## 2024-01-26 NOTE — PLAN OF CARE
No acute changes. Patient resting comfortably, call light within reach. Patient had HD today, reinforced education from dialysis nurse regarding electrolyte management, diet.    Problem: PAIN - ADULT  Goal: Verbalizes/displays adequate comfort level or patient's stated pain goal  Description: INTERVENTIONS:  - Encourage pt to monitor pain and request assistance  - Assess pain using appropriate pain scale  - Administer analgesics based on type and severity of pain and evaluate response  - Implement non-pharmacological measures as appropriate and evaluate response  - Consider cultural and social influences on pain and pain management  - Manage/alleviate anxiety  - Utilize distraction and/or relaxation techniques  - Monitor for opioid side effects  - Notify MD/LIP if interventions unsuccessful or patient reports new pain  - Anticipate increased pain with activity and pre-medicate as appropriate  Outcome: Progressing     Problem: SAFETY ADULT - FALL  Goal: Free from fall injury  Description: INTERVENTIONS:  - Assess pt frequently for physical needs  - Identify cognitive and physical deficits and behaviors that affect risk of falls.  - Brogan fall precautions as indicated by assessment.  - Educate pt/family on patient safety including physical limitations  - Instruct pt to call for assistance with activity based on assessment  - Modify environment to reduce risk of injury  - Provide assistive devices as appropriate  - Consider OT/PT consult to assist with strengthening/mobility  - Encourage toileting schedule  Outcome: Progressing     Problem: DISCHARGE PLANNING  Goal: Discharge to home or other facility with appropriate resources  Description: INTERVENTIONS:  - Identify barriers to discharge w/pt and caregiver  - Include patient/family/discharge partner in discharge planning  - Arrange for needed discharge resources and transportation as appropriate  - Identify discharge learning needs (meds, wound care, etc)  -  Arrange for interpreters to assist at discharge as needed  - Consider post-discharge preferences of patient/family/discharge partner  - Complete POLST form as appropriate  - Assess patient's ability to be responsible for managing their own health  - Refer to Case Management Department for coordinating discharge planning if the patient needs post-hospital services based on physician/LIP order or complex needs related to functional status, cognitive ability or social support system  Outcome: Progressing     Problem: Patient/Family Goals  Goal: Patient/Family Long Term Goal  Description: Patient's Long Term Goal: be discharged    Interventions:  -monitor VS  -monitor appropriate labs  -update patient and family on plan of care  -follow MD orders     - See additional Care Plan goals for specific interventions  Outcome: Progressing  Goal: Patient/Family Short Term Goal  Description: Patient's Short Term Goal: manage pain    Interventions:   -monitor VS  -monitor appropriate labs  -pain management per MD  -update patient and family on plan of care  -discharge planning  -follow MD orders     - See additional Care Plan goals for specific interventions  Outcome: Progressing     Problem: METABOLIC/FLUID AND ELECTROLYTES - ADULT  Goal: Electrolytes maintained within normal limits  Description: INTERVENTIONS:  - Monitor labs and rhythm and assess patient for signs and symptoms of electrolyte imbalances  - Administer electrolyte replacement as ordered  - Monitor response to electrolyte replacements, including rhythm and repeat lab results as appropriate  - Fluid restriction as ordered  - Instruct patient on fluid and nutrition restrictions as appropriate  Outcome: Progressing     Problem: Impaired Functional Mobility  Goal: Achieve highest/safest level of mobility/gait  Description: Interventions:  - Assess patient's functional ability and stability  - Promote increasing activity/tolerance for mobility and gait  - Educate and  engage patient/family in tolerated activity level and precautions  Outcome: Progressing

## 2024-01-26 NOTE — ED PROVIDER NOTES
Patient Seen in: [facility] Emergency Department    History     Chief Complaint   Patient presents with    Groin Pain    Leg Pain       HPI    History is provided by patient/independent historian: patient, patient's son  83yoF with chronic L thigh/hip pain here with acute worsening of pain today after trying to get up from standing. She reports excruciating pain that she would not be able to walk on and be unable to get to dialysis tomorrow morning. Son reports wanting patient to stay for dialysis.     History reviewed.   Past Medical History:   Diagnosis Date    Arrhythmia     Afib    AVF (arteriovenous fistula) ([facility]) 10/12/2017    Biceps rupture, proximal, right, initial encounter 2/20/2018    Breast cancer ([facility]) 1998    Breast cancer in female ([facility]) 12/20/2018    CANCER 1989    right breast mastectomy    Cataract 2001    [place]    Clostridioides difficile infection     Cup to disc asymmetry 2001    OU    Dialysis patient ([facility])     HD M, W, F    Essential hypertension     Floaters 2001    [place]    Hearing impairment     hearing aides    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   Past Surgical History:   Procedure Laterality Date    CATARACT EXTRACTION W/  INTRAOCULAR LENS IMPLANT Right 11/02/2021    Dr. Giordano @ [facility]    CATARACT EXTRACTION W/  INTRAOCULAR LENS IMPLANT Left 12/28/2021    Dr. Giordano @ [facility]    CHOLECYSTECTOMY  12/04    laparoscopic    COLONOSCOPY  6/07    c. diff colitis-Ali    ECHO 2D DP/CLRFL, CARDIO (INTERNAL)  2016 [facility] estee    mod MR/LAE; border systolic LVEF    ECHO 2D, CARDIO ([facility])  06/30/2020    [facility] cardio: no change except mod mitral regurg mild now    HX BREAST CANCER      LEXISCAN NUC STRESS TST, CARDIO  (INTERNAL)  05/15/2018    [place] [facility]; normal ; EF 54%    LEXISCAN NUC STRESS TST, CARDIO (INTERNAL)  2021    [facility]; EF57%; normal wall motion; fixed perfusion defect inf. wall    LUMPECTOMY RIGHT  2016    MASTECTOMY PARTIAL  2018    u [facility]: left breaswt seed loc partial mastectomy     MASTECTOMY RIGHT            OTHER SURGICAL HISTORY  12    cysto-Dr. Allred    OTHER SURGICAL HISTORY  1/21/15     lap sigmoid colectomy     OTHER SURGICAL HISTORY  16    Right Hemicolectomy by Dr. Chaudhry    PERITONEAL DIALYSIS SYSTEM      2009         Home Medications reviewed :  (Not in a hospital admission)        History reviewed.   Social History     Socioeconomic History    Marital status:    Occupational History    Occupation: retired real estate   Tobacco Use    Smoking status: Never    Smokeless tobacco: Never   Vaping Use    Vaping Use: Never used    Passive vaping exposure: Yes   Substance and Sexual Activity    Alcohol use: No    Drug use: No    Sexual activity: Yes     Partners: Male     Comment:  2020-he had CHF    Other Topics Concern     Service No    Blood Transfusions Yes    Caffeine Concern Yes     Comment: coffee    Hobby Hazards No    Sleep Concern Yes    Stress Concern Yes    Weight Concern Yes    Special Diet Yes    Seat Belt Yes    Self-Exams Yes         ROS  Review of Systems   Respiratory:  Negative for shortness of breath.    Cardiovascular:  Negative for chest pain.   Musculoskeletal:  Positive for myalgias.   All other systems reviewed and are negative.     All other pertinent organ systems are reviewed and are negative.      Physical Exam     ED Triage Vitals [248]   /73   Pulse 99   Resp 22   Temp 98.8 °F (37.1 °C)   Temp src Oral   SpO2 95 %   O2 Device None (Room air)     Vital signs reviewed.      Physical Exam  Vitals and nursing note reviewed.   Cardiovascular:      Pulses: Normal pulses.    Pulmonary:      Effort: No respiratory distress.   Abdominal:      General: There is no distension.   Musculoskeletal:      Comments: L proximal thigh tender to palpation, limited active ROM 2/2 pain   Neurological:      Mental Status: She is alert.         ED Course       Labs:     Labs Reviewed   CBC W/ DIFFERENTIAL - Abnormal; Notable for the following components:       Result Value    RBC 3.50 (*)     HGB 10.8 (*)     HCT 33.7 (*)     RDW-SD 57.9 (*)     RDW 16.7 (*)     .0 (*)     Monocyte Absolute 1.15 (*)     All other components within normal limits   CBC WITH DIFFERENTIAL WITH PLATELET    Narrative:     The following orders were created for panel order CBC With Differential With Platelet.                  Procedure                               Abnormality         Status                                     ---------                               -----------         ------                                     CBC W/ DIFFERENTIAL[[id number]]          Abnormal            Final result                                                 Please view results for these tests on the individual orders.   BASIC METABOLIC PANEL (8)         My EKG Interpretation:   As reviewed and Interpreted by me      Imaging Results Available and Reviewed while in ED:   No results found.      Decision rules/scores evaluated: none      Diagnostic labs/tests considered but not ordered: type and screen, hip XR    ED Medications Administered:   Medications   morphINE PF 4 MG/ML injection 4 mg (4 mg Intravenous Given 1/25/24 2318)   methylPREDNISolone sodium succinate (Solu-MEDROL) injection 40 mg (40 mg Intravenous Given 1/25/24 2318)   morphINE PF 4 MG/ML injection 4 mg (4 mg Intravenous Given 1/26/24 0113)            - pt still in pain - unable to ambulate  - due for dialysis tomorrow AM - sees Dr. Smith    [facility]       Medical Decision Making      Differential Diagnosis: After obtaining the patient's history, performing the physical  exam and reviewing the diagnostics, multiple initial diagnoses were considered based on the presenting problem including arthritis, acute arterial occlusion, muscle spasm    External document review: I personally reviewed available external medical records for any recent pertinent discharge summaries, testing, and procedures - the findings are as follows: 1/10/24 visit with Dr. Mayorga for hip arthritis    Complicating Factors: The patient already  has a past medical history of Arrhythmia, AVF (arteriovenous fistula) ([facility]) (10/12/2017), Biceps rupture, proximal, right, initial encounter (2/20/2018), Breast cancer ([facility]) (1998), Breast cancer in female ([facility]) (12/20/2018), CANCER (1989), Cataract (2001), Clostridioides difficile infection, Cup to disc asymmetry (2001), Dialysis patient ([facility]), Essential hypertension, Floaters (2001), Hearing impairment, Hemodialysis AV fistula aneurysm ([facility]) (1/23/2021), History of blood transfusion, Hyperlipidemia, Osteoarthritis, Osteoporosis, OTHER DISEASES, Pulmonary embolism ([facility]), Right wrist pain (10/12/2017), and Visual impairment. to contribute to the complexity of this ED evaluation.    Procedures performed: none    Discussed management with physician/appropriate source: Dr. Matamoros, Dr. Wiggins    Considered admission/deescalation of care for: intractable pain    Social determinants of health affecting patient care: none    Prescription medications considered: discussed continuing current medication regimen    The patient requires continuous monitoring for: hip pain    Shared decision making: discussed possible admission        Disposition and Plan     Clinical Impression:  1. Inability to walk        Disposition:  Admit    Follow-up:  No follow-up provider specified.    Medications Prescribed:  Current Discharge Medication List          Hospital Problems       Present on Admission  Date Reviewed: 6/20/2023            ICD-10-CM Noted POA    * (Principal) Inability to walk R26.2  1/26/2024 Unknown

## 2024-01-26 NOTE — ED QUICK NOTES
Orders for admission, patient is aware of plan and ready to go upstairs. Any questions, please call ED RN Nhi at extension [phone].     Patient Covid vaccination status: Fully vaccinated     COVID Test Ordered in ED: None    COVID Suspicion at Admission: N/A    Running Infusions:  None    Mental Status/LOC at time of transport: A+Ox4    Other pertinent information:   CIWA score: N/A   NIH score:  N/A

## 2024-01-26 NOTE — ED INITIAL ASSESSMENT (HPI)
Patient arrived via EMS, per medics pt from home, c/c left mid thigh pain radiates up to left groin region, ongoing 1 month, increasingly worst today, unable to rise from chair due to pain. Denies falls or trauma. +eliquis. Usually ambulatory, AOX4.

## 2024-01-26 NOTE — PROGRESS NOTES
OT orders received and chart reviewed. Pt currently off unit for dialysis and unavailable for therapy at this time

## 2024-01-26 NOTE — H&P
[facility] Hospitalist H&P       CC:   Chief Complaint   Patient presents with    Groin Pain    Leg Pain        PCP: Shannon Bahena MD    History of Present Illness:   Ms. Cook is an 83-year-old female with a history of ESRD, paroxysmal atrial fibrillation, carotid stenosis, hypertension, hyperlipidemia, diastolic congestive heart failure, presents to the hospital for evaluation of left leg and hip pain.  Patient states she has had pain in her left hip for the past 2 months, she has been followed in the outpatient setting with orthopedic surgery, and has had steroid injections, MRIs, has recently been on a course of oral steroids, as well as Tylenol 3's, varying degrees of improvement, however pain has gotten progressively worse in the last few days, yesterday she was unable to get up out of the chair after being out shopping.  Denies any trauma, no falls, no fevers chills, no nausea or vomiting, no chest pain or shortness of breath.  She does note pain in the left leg, going down to her knee, and at times going down to her lower leg.  Any low back pain, no bowel or bladder incontinence, no saddle anesthesia.      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 Giordano @ [facility]    CATARACT EXTRACTION W/  INTRAOCULAR LENS IMPLANT Left 2021    Dr. Giordano @ [facility]    CHOLECYSTECTOMY      laparoscopic    COLONOSCOPY      c. diff colitis-Ali    ECHO 2D DP/CLRFL, CARDIO (INTERNAL)   [facility] estee    mod MR/LAE; border systolic LVEF    ECHO 2D, CARDIO ([facility])  2020    [facility] cardio: no change except mod mitral regurg mild now    HX BREAST CANCER      LEXISCAN NUC STRESS TST, CARDIO (INTERNAL)  05/15/2018    [place] [facility]; normal ; EF 54%    LEXISCAN NUC STRESS TST, CARDIO (INTERNAL)  2021    [place] [facility]; EF57%; normal wall motion; fixed perfusion defect inf. wall    LUMPECTOMY RIGHT      MASTECTOMY PARTIAL  2018    u [facility]: left breaswt seed loc partial mastectomy     MASTECTOMY RIGHT            OTHER SURGICAL HISTORY  12    cysto-Dr. Allred    OTHER SURGICAL HISTORY  1/21/15     lap sigmoid colectomy     OTHER SURGICAL HISTORY  16    Right Hemicolectomy by Dr. Chaudhry    PERITONEAL DIALYSIS SYSTEM      2009        ALL:  Allergies   Allergen Reactions    Adhesive Tape SWELLING    Denosumab OTHER (SEE COMMENTS)     hypocalcemia  Lowered calcium level  Lowered calcium level  Lowered calcium level      Docosahexaenoic Acid, Dha HIVES    Ficus HIVES    Penicillins HIVES     Thinks she has tolerated penicillin in the past    Zithromax HIVES    Eicosapentaenoic Acid, Epa HIVES    Fig HIVES    Levofloxacin HIVES    Prolia OTHER (SEE COMMENTS)     Lowered calcium level    Vitamin E HIVES        Home Medications:  Outpatient Medications Marked as Taking for the 24 encounter (Hospital Encounter)   Medication Sig Dispense Refill    metoprolol tartrate 50 MG Oral Tab Take 1 tablet (50 mg total) by mouth 2x Daily(Beta Blocker). 60 tablet 0    midodrine 5 MG Oral  Tab Take 1 tablet (5 mg total) by mouth in the morning and 1 tablet (5 mg total) at noon and 1 tablet (5 mg total) in the evening. 90 tablet 0    ELIQUIS 2.5 MG Oral Tab Take 1 tablet (2.5 mg total) by mouth Q12H.      amLODIPine 5 MG Oral Tab Take 1 tablet (5 mg total) by mouth daily as needed (for HTN).      SEVELAMER 800 MG Oral Tab TAKE 3 TABLETS BY MOUTH 3 TIMES DAILY 810 tablet 4    ESOMEPRAZOLE MAGNESIUM 40 MG Oral Capsule Delayed Release TAKE 1 CAPSULE BY MOUTH DAILY. BEFORE MEAL 90 capsule 0    MONTELUKAST 10 MG Oral Tab TAKE 1 TABLET BY MOUTH EVERY DAY AT NIGHT 90 tablet 1    TURMERIC OR Take by mouth.      atorvastatin 10 MG Oral Tab Take 0.5 tablets (5 mg total) by mouth daily. Every other day as stated by pt**      loratadine 10 MG Oral Tab Take 1 tablet (10 mg total) by mouth.      Cholecalciferol (VITAMIN D) 2000 UNITS Oral Tab Take 2 tablets by mouth daily. 30 tablet 11         Soc Hx  Social History     Tobacco Use    Smoking status: Never    Smokeless tobacco: Never   Substance Use Topics    Alcohol use: No        Fam Hx  Family History   Problem Relation Age of Onset    Heart Disorder Father         MI    Diabetes Father     Kidney Disease Father         polycystic kidney disease    Hypertension Mother     Heart Disorder Mother         stroke    Cataracts Mother     Thyroid Disorder Daughter         thyroid    Other (Other) Sister         Cushing's disease    Diabetes Sister     Hypertension Sister     Cancer Sister 69        lung tob     Kidney Disease Son         pckd    Kidney Disease Brother         PCKD     Breast Cancer Maternal Aunt 50        x2 ages 85 and in her 50's    Macular degeneration Other         Aunt    Glaucoma Neg         family h/o       Review of Systems  Comprehensive ROS reviewed and negative except for what's stated above.     OBJECTIVE:  /80 (BP Location: Right arm)   Pulse 99   Temp 98 °F (36.7 °C) (Oral)   Resp 19   Ht 5' 3\" (1.6 m)   Wt 135 lb 12.9 oz (61.6  kg)   SpO2 91%   BMI 24.06 kg/m²   General:  Alert, no distress   Head:  Normocephalic, without obvious abnormality, atraumatic.   Eyes:  Sclera anicteric, No conjunctival pallor,   Nose: Nares normal. Septum midline. Mucosa normal. No drainage.   Throat: Lips, mucosa, and tongue normal. Teeth and gums normal.   Neck: Supple,    Lungs:   Clear to auscultation bilaterally. Normal effort   Chest wall:  No tenderness or deformity.   Heart:  Regular rate and rhythm, S1, S2 normal,     Abdomen:   Soft, non-tender. Bowel sounds normal. No masses,  No organomegaly. Non distended   Extremities: Extremities normal, no swelling or erythema of the left hip, decreased ROM of left hip   Skin: Skin color, texture, turgor normal. No rashes or lesions.    Neurologic: Normal strength, no focal deficit appreciated     Diagnostic Data:    CBC/Chem  Recent Labs   Lab 01/26/24 0132   WBC 8.8   HGB 10.8*   MCV 96.3   .0*       Recent Labs   Lab 01/26/24 0132      K 4.1      CO2 24.0   BUN 46*   CREATSERUM 5.67*   *   CA 7.3*       No results for input(s): \"ALT\", \"AST\", \"ALB\", \"AMYLASE\", \"LIPASE\", \"LDH\" in the last 168 hours.    Invalid input(s): \"ALPHOS\", \"TBIL\", \"DBIL\", \"TPROT\"    No results for input(s): \"TROP\" in the last 168 hours.      Radiology: No results found.      ASSESSMENT / PLAN:   Ms. Cook is an 83-year-old female with a history of ESRD, paroxysmal atrial fibrillation, carotid stenosis, hypertension, hyperlipidemia, diastolic congestive heart failure, presents to the hospital for evaluation of left leg and hip pain.       Left hip/ Leg pain  Left hip OA  - pre romero xray neg for fracture  - outpatient MRI with Left hip OA and trets of hamstring tendon and gluteus minimus  - pain does appear to radiate to her lower leg today  - will check lumbar spine xray's  - give lidoderm, patch start low dose gabapentin  - add norco and tylenol for pain  - PT/OT  - recently had steroid injection with ortho,  therefore not candidate for at least 1-2 more months     ESRD  -Nephrology consulted  -MWF HD     Paroxysmal atrial fibrillation  -Follows with [facility]  -She is on Eliquis 2.5 mg twice daily  -Normally on metoprolol and diltiazem,     Chronic HFpEF  - euvolemic  - volume management per HD    Endometrial wall thickening  - noted on outpatient MRI and US  - needs outpatient GYN follow up     Hypertension  -She takes a Norvasc as needed but not daily  -resume lopressor    FN:  - IVF:none  - Diet: adv    DVT Prophy:scd, eliquis  Lines: PIV    Dispo: pending clinical course    Outpatient records or previous hospital records reviewed.     Further recommendations pending patient's clinical course.  [facility] hospitalist to continue to follow patient while in house    Patient and/or patient's family given opportunity to ask questions and note understanding and agreeing with therapeutic plan as outlined    Thank You,  Miguel Corral MD    Hospitalist with [facility] Service number: [phone]

## 2024-01-26 NOTE — ED QUICK NOTES
Patient states she had an MRI of leg and ultrasound of the uterus a few weeks ago. Patient stable at this time and awaiting evaluation.

## 2024-01-27 ENCOUNTER — APPOINTMENT (OUTPATIENT)
Dept: MRI IMAGING | Facility: HOSPITAL | Age: 84
End: 2024-01-27
Attending: HOSPITALIST
Payer: MEDICARE

## 2024-01-27 ENCOUNTER — APPOINTMENT (OUTPATIENT)
Dept: MRI IMAGING | Facility: HOSPITAL | Age: 84
DRG: 553 | End: 2024-01-27
Attending: HOSPITALIST
Payer: MEDICARE

## 2024-01-27 LAB
ANION GAP SERPL CALC-SCNC: 13 MMOL/L (ref 0–18)
BUN BLD-MCNC: 33 MG/DL (ref 9–23)
BUN/CREAT SERPL: 7.6 (ref 10–20)
CALCIUM BLD-MCNC: 8 MG/DL (ref 8.7–10.4)
CHLORIDE SERPL-SCNC: 101 MMOL/L (ref 98–112)
CO2 SERPL-SCNC: 24 MMOL/L (ref 21–32)
CREAT BLD-MCNC: 4.36 MG/DL
DEPRECATED RDW RBC AUTO: 60 FL (ref 35.1–46.3)
EGFRCR SERPLBLD CKD-EPI 2021: 10 ML/MIN/1.73M2 (ref 60–?)
ERYTHROCYTE [DISTWIDTH] IN BLOOD BY AUTOMATED COUNT: 16.9 % (ref 11–15)
GLUCOSE BLD-MCNC: 108 MG/DL (ref 70–99)
HCT VFR BLD AUTO: 38.3 %
HGB BLD-MCNC: 12.1 G/DL
MAGNESIUM SERPL-MCNC: 2 MG/DL (ref 1.6–2.6)
MCH RBC QN AUTO: 30.8 PG (ref 26–34)
MCHC RBC AUTO-ENTMCNC: 31.6 G/DL (ref 31–37)
MCV RBC AUTO: 97.5 FL
OSMOLALITY SERPL CALC.SUM OF ELEC: 294 MOSM/KG (ref 275–295)
PLATELET # BLD AUTO: 147 10(3)UL (ref 150–450)
POTASSIUM SERPL-SCNC: 5.1 MMOL/L (ref 3.5–5.1)
RBC # BLD AUTO: 3.93 X10(6)UL
SODIUM SERPL-SCNC: 138 MMOL/L (ref 136–145)
WBC # BLD AUTO: 9.7 X10(3) UL (ref 4–11)

## 2024-01-27 PROCEDURE — 99232 SBSQ HOSP IP/OBS MODERATE 35: CPT | Performed by: INTERNAL MEDICINE

## 2024-01-27 PROCEDURE — 72148 MRI LUMBAR SPINE W/O DYE: CPT | Performed by: HOSPITALIST

## 2024-01-27 RX ORDER — PREDNISONE 20 MG/1
40 TABLET ORAL
Status: DISPENSED | OUTPATIENT
Start: 2024-01-28 | End: 2024-02-01

## 2024-01-27 NOTE — PROGRESS NOTES
[facility] Hospitalist Progress Note     CC: Hospital Follow up    PCP: Shannon Bahena MD       Assessment/Plan:     Principal Problem:    Inability to walk  Active Problems:    Osteoarthritis of hip    Anemia due to chronic kidney disease, on chronic dialysis (HCC)    ESRD (end stage renal disease) on dialysis ([facility])    Ms. Cook is an 83-year-old female with a history of ESRD, paroxysmal atrial fibrillation, carotid stenosis, hypertension, hyperlipidemia, diastolic congestive heart failure, presents to the hospital for evaluation of left leg and hip pain.       Left hip/ Leg pain  Left hip OA  - pre romero xray neg for fracture  - outpatient MRI with Left hip OA and trets of hamstring tendon and gluteus minimus  - pain does appear to radiate to her lower leg   - lumbar spine with DDD  - continue lidoderm, patch start low dose gabapentin  - continue norco    - PT/OT  - recently had steroid injection with ortho, therefore not candidate for at least 1-2 more months  - continue prednisone   - will check MRI lumbar spine to rule out radicular pain     ESRD  -Nephrology consulted  -MWF HD     Paroxysmal atrial fibrillation  -Follows with [facility]  -She is on Eliquis 2.5 mg twice daily  -Normally on metoprolol and diltiazem,      Chronic HFpEF  - euvolemic  - volume management per HD     Endometrial wall thickening  - noted on outpatient MRI and US  - needs outpatient GYN follow up     Hypertension  -She takes a Norvasc as needed but not daily  -resume lopressor     FN:  - IVF:none  - Diet: adv     DVT Prophy:scd, eliquis  Lines: PIV     Dispo: pending clinical course    Questions/concerns were discussed with patient and/or family by bedside.    Thank You,  Miguel Corral MD    Hospitalist with [facility]     Subjective:     Patient states her pain is significantly improved, sitting up in chair without any difficulty, no chest pain shortness of breath, no nausea or vomiting, feels much  better overall    Asked PT to reevaluate to determine if patient can tolerate stairs, unfortunately patient worsening pain at that time therefore we will continue monitoring in the hospital, obtain MRI of lumbar spine to rule out any secondary nerve impingement contributing to symptoms.    OBJECTIVE:    Blood pressure 131/82, pulse 92, temperature 97.6 °F (36.4 °C), temperature source Oral, resp. rate 16, height 5' 3\" (1.6 m), weight 124 lb 12.5 oz (56.6 kg), SpO2 93%, not currently breastfeeding.    Temp:  [97.5 °F (36.4 °C)-97.9 °F (36.6 °C)] 97.6 °F (36.4 °C)  Pulse:  [] 92  Resp:  [16-18] 16  BP: ()/(62-82) 131/82  SpO2:  [92 %-93 %] 93 %      Intake/Output:    Intake/Output Summary (Last 24 hours) at 1/27/2024 1424  Last data filed at 1/27/2024 0845  Gross per 24 hour   Intake 240 ml   Output 2500 ml   Net -2260 ml       Last 3 Weights   01/27/24 0642 124 lb 12.5 oz (56.6 kg)   01/26/24 0246 135 lb 12.9 oz (61.6 kg)   01/25/24 2148 126 lb 12.2 oz (57.5 kg)   07/18/23 1000 133 lb 6.1 oz (60.5 kg)   07/16/23 1942 134 lb 12.8 oz (61.1 kg)   07/16/23 1203 135 lb (61.2 kg)   06/20/23 1508 135 lb (61.2 kg)       Exam    Gen: No acute distress, alert and oriented x3   Heent: NC AT, neck supple  Pulm: Lungs clear, normal respiratory effort  CV: Heart with regular rate and rhythm   Abd: Abdomen soft, nontender, nondistended   MSK: no clubbing, no cyanosis  Skin: no rashes or lesions  Neuro: AO*3, motor intact, no sensory deficits  Psyc: appropriate mood and affect      Data Review:       Labs:     Recent Labs   Lab 01/26/24  0132 01/27/24  0651   RBC 3.50* 3.93   HGB 10.8* 12.1   HCT 33.7* 38.3   MCV 96.3 97.5   MCH 30.9 30.8   MCHC 32.0 31.6   RDW 16.7* 16.9*   NEPRELIM 6.24  --    WBC 8.8 9.7   .0* 147.0*         Recent Labs   Lab 01/26/24  0132 01/27/24  0651   * 108*   BUN 46* 33*   CREATSERUM 5.67* 4.36*   EGFRCR 7* 10*   CA 7.3* 8.0*    138   K 4.1 5.1    101   CO2 24.0 24.0        No results for input(s): \"ALT\", \"AST\", \"ALB\", \"AMYLASE\", \"LIPASE\", \"LDH\" in the last 168 hours.    Invalid input(s): \"ALPHOS\", \"TBIL\", \"DBIL\", \"TPROT\"      Imaging:  XR LUMBAR SPINE (MIN 4 VIEWS) (CPT=72110)    Result Date: 1/26/2024  CONCLUSION:  1. No fracture or dislocation. 2. Development  of grade I degenerative anterolisthesis of L4 on L5 since 05/02/2020. 3. Stable moderate levoscoliosis to the lumbar spine with otherwise grossly unchanged mild-to-moderate spondylotic changes and greatest involvement of the lower lumbar spine. 4. Lesser incidental findings as above.    Dictated by (CST): Carlos Hinojosa MD on 1/26/2024 at 7:42 PM     Finalized by (CST): Carlos Hinojosa MD on 1/26/2024 at 7:46 PM          XR HIP W OR WO PELVIS 2 OR 3 VIEWS, LEFT (CPT=73502)    Result Date: 1/26/2024  CONCLUSION:  1. No acute appearing fracture or dislocation.  Mild central narrowing of both hip joints as described above. 2. The examination was read on call by [facility] radiology services with a similar interpretation given.    Dictated by (CST): Checo Mireles MD on 1/26/2024 at 8:24 AM     Finalized by (CST): Checo Mireles MD on 1/26/2024 at 8:26 AM             Meds:      apixaban  2.5 mg Oral 2 times per day    metoprolol tartrate  50 mg Oral 2x Daily(Beta Blocker)    midodrine  5 mg Oral TID    montelukast  10 mg Oral Nightly    sevelamer carbonate  2,400 mg Oral TID    lidocaine-menthol  1 patch Transdermal Daily    gabapentin  100 mg Oral BID    sennosides  17.2 mg Oral Nightly    atorvastatin  5 mg Oral Nightly       amLODIPine, polyethylene glycol (PEG 3350), bisacodyl, fleet enema, acetaminophen, HYDROcodone-acetaminophen, sodium chloride **AND** albumin human

## 2024-01-27 NOTE — HOME CARE LIAISON
Received referral via [facility] for Home Health services. Spoke w/ patient who is agreeable with [facility]. Contact information placed on AVS.

## 2024-01-27 NOTE — PHYSICAL THERAPY NOTE
PHYSICAL THERAPY TREATMENT NOTE - INPATIENT     Room Number: 572/572-A       Presenting Problem: inability to walk       Problem List  Principal Problem:    Inability to walk  Active Problems:    Osteoarthritis of hip    Anemia due to chronic kidney disease, on chronic dialysis (HCC)    ESRD (end stage renal disease) on dialysis ([facility])    PHYSICAL THERAPY ASSESSMENT     Per RN request per MD pt needs stair training for DC. Per RN, pt was medicated approximately 1 hour prior to this session. Pt was seen resting in bed with family present. Introduced self and role again, goals for this session. Pt verbalized understanding. Pt reported minimal improvement in pain since pain medication was given. Pt demonstrated bed mobility (supine to sit at EOB) with HOB elevated with SBA. Very slow movement due to pain, benefits from increased time. SBA static sitting balance at EOB unsupported. CGA for STS with RW. Able to take 5-6 steps, slow, shuffled, guarded gait with RW and min A. Pt c/o significant pain with weight bearing and movement of LLE, visibly uncomfortable and grimacing with movement of LLE. VC for sequencing of gait and use of RW. No LOB. Further gait and stair training deferred due to pain. Chair was brought to pt, completed transfer to chair. Left sitting in chair with needs within reach. RN was notified of the above.     The patient's Approx Degree of Impairment: 50.57% has been calculated based on documentation in the [facility] '6 clicks' Inpatient Basic Mobility Short Form.  Research supports that patients with this level of impairment may benefit from RADHA. Based on today's therapy sessions, this pt is not safe to DC home at this time. The patient will need to be at modified independent level with bed mobility, transfers, gait, and stair navigation to DC home safely. At this time, she is requiring min A for mobility. Final DC recommendation to be determined by interdisciplinary medical team pending progress with IP  PT, medical POC, and pain management.     DISCHARGE RECOMMENDATIONS  PT Discharge Recommendations: Undetermined (HH PT vs RADHA pending progress and pain control)     PLAN  PT Treatment Plan: Bed mobility;Body mechanics;Endurance;Patient education;Energy conservation;Family education;Gait training;Strengthening;Stair training;Transfer training;Balance training  Frequency (Obs): 5x/week    SUBJECTIVE  Agreeable to activity.     OBJECTIVE  Precautions: None    WEIGHT BEARING RESTRICTION  none    PAIN ASSESSMENT   Rating:  (did not rate)  Location: left leg  Management Techniques: Activity promotion;Body mechanics;Repositioning    BALANCE  Static Sitting: Good  Dynamic Sitting: Fair +  Static Standing: Fair -  Dynamic Standing: Poor +    AM-PAC '6-Clicks' INPATIENT SHORT FORM - BASIC MOBILITY  How much difficulty does the patient currently have...  Patient Difficulty: Turning over in bed (including adjusting bedclothes, sheets and blankets)?: A Little   Patient Difficulty: Sitting down on and standing up from a chair with arms (e.g., wheelchair, bedside commode, etc.): A Little   Patient Difficulty: Moving from lying on back to sitting on the side of the bed?: A Little   How much help from another person does the patient currently need...   Help from Another: Moving to and from a bed to a chair (including a wheelchair)?: A Little   Help from Another: Need to walk in hospital room?: A Little   Help from Another: Climbing 3-5 steps with a railing?: A Lot     AM-PAC Score:  Raw Score: 17   Approx Degree of Impairment: 50.57%   Standardized Score (AM-PAC Scale): 42.13   CMS Modifier (G-Code): CK    FUNCTIONAL ABILITY STATUS  Functional Mobility/Gait Assessment  Gait Assistance: Minimum assistance  Distance (ft): 3  Assistive Device: Rolling walker  Pattern: Shuffle;L Decreased stance time (slow, guarded)    Patient End of Session: Up in chair;Needs met;RN aware of session/findings;Call light within reach;All patient  questions and concerns addressed;With  staff;Family present    CURRENT GOALS   Goals to be met by: 2/7/24  Patient Goal Patient's self-stated goal is: go home   Goal #1 Patient is able to demonstrate supine - sit EOB @ level: modified independent      Goal #1   Current Status  SBA   Goal #2 Patient is able to demonstrate transfers Sit to/from Stand at assistance level: modified independent with walker - rolling      Goal #2  Current Status Min A with RW    Goal #3 Patient is able to ambulate 100 feet with assist device: walker - rolling at assistance level: supervision   Goal #3   Current Status 3 ft with RW and Min A    Goal #4 Patient will negotiate 7 stairs/one curb w/ assistive device and supervision   Goal #4   Current Status NT    Goal #5 Patient to demonstrate independence with home activity/exercise instructions provided to patient in preparation for discharge.   Goal #5   Current Status In progress   Goal #6     Goal #6  Current Status        Therapeutic Activity: 15 minutes

## 2024-01-27 NOTE — CM/SW NOTE
Department  notified of request for HHC, aidin referrals started. Assigned CM/SW to follow up with pt/family on further discharge planning.     Mckenzie Chowdhury, DSC

## 2024-01-27 NOTE — OCCUPATIONAL THERAPY NOTE
OCCUPATIONAL THERAPY EVALUATION - INPATIENT     Room Number: 572/572-A  Evaluation Date: 1/27/2024  Type of Evaluation: Initial  Presenting Problem: 83-year-old female with a history of ESRD, paroxysmal atrial fibrillation, carotid stenosis, hypertension, hyperlipidemia, diastolic congestive heart failure, presents to the hospital for evaluation of left leg and hip pain.    Physician Order: IP Consult to Occupational Therapy  Reason for Therapy: ADL/IADL Dysfunction and Discharge Planning    OCCUPATIONAL THERAPY ASSESSMENT   Patient is a 83 year old female admitted 1/25/2024 for pain in LLE with unknown origin.  In this OT evaluation patient presents with the following impairments: inability to walk, dec LLE strength, impaired sensation to LLE and dec hip flexion in sitting with c/o of sharp and shooting pain when active him flexion is attempted.  These deficits manifest functionally while performing all functional transfers and lb self care tasks.   Pt also has RUE RTC impairment that was premorbid to this admission.  The patient is below baseline and would benefit from skilled inpatient OT to address the above deficits, maximizing patient's ability to return to prior level of function.    Orders received and chart reviewed.  Pt received in supine in bed agreeable to OT.  Pt transferred sitting in supported long sitting to EOB w/ min A for inability to flex hip and inability to extend knee to advance LLE for bed mobility.  Of note, LLE w/ 3+ pitting edema and dec quad contraction noted.  No redness or tenderness to calf in LLE.  Pt t/f sit>stand at eob to fww w/ min A, however, once pt attempted to accept weight to LLE, pt experienced a sharp shooting pain in the L3 dermatone distribution.  Pt unable to ambulate to bathroom for toilet transfer.  Pt transfer to chair and perf grooming w/ set up.      From an OT standpoint, pt is not safe to discharge home as pt is not able to perform transfers safely and is a high  fall risk.  From an OT standpoint, further work up would be beneficial to the patient as the etiology of symptoms has not yet been identified. Per hospitalist note, mention of endometrial wall thickening noted on an outpatient MRI and US.  Unknown if this is contributing to symptoms of hip flexor weakness. OT spoke w/ RN who confirmed new MRI ordered.      The patient's Approx Degree of Impairment: 50.11% has been calculated based on documentation in the [facility] '6 clicks' Inpatient Daily Activity Short Form.  Research supports that patients with this level of impairment may benefit from home vs. RADHA pending POC.     DISCHARGE RECOMMENDATIONS: home vs. RADHA pending POC.  OT Discharge Recommendations: Undetermined    PLAN  OT Treatment Plan: Balance activities;ADL training;Functional transfer training;UE strengthening/ROM;Endurance training;Patient/Family education;Neuromuscluar reeducationUnknown as pt is not safe to discharge home and etiology of symptoms has been identified.        OCCUPATIONAL THERAPY MEDICAL/SOCIAL HISTORY   Problem List  Principal Problem:    Inability to walk  Active Problems:    Osteoarthritis of hip    Anemia due to chronic kidney disease, on chronic dialysis (HCC)    ESRD (end stage renal disease) on dialysis ([facility])    HOME SITUATION  Type of Home: House  Home Layout: One level  Lives With: Family  Toilet and Equipment: Comfort height toilet  Shower/Tub and Equipment: Walk-in shower; Shower chair  Drives: Yes  Patient Regularly Uses: Glasses    Stairs in Home: 7  Use of Assistive Device(s): fww    Prior Level of [place]: mod I     SUBJECTIVE  \"I am so sick of this pain and nothing is working\"    OCCUPATIONAL THERAPY EXAMINATION    OBJECTIVE  Precautions: Hard of hearing; Bed/chair alarm  Fall Risk: High fall risk    PAIN ASSESSMENT  Rating: Non-verbal signs of pain/discomfort observed       COGNITION  Mild short term memory lost     VISION  Wears  glasses    PERCEPTION  intact    SENSATION  intact    Communication: able to clearly state basic wants and needs    Behavioral/Emotional/Social: restless due to pain    RANGE OF MOTION   Upper extremity ROM is within functional limits w/ the exception of RUE    STRENGTH ASSESSMENT  Upper extremity strength is within functional limits w/ the exception of RUE    COORDINATION  Gross Motor: WFL   Fine Motor: WFL     ACTIVITIES OF DAILY LIVING ASSESSMENT  AM-PAC ‘6-Clicks’ Inpatient Daily Activity Short Form  How much help from another person does the patient currently need…  -   Putting on and taking off regular lower body clothing?: A Lot  -   Bathing (including washing, rinsing, drying)?: A Lot  -   Toileting, which includes using toilet, bedpan or urinal? : A Lot  -   Putting on and taking off regular upper body clothing?: A Little  -   Taking care of personal grooming such as brushing teeth?: None  -   Eating meals?: None    AM-PAC Score:  Score: 17  Approx Degree of Impairment: 50.11%  Standardized Score (AM-PAC Scale): 37.26  CMS Modifier (G-Code): CK    FUNCTIONAL TRANSFER ASSESSMENT  Sit to Stand: Chair  Chair: Moderate Assist       Skilled Therapy Provided: energy conservation, pain management, transfer safety    EDUCATION PROVIDED  Patient : Role of Occupational Therapy; Plan of Care; Discharge Recommendations; DME Recommendations; Functional Transfer Techniques; Fall Prevention; Weight Bear Status; Posture/Positioning  Patient's Response to Education: Verbalized Understanding  Family/Caregiver: Role of Occupational Therapy; Proper Body Mechanics; Compensatory ADL Techniques; Fall Prevention  Family/Caregiver's Response to Education: Verbalized Understanding    Patient End of Session: Up in chair;Needs met;Call light within reach;RN aware of session/findings;All patient questions and concerns addressed;Family present  OT Goals  Patients self stated goal is: \"find out what is causing this pain!\"     Patient  will complete functional transfer with mod I and fww  Comment:     Patient will complete toileting with distant supervision  Comment:     Patient will tolerate standing for 1 minutes in prep for adls with supervision   Comment:    Patient will complete item retrieval with supervision   Comment:          Goals  on: 2/10/24  Frequency: 3x a week    Patient Evaluation Complexity Level:   Occupational Profile/Medical History MODERATE - Expanded review of history including review of medical or therapy record   Specific performance deficits impacting engagement in ADL/IADL MODERATE  3 - 5 performance deficits   Client Assessment/Performance Deficits MODERATE - Comorbidities and min to mod modifications of tasks    Clinical Decision Making MODERATE - Analysis of occupational profile, detailed assessments, several treatment options    Overall Complexity MODERATE     OT Session Time: 55 minutes  Self-Care Home Management: 40 minutes  Therapeutic Activity: 15 minutes

## 2024-01-27 NOTE — CM/SW NOTE
01/27/24 1500   CM/SW Referral Data   Referral Source Social Work (self-referral);Physician   Reason for Referral Discharge planning   Informant Patient   Medical Hx   Does patient have an established PCP? Yes  (Dr. Shannon Bahena)   Patient Info   Patient's Current Mental Status at Time of Assessment Alert;Oriented   Patient's Home Environment House   Number of Levels in Home 1   Number of Stair in Home 2 external, 7 up   Patient lives with Grandchild   Patient Status Prior to Admission   Independent with ADLs and Mobility No   Services in place prior to admission DME/Supplies at home;Dialysis   Type of DME/Supplies Rollator Walker;Straight Cane   Dialysis Clinic  [facility]  ([place])   Scheduled Dialysis days M-W-F   Dialysis scheduled time 0500   Discharge Needs   Anticipated D/C needs Home health care   Choice of Post-Acute Provider   Informed patient of right to choose their preferred provider Yes   List of appropriate post-acute services provided to patient/family with quality data   (Declined, okay w/ [facility] if they can accept)   Patient/family choice [facility]     SW received MDO for HH services. SW spoke to pt via phone for initial assessment and to discuss DC plans. Pt lives at home w/ grandson, who works during the day. Pt has 2 sons who lives in the area but they both also work but check on pt as much as they can. Pt stated that she use to drive herself to/from dialysis, but recently due to pt's health - sons have been assisting w/ to/from transport to dialysis.     Therapy recommending C. Pt stated she had [facility] in the past and is agreeable w/ them again if they're able to accept. HHC referrals previously sent, [facility] accept - SW reserved them in [place] and messaged Estella from [facility]. F2F orders completed.    SW/CM to remain available for support and/or discharge planning.     PLAN: Home w/ [facility]    Jenny Brown, TANYA - [phone]

## 2024-01-27 NOTE — DISCHARGE INSTRUCTIONS
Sometimes managing your health at home requires assistance.  The [facility] team has recognized your preference to use [facility].  They can be reached by phone at [phone].  The fax number for your reference is [phone].  A representative from the home health agency will contact you or your family to schedule your first visit.          POST-UPPER ENDOSCOPY (EGD) DISCHARGE INSTRUCTIONS      PROCEDURE PERFORMED: EGD with biopsies    ENDOSCOPIST: Jacqueline Gusman MD    FINDINGS: Esophagitis (inflamation of the esophagus lining) and food bolus and suspected esophageal dysmotility    MEDICATIONS: You may resume all other medications today    DIET: You may resume your regular diet today.    BIOPSIES: Biopsies were taken.  They will be sent to pathology and results will be available in 7-10 days.    X-RAYS: No X-rays/Labs were ordered today        Activity for remainder of today:  REST TODAY  DO NOT drive or operate heavy machinery  DO NOT drink any alcoholic beverages  DO NOT sign any legal documents or make any important decisions    After your procedure(s):  It is not unusual to feel bloated or gassy .  Passing gas and belching is encouraged. Lying on your left side with your knees flexed may relieve the discomfort. A hot pack to the abdomen may also help. If you had an upper endoscopy (EGD), you may experience a slight sore throat which will subside. Throat lozenges or salt water gargle can be used.    FOLLOW-UP:  Contact the office at [phone] if a follow-up appointment is needed or if you develop any of the following:    Severe abdominal pain/discomfort   Excessive bleeding  Black tarry stool  Difficulty breathing/swallowing  Persistent nausea/vomiting    Fever above 100 degrees or chills

## 2024-01-27 NOTE — PROGRESS NOTES
[facility]    Progress Note    Helen Espana Patient Status:  Observation    1940 MRN [id number]   Location [facility] 5SW/SE Attending Miguel Corral MD   Hosp Day # 0 PCP Shannon Bahena MD       Subjective:   Helen Espana is a(n) 83 year old female who I am seeing for ESRD    No new issues  Feels better  Tolerated HD yest        Objective:   /82 (BP Location: Left arm)   Pulse 92   Temp 97.6 °F (36.4 °C) (Oral)   Resp 16   Ht 5' 3\" (1.6 m)   Wt 124 lb 12.5 oz (56.6 kg)   SpO2 93%   BMI 22.10 kg/m²      Intake/Output Summary (Last 24 hours) at 2024 1527  Last data filed at 2024 0845  Gross per 24 hour   Intake 240 ml   Output --   Net 240 ml     Wt Readings from Last 1 Encounters:   24 124 lb 12.5 oz (56.6 kg)       Exam  Vital signs: Blood pressure 131/82, pulse 92, temperature 97.6 °F (36.4 °C), temperature source Oral, resp. rate 16, height 5' 3\" (1.6 m), weight 124 lb 12.5 oz (56.6 kg), SpO2 93%, not currently breastfeeding.    General: No acute distress. Alert and oriented x 3.  HEENT: Moist mucous membranes. EOMI. PERRLA  Neck:  No JVD.   Respiratory: Clear to auscultation bilaterally.    Cardiovascular: S1, S2.  Regular rate and rhythm.   Abdomen: Soft, nontender, nondistended.    Neurologic: No focal neurological deficits.  Musculoskeletal: Full range of motion of all extremities.  No swelling noted.  Access: AVF    Results:     Recent Labs   Lab 24  0132 24  0651   RBC 3.50* 3.93   HGB 10.8* 12.1   HCT 33.7* 38.3   MCV 96.3 97.5   MCH 30.9 30.8   MCHC 32.0 31.6   RDW 16.7* 16.9*   NEPRELIM 6.24  --    WBC 8.8 9.7   .0* 147.0*         Recent Labs   Lab 24  0132 24  0651   * 108*   BUN 46* 33*   CREATSERUM 5.67* 4.36*   CA 7.3* 8.0*    138   K 4.1 5.1    101   CO2 24.0 24.0          XR LUMBAR SPINE (MIN 4 VIEWS) (CPT=72110)    Result Date: 2024  CONCLUSION:  1. No fracture or  dislocation. 2. Development  of grade I degenerative anterolisthesis of L4 on L5 since 05/02/2020. 3. Stable moderate levoscoliosis to the lumbar spine with otherwise grossly unchanged mild-to-moderate spondylotic changes and greatest involvement of the lower lumbar spine. 4. Lesser incidental findings as above.    Dictated by (CST): Carlos Hinojosa MD on 1/26/2024 at 7:42 PM     Finalized by (CST): Carlos Hinojosa MD on 1/26/2024 at 7:46 PM          XR HIP W OR WO PELVIS 2 OR 3 VIEWS, LEFT (CPT=73502)    Result Date: 1/26/2024  CONCLUSION:  1. No acute appearing fracture or dislocation.  Mild central narrowing of both hip joints as described above. 2. The examination was read on call by [facility] services with a similar interpretation given.    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  If you have any questions, please do not hesitate to contact me.     Pearl Wiggins MD  1/27/2024

## 2024-01-27 NOTE — PLAN OF CARE
Helen is alert x4. From home with family. C/o pain in L thigh/leg. Norco proven effective. Needs to complete stairs with PT prior to discharge. R arm precautions. On Eliquis for AFIB. Tolerating renal diet. Minimal urine output. HD MWF - ESRD. Plan for MRI spine to r/o disc herniation. Call light within reach.   Problem: PAIN - ADULT  Goal: Verbalizes/displays adequate comfort level or patient's stated pain goal  Description: INTERVENTIONS:  - Encourage pt to monitor pain and request assistance  - Assess pain using appropriate pain scale  - Administer analgesics based on type and severity of pain and evaluate response  - Implement non-pharmacological measures as appropriate and evaluate response  - Consider cultural and social influences on pain and pain management  - Manage/alleviate anxiety  - Utilize distraction and/or relaxation techniques  - Monitor for opioid side effects  - Notify MD/LIP if interventions unsuccessful or patient reports new pain  - Anticipate increased pain with activity and pre-medicate as appropriate  Outcome: Progressing     Problem: SAFETY ADULT - FALL  Goal: Free from fall injury  Description: INTERVENTIONS:  - Assess pt frequently for physical needs  - Identify cognitive and physical deficits and behaviors that affect risk of falls.  - [place] fall precautions as indicated by assessment.  - Educate pt/family on patient safety including physical limitations  - Instruct pt to call for assistance with activity based on assessment  - Modify environment to reduce risk of injury  - Provide assistive devices as appropriate  - Consider OT/PT consult to assist with strengthening/mobility  - Encourage toileting schedule  Outcome: Progressing     Problem: DISCHARGE PLANNING  Goal: Discharge to home or other facility with appropriate resources  Description: INTERVENTIONS:  - Identify barriers to discharge w/pt and caregiver  - Include patient/family/discharge partner in discharge planning  -  Arrange for needed discharge resources and transportation as appropriate  - Identify discharge learning needs (meds, wound care, etc)  - Arrange for interpreters to assist at discharge as needed  - Consider post-discharge preferences of patient/family/discharge partner  - Complete POLST form as appropriate  - Assess patient's ability to be responsible for managing their own health  - Refer to Case Management Department for coordinating discharge planning if the patient needs post-hospital services based on physician/LIP order or complex needs related to functional status, cognitive ability or social support system  Outcome: Progressing     Problem: Patient/Family Goals  Goal: Patient/Family Long Term Goal  Description: Patient's Long Term Goal: be discharged    Interventions:  -monitor VS  -monitor appropriate labs  -update patient and family on plan of care  -follow MD orders     - See additional Care Plan goals for specific interventions  Outcome: Progressing  Goal: Patient/Family Short Term Goal  Description: Patient's Short Term Goal: manage pain    Interventions:   -monitor VS  -monitor appropriate labs  -pain management per MD  -update patient and family on plan of care  -discharge planning  -follow MD orders     - See additional Care Plan goals for specific interventions  Outcome: Progressing     Problem: METABOLIC/FLUID AND ELECTROLYTES - ADULT  Goal: Electrolytes maintained within normal limits  Description: INTERVENTIONS:  - Monitor labs and rhythm and assess patient for signs and symptoms of electrolyte imbalances  - Administer electrolyte replacement as ordered  - Monitor response to electrolyte replacements, including rhythm and repeat lab results as appropriate  - Fluid restriction as ordered  - Instruct patient on fluid and nutrition restrictions as appropriate  Outcome: Progressing     Problem: Impaired Functional Mobility  Goal: Achieve highest/safest level of mobility/gait  Description:  Interventions:  - Assess patient's functional ability and stability  - Promote increasing activity/tolerance for mobility and gait  - Educate and engage patient/family in tolerated activity level and precautions    Outcome: Progressing     Problem: Patient Centered Care  Goal: Patient preferences are identified and integrated in the patient's plan of care  Description: Interventions:  - What would you like us to know as we care for you?   - Provide timely, complete, and accurate information to patient/family  - Incorporate patient and family knowledge, values, beliefs, and cultural backgrounds into the planning and delivery of care  - Encourage patient/family to participate in care and decision-making at the level they choose  - Honor patient and family perspectives and choices  Outcome: Progressing

## 2024-01-27 NOTE — PLAN OF CARE
Problem: PAIN - ADULT  Goal: Verbalizes/displays adequate comfort level or patient's stated pain goal  Description: INTERVENTIONS:  - Encourage pt to monitor pain and request assistance  - Assess pain using appropriate pain scale  - Administer analgesics based on type and severity of pain and evaluate response  - Implement non-pharmacological measures as appropriate and evaluate response  - Consider cultural and social influences on pain and pain management  - Manage/alleviate anxiety  - Utilize distraction and/or relaxation techniques  - Monitor for opioid side effects  - Notify MD/LIP if interventions unsuccessful or patient reports new pain  - Anticipate increased pain with activity and pre-medicate as appropriate  Outcome: Progressing     Problem: SAFETY ADULT - FALL  Goal: Free from fall injury  Description: INTERVENTIONS:  - Assess pt frequently for physical needs  - Identify cognitive and physical deficits and behaviors that affect risk of falls.  - [place] fall precautions as indicated by assessment.  - Educate pt/family on patient safety including physical limitations  - Instruct pt to call for assistance with activity based on assessment  - Modify environment to reduce risk of injury  - Provide assistive devices as appropriate  - Consider OT/PT consult to assist with strengthening/mobility  - Encourage toileting schedule  Outcome: Progressing     Problem: DISCHARGE PLANNING  Goal: Discharge to home or other facility with appropriate resources  Description: INTERVENTIONS:  - Identify barriers to discharge w/pt and caregiver  - Include patient/family/discharge partner in discharge planning  - Arrange for needed discharge resources and transportation as appropriate  - Identify discharge learning needs (meds, wound care, etc)  - Arrange for interpreters to assist at discharge as needed  - Consider post-discharge preferences of patient/family/discharge partner  - Complete POLST form as appropriate  - Assess  patient's ability to be responsible for managing their own health  - Refer to Case Management Department for coordinating discharge planning if the patient needs post-hospital services based on physician/LIP order or complex needs related to functional status, cognitive ability or social support system  Outcome: Progressing     Problem: Patient/Family Goals  Goal: Patient/Family Long Term Goal  Description: Patient's Long Term Goal: be discharged    Interventions:  -monitor VS  -monitor appropriate labs  -update patient and family on plan of care  -follow MD orders     - See additional Care Plan goals for specific interventions  Outcome: Progressing  Goal: Patient/Family Short Term Goal  Description: Patient's Short Term Goal: manage pain    Interventions:   -monitor VS  -monitor appropriate labs  -pain management per MD  -update patient and family on plan of care  -discharge planning  -follow MD orders     - See additional Care Plan goals for specific interventions  Outcome: Progressing     Problem: METABOLIC/FLUID AND ELECTROLYTES - ADULT  Goal: Electrolytes maintained within normal limits  Description: INTERVENTIONS:  - Monitor labs and rhythm and assess patient for signs and symptoms of electrolyte imbalances  - Administer electrolyte replacement as ordered  - Monitor response to electrolyte replacements, including rhythm and repeat lab results as appropriate  - Fluid restriction as ordered  - Instruct patient on fluid and nutrition restrictions as appropriate  Outcome: Progressing     Problem: Impaired Functional Mobility  Goal: Achieve highest/safest level of mobility/gait  Description: Interventions:  - Assess patient's functional ability and stability  - Promote increasing activity/tolerance for mobility and gait  - Educate and engage patient/family in tolerated activity level and precautions  Outcome: Progressing

## 2024-01-27 NOTE — PHYSICAL THERAPY NOTE
PHYSICAL THERAPY EVALUATION - INPATIENT     Room Number: 572/572-A  Evaluation Date: 1/27/2024  Type of Evaluation: Initial   Physician Order: PT Eval and Treat    Presenting Problem: inability to walk     Reason for Therapy: Mobility Dysfunction and Discharge Planning    PHYSICAL THERAPY ASSESSMENT     Patient is a 83 year old female admitted 1/25/2024 for inability to walk.  Patient's current functional deficits include pain, weakness, impaired balance, transfers, gait, and stair navigation, which are below the patient's pre-admission status.  Prior to admission, pt lives in 1 level house with 7 JEFFREY, with her supportive adult grandson, is independent with ADLs and mobility, and has only been using a rollator since pain started several weeks ago. At this time, pt is below functional baseline. The patient's Approx Degree of Impairment: 46.58% has been calculated based on documentation in the [facility] '6 clicks' Inpatient Basic Mobility Short Form.  Research supports that patients with this level of impairment may benefit from home with HH PT. Patient will benefit from continued IP PT services to address these deficits in preparation for discharge.    9:30 am: In this evaluation, RN approved participation with physical therapy. Pt was received resting in bed and agreeable to activity. Son at bedside. Educated on role of PT and PT plan of care, goals for this session. Pt verbalized understanding. Pt with minimal pain at rest, which increases with activity and weight bearing.  Bed mobility: SBA supine to sit at EOB with HOB elevated   Transfers: CGA for STS with RW   Gait: ambulated 30 ft with RW and CGA, slow, shuffled gait, flexed and guarded stance/posture, requires frequent pauses, no overall LOB noted. C/o increased pain with activity.   Encouraged short, frequent walks with nursing staff to/from bathroom with RW and frequent position changes and movement. Pt was left sitting in chair with needs within reach,  handoff to RN complete.    @12:00pm: per secure chat message from GERARDO Pérez, pt may be DC if completes stair training safely today. PT arrived in pt's room at 12:05 to attempt stair training; pt seen sitting in chair with family present and agreeable to activity. WC brought to room in prep to take pt to the stairs. Pt with minimal pain at rest again and with increased pain with activity. Required min A for STS from chair due to pain, benefits from increased time. Pt c/o significant pain with standing; significant difficulty weight bearing on LLE; very slow and guarded. min A to take a few shuffled steps forward. Due to increased pain, pt deferred stair training. Min A to assist pt to take steps back to bed and for sit>supine. Max A to boost pt up in bed. Left pt resting in bed with needs within reach and family present; handoff to RN complete.     Anticipate stair training for DC later today pending pain control.     DISCHARGE RECOMMENDATIONS  PT Discharge Recommendations: Home with home health PT    PLAN  PT Treatment Plan: Bed mobility;Body mechanics;Endurance;Patient education;Energy conservation;Family education;Gait training;Strengthening;Stair training;Transfer training;Balance training  Rehab Potential : Good  Frequency (Obs): 5x/week    PHYSICAL THERAPY MEDICAL/SOCIAL HISTORY     Problem List  Principal Problem:    Inability to walk  Active Problems:    Osteoarthritis of hip    Anemia due to chronic kidney disease, on chronic dialysis (HCC)    ESRD (end stage renal disease) on dialysis ([facility])      HOME SITUATION  Home Situation  Type of Home: House  Home Layout: One level  Stairs to Enter : 7  Railing: Yes  Lives With: Other (Comment) (grandson; supportive son nearby)  Drives: Yes  Patient Owned Equipment: Rollator;Cane  Patient Regularly Uses: None     Prior Level of [place]: independent with ADLs and mobility with cane occasionally; has been using rollator for the past few weeks due to increasing LLE  pain.     SUBJECTIVE  Agreeable to activity.     PHYSICAL THERAPY EXAMINATION     OBJECTIVE  Precautions: None  Fall Risk: High fall risk    WEIGHT BEARING RESTRICTION  Weight Bearing Restriction: None    PAIN ASSESSMENT  Rating: Unable to rate  Location: left hip and thigh  Management Techniques: Activity promotion;Body mechanics;Repositioning;Breathing techniques    COGNITION  Overall Cognitive Status:  WFL - within functional limits    RANGE OF MOTION AND STRENGTH ASSESSMENT  Upper extremity ROM and strength are within functional limits.  Lower extremity ROM is within functional limits.  Lower extremity strength is within functional limits.    BALANCE  Static Sitting: Good  Dynamic Sitting: Fair +  Static Standing: Fair  Dynamic Standing: Fair -    AM-PAC '6-Clicks' INPATIENT SHORT FORM - BASIC MOBILITY  How much difficulty does the patient currently have...  Patient Difficulty: Turning over in bed (including adjusting bedclothes, sheets and blankets)?: A Little   Patient Difficulty: Sitting down on and standing up from a chair with arms (e.g., wheelchair, bedside commode, etc.): A Little   Patient Difficulty: Moving from lying on back to sitting on the side of the bed?: A Little   How much help from another person does the patient currently need...   Help from Another: Moving to and from a bed to a chair (including a wheelchair)?: A Little   Help from Another: Need to walk in hospital room?: A Little   Help from Another: Climbing 3-5 steps with a railing?: A Little     AM-PAC Score:  Raw Score: 18   Approx Degree of Impairment: 46.58%   Standardized Score (AM-PAC Scale): 43.63   CMS Modifier (G-Code): CK    FUNCTIONAL ABILITY STATUS  Functional Mobility/Gait Assessment  Gait Assistance: Contact guard assist  Distance (ft): 30  Assistive Device: Rolling walker  Pattern: Shuffle (slow, guarded, heavy BUE reliance on RW)    Bed Mobility: SBA    Transfers: CGA to min A     Exercise/Education Provided:  Bed  mobility  Body mechanics  Energy conservation  Functional activity tolerated  Gait training  Posture  Transfer training    Patient End of Session: Needs met;In bed;Call light within reach;RN aware of session/findings;All patient questions and concerns addressed;Alarm set;Family present    CURRENT GOALS    Goals to be met by: 2/7/24  Patient Goal Patient's self-stated goal is: go home   Goal #1 Patient is able to demonstrate supine - sit EOB @ level: modified independent     Goal #1   Current Status    Goal #2 Patient is able to demonstrate transfers Sit to/from Stand at assistance level: modified independent with walker - rolling     Goal #2  Current Status    Goal #3 Patient is able to ambulate 100 feet with assist device: walker - rolling at assistance level: supervision   Goal #3   Current Status    Goal #4 Patient will negotiate 7 stairs/one curb w/ assistive device and supervision   Goal #4   Current Status    Goal #5 Patient to demonstrate independence with home activity/exercise instructions provided to patient in preparation for discharge.   Goal #5   Current Status    Goal #6    Goal #6  Current Status      Patient Evaluation Complexity Level:  History Moderate - 1 or 2 personal factors and/or co-morbidities   Examination of body systems Moderate - addressing a total of 3 or more elements   Clinical Presentation Moderate - Evolving   Clinical Decision Making Moderate Complexity       Therapeutic Activity: 23 minutes

## 2024-01-28 PROCEDURE — 99232 SBSQ HOSP IP/OBS MODERATE 35: CPT | Performed by: INTERNAL MEDICINE

## 2024-01-28 RX ORDER — PANTOPRAZOLE SODIUM 40 MG/1
40 TABLET, DELAYED RELEASE ORAL
Status: DISCONTINUED | OUTPATIENT
Start: 2024-01-29 | End: 2024-02-01

## 2024-01-28 RX ORDER — GABAPENTIN 100 MG/1
100 CAPSULE ORAL 2 TIMES DAILY
Status: DISCONTINUED | OUTPATIENT
Start: 2024-01-28 | End: 2024-02-08

## 2024-01-28 RX ORDER — CALCIUM CARBONATE 500 MG/1
1000 TABLET, CHEWABLE ORAL EVERY 8 HOURS PRN
Status: DISCONTINUED | OUTPATIENT
Start: 2024-01-28 | End: 2024-02-08

## 2024-01-28 RX ORDER — ALBUMIN (HUMAN) 12.5 G/50ML
25 SOLUTION INTRAVENOUS
Status: ACTIVE | OUTPATIENT
Start: 2024-01-28 | End: 2024-01-30

## 2024-01-28 RX ORDER — GABAPENTIN 100 MG/1
200 CAPSULE ORAL 2 TIMES DAILY
Status: DISCONTINUED | OUTPATIENT
Start: 2024-01-28 | End: 2024-01-28

## 2024-01-28 NOTE — PLAN OF CARE
Problem: PAIN - ADULT  Goal: Verbalizes/displays adequate comfort level or patient's stated pain goal  Description: INTERVENTIONS:  - Encourage pt to monitor pain and request assistance  - Assess pain using appropriate pain scale  - Administer analgesics based on type and severity of pain and evaluate response  - Implement non-pharmacological measures as appropriate and evaluate response  - Consider cultural and social influences on pain and pain management  - Manage/alleviate anxiety  - Utilize distraction and/or relaxation techniques  - Monitor for opioid side effects  - Notify MD/LIP if interventions unsuccessful or patient reports new pain  - Anticipate increased pain with activity and pre-medicate as appropriate  Outcome: Progressing     Problem: SAFETY ADULT - FALL  Goal: Free from fall injury  Description: INTERVENTIONS:  - Assess pt frequently for physical needs  - Identify cognitive and physical deficits and behaviors that affect risk of falls.  - [place] fall precautions as indicated by assessment.  - Educate pt/family on patient safety including physical limitations  - Instruct pt to call for assistance with activity based on assessment  - Modify environment to reduce risk of injury  - Provide assistive devices as appropriate  - Consider OT/PT consult to assist with strengthening/mobility  - Encourage toileting schedule  Outcome: Progressing     Problem: DISCHARGE PLANNING  Goal: Discharge to home or other facility with appropriate resources  Description: INTERVENTIONS:  - Identify barriers to discharge w/pt and caregiver  - Include patient/family/discharge partner in discharge planning  - Arrange for needed discharge resources and transportation as appropriate  - Identify discharge learning needs (meds, wound care, etc)  - Arrange for interpreters to assist at discharge as needed  - Consider post-discharge preferences of patient/family/discharge partner  - Complete POLST form as appropriate  - Assess  patient's ability to be responsible for managing their own health  - Refer to Case Management Department for coordinating discharge planning if the patient needs post-hospital services based on physician/LIP order or complex needs related to functional status, cognitive ability or social support system  Outcome: Progressing     Problem: Patient/Family Goals  Goal: Patient/Family Long Term Goal  Description: Patient's Long Term Goal: Discharge from the hospital    Interventions:  - Monitor vital signs  - Monitor appropriate labs  - Pain management  - Administer medications per order  - Follow MD orders  - Diagnostics per order  - Update / inform patient and family on plan of care  - Discharge planning  - See additional Care Plan goals for specific interventions  Outcome: Progressing  Goal: Patient/Family Short Term Goal  Description: Patient's Short Term Goal: Manage pain    Interventions:   - Monitor vital signs  - Monitor appropriate labs  - Pain management  - Administer medications per order  - Follow MD orders  - Diagnostics per order  - Update / inform patient and family on plan of care  - See additional Care Plan goals for specific interventions  Outcome: Progressing     Problem: METABOLIC/FLUID AND ELECTROLYTES - ADULT  Goal: Electrolytes maintained within normal limits  Description: INTERVENTIONS:  - Monitor labs and rhythm and assess patient for signs and symptoms of electrolyte imbalances  - Administer electrolyte replacement as ordered  - Monitor response to electrolyte replacements, including rhythm and repeat lab results as appropriate  - Fluid restriction as ordered  - Instruct patient on fluid and nutrition restrictions as appropriate  Outcome: Progressing  Goal: Hemodynamic stability and optimal renal function maintained  Description: INTERVENTIONS:  - Monitor labs and assess for signs and symptoms of volume excess or deficit  - Monitor intake, output and patient weight  - Monitor urine specific  gravity, serum osmolarity and serum sodium as indicated or ordered  - Monitor response to interventions for patient's volume status, including labs, urine output, blood pressure (other measures as available)  - Encourage oral intake as appropriate  - Instruct patient on fluid and nutrition restrictions as appropriate  Outcome: Progressing     Problem: Impaired Functional Mobility  Goal: Achieve highest/safest level of mobility/gait  Description: Interventions:  - Assess patient's functional ability and stability  - Promote increasing activity/tolerance for mobility and gait  - Educate and engage patient/family in tolerated activity level and precautions  - Recommend use of  RW for transfers and ambulation  - Recommend patient transfer to bedside chair toward strongest side  - When transferring patient, block weaker knee for safety  - Recommend use of chair position in bed 3 times per day  Outcome: Progressing     Problem: Patient Centered Care  Goal: Patient preferences are identified and integrated in the patient's plan of care  Description: Interventions:  - What would you like us to know as we care for you? From home with grandson  - Provide timely, complete, and accurate information to patient/family  - Incorporate patient and family knowledge, values, beliefs, and cultural backgrounds into the planning and delivery of care  - Encourage patient/family to participate in care and decision-making at the level they choose  - Honor patient and family perspectives and choices  Outcome: Progressing     Problem: MUSCULOSKELETAL - ADULT  Goal: Return mobility to safest level of function  Description: INTERVENTIONS:  - Assess patient stability and activity tolerance for standing, transferring and ambulating w/ or w/o assistive devices  - Assist with transfers and ambulation using safe patient handling equipment as needed  - Ensure adequate protection for wounds/incisions during mobilization  - Obtain PT/OT consults as  needed  - Advance activity as appropriate  - Communicate ordered activity level and limitations with patient/family  Outcome: Progressing     Problem: Impaired Activities of Daily Living  Goal: Achieve highest/safest level of independence in self care  Description: Interventions:  - Assess ability and encourage patient to participate in ADLs to maximize function  - Promote sitting position while performing ADLs such as feeding, grooming, and bathing  - Educate and encourage patient/family in tolerated functional activity level and precautions during self-care  Outcome: Progressing      Monitoring vital signs- stable at this time. Pain medication provided as needed. No acute changes noted at this moment. Safety and fall precautions maintained- bed alarm on, bed locked in lowest position, call light within reach. Frequent rounding by nursing staff.

## 2024-01-28 NOTE — PROGRESS NOTES
[facility] Hospitalist Progress Note     CC: Hospital Follow up    PCP: Shannon Bahena MD       Assessment/Plan:     Principal Problem:    Inability to walk  Active Problems:    Osteoarthritis of hip    Anemia due to chronic kidney disease, on chronic dialysis (HCC)    ESRD (end stage renal disease) on dialysis ([facility])    Ms. Cook is an 83-year-old female with a history of ESRD, paroxysmal atrial fibrillation, carotid stenosis, hypertension, hyperlipidemia, diastolic congestive heart failure, presents to the hospital for evaluation of left leg and hip pain, likely radicular pain fro L4-L5 disc herniation     Left hip/ Leg pain  Left hip OA  Spinal stenosis, L4-L5 disc extrusion  - xray neg for fracture  - outpatient MRI with Left hip OA and trets of hamstring tendon and gluteus minimus  - pain does appear to radiate to her lower leg   - lumbar spine with DDD  - continue lidoderm patch start low dose gabapentin  - continue norco    - PT/OT  - recently had steroid injection in hip with ortho, therefore not candidate for at least 1-2 more months  - continue prednisone   - Lumbar spine MRI, pre romero read with L4-L5 disc herniation  - continue PT/OT, may need RADHA on DC pending progress     ESRD  -Nephrology consulted  -MWF HD     Paroxysmal atrial fibrillation  -Follows with [facility]  -She is on Eliquis 2.5 mg twice daily  -Normally on metoprolol and diltiazem,      Chronic HFpEF  - euvolemic  - volume management per HD     Endometrial wall thickening  - noted on outpatient MRI and US  - needs outpatient GYN follow up     Hypertension  -She takes a Norvasc as needed but not daily  -resume lopressor     FN:  - IVF:none  - Diet: adv     DVT Prophy:scd, eliquis  Lines: PIV     Dispo: pending clinical course    Called daughter on 1/28    Questions/concerns were discussed with patient and/or family by bedside.    Thank You,  Miguel Corral MD    Hospitalist with [facility]     Subjective:      Patient states pain improved from admission, but still with sig pain walking, and unable to get up stairs, no chest pain or sob, no nausea or vomiting    OBJECTIVE:    Blood pressure 93/61, pulse 82, temperature 98 °F (36.7 °C), temperature source Oral, resp. rate 17, height 5' 3\" (1.6 m), weight 127 lb 13.9 oz (58 kg), SpO2 92%, not currently breastfeeding.    Temp:  [98 °F (36.7 °C)-98.6 °F (37 °C)] 98 °F (36.7 °C)  Pulse:  [] 82  Resp:  [10-18] 17  BP: ()/(55-73) 93/61  SpO2:  [91 %-94 %] 92 %      Intake/Output:    Intake/Output Summary (Last 24 hours) at 1/28/2024 0945  Last data filed at 1/28/2024 0547  Gross per 24 hour   Intake 848 ml   Output --   Net 848 ml       Last 3 Weights   01/28/24 0551 127 lb 13.9 oz (58 kg)   01/27/24 0642 124 lb 12.5 oz (56.6 kg)   01/26/24 0246 135 lb 12.9 oz (61.6 kg)   01/25/24 2148 126 lb 12.2 oz (57.5 kg)   07/18/23 1000 133 lb 6.1 oz (60.5 kg)   07/16/23 1942 134 lb 12.8 oz (61.1 kg)   07/16/23 1203 135 lb (61.2 kg)   06/20/23 1508 135 lb (61.2 kg)       Exam    Gen: No acute distress, alert and oriented x3   Heent: NC AT, neck supple  Pulm: Lungs clear, normal respiratory effort  CV: Heart with regular rate and rhythm   Abd: Abdomen soft, nontender, nondistended   MSK: no clubbing, no cyanosis  Skin: no rashes or lesions  Neuro: AO*3, motor intact, no sensory deficits  Psyc: appropriate mood and affect      Data Review:       Labs:     Recent Labs   Lab 01/26/24  0132 01/27/24  0651   RBC 3.50* 3.93   HGB 10.8* 12.1   HCT 33.7* 38.3   MCV 96.3 97.5   MCH 30.9 30.8   MCHC 32.0 31.6   RDW 16.7* 16.9*   NEPRELIM 6.24  --    WBC 8.8 9.7   .0* 147.0*         Recent Labs   Lab 01/26/24  0132 01/27/24  0651   * 108*   BUN 46* 33*   CREATSERUM 5.67* 4.36*   EGFRCR 7* 10*   CA 7.3* 8.0*    138   K 4.1 5.1    101   CO2 24.0 24.0       No results for input(s): \"ALT\", \"AST\", \"ALB\", \"AMYLASE\", \"LIPASE\", \"LDH\" in the last 168 hours.    Invalid  input(s): \"ALPHOS\", \"TBIL\", \"DBIL\", \"TPROT\"      Imaging:  XR LUMBAR SPINE (MIN 4 VIEWS) (CPT=72110)    Result Date: 1/26/2024  CONCLUSION:  1. No fracture or dislocation. 2. Development  of grade I degenerative anterolisthesis of L4 on L5 since 05/02/2020. 3. Stable moderate levoscoliosis to the lumbar spine with otherwise grossly unchanged mild-to-moderate spondylotic changes and greatest involvement of the lower lumbar spine. 4. Lesser incidental findings as above.    Dictated by (CST): Carlos Hinojosa MD on 1/26/2024 at 7:42 PM     Finalized by (CST): Carlos Hinojosa MD on 1/26/2024 at 7:46 PM          XR HIP W OR WO PELVIS 2 OR 3 VIEWS, LEFT (CPT=73502)    Result Date: 1/26/2024  CONCLUSION:  1. No acute appearing fracture or dislocation.  Mild central narrowing of both hip joints as described above. 2. The examination was read on call by [facility] radiology services with a similar interpretation given.    Dictated by (CST): Checo Mireles MD on 1/26/2024 at 8:24 AM     Finalized by (CST): Checo Mireles MD on 1/26/2024 at 8:26 AM             Meds:      gabapentin  100 mg Oral BID    predniSONE  40 mg Oral Daily with breakfast    apixaban  2.5 mg Oral 2 times per day    metoprolol tartrate  50 mg Oral 2x Daily(Beta Blocker)    midodrine  5 mg Oral TID    montelukast  10 mg Oral Nightly    sevelamer carbonate  2,400 mg Oral TID    lidocaine-menthol  1 patch Transdermal Daily    sennosides  17.2 mg Oral Nightly    atorvastatin  5 mg Oral Nightly       amLODIPine, polyethylene glycol (PEG 3350), bisacodyl, fleet enema, acetaminophen, HYDROcodone-acetaminophen

## 2024-01-28 NOTE — PROGRESS NOTES
[facility]    Progress Note    Helen Espana Patient Status:  Observation    1940 MRN [id number]   Location [facility] 5SW/SE Attending Miguel Corral MD   Hosp Day # 0 PCP Shannon Bahena MD       Subjective:   Helen Espana is a(n) 83 year old female who I am seeing for ESRD    No new issues  Feels better  Having her breakfast        Objective:   BP 93/61 (BP Location: Left arm)   Pulse 82   Temp 98 °F (36.7 °C) (Oral)   Resp 17   Ht 5' 3\" (1.6 m)   Wt 127 lb 13.9 oz (58 kg)   SpO2 92%   BMI 22.65 kg/m²      Intake/Output Summary (Last 24 hours) at 2024 1225  Last data filed at 2024 0853  Gross per 24 hour   Intake 968 ml   Output --   Net 968 ml     Wt Readings from Last 1 Encounters:   24 127 lb 13.9 oz (58 kg)       Exam  Vital signs: Blood pressure 93/61, pulse 82, temperature 98 °F (36.7 °C), temperature source Oral, resp. rate 17, height 5' 3\" (1.6 m), weight 127 lb 13.9 oz (58 kg), SpO2 92%, not currently breastfeeding.    General: No acute distress. Alert and oriented x 3.  HEENT: Moist mucous membranes. EOMI. PERRLA  Neck:  No JVD.   Respiratory: Clear to auscultation bilaterally.    Cardiovascular: S1, S2.  Regular rate and rhythm.   Abdomen: Soft, nontender, nondistended.    Neurologic: No focal neurological deficits.  Musculoskeletal: Full range of motion of all extremities.  No swelling noted.  Access: AVF    Results:     Recent Labs   Lab 24  0132 24  0651   RBC 3.50* 3.93   HGB 10.8* 12.1   HCT 33.7* 38.3   MCV 96.3 97.5   MCH 30.9 30.8   MCHC 32.0 31.6   RDW 16.7* 16.9*   NEPRELIM 6.24  --    WBC 8.8 9.7   .0* 147.0*         Recent Labs   Lab 24  0132 24  0651   * 108*   BUN 46* 33*   CREATSERUM 5.67* 4.36*   CA 7.3* 8.0*    138   K 4.1 5.1    101   CO2 24.0 24.0          MRI SPINE LUMBAR (CPT=72148)    Result Date: 2024  CONCLUSION:  1. Moderate spinal canal stenosis at L4-L5.  2.  Additional multilevel degenerative changes as detailed above.  3. No acute osseous injury of the lumbar spine.  4. Nonspecific heterogeneous appearance of the bone marrow of the osseous structures.  5. Moderate volume free pelvic fluid.  6. Imaging findings suggesting polycystic kidney disease.  7. Lesser incidental findings as above.    A preliminary report was issued by the [facility] teleradiology service. There are no major discrepancies.    elm-remote.   Dictated by (CST): Josue Melgoza MD on 1/28/2024 at 10:32 AM     Finalized by (CST): Josue Melgoza MD on 1/28/2024 at 10:38 AM          XR LUMBAR SPINE (MIN 4 VIEWS) (CPT=72110)    Result Date: 1/26/2024  CONCLUSION:  1. No fracture or dislocation. 2. Development  of grade I degenerative anterolisthesis of L4 on L5 since 05/02/2020. 3. Stable moderate levoscoliosis to the lumbar spine with otherwise grossly unchanged mild-to-moderate spondylotic changes and greatest involvement of the lower lumbar spine. 4. Lesser incidental findings as above.    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  If you have any questions, please do not hesitate to contact me.     Pearl Wiggins MD

## 2024-01-29 ENCOUNTER — APPOINTMENT (OUTPATIENT)
Dept: CT IMAGING | Facility: HOSPITAL | Age: 84
End: 2024-01-29
Attending: STUDENT IN AN ORGANIZED HEALTH CARE EDUCATION/TRAINING PROGRAM
Payer: MEDICARE

## 2024-01-29 ENCOUNTER — APPOINTMENT (OUTPATIENT)
Dept: CT IMAGING | Facility: HOSPITAL | Age: 84
DRG: 553 | End: 2024-01-29
Attending: STUDENT IN AN ORGANIZED HEALTH CARE EDUCATION/TRAINING PROGRAM
Payer: MEDICARE

## 2024-01-29 PROCEDURE — 90935 HEMODIALYSIS ONE EVALUATION: CPT | Performed by: INTERNAL MEDICINE

## 2024-01-29 PROCEDURE — 5A1D70Z PERFORMANCE OF URINARY FILTRATION, INTERMITTENT, LESS THAN 6 HOURS PER DAY: ICD-10-PCS | Performed by: INTERNAL MEDICINE

## 2024-01-29 PROCEDURE — 70490 CT SOFT TISSUE NECK W/O DYE: CPT | Performed by: STUDENT IN AN ORGANIZED HEALTH CARE EDUCATION/TRAINING PROGRAM

## 2024-01-29 RX ORDER — HYDROMORPHONE HYDROCHLORIDE 1 MG/ML
0.2 INJECTION, SOLUTION INTRAMUSCULAR; INTRAVENOUS; SUBCUTANEOUS EVERY 2 HOUR PRN
Status: DISCONTINUED | OUTPATIENT
Start: 2024-01-29 | End: 2024-01-30

## 2024-01-29 RX ORDER — HYDROMORPHONE HYDROCHLORIDE 1 MG/ML
0.4 INJECTION, SOLUTION INTRAMUSCULAR; INTRAVENOUS; SUBCUTANEOUS EVERY 2 HOUR PRN
Status: DISCONTINUED | OUTPATIENT
Start: 2024-01-29 | End: 2024-01-30

## 2024-01-29 RX ORDER — HYDROMORPHONE HYDROCHLORIDE 1 MG/ML
0.1 INJECTION, SOLUTION INTRAMUSCULAR; INTRAVENOUS; SUBCUTANEOUS EVERY 2 HOUR PRN
Status: DISCONTINUED | OUTPATIENT
Start: 2024-01-29 | End: 2024-01-30

## 2024-01-29 RX ORDER — SODIUM CHLORIDE 9 MG/ML
INJECTION, SOLUTION INTRAVENOUS CONTINUOUS
Status: DISCONTINUED | OUTPATIENT
Start: 2024-01-29 | End: 2024-01-31

## 2024-01-29 RX ORDER — MORPHINE SULFATE 2 MG/ML
2 INJECTION, SOLUTION INTRAMUSCULAR; INTRAVENOUS EVERY 4 HOURS PRN
Status: DISCONTINUED | OUTPATIENT
Start: 2024-01-29 | End: 2024-01-29

## 2024-01-29 NOTE — PHYSICAL THERAPY NOTE
PHYSICAL THERAPY TREATMENT NOTE - INPATIENT     Room Number: 572/572-A       Presenting Problem: inability to walk    Problem List  Principal Problem:    Inability to walk  Active Problems:    Osteoarthritis of hip    Anemia due to chronic kidney disease, on chronic dialysis ([facility])    ESRD (end stage renal disease) on dialysis ([facility])      PHYSICAL THERAPY ASSESSMENT     RN approved participation with physical therapy. Pt was received resting in bed and agreeable to activity. Daughter at bedside. Pt is [place]. Educated on role of PT, PT plan of care, benefits of oob mobility and ambulation, goals for this session. Pt verbalized understanding. Pt with moderate to severe pain of LLE, from the hip down to the knee.   Bed mobility: Min A for LLE movement off bed during supine to sit with HOB elevated. Max A x 2 to return to supine; most assistance needed for LLE movement due to pain. Pt very slow moving and benefits from increased time and VC for sequencing.   Transfers: Min A for STS with RW   Gait: 3 ft with RW and mod A. Pt with significantly decreased dorothea and guarded gait. Max VC for sequencing of gait and management of RW. Significant difficulty weight bearing through LLE and moving LLE, difficulty advancing RLE. Step-to pattern and overall very unsteady but no LOB. Pt declined further activity due to pain.     Education provided on importance of staying mobile and functional while hospitalized, benefits of sitting in chair and practicing pivot transfers for toileting, position changes for comfort and pain relief.  Pt was left resting in bed with needs within reach, ice applied to LLE for pain relief, bed alarm on, handoff to RN complete.    The patient is well below functional baseline and has made minimal progress with IP PT. Pt with uncontrolled pain at this time. Pt is not safe to return home at this time. The patient's Approx Degree of Impairment: 61.29% has been calculated based on documentation in the [facility] '6  clicks' Inpatient Basic Mobility Short Form.  Research supports that patients with this level of impairment may benefit from RADHA.    DISCHARGE RECOMMENDATIONS  PT Discharge Recommendations: Sub-acute rehabilitation     PLAN  PT Treatment Plan: Bed mobility;Body mechanics;Endurance;Patient education;Energy conservation;Family education;Gait training;Strengthening;Stair training;Transfer training;Balance training  Frequency (Obs): 5x/week    SUBJECTIVE  Agreeable to activity.     OBJECTIVE  Precautions: Limb alert - right;Bed/chair alarm    WEIGHT BEARING RESTRICTION  none    PAIN ASSESSMENT   Rating:  (did not rate)  Location: left leg, hip to knee  Management Techniques: Activity promotion;Body mechanics;Repositioning    BALANCE  Static Sitting: Fair +  Dynamic Sitting: Fair  Static Standing: Poor +  Dynamic Standing: Poor +    AM-PAC '6-Clicks' INPATIENT SHORT FORM - BASIC MOBILITY  How much difficulty does the patient currently have...  Patient Difficulty: Turning over in bed (including adjusting bedclothes, sheets and blankets)?: A Little   Patient Difficulty: Sitting down on and standing up from a chair with arms (e.g., wheelchair, bedside commode, etc.): A Little   Patient Difficulty: Moving from lying on back to sitting on the side of the bed?: A Lot   How much help from another person does the patient currently need...   Help from Another: Moving to and from a bed to a chair (including a wheelchair)?: A Lot   Help from Another: Need to walk in hospital room?: A Lot   Help from Another: Climbing 3-5 steps with a railing?: A Lot     AM-PAC Score:  Raw Score: 14   Approx Degree of Impairment: 61.29%   Standardized Score (AM-PAC Scale): 38.1   CMS Modifier (G-Code): CL    FUNCTIONAL ABILITY STATUS  Functional Mobility/Gait Assessment  Gait Assistance: Moderate assistance  Distance (ft): 3  Assistive Device: Rolling walker  Pattern: Shuffle;L Decreased stance time (step-to pattern, guarded, slow)    Patient End of  Session: In bed;Needs met;RN aware of session/findings;Call light within reach;All patient questions and concerns addressed;Ice applied;Alarm set;Family present    CURRENT GOALS   Goals to be met by: 2/7/24  Patient Goal Patient's self-stated goal is: go home   Goal #1 Patient is able to demonstrate supine - sit EOB @ level: modified independent      Goal #1   Current Status  min A supine>sit, max A x 2 sit>supine and boosting    Goal #2 Patient is able to demonstrate transfers Sit to/from Stand at assistance level: modified independent with walker - rolling      Goal #2  Current Status Min A with RW    Goal #3 Patient is able to ambulate 100 feet with assist device: walker - rolling at assistance level: supervision   Goal #3   Current Status 3 ft with RW and Mod A    Goal #4 Patient will negotiate 7 stairs/one curb w/ assistive device and supervision   Goal #4   Current Status NT    Goal #5 Patient to demonstrate independence with home activity/exercise instructions provided to patient in preparation for discharge.   Goal #5   Current Status In progress   Goal #6     Goal #6  Current Status          Therapeutic Activity: 23 minutes

## 2024-01-29 NOTE — PLAN OF CARE
Patient resting in bed at this time. Safety and fall precautions in place, call light within reach, bed in lowest position. Frequent rounding performed by nursing staff, all medications and treatments given as ordered. Vital signs stable, no acute changes. All needs attended.     Problem: PAIN - ADULT  Goal: Verbalizes/displays adequate comfort level or patient's stated pain goal  Description: INTERVENTIONS:  - Encourage pt to monitor pain and request assistance  - Assess pain using appropriate pain scale  - Administer analgesics based on type and severity of pain and evaluate response  - Implement non-pharmacological measures as appropriate and evaluate response  - Consider cultural and social influences on pain and pain management  - Manage/alleviate anxiety  - Utilize distraction and/or relaxation techniques  - Monitor for opioid side effects  - Notify MD/LIP if interventions unsuccessful or patient reports new pain  - Anticipate increased pain with activity and pre-medicate as appropriate  Outcome: Progressing     Problem: SAFETY ADULT - FALL  Goal: Free from fall injury  Description: INTERVENTIONS:  - Assess pt frequently for physical needs  - Identify cognitive and physical deficits and behaviors that affect risk of falls.  - [place] fall precautions as indicated by assessment.  - Educate pt/family on patient safety including physical limitations  - Instruct pt to call for assistance with activity based on assessment  - Modify environment to reduce risk of injury  - Provide assistive devices as appropriate  - Consider OT/PT consult to assist with strengthening/mobility  - Encourage toileting schedule  Outcome: Progressing     Problem: DISCHARGE PLANNING  Goal: Discharge to home or other facility with appropriate resources  Description: INTERVENTIONS:  - Identify barriers to discharge w/pt and caregiver  - Include patient/family/discharge partner in discharge planning  - Arrange for needed discharge resources  and transportation as appropriate  - Identify discharge learning needs (meds, wound care, etc)  - Arrange for interpreters to assist at discharge as needed  - Consider post-discharge preferences of patient/family/discharge partner  - Complete POLST form as appropriate  - Assess patient's ability to be responsible for managing their own health  - Refer to Case Management Department for coordinating discharge planning if the patient needs post-hospital services based on physician/LIP order or complex needs related to functional status, cognitive ability or social support system  Outcome: Progressing     Problem: Patient/Family Goals  Goal: Patient/Family Long Term Goal  Description: Patient's Long Term Goal: Discharge from the hospital    Interventions:  - Monitor vital signs  - Monitor appropriate labs  - Pain management  - Administer medications per order  - Follow MD orders  - Diagnostics per order  - Update / inform patient and family on plan of care  - Discharge planning  - See additional Care Plan goals for specific interventions  Outcome: Progressing  Goal: Patient/Family Short Term Goal  Description: Patient's Short Term Goal: Manage pain    Interventions:   - Monitor vital signs  - Monitor appropriate labs  - Pain management  - Administer medications per order  - Follow MD orders  - Diagnostics per order  - Update / inform patient and family on plan of care  - See additional Care Plan goals for specific interventions  Outcome: Progressing     Problem: METABOLIC/FLUID AND ELECTROLYTES - ADULT  Goal: Electrolytes maintained within normal limits  Description: INTERVENTIONS:  - Monitor labs and rhythm and assess patient for signs and symptoms of electrolyte imbalances  - Administer electrolyte replacement as ordered  - Monitor response to electrolyte replacements, including rhythm and repeat lab results as appropriate  - Fluid restriction as ordered  - Instruct patient on fluid and nutrition restrictions as  appropriate  Outcome: Progressing  Goal: Hemodynamic stability and optimal renal function maintained  Description: INTERVENTIONS:  - Monitor labs and assess for signs and symptoms of volume excess or deficit  - Monitor intake, output and patient weight  - Monitor urine specific gravity, serum osmolarity and serum sodium as indicated or ordered  - Monitor response to interventions for patient's volume status, including labs, urine output, blood pressure (other measures as available)  - Encourage oral intake as appropriate  - Instruct patient on fluid and nutrition restrictions as appropriate  Outcome: Progressing     Problem: Impaired Functional Mobility  Goal: Achieve highest/safest level of mobility/gait  Description: Interventions:  - Assess patient's functional ability and stability  - Promote increasing activity/tolerance for mobility and gait  - Educate and engage patient/family in tolerated activity level and precautions  - Recommend use of chair position in bed 3 times per day  Outcome: Progressing     Problem: Patient Centered Care  Goal: Patient preferences are identified and integrated in the patient's plan of care  Description: Interventions:  - What would you like us to know as we care for you? From home with grandson  - Provide timely, complete, and accurate information to patient/family  - Incorporate patient and family knowledge, values, beliefs, and cultural backgrounds into the planning and delivery of care  - Encourage patient/family to participate in care and decision-making at the level they choose  - Honor patient and family perspectives and choices  Outcome: Progressing     Problem: SKIN/TISSUE INTEGRITY - ADULT  Goal: Skin integrity remains intact  Description: INTERVENTIONS  - Assess and document risk factors for pressure ulcer development  - Assess and document skin integrity  - Monitor for areas of redness and/or skin breakdown  - Initiate interventions, skin care algorithm/standards of  care as needed  Outcome: Progressing     Problem: MUSCULOSKELETAL - ADULT  Goal: Return mobility to safest level of function  Description: INTERVENTIONS:  - Assess patient stability and activity tolerance for standing, transferring and ambulating w/ or w/o assistive devices  - Assist with transfers and ambulation using safe patient handling equipment as needed  - Ensure adequate protection for wounds/incisions during mobilization  - Obtain PT/OT consults as needed  - Advance activity as appropriate  - Communicate ordered activity level and limitations with patient/family  Outcome: Progressing     Problem: Impaired Activities of Daily Living  Goal: Achieve highest/safest level of independence in self care  Description: Interventions:  - Assess ability and encourage patient to participate in ADLs to maximize function  - Promote sitting position while performing ADLs such as feeding, grooming, and bathing  - Educate and encourage patient/family in tolerated functional activity level and precautions during self-care  Outcome: Progressing

## 2024-01-29 NOTE — OCCUPATIONAL THERAPY NOTE
OCCUPATIONAL THERAPY TREATMENT NOTE - INPATIENT        Room Number: 572/572-A     Presenting Problem: 83-year-old female with a history of ESRD, paroxysmal atrial fibrillation, carotid stenosis, hypertension, hyperlipidemia, diastolic congestive heart failure, presents to the hospital for evaluation of left leg and hip pain.    Problem List  Principal Problem:    Inability to walk  Active Problems:    Osteoarthritis of hip    Anemia due to chronic kidney disease, on chronic dialysis (HCC)    ESRD (end stage renal disease) on dialysis (HCC)      OCCUPATIONAL THERAPY ASSESSMENT     RN reached out to the therapy dept stating that Dr Jewell wished for therapy to re-evaluate pt for discharge planning. Upon arrival to pt's room, pt was asleep, family was present. Pt and family agreed to therapy session.    Pt was in severe pain during entire session. Pt was unable to ambulate, unable to return to bed. Pt required 2 person total A for sit to supine due to severe pain, total A x2 for positioning in bed. This therapist reported to RN. RN reported that pt was made NPO earlier today due to vomiting medication. That pt will have a CT scan to assess inability to swallow. This therapist asked RN if pt could receive IV pain medication, RN reported that pt did not have an order for any IV pain medication. This therapist sent a secure [employer] chat to MD ROCÍO added medication to order set.    The patient's Approx Degree of Impairment: 50.11% has been calculated based on documentation in the [facility] '6 clicks' Inpatient Daily Activity Short Form.  Research supports that patients with this level of impairment may benefit from [facility] for continued skilled OT and PT intervention, pending progress and pain management. At this time. Pt is unable to be discharged home as pt is unable\ to ambulate household distance, unable to negotiate stairs. Pt is unable to manage at home with 24 Hr care without proper pain management.    DISCHARGE  RECOMMENDATIONS  OT Discharge Recommendations: Sub-acute rehabilitation (vs home with 24 hr care and  service)     PLAN  OT Treatment Plan: Balance activities;Energy conservation/work simplification techniques;ADL training;Functional transfer training;Endurance training;Patient/Family education;Patient/Family training;Compensatory technique education    SUBJECTIVE  \"My leg hurts\"    OBJECTIVE  Precautions: Limb alert - right;Bed/chair alarm    WEIGHT BEARING RESTRICTION     PAIN ASSESSMENT  Rating: Non-verbal signs of pain/discomfort observed      ACTIVITIES OF DAILY LIVING ASSESSMENT  AM-PAC ‘6-Clicks’ Inpatient Daily Activity Short Form  How much help from another person does the patient currently need…  -   Putting on and taking off regular lower body clothing?: A Lot  -   Bathing (including washing, rinsing, drying)?: A Lot  -   Toileting, which includes using toilet, bedpan or urinal? : A Lot  -   Putting on and taking off regular upper body clothing?: A Little  -   Taking care of personal grooming such as brushing teeth?: None  -   Eating meals?: None    AM-PAC Score:  Score: 17  Approx Degree of Impairment: 50.11%  Standardized Score (AM-PAC Scale): 37.26  CMS Modifier (G-Code): CK    FUNCTIONAL TRANSFER ASSESSMENT  Sit to Stand: Edge of Bed  Edge of Bed: Minimal Assist  Chair: Moderate Assist    BED MOBILITY  Rolling: Not Tested  Supine to Sit : Minimal Assist  Sit to Supine (OT): Maximum Assist (x2)  Scooting: Min A    BALANCE ASSESSMENT  Static Sitting: Minimal Assist  Static Standing: Moderate Assist       Skilled Therapy Provided: Bed mobility, functional transfers, sitting tolerance, standing tolerance, pt and family education    EDUCATION PROVIDED  Patient : Discharge Recommendations; Functional Transfer Techniques; Fall Prevention; Posture/Positioning  Patient's Response to Education: Requires Further Education  Family/Caregiver: Role of Occupational Therapy; Plan of Care; Discharge  Recommendations  Family/Caregiver's Response to Education: Verbalized Understanding    Patient End of Session: In bed;Needs met;Call light within reach;RN aware of session/findings;All patient questions and concerns addressed;Family present    OT Goals:    atients self stated goal is: \"find out what is causing this pain!\"     Patient will complete functional transfer with mod I and fww  Comment:  cont    Patient will complete toileting with distant supervision  Comment:  cont    Patient will tolerate standing for 1 minutes in prep for adls with supervision   Comment:  cont    Patient will complete item retrieval with supervision   Comment:  cont          Goals  on: 2/10/24  Frequency: 3x a week    Therapeutic Activity: 35 minutes      Joanna Burkett OTR/L  Occupational Therapy  [facility]

## 2024-01-29 NOTE — PROGRESS NOTES
[facility]    Nephrology Progress Note    Chief Complaint   Patient presents with    Groin Pain    Leg Pain       Subjective:   83 year old female, following for ESRD on HD.     Seen and examined during dialysis.   Reports left leg pain is better today.     Review of Systems:   Review of Systems   Constitutional:  Positive for fatigue. Negative for chills and fever.   Respiratory:  Negative for cough and shortness of breath.    Cardiovascular:  Negative for chest pain and leg swelling.   Gastrointestinal:  Negative for abdominal pain, constipation, diarrhea, nausea and vomiting.   Genitourinary:  Negative for difficulty urinating, dysuria and flank pain.   Musculoskeletal:         Left leg pain       Objective:   Temp:  [97.9 °F (36.6 °C)-98.2 °F (36.8 °C)] 98 °F (36.7 °C)  Pulse:  [] 113  Resp:  [16-18] 17  BP: (106-137)/(56-66) 137/66  SpO2:  [95 %-99 %] 96 %  SpO2: 96 %     Intake/Output Summary (Last 24 hours) at 1/29/2024 1241  Last data filed at 1/29/2024 1100  Gross per 24 hour   Intake 730 ml   Output 2000 ml   Net -1270 ml     Wt Readings from Last 3 Encounters:   01/29/24 130 lb 15.3 oz (59.4 kg)   07/18/23 133 lb 6.1 oz (60.5 kg)   06/20/23 135 lb (61.2 kg)     Physical Exam  Constitutional:       Appearance: Normal appearance.   Cardiovascular:      Rate and Rhythm: Normal rate and regular rhythm.      Heart sounds: Normal heart sounds.      Comments: R AVF-pos bruit/thrill  Pulmonary:      Effort: Pulmonary effort is normal.      Breath sounds: Normal breath sounds.   Musculoskeletal:         General: Normal range of motion.   Skin:     General: Skin is warm and dry.   Neurological:      General: No focal deficit present.      Mental Status: She is alert and oriented to person, place, and time.   Psychiatric:         Mood and Affect: Mood normal.         Behavior: Behavior normal.         Medications:  Current Facility-Administered Medications   Medication Dose Route Frequency     gabapentin (Neurontin) cap 100 mg  100 mg Oral BID    sodium chloride 0.9 % IV bolus 100 mL  100 mL Intravenous Q30 Min PRN    And    albumin human (Albumin) 25% injection 25 g  25 g Intravenous PRN Dialysis    calcium carbonate (Tums) chewable tab 1,000 mg  1,000 mg Oral Q8H PRN    pantoprazole (Protonix) DR tab 40 mg  40 mg Oral QAM AC    predniSONE (Deltasone) tab 40 mg  40 mg Oral Daily with breakfast    amLODIPine (Norvasc) tab 5 mg  5 mg Oral Daily PRN    apixaban (Eliquis) tab 2.5 mg  2.5 mg Oral 2 times per day    metoprolol tartrate (Lopressor) tab 50 mg  50 mg Oral 2x Daily(Beta Blocker)    midodrine (ProAmatine) tab 5 mg  5 mg Oral TID    montelukast (Singulair) tab 10 mg  10 mg Oral Nightly    sevelamer carbonate (Renvela) tab 2,400 mg  2,400 mg Oral TID    polyethylene glycol (PEG 3350) (Miralax) 17 g oral packet 17 g  17 g Oral Daily PRN    bisacodyl (Dulcolax) 10 MG rectal suppository 10 mg  10 mg Rectal Daily PRN    fleet enema (Fleet) 7-19 GM/118ML rectal enema 133 mL  1 enema Rectal Once PRN    acetaminophen (Tylenol) tab 650 mg  650 mg Oral Q6H PRN    HYDROcodone-acetaminophen (Norco)  MG per tab 1 tablet  1 tablet Oral Q4H PRN    lidocaine-menthol 4-1 % patch 1 patch  1 patch Transdermal Daily    sennosides (Senokot) tab 17.2 mg  17.2 mg Oral Nightly    atorvastatin (Lipitor) tab 5 mg  5 mg Oral Nightly              Results:     Recent Labs   Lab 01/26/24  0132 01/27/24  0651   RBC 3.50* 3.93   HGB 10.8* 12.1   HCT 33.7* 38.3   MCV 96.3 97.5   NEPRELIM 6.24  --    WBC 8.8 9.7   .0* 147.0*     Recent Labs   Lab 01/26/24  0132 01/27/24  0651   * 108*   BUN 46* 33*   CREATSERUM 5.67* 4.36*   CA 7.3* 8.0*    138   K 4.1 5.1    101   CO2 24.0 24.0     PTT   Date Value Ref Range Status   11/06/2018 23.6 (L) 24.0 - 33.7 sec Final     INR   Date Value Ref Range Status   11/06/2018 1.0 0.9 - 1.2 ratio Final     Comment:     An INR of 2.0-3.0 is the usual therapeutic  interval.  Some patients (e.g., those with recurrent thrombotic events, a mechanical heart valve) may require more intense therapy with a therapeutic INR interval of 2.5-3.5   08/11/2015 3.0 (A) 0.8 - 1.2 Final     No results for input(s): \"BNP\" in the last 168 hours.  Recent Labs   Lab 01/27/24  0651   MG 2.0        No results for input(s): \"PHOS\", \"ALB\" in the last 168 hours.    Invalid input(s): \"BMP\"    MRI SPINE LUMBAR (CPT=72148)    Result Date: 1/28/2024  CONCLUSION:  1. Moderate spinal canal stenosis at L4-L5.  2. Additional multilevel degenerative changes as detailed above.  3. No acute osseous injury of the lumbar spine.  4. Nonspecific heterogeneous appearance of the bone marrow of the osseous structures.  5. Moderate volume free pelvic fluid.  6. Imaging findings suggesting polycystic kidney disease.  7. Lesser incidental findings as above.    A preliminary report was issued by the [facility] teleradiology service. There are no major discrepancies.    elm-remote.   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    ESRD HD MWF:   HD today.      HTN:   BP better. Cont Midodrine.      Anemia:   Hb 12.1, at goal.      Hyperphosphatemia:   Cont Renvela 3 tabs with meals.      Left leg and hip pain:   PT following.       Ana Bay MD  1/29/2024

## 2024-01-29 NOTE — PLAN OF CARE
Problem: PAIN - ADULT  Goal: Verbalizes/displays adequate comfort level or patient's stated pain goal  Description: INTERVENTIONS:  - Encourage pt to monitor pain and request assistance  - Assess pain using appropriate pain scale  - Administer analgesics based on type and severity of pain and evaluate response  - Implement non-pharmacological measures as appropriate and evaluate response  - Consider cultural and social influences on pain and pain management  - Manage/alleviate anxiety  - Utilize distraction and/or relaxation techniques  - Monitor for opioid side effects  - Notify MD/LIP if interventions unsuccessful or patient reports new pain  - Anticipate increased pain with activity and pre-medicate as appropriate  Outcome: Progressing     Problem: SAFETY ADULT - FALL  Goal: Free from fall injury  Description: INTERVENTIONS:  - Assess pt frequently for physical needs  - Identify cognitive and physical deficits and behaviors that affect risk of falls.  - [place] fall precautions as indicated by assessment.  - Educate pt/family on patient safety including physical limitations  - Instruct pt to call for assistance with activity based on assessment  - Modify environment to reduce risk of injury  - Provide assistive devices as appropriate  - Consider OT/PT consult to assist with strengthening/mobility  - Encourage toileting schedule  Outcome: Progressing     Problem: DISCHARGE PLANNING  Goal: Discharge to home or other facility with appropriate resources  Description: INTERVENTIONS:  - Identify barriers to discharge w/pt and caregiver  - Include patient/family/discharge partner in discharge planning  - Arrange for needed discharge resources and transportation as appropriate  - Identify discharge learning needs (meds, wound care, etc)  - Arrange for interpreters to assist at discharge as needed  - Consider post-discharge preferences of patient/family/discharge partner  - Complete POLST form as appropriate  - Assess  patient's ability to be responsible for managing their own health  - Refer to Case Management Department for coordinating discharge planning if the patient needs post-hospital services based on physician/LIP order or complex needs related to functional status, cognitive ability or social support system  Outcome: Progressing     Problem: Patient/Family Goals  Goal: Patient/Family Long Term Goal  Description: Patient's Long Term Goal: Discharge from the hospital    Interventions:  - Monitor vital signs  - Monitor appropriate labs  - Pain management  - Administer medications per order  - Follow MD orders  - Diagnostics per order  - Update / inform patient and family on plan of care  - Discharge planning  - See additional Care Plan goals for specific interventions  Outcome: Progressing  Goal: Patient/Family Short Term Goal  Description: Patient's Short Term Goal: Manage pain    Interventions:   - Monitor vital signs  - Monitor appropriate labs  - Pain management  - Administer medications per order  - Follow MD orders  - Diagnostics per order  - Update / inform patient and family on plan of care  - See additional Care Plan goals for specific interventions  Outcome: Progressing     Problem: METABOLIC/FLUID AND ELECTROLYTES - ADULT  Goal: Electrolytes maintained within normal limits  Description: INTERVENTIONS:  - Monitor labs and rhythm and assess patient for signs and symptoms of electrolyte imbalances  - Administer electrolyte replacement as ordered  - Monitor response to electrolyte replacements, including rhythm and repeat lab results as appropriate  - Fluid restriction as ordered  - Instruct patient on fluid and nutrition restrictions as appropriate  Outcome: Progressing  Goal: Hemodynamic stability and optimal renal function maintained  Description: INTERVENTIONS:  - Monitor labs and assess for signs and symptoms of volume excess or deficit  - Monitor intake, output and patient weight  - Monitor urine specific  gravity, serum osmolarity and serum sodium as indicated or ordered  - Monitor response to interventions for patient's volume status, including labs, urine output, blood pressure (other measures as available)  - Encourage oral intake as appropriate  - Instruct patient on fluid and nutrition restrictions as appropriate  Outcome: Progressing     Problem: Impaired Functional Mobility  Goal: Achieve highest/safest level of mobility/gait  Description: Interventions:  - Assess patient's functional ability and stability  - Promote increasing activity/tolerance for mobility and gait  - Educate and engage patient/family in tolerated activity level and precautions  - Recommend use of  RW for transfers and ambulation  - Recommend patient transfer to bedside chair toward strongest side  - When transferring patient, block weaker knee for safety  - Use towel roll for neutral alignment of cervical spine  Outcome: Progressing     Problem: Patient Centered Care  Goal: Patient preferences are identified and integrated in the patient's plan of care  Description: Interventions:  - What would you like us to know as we care for you? From home with grandson  - Provide timely, complete, and accurate information to patient/family  - Incorporate patient and family knowledge, values, beliefs, and cultural backgrounds into the planning and delivery of care  - Encourage patient/family to participate in care and decision-making at the level they choose  - Honor patient and family perspectives and choices  Outcome: Progressing     Problem: MUSCULOSKELETAL - ADULT  Goal: Return mobility to safest level of function  Description: INTERVENTIONS:  - Assess patient stability and activity tolerance for standing, transferring and ambulating w/ or w/o assistive devices  - Assist with transfers and ambulation using safe patient handling equipment as needed  - Ensure adequate protection for wounds/incisions during mobilization  - Obtain PT/OT consults as  needed  - Advance activity as appropriate  - Communicate ordered activity level and limitations with patient/family  Outcome: Progressing     Problem: Impaired Activities of Daily Living  Goal: Achieve highest/safest level of independence in self care  Description: Interventions:  - Assess ability and encourage patient to participate in ADLs to maximize function  - Promote sitting position while performing ADLs such as feeding, grooming, and bathing  - Educate and encourage patient/family in tolerated functional activity level and precautions during self-care  Outcome: Progressing      Monitoring vital signs- stable at this time. Pain medication provided as needed. Safety and fall precautions maintained- bed alarm on, bed locked in lowest position, call light within reach. Frequent rounding by nursing staff. Plan for dialysis tomorrow per order.

## 2024-01-29 NOTE — PROGRESS NOTES
[facility] Hospitalist Progress Note     CC: Hospital Follow up    PCP: Shannon Bahena MD       Assessment/Plan:     Principal Problem:    Inability to walk  Active Problems:    Osteoarthritis of hip    Anemia due to chronic kidney disease, on chronic dialysis (HCC)    ESRD (end stage renal disease) on dialysis ([facility])    Ms. Cook is an 83-year-old female with a history of ESRD, paroxysmal atrial fibrillation, carotid stenosis, hypertension, hyperlipidemia, diastolic congestive heart failure, presents to the hospital for evaluation of left leg and hip pain, likely radicular pain fro L4-L5 disc herniation     Left hip/ Leg pain  Left hip OA  Spinal stenosis, L4-L5 disc extrusion  - xray neg for fracture  - outpatient MRI with Left hip OA and trets of hamstring tendon and gluteus minimus  - pain does appear to radiate to her lower leg   - lumbar spine with DDD  - continue lidoderm patch start low dose gabapentin  - continue norco    - PT/OT  - recently had steroid injection in hip with ortho, therefore not candidate for at least 1-2 more months  - continue prednisone   - Lumbar spine MRI, pre romero read with L4-L5 disc herniation  - continue PT/OT, may need RADHA on DC pending progress     ESRD  -Nephrology consulted  -MWF HD     Paroxysmal atrial fibrillation  -Follows with [facility]  -She is on Eliquis 2.5 mg twice daily  -Normally on metoprolol and diltiazem,      Chronic HFpEF  - euvolemic  - volume management per HD     Endometrial wall thickening  - noted on outpatient MRI and US  - needs outpatient GYN follow up     Hypertension  -She takes a Norvasc as needed but not daily  -resume lopressor     FN:  - IVF:none  - Diet: adv     DVT Prophy:scd, eliquis  Lines: PIV     Dispo: pending clinical course    Questions/concerns were discussed with patient and/or family by bedside.    Thank You,  Ashley Jewell DO    Hospitalist with [facility]     Subjective:     Patient states  pain improved from admission. Feels like the medications make the pain more tolerable however has not worked with PT yet. Plan to have PT re-eval today    OBJECTIVE:    Blood pressure 112/65, pulse 75, temperature 97.9 °F (36.6 °C), temperature source Oral, resp. rate 18, height 5' 3\" (1.6 m), weight 130 lb 15.3 oz (59.4 kg), SpO2 95%, not currently breastfeeding.    Temp:  [97.9 °F (36.6 °C)-98.2 °F (36.8 °C)] 97.9 °F (36.6 °C)  Pulse:  [75-96] 75  Resp:  [16-18] 18  BP: (106-122)/(56-66) 112/65  SpO2:  [95 %-99 %] 95 %      Intake/Output:    Intake/Output Summary (Last 24 hours) at 1/29/2024 1112  Last data filed at 1/29/2024 0602  Gross per 24 hour   Intake 730 ml   Output --   Net 730 ml       Last 3 Weights   01/29/24 0630 130 lb 15.3 oz (59.4 kg)   01/28/24 0551 127 lb 13.9 oz (58 kg)   01/27/24 0642 124 lb 12.5 oz (56.6 kg)   01/26/24 0246 135 lb 12.9 oz (61.6 kg)   01/25/24 2148 126 lb 12.2 oz (57.5 kg)   07/18/23 1000 133 lb 6.1 oz (60.5 kg)   07/16/23 1942 134 lb 12.8 oz (61.1 kg)   07/16/23 1203 135 lb (61.2 kg)   06/20/23 1508 135 lb (61.2 kg)       Exam    Gen: No acute distress, alert and oriented x3   Heent: NC AT, neck supple  Pulm: Lungs clear, normal respiratory effort  CV: Heart with regular rate and rhythm   Abd: Abdomen soft, nontender, nondistended   MSK: no clubbing, no cyanosis  Skin: no rashes or lesions  Neuro: AO*3, motor intact, no sensory deficits  Psyc: appropriate mood and affect      Data Review:       Labs:     Recent Labs   Lab 01/26/24  0132 01/27/24  0651   RBC 3.50* 3.93   HGB 10.8* 12.1   HCT 33.7* 38.3   MCV 96.3 97.5   MCH 30.9 30.8   MCHC 32.0 31.6   RDW 16.7* 16.9*   NEPRELIM 6.24  --    WBC 8.8 9.7   .0* 147.0*         Recent Labs   Lab 01/26/24  0132 01/27/24  0651   * 108*   BUN 46* 33*   CREATSERUM 5.67* 4.36*   EGFRCR 7* 10*   CA 7.3* 8.0*    138   K 4.1 5.1    101   CO2 24.0 24.0       No results for input(s): \"ALT\", \"AST\", \"ALB\", \"AMYLASE\",  \"LIPASE\", \"LDH\" in the last 168 hours.    Invalid input(s): \"ALPHOS\", \"TBIL\", \"DBIL\", \"TPROT\"      Imaging:  MRI SPINE LUMBAR (CPT=72148)    Result Date: 1/28/2024  CONCLUSION:  1. Moderate spinal canal stenosis at L4-L5.  2. Additional multilevel degenerative changes as detailed above.  3. No acute osseous injury of the lumbar spine.  4. Nonspecific heterogeneous appearance of the bone marrow of the osseous structures.  5. Moderate volume free pelvic fluid.  6. Imaging findings suggesting polycystic kidney disease.  7. Lesser incidental findings as above.    A preliminary report was issued by the [facility] teleradiology service. There are no major discrepancies.    elm-remote.   Dictated by (CST): Josue Melgoza MD on 1/28/2024 at 10:32 AM     Finalized by (CST): Josue Melgoza MD on 1/28/2024 at 10:38 AM          XR LUMBAR SPINE (MIN 4 VIEWS) (CPT=72110)    Result Date: 1/26/2024  CONCLUSION:  1. No fracture or dislocation. 2. Development  of grade I degenerative anterolisthesis of L4 on L5 since 05/02/2020. 3. Stable moderate levoscoliosis to the lumbar spine with otherwise grossly unchanged mild-to-moderate spondylotic changes and greatest involvement of the lower lumbar spine. 4. Lesser incidental findings as above.    Dictated by (CST): Carlos Hinojosa MD on 1/26/2024 at 7:42 PM     Finalized by (CST): Carlos Hinojosa MD on 1/26/2024 at 7:46 PM             Meds:      gabapentin  100 mg Oral BID    pantoprazole  40 mg Oral QAM AC    predniSONE  40 mg Oral Daily with breakfast    apixaban  2.5 mg Oral 2 times per day    metoprolol tartrate  50 mg Oral 2x Daily(Beta Blocker)    midodrine  5 mg Oral TID    montelukast  10 mg Oral Nightly    sevelamer carbonate  2,400 mg Oral TID    lidocaine-menthol  1 patch Transdermal Daily    sennosides  17.2 mg Oral Nightly    atorvastatin  5 mg Oral Nightly       sodium chloride **AND** albumin human, calcium carbonate, amLODIPine, polyethylene glycol (PEG  3350), bisacodyl, fleet enema, acetaminophen, HYDROcodone-acetaminophen

## 2024-01-30 LAB
HBV CORE AB SERPL QL IA: NONREACTIVE
HBV SURFACE AB SER QL: NONREACTIVE
HBV SURFACE AB SERPL IA-ACNC: 4.17 MIU/ML
HBV SURFACE AG SER-ACNC: <0.1 [IU]/L
HBV SURFACE AG SERPL QL IA: NONREACTIVE

## 2024-01-30 PROCEDURE — 99232 SBSQ HOSP IP/OBS MODERATE 35: CPT | Performed by: INTERNAL MEDICINE

## 2024-01-30 RX ORDER — HYDROMORPHONE HYDROCHLORIDE 1 MG/ML
0.2 INJECTION, SOLUTION INTRAMUSCULAR; INTRAVENOUS; SUBCUTANEOUS EVERY 2 HOUR PRN
Status: DISCONTINUED | OUTPATIENT
Start: 2024-01-30 | End: 2024-02-02

## 2024-01-30 RX ORDER — HYDROMORPHONE HYDROCHLORIDE 1 MG/ML
0.8 INJECTION, SOLUTION INTRAMUSCULAR; INTRAVENOUS; SUBCUTANEOUS EVERY 2 HOUR PRN
Status: DISCONTINUED | OUTPATIENT
Start: 2024-01-30 | End: 2024-02-02

## 2024-01-30 RX ORDER — ACETAMINOPHEN 10 MG/ML
1000 INJECTION, SOLUTION INTRAVENOUS EVERY 6 HOURS PRN
Status: DISCONTINUED | OUTPATIENT
Start: 2024-01-30 | End: 2024-02-02

## 2024-01-30 RX ORDER — METOPROLOL TARTRATE 1 MG/ML
5 INJECTION, SOLUTION INTRAVENOUS EVERY 6 HOURS PRN
Status: DISCONTINUED | OUTPATIENT
Start: 2024-01-30 | End: 2024-01-31

## 2024-01-30 RX ORDER — HYDROMORPHONE HYDROCHLORIDE 1 MG/ML
0.4 INJECTION, SOLUTION INTRAMUSCULAR; INTRAVENOUS; SUBCUTANEOUS EVERY 2 HOUR PRN
Status: DISCONTINUED | OUTPATIENT
Start: 2024-01-30 | End: 2024-02-02

## 2024-01-30 NOTE — PROGRESS NOTES
[facility]    Nephrology Progress Note    Chief Complaint   Patient presents with    Groin Pain    Leg Pain       Subjective:   83 year old female, following for ESRD on HD.     Reports left leg pain, not much better today.   Complains of globus sensation.   HD yesterday.     Review of Systems:   Review of Systems   Constitutional:  Positive for fatigue. Negative for chills and fever.   Respiratory:  Negative for cough and shortness of breath.    Cardiovascular:  Negative for chest pain and leg swelling.   Gastrointestinal:  Negative for abdominal pain, constipation, diarrhea, nausea and vomiting.   Genitourinary:  Negative for difficulty urinating, dysuria and flank pain.   Musculoskeletal:         Left leg pain       Objective:   Temp:  [98 °F (36.7 °C)-98.5 °F (36.9 °C)] 98.5 °F (36.9 °C)  Pulse:  [] 110  Resp:  [16-17] 16  BP: (109-151)/(65-74) 114/71  SpO2:  [93 %-97 %] 93 %  SpO2: 93 %     Intake/Output Summary (Last 24 hours) at 1/30/2024 1102  Last data filed at 1/30/2024 0600  Gross per 24 hour   Intake 845 ml   Output --   Net 845 ml     Wt Readings from Last 3 Encounters:   01/30/24 129 lb 13.6 oz (58.9 kg)   07/18/23 133 lb 6.1 oz (60.5 kg)   06/20/23 135 lb (61.2 kg)     Physical Exam  Constitutional:       Appearance: Normal appearance.   Cardiovascular:      Rate and Rhythm: Normal rate and regular rhythm.      Heart sounds: Normal heart sounds.      Comments: R AVF-pos bruit/thrill  Pulmonary:      Effort: Pulmonary effort is normal.      Breath sounds: Normal breath sounds.   Musculoskeletal:         General: Normal range of motion.   Skin:     General: Skin is warm and dry.   Neurological:      General: No focal deficit present.      Mental Status: She is alert and oriented to person, place, and time.   Psychiatric:         Mood and Affect: Mood normal.         Behavior: Behavior normal.         Medications:               Results:     Recent Labs   Lab 01/26/24  3218  01/27/24  0651   RBC 3.50* 3.93   HGB 10.8* 12.1   HCT 33.7* 38.3   MCV 96.3 97.5   NEPRELIM 6.24  --    WBC 8.8 9.7   .0* 147.0*     Recent Labs   Lab 01/26/24  0132 01/27/24  0651   * 108*   BUN 46* 33*   CREATSERUM 5.67* 4.36*   CA 7.3* 8.0*    138   K 4.1 5.1    101   CO2 24.0 24.0     PTT   Date Value Ref Range Status   11/06/2018 23.6 (L) 24.0 - 33.7 sec Final     INR   Date Value Ref Range Status   11/06/2018 1.0 0.9 - 1.2 ratio Final     Comment:     An INR of 2.0-3.0 is the usual therapeutic interval.  Some patients (e.g., those with recurrent thrombotic events, a mechanical heart valve) may require more intense therapy with a therapeutic INR interval of 2.5-3.5   08/11/2015 3.0 (A) 0.8 - 1.2 Final     No results for input(s): \"BNP\" in the last 168 hours.  Recent Labs   Lab 01/27/24  0651   MG 2.0        No results for input(s): \"PHOS\", \"ALB\" in the last 168 hours.    Invalid input(s): \"BMP\"    CT SOFT TISSUE OF NECK (CPT=70490)    Result Date: 1/29/2024  CONCLUSION:   1. Patulous esophagus with multifocal areas of retained ingested material involving the visualized upper thoracic esophagus. 2. Small clustered area of nodularity involving the right upper lobe with proximal mucous plugging.  These findings are likely infectious/inflammatory. 3. Small left pleural effusion, which is only partially visualized. 4. Heterogeneous and enlarged thyroid gland with multiple thyroid nodules.  Consider correlation with thyroid ultrasound. 5. Lesser incidental findings described above.    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    ESRD HD MWF:   Next HD tomorrow.      HTN:   BP better. Cont Midodrine.      Anemia:   Hb 12.1, at goal.      Hyperphosphatemia:   Cont Renvela 3 tabs with meals.      Left leg and hip pain:   PT following.     Globus sensation:   For EGD tomorrow.       Ana Bay MD  1/30/2024

## 2024-01-30 NOTE — PLAN OF CARE
Patient went down to dialysis this AM, was down in dialysis at shift change this AM. Patient had food tray delivered down with her per report. [facility] education as well as education on the importance of adhering to dialysis treatment schedule, patient verbalized understanding, education reinforced by this RN at bedside. Upon returning from dialysis around noon, vital signs were checked, patient was in stable condition, denied pain. However, patient complained of the feeling of food being stuck in her throat but denied shortness of breath, difficulty breathing or aspiration. Patient said her throat felt tight with food as she at breakfast in dialysis. Patient was unable to take any oral medication at this time. MD was notified, patient was made NPO, a CT scan was ordered. CT showed food bolus, GI placed on consult, patient maintained NPO and IV glucagon was given per GI MD orders. IV pain medication orders were received, provided adequate relief per patient. Patient was given non-pharmacologic pain interventions throughout shift as well such as hot and ice packs. Patient maintained NPO, eliquis on hold for possible intervention. Full care endorsed to night RN, all needs attended.   Problem: PAIN - ADULT  Goal: Verbalizes/displays adequate comfort level or patient's stated pain goal  Description: INTERVENTIONS:  - Encourage pt to monitor pain and request assistance  - Assess pain using appropriate pain scale  - Administer analgesics based on type and severity of pain and evaluate response  - Implement non-pharmacological measures as appropriate and evaluate response  - Consider cultural and social influences on pain and pain management  - Manage/alleviate anxiety  - Utilize distraction and/or relaxation techniques  - Monitor for opioid side effects  - Notify MD/LIP if interventions unsuccessful or patient reports new pain  - Anticipate increased pain with activity and pre-medicate as  appropriate  Outcome: Progressing     Problem: SAFETY ADULT - FALL  Goal: Free from fall injury  Description: INTERVENTIONS:  - Assess pt frequently for physical needs  - Identify cognitive and physical deficits and behaviors that affect risk of falls.  - [place] fall precautions as indicated by assessment.  - Educate pt/family on patient safety including physical limitations  - Instruct pt to call for assistance with activity based on assessment  - Modify environment to reduce risk of injury  - Provide assistive devices as appropriate  - Consider OT/PT consult to assist with strengthening/mobility  - Encourage toileting schedule  Outcome: Progressing     Problem: DISCHARGE PLANNING  Goal: Discharge to home or other facility with appropriate resources  Description: INTERVENTIONS:  - Identify barriers to discharge w/pt and caregiver  - Include patient/family/discharge partner in discharge planning  - Arrange for needed discharge resources and transportation as appropriate  - Identify discharge learning needs (meds, wound care, etc)  - Arrange for interpreters to assist at discharge as needed  - Consider post-discharge preferences of patient/family/discharge partner  - Complete POLST form as appropriate  - Assess patient's ability to be responsible for managing their own health  - Refer to Case Management Department for coordinating discharge planning if the patient needs post-hospital services based on physician/LIP order or complex needs related to functional status, cognitive ability or social support system  Outcome: Progressing     Problem: Patient/Family Goals  Goal: Patient/Family Long Term Goal  Description: Patient's Long Term Goal: Discharge from the hospital    Interventions:  - Monitor vital signs  - Monitor appropriate labs  - Pain management  - Administer medications per order  - Follow MD orders  - Diagnostics per order  - Update / inform patient and family on plan of care  - Discharge planning  - See  additional Care Plan goals for specific interventions  Outcome: Progressing  Goal: Patient/Family Short Term Goal  Description: Patient's Short Term Goal: Manage pain    Interventions:   - Monitor vital signs  - Monitor appropriate labs  - Pain management  - Administer medications per order  - Follow MD orders  - Diagnostics per order  - Update / inform patient and family on plan of care  - See additional Care Plan goals for specific interventions  Outcome: Progressing     Problem: METABOLIC/FLUID AND ELECTROLYTES - ADULT  Goal: Electrolytes maintained within normal limits  Description: INTERVENTIONS:  - Monitor labs and rhythm and assess patient for signs and symptoms of electrolyte imbalances  - Administer electrolyte replacement as ordered  - Monitor response to electrolyte replacements, including rhythm and repeat lab results as appropriate  - Fluid restriction as ordered  - Instruct patient on fluid and nutrition restrictions as appropriate  Outcome: Progressing  Goal: Hemodynamic stability and optimal renal function maintained  Description: INTERVENTIONS:  - Monitor labs and assess for signs and symptoms of volume excess or deficit  - Monitor intake, output and patient weight  - Monitor urine specific gravity, serum osmolarity and serum sodium as indicated or ordered  - Monitor response to interventions for patient's volume status, including labs, urine output, blood pressure (other measures as available)  - Encourage oral intake as appropriate  - Instruct patient on fluid and nutrition restrictions as appropriate  Outcome: Progressing     Problem: Impaired Functional Mobility  Goal: Achieve highest/safest level of mobility/gait  Description: Interventions:  - Assess patient's functional ability and stability  - Promote increasing activity/tolerance for mobility and gait  - Educate and engage patient/family in tolerated activity level and precautions  - Recommend use of  RW for transfers and ambulation  -  Recommend patient transfer to bedside chair toward strongest side  - When transferring patient, block weaker knee for safety  - Recommend use of chair position in bed 3 times per day  Outcome: Progressing     Problem: Patient Centered Care  Goal: Patient preferences are identified and integrated in the patient's plan of care  Description: Interventions:  - What would you like us to know as we care for you? From home with grandson  - Provide timely, complete, and accurate information to patient/family  - Incorporate patient and family knowledge, values, beliefs, and cultural backgrounds into the planning and delivery of care  - Encourage patient/family to participate in care and decision-making at the level they choose  - Honor patient and family perspectives and choices  Outcome: Progressing     Problem: SKIN/TISSUE INTEGRITY - ADULT  Goal: Skin integrity remains intact  Description: INTERVENTIONS  - Assess and document risk factors for pressure ulcer development  - Assess and document skin integrity  - Monitor for areas of redness and/or skin breakdown  - Initiate interventions, skin care algorithm/standards of care as needed  Outcome: Progressing     Problem: MUSCULOSKELETAL - ADULT  Goal: Return mobility to safest level of function  Description: INTERVENTIONS:  - Assess patient stability and activity tolerance for standing, transferring and ambulating w/ or w/o assistive devices  - Assist with transfers and ambulation using safe patient handling equipment as needed  - Ensure adequate protection for wounds/incisions during mobilization  - Obtain PT/OT consults as needed  - Advance activity as appropriate  - Communicate ordered activity level and limitations with patient/family  Outcome: Progressing     Problem: Impaired Activities of Daily Living  Goal: Achieve highest/safest level of independence in self care  Description: Interventions:  - Assess ability and encourage patient to participate in ADLs to maximize  function  - Promote sitting position while performing ADLs such as feeding, grooming, and bathing  - Educate and encourage patient/family in tolerated functional activity level and precautions during self-care  Outcome: Progressing

## 2024-01-30 NOTE — CONSULTS
GASTROENTEROLOGY CONSULT NOTE      HPI:  Helen Espana is a/a(n) 83 year old female with history of paroxysmal A-fib on Eliquis, carotid stenosis, hypertension, hyperlipidemia, congestive heart failure and end-stage renal disease who was initially admitted on January 26, 2024 for evaluation of left leg and hip pain.  GI consult was requested for acute onset of a food bolus sensation that occurred after patient had breakfast yesterday and could not tolerate any further p.o. intake.  She complained of chest pressure and a CT of the neck showed a patulous esophagus with multifocal areas of retained ingested material.  Glucagon was given overnight with some relief and patient was able to tolerate her saliva.    This morning patient states she was feeling a little bit better, and was also able to tolerate ice chips.  Daughter at bedside conveys occasional dysphagia for which she underwent upper endoscopy at [facility] last year that was reportedly normal with exception of hypomotility.  Daughter states patient's granddaughter does have eosinophilic esophagitis.  No evidence of GI blood loss, no recent weight loss, no other GI complaints at this time.    Medical History:  Past Medical History:   Diagnosis Date    Arrhythmia     Afib    AVF (arteriovenous fistula) ([facility]) 10/12/2017    Biceps rupture, proximal, right, initial encounter 2/20/2018    Breast cancer ([facility]ly [facility]) 1998    Breast cancer in female ([facility]ly [facility]) 12/20/2018    CANCER 1989    right breast mastectomy    Cataract 2001    OU    Clostridioides difficile infection     Cup to disc asymmetry 2001    OU    Dialysis patient (Formerly [facility])     HD M, W, F    Essential hypertension     Floaters 2001    OU    Hearing impairment     hearing aides    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 Giordano @ [facility]    CATARACT EXTRACTION W/  INTRAOCULAR LENS IMPLANT Left 2021    Dr. Giordano @ [facility]    CHOLECYSTECTOMY      laparoscopic    COLONOSCOPY      c. diff colitis-Ali    ECHO 2D DP/CLRFL, CARDIO (INTERNAL)   [facility] estee    mod MR/LAE; border systolic LVEF    ECHO 2D, CARDIO (DMG)  2020    [facility] cardio: no change except mod mitral regurg mild now    HX BREAST CANCER      LEXISCAN NUC STRESS TST, CARDIO (INTERNAL)  05/15/2018    [facility]; normal ; EF 54%    LEXISCAN NUC STRESS TST, CARDIO (INTERNAL)  2021    [facility]; EF57%; normal wall motion; fixed perfusion defect inf. wall    LUMPECTOMY RIGHT      MASTECTOMY PARTIAL  2018    u [facility]: left breaswt seed loc partial mastectomy     MASTECTOMY RIGHT            OTHER SURGICAL HISTORY  12    cysto-Dr. Allred    OTHER SURGICAL HISTORY  1/21/15     lap sigmoid colectomy     OTHER SURGICAL HISTORY  16    Right Hemicolectomy by Dr. Chaudhry    PERITONEAL DIALYSIS SYSTEM      2009     Pt  reports that she has never smoked. She has never used smokeless tobacco. She reports that she does not drink alcohol and does not use drugs.  Family History   Problem Relation Age of Onset    Heart Disorder Father         MI    Diabetes Father     Kidney Disease Father         polycystic kidney disease    Hypertension Mother     Heart Disorder Mother         stroke    Cataracts Mother     Thyroid Disorder Daughter         thyroid    Other (Other) Sister         Cushing's disease    Diabetes Sister     Hypertension Sister     Cancer Sister 69        lung tob     Kidney Disease Son         pckd    Kidney Disease Brother         PCKD     Breast Cancer Maternal Aunt 50        x2 ages 85 and in her 50's    Macular  degeneration Other         Aunt    Glaucoma Neg         family h/o       Allergies:  Allergies   Allergen Reactions    Adhesive Tape SWELLING    Denosumab OTHER (SEE COMMENTS)     hypocalcemia  Lowered calcium level  Lowered calcium level  Lowered calcium level      Docosahexaenoic Acid, Dha HIVES    Ficus HIVES    Penicillins HIVES     Thinks she has tolerated penicillin in the past    Zithromax HIVES    Eicosapentaenoic Acid, Epa HIVES    Fig HIVES    Levofloxacin HIVES    Prolia OTHER (SEE COMMENTS)     Lowered calcium level    Vitamin E HIVES       Medications:    Current Facility-Administered Medications:     acetaminophen (Ofirmev) 10 mg/mL infusion premix 1,000 mg, 1,000 mg, Intravenous, Q6H PRN    HYDROmorphone (Dilaudid) 1 MG/ML injection 0.2 mg, 0.2 mg, Intravenous, Q2H PRN **OR** HYDROmorphone (Dilaudid) 1 MG/ML injection 0.4 mg, 0.4 mg, Intravenous, Q2H PRN **OR** HYDROmorphone (Dilaudid) 1 MG/ML injection 0.8 mg, 0.8 mg, Intravenous, Q2H PRN    metoprolol (Lopressor) 5 mg/5mL injection 5 mg, 5 mg, Intravenous, Q6H PRN    sodium chloride 0.9% infusion, , Intravenous, Continuous    gabapentin (Neurontin) cap 100 mg, 100 mg, Oral, BID    calcium carbonate (Tums) chewable tab 1,000 mg, 1,000 mg, Oral, Q8H PRN    pantoprazole (Protonix) DR tab 40 mg, 40 mg, Oral, QAM AC    predniSONE (Deltasone) tab 40 mg, 40 mg, Oral, Daily with breakfast    amLODIPine (Norvasc) tab 5 mg, 5 mg, Oral, Daily PRN    [Held by provider] apixaban (Eliquis) tab 2.5 mg, 2.5 mg, Oral, 2 times per day    metoprolol tartrate (Lopressor) tab 50 mg, 50 mg, Oral, 2x Daily(Beta Blocker)    midodrine (ProAmatine) tab 5 mg, 5 mg, Oral, TID    montelukast (Singulair) tab 10 mg, 10 mg, Oral, Nightly    sevelamer carbonate (Renvela) tab 2,400 mg, 2,400 mg, Oral, TID    polyethylene glycol (PEG 3350) (Miralax) 17 g oral packet 17 g, 17 g, Oral, Daily PRN    bisacodyl (Dulcolax) 10 MG rectal suppository 10 mg, 10 mg, Rectal, Daily PRN    fleet  enema (Fleet) 7-19 GM/118ML rectal enema 133 mL, 1 enema, Rectal, Once PRN    acetaminophen (Tylenol) tab 650 mg, 650 mg, Oral, Q6H PRN    HYDROcodone-acetaminophen (Norco)  MG per tab 1 tablet, 1 tablet, Oral, Q4H PRN    lidocaine-menthol 4-1 % patch 1 patch, 1 patch, Transdermal, Daily    sennosides (Senokot) tab 17.2 mg, 17.2 mg, Oral, Nightly    atorvastatin (Lipitor) tab 5 mg, 5 mg, Oral, Nightly  No current outpatient medications on file.       Review of systems:  GENERAL: feels well otherwise  EYES: denies icterus  HEENT: denies jaundice, lymphadenopathy  LUNGS: denies shortness of breath with exertion  CARDIOVASCULAR: denies chest pain on exertion  GI: as stated above  MUSCULOSKELETAL: denies back pain  NEURO: denies headaches    PE:  Vitals:   BP Readings from Last 3 Encounters:   24 126/72   23 113/70   10/07/21 118/76      Wt Readings from Last 3 Encounters:   24 129 lb 13.6 oz (58.9 kg)   23 133 lb 6.1 oz (60.5 kg)   23 135 lb (61.2 kg)      BMI: Estimated body mass index is 23 kg/m² as calculated from the following:    Height as of this encounter: 5' 3\" (1.6 m).    Weight as of this encounter: 129 lb 13.6 oz (58.9 kg).  General: AAOx3 in NAD  HEENT: No scleral icterus, no LAD  Lungs: CTA bilaterally, no wheezing or crackles  CV: RRR, S1S2,, no murmurs or rubs  Abdomen: normal active bowel sounds, soft, nontender, nondistended,    Labs: Reviewed in chart    Imagin24 Ct neck:  1. Patulous esophagus with multifocal areas of retained ingested material involving the visualized upper thoracic esophagus.   2. Small clustered area of nodularity involving the right upper lobe with proximal mucous plugging.  These findings are likely infectious/inflammatory.   3. Small left pleural effusion, which is only partially visualized.   4. Heterogeneous and enlarged thyroid gland with multiple thyroid nodules.  Consider correlation with thyroid ultrasound.   5. Lesser  incidental findings described above.     Endoscopy: EGD March 2023 at [facility] apparently showed hypomotility    Assessment and Plan: Patient is an 83-year-old female admitted for left leg and left hip pain, GI consult requested for acute onset of food bolus after breakfast yesterday.  Patient tolerating secretions and overall feeling better after receiving glucagon last night.  Will give another dose of glucagon today and plan for upper endoscopy tomorrow after Eliquis has been out of the system for 48 hours to reduce risk of spontaneous bleeding at time of endoscopy.  Patient and daughter voiced her understanding.

## 2024-01-30 NOTE — H&P (VIEW-ONLY)
GASTROENTEROLOGY CONSULT NOTE      HPI:  Helen Espana is a/a(n) 83 year old female with history of paroxysmal A-fib on Eliquis, carotid stenosis, hypertension, hyperlipidemia, congestive heart failure and end-stage renal disease who was initially admitted on January 26, 2024 for evaluation of left leg and hip pain.  GI consult was requested for acute onset of a food bolus sensation that occurred after patient had breakfast yesterday and could not tolerate any further p.o. intake.  She complained of chest pressure and a CT of the neck showed a patulous esophagus with multifocal areas of retained ingested material.  Glucagon was given overnight with some relief and patient was able to tolerate her saliva.    This morning patient states she was feeling a little bit better, and was also able to tolerate ice chips.  Daughter at bedside conveys occasional dysphagia for which she underwent upper endoscopy at [facility] last year that was reportedly normal with exception of hypomotility.  Daughter states patient's granddaughter does have eosinophilic esophagitis.  No evidence of GI blood loss, no recent weight loss, no other GI complaints at this time.    Medical History:  Past Medical History:   Diagnosis Date    Arrhythmia     Afib    AVF (arteriovenous fistula) ([facility]) 10/12/2017    Biceps rupture, proximal, right, initial encounter 2/20/2018    Breast cancer ([facility]) 1998    Breast cancer in female ([facility]) 12/20/2018    CANCER 1989    right breast mastectomy    Cataract 2001    OU    Clostridioides difficile infection     Cup to disc asymmetry 2001    OU    Dialysis patient ([facility])     HD M, W, F    Essential hypertension     Floaters 2001    OU    Hearing impairment     hearing aides    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 Giordano @ [facility]    CATARACT EXTRACTION W/  INTRAOCULAR LENS IMPLANT Left 2021    Dr. Giordano @ [facility]    CHOLECYSTECTOMY      laparoscopic    COLONOSCOPY      c. diff colitis-Ali    ECHO 2D DP/CLRFL, CARDIO (INTERNAL)   [facility] estee    mod MR/LAE; border systolic LVEF    ECHO 2D, CARDIO (DMG)  2020    [facility] cardio: no change except mod mitral regurg mild now    HX BREAST CANCER      LEXISCAN NUC STRESS TST, CARDIO (INTERNAL)  05/15/2018    [facility]; normal ; EF 54%    LEXISCAN NUC STRESS TST, CARDIO (INTERNAL)  2021    [facility]; EF57%; normal wall motion; fixed perfusion defect inf. wall    LUMPECTOMY RIGHT      MASTECTOMY PARTIAL  2018    u [facility]: left breaswt seed loc partial mastectomy     MASTECTOMY RIGHT            OTHER SURGICAL HISTORY  12    cysto-Dr. Allred    OTHER SURGICAL HISTORY  1/21/15     lap sigmoid colectomy     OTHER SURGICAL HISTORY  16    Right Hemicolectomy by Dr. Chaudhry    PERITONEAL DIALYSIS SYSTEM      2009     Pt  reports that she has never smoked. She has never used smokeless tobacco. She reports that she does not drink alcohol and does not use drugs.  Family History   Problem Relation Age of Onset    Heart Disorder Father         MI    Diabetes Father     Kidney Disease Father         polycystic kidney disease    Hypertension Mother     Heart Disorder Mother         stroke    Cataracts Mother     Thyroid Disorder Daughter         thyroid    Other (Other) Sister         Cushing's disease    Diabetes Sister     Hypertension Sister     Cancer Sister 69        lung tob     Kidney Disease Son         pckd    Kidney Disease Brother         PCKD     Breast Cancer Maternal Aunt 50        x2 ages 85 and in her 50's    Macular  degeneration Other         Aunt    Glaucoma Neg         family h/o       Allergies:  Allergies   Allergen Reactions    Adhesive Tape SWELLING    Denosumab OTHER (SEE COMMENTS)     hypocalcemia  Lowered calcium level  Lowered calcium level  Lowered calcium level      Docosahexaenoic Acid, Dha HIVES    Ficus HIVES    Penicillins HIVES     Thinks she has tolerated penicillin in the past    Zithromax HIVES    Eicosapentaenoic Acid, Epa HIVES    Fig HIVES    Levofloxacin HIVES    Prolia OTHER (SEE COMMENTS)     Lowered calcium level    Vitamin E HIVES       Medications:    Current Facility-Administered Medications:     acetaminophen (Ofirmev) 10 mg/mL infusion premix 1,000 mg, 1,000 mg, Intravenous, Q6H PRN    HYDROmorphone (Dilaudid) 1 MG/ML injection 0.2 mg, 0.2 mg, Intravenous, Q2H PRN **OR** HYDROmorphone (Dilaudid) 1 MG/ML injection 0.4 mg, 0.4 mg, Intravenous, Q2H PRN **OR** HYDROmorphone (Dilaudid) 1 MG/ML injection 0.8 mg, 0.8 mg, Intravenous, Q2H PRN    metoprolol (Lopressor) 5 mg/5mL injection 5 mg, 5 mg, Intravenous, Q6H PRN    sodium chloride 0.9% infusion, , Intravenous, Continuous    gabapentin (Neurontin) cap 100 mg, 100 mg, Oral, BID    calcium carbonate (Tums) chewable tab 1,000 mg, 1,000 mg, Oral, Q8H PRN    pantoprazole (Protonix) DR tab 40 mg, 40 mg, Oral, QAM AC    predniSONE (Deltasone) tab 40 mg, 40 mg, Oral, Daily with breakfast    amLODIPine (Norvasc) tab 5 mg, 5 mg, Oral, Daily PRN    [Held by provider] apixaban (Eliquis) tab 2.5 mg, 2.5 mg, Oral, 2 times per day    metoprolol tartrate (Lopressor) tab 50 mg, 50 mg, Oral, 2x Daily(Beta Blocker)    midodrine (ProAmatine) tab 5 mg, 5 mg, Oral, TID    montelukast (Singulair) tab 10 mg, 10 mg, Oral, Nightly    sevelamer carbonate (Renvela) tab 2,400 mg, 2,400 mg, Oral, TID    polyethylene glycol (PEG 3350) (Miralax) 17 g oral packet 17 g, 17 g, Oral, Daily PRN    bisacodyl (Dulcolax) 10 MG rectal suppository 10 mg, 10 mg, Rectal, Daily PRN    fleet  enema (Fleet) 7-19 GM/118ML rectal enema 133 mL, 1 enema, Rectal, Once PRN    acetaminophen (Tylenol) tab 650 mg, 650 mg, Oral, Q6H PRN    HYDROcodone-acetaminophen (Norco)  MG per tab 1 tablet, 1 tablet, Oral, Q4H PRN    lidocaine-menthol 4-1 % patch 1 patch, 1 patch, Transdermal, Daily    sennosides (Senokot) tab 17.2 mg, 17.2 mg, Oral, Nightly    atorvastatin (Lipitor) tab 5 mg, 5 mg, Oral, Nightly  No current outpatient medications on file.       Review of systems:  GENERAL: feels well otherwise  EYES: denies icterus  HEENT: denies jaundice, lymphadenopathy  LUNGS: denies shortness of breath with exertion  CARDIOVASCULAR: denies chest pain on exertion  GI: as stated above  MUSCULOSKELETAL: denies back pain  NEURO: denies headaches    PE:  Vitals:   BP Readings from Last 3 Encounters:   24 126/72   23 113/70   10/07/21 118/76      Wt Readings from Last 3 Encounters:   24 129 lb 13.6 oz (58.9 kg)   23 133 lb 6.1 oz (60.5 kg)   23 135 lb (61.2 kg)      BMI: Estimated body mass index is 23 kg/m² as calculated from the following:    Height as of this encounter: 5' 3\" (1.6 m).    Weight as of this encounter: 129 lb 13.6 oz (58.9 kg).  General: AAOx3 in NAD  HEENT: No scleral icterus, no LAD  Lungs: CTA bilaterally, no wheezing or crackles  CV: RRR, S1S2,, no murmurs or rubs  Abdomen: normal active bowel sounds, soft, nontender, nondistended,    Labs: Reviewed in chart    Imagin24 Ct neck:  1. Patulous esophagus with multifocal areas of retained ingested material involving the visualized upper thoracic esophagus.   2. Small clustered area of nodularity involving the right upper lobe with proximal mucous plugging.  These findings are likely infectious/inflammatory.   3. Small left pleural effusion, which is only partially visualized.   4. Heterogeneous and enlarged thyroid gland with multiple thyroid nodules.  Consider correlation with thyroid ultrasound.   5. Lesser  incidental findings described above.     Endoscopy: EGD March 2023 at [facility] apparently showed hypomotility    Assessment and Plan: Patient is an 83-year-old female admitted for left leg and left hip pain, GI consult requested for acute onset of food bolus after breakfast yesterday.  Patient tolerating secretions and overall feeling better after receiving glucagon last night.  Will give another dose of glucagon today and plan for upper endoscopy tomorrow after Eliquis has been out of the system for 48 hours to reduce risk of spontaneous bleeding at time of endoscopy.  Patient and daughter voiced her understanding.

## 2024-01-30 NOTE — PLAN OF CARE
Problem: PAIN - ADULT  Goal: Verbalizes/displays adequate comfort level or patient's stated pain goal  Description: INTERVENTIONS:  - Encourage pt to monitor pain and request assistance  - Assess pain using appropriate pain scale  - Administer analgesics based on type and severity of pain and evaluate response  - Implement non-pharmacological measures as appropriate and evaluate response  - Consider cultural and social influences on pain and pain management  - Manage/alleviate anxiety  - Utilize distraction and/or relaxation techniques  - Monitor for opioid side effects  - Notify MD/LIP if interventions unsuccessful or patient reports new pain  - Anticipate increased pain with activity and pre-medicate as appropriate  Outcome: Progressing     Problem: SAFETY ADULT - FALL  Goal: Free from fall injury  Description: INTERVENTIONS:  - Assess pt frequently for physical needs  - Identify cognitive and physical deficits and behaviors that affect risk of falls.  - [place] fall precautions as indicated by assessment.  - Educate pt/family on patient safety including physical limitations  - Instruct pt to call for assistance with activity based on assessment  - Modify environment to reduce risk of injury  - Provide assistive devices as appropriate  - Consider OT/PT consult to assist with strengthening/mobility  - Encourage toileting schedule  Outcome: Progressing     Problem: DISCHARGE PLANNING  Goal: Discharge to home or other facility with appropriate resources  Description: INTERVENTIONS:  - Identify barriers to discharge w/pt and caregiver  - Include patient/family/discharge partner in discharge planning  - Arrange for needed discharge resources and transportation as appropriate  - Identify discharge learning needs (meds, wound care, etc)  - Arrange for interpreters to assist at discharge as needed  - Consider post-discharge preferences of patient/family/discharge partner  - Complete POLST form as appropriate  - Assess  patient's ability to be responsible for managing their own health  - Refer to Case Management Department for coordinating discharge planning if the patient needs post-hospital services based on physician/LIP order or complex needs related to functional status, cognitive ability or social support system  Outcome: Progressing     Problem: Patient/Family Goals  Goal: Patient/Family Long Term Goal  Description: Patient's Long Term Goal: Discharge from the hospital    Interventions:  - Monitor vital signs  - Monitor appropriate labs  - Pain management  - Administer medications per order  - Follow MD orders  - Diagnostics per order  - Update / inform patient and family on plan of care  - Discharge planning  - See additional Care Plan goals for specific interventions  Outcome: Progressing  Goal: Patient/Family Short Term Goal  Description: Patient's Short Term Goal: Manage pain    Interventions:   - Monitor vital signs  - Monitor appropriate labs  - Pain management  - Administer medications per order  - Follow MD orders  - Diagnostics per order  - Update / inform patient and family on plan of care  - See additional Care Plan goals for specific interventions  Outcome: Progressing     Problem: METABOLIC/FLUID AND ELECTROLYTES - ADULT  Goal: Electrolytes maintained within normal limits  Description: INTERVENTIONS:  - Monitor labs and rhythm and assess patient for signs and symptoms of electrolyte imbalances  - Administer electrolyte replacement as ordered  - Monitor response to electrolyte replacements, including rhythm and repeat lab results as appropriate  - Fluid restriction as ordered  - Instruct patient on fluid and nutrition restrictions as appropriate  Outcome: Progressing  Goal: Hemodynamic stability and optimal renal function maintained  Description: INTERVENTIONS:  - Monitor labs and assess for signs and symptoms of volume excess or deficit  - Monitor intake, output and patient weight  - Monitor urine specific  gravity, serum osmolarity and serum sodium as indicated or ordered  - Monitor response to interventions for patient's volume status, including labs, urine output, blood pressure (other measures as available)  - Encourage oral intake as appropriate  - Instruct patient on fluid and nutrition restrictions as appropriate  Outcome: Progressing     Problem: Impaired Functional Mobility  Goal: Achieve highest/safest level of mobility/gait  Description: Interventions:  - Assess patient's functional ability and stability  - Promote increasing activity/tolerance for mobility and gait  - Educate and engage patient/family in tolerated activity level and precautions  - Recommend use of  RW for transfers and ambulation  - Recommend patient transfer to bedside chair toward strongest side  - When transferring patient, block weaker knee for safety  - Recommend use of chair position in bed 3 times per day  Outcome: Progressing     Problem: Patient Centered Care  Goal: Patient preferences are identified and integrated in the patient's plan of care  Description: Interventions:  - What would you like us to know as we care for you? From home with grandson  - Provide timely, complete, and accurate information to patient/family  - Incorporate patient and family knowledge, values, beliefs, and cultural backgrounds into the planning and delivery of care  - Encourage patient/family to participate in care and decision-making at the level they choose  - Honor patient and family perspectives and choices  Outcome: Progressing     Problem: MUSCULOSKELETAL - ADULT  Goal: Return mobility to safest level of function  Description: INTERVENTIONS:  - Assess patient stability and activity tolerance for standing, transferring and ambulating w/ or w/o assistive devices  - Assist with transfers and ambulation using safe patient handling equipment as needed  - Ensure adequate protection for wounds/incisions during mobilization  - Obtain PT/OT consults as  needed  - Advance activity as appropriate  - Communicate ordered activity level and limitations with patient/family  Outcome: Progressing     Problem: Impaired Activities of Daily Living  Goal: Achieve highest/safest level of independence in self care  Description: Interventions:  - Assess ability and encourage patient to participate in ADLs to maximize function  - Promote sitting position while performing ADLs such as feeding, grooming, and bathing  - Educate and encourage patient/family in tolerated functional activity level and precautions during self-care  Outcome: Progressing      Monitoring vital signs- stable at this time. Safety and fall precautions maintained- bed alarm on, bed locked in lowest position, call light within reach. Frequent rounding by nursing staff. Plan for dialysis tomorrow per order.

## 2024-01-30 NOTE — OCCUPATIONAL THERAPY NOTE
01/30/24 1011   VISIT TYPE   OT Inpatient Visit Type  Attempted Treatment  RN advised to hold therapy today per MD order. Pt will have an EGD tomorrow and continues to be NPO. Pt is unable to take oral pain medication at this time.   OT Follow Up   Charge Missed visit   Follow Up Needed  Re-attempt on a later date, post EGD       CT soft tissue of Neck on 1/29/24:  \"1. Patulous esophagus with multifocal areas of retained ingested material involving the visualized upper thoracic esophagus.   2. Small clustered area of nodularity involving the right upper lobe with proximal mucous plugging.  These findings are likely infectious/inflammatory.   3. Small left pleural effusion, which is only partially visualized.   4. Heterogeneous and enlarged thyroid gland with multiple thyroid nodules.  Consider correlation with thyroid ultrasound.   5. Lesser incidental findings described above.\"    SAKSHI Valentin/KASSANDRA  OCONCLUSION:     ccupational Therapy  [facility]

## 2024-01-30 NOTE — PLAN OF CARE
Patient resting up in chair at this time, family in room. Safety and fall precautions in place, call light within reach, chair locked. Patient wanted to get up from bed, this RN helped to dangle patient on edge of bed, patient was dangled for 10 minutes and denied dizziness. Patient was then transferred with staff help by stand/pivot to chair. Pain adequately controlled per patient. Patient maintained NPO except for ice chips per dr MCPHERSON order, patient tolerating well. PO medications not given due to NPO order, MD aware. HR and vitals relayed to dr after , orders received and carried out by this RN. Patient denies chest pain, sob, fluttering or palpitations. Consent received for procedure tomorrow, procedure education provided to patient and family, verbalized understanding. All needs attended.     Problem: PAIN - ADULT  Goal: Verbalizes/displays adequate comfort level or patient's stated pain goal  Description: INTERVENTIONS:  - Encourage pt to monitor pain and request assistance  - Assess pain using appropriate pain scale  - Administer analgesics based on type and severity of pain and evaluate response  - Implement non-pharmacological measures as appropriate and evaluate response  - Consider cultural and social influences on pain and pain management  - Manage/alleviate anxiety  - Utilize distraction and/or relaxation techniques  - Monitor for opioid side effects  - Notify MD/LIP if interventions unsuccessful or patient reports new pain  - Anticipate increased pain with activity and pre-medicate as appropriate  Outcome: Progressing     Problem: SAFETY ADULT - FALL  Goal: Free from fall injury  Description: INTERVENTIONS:  - Assess pt frequently for physical needs  - Identify cognitive and physical deficits and behaviors that affect risk of falls.  - [place] fall precautions as indicated by assessment.  - Educate pt/family on patient safety including physical limitations  - Instruct pt to call for assistance  with activity based on assessment  - Modify environment to reduce risk of injury  - Provide assistive devices as appropriate  - Consider OT/PT consult to assist with strengthening/mobility  - Encourage toileting schedule  Outcome: Progressing     Problem: DISCHARGE PLANNING  Goal: Discharge to home or other facility with appropriate resources  Description: INTERVENTIONS:  - Identify barriers to discharge w/pt and caregiver  - Include patient/family/discharge partner in discharge planning  - Arrange for needed discharge resources and transportation as appropriate  - Identify discharge learning needs (meds, wound care, etc)  - Arrange for interpreters to assist at discharge as needed  - Consider post-discharge preferences of patient/family/discharge partner  - Complete POLST form as appropriate  - Assess patient's ability to be responsible for managing their own health  - Refer to Case Management Department for coordinating discharge planning if the patient needs post-hospital services based on physician/LIP order or complex needs related to functional status, cognitive ability or social support system  Outcome: Progressing     Problem: Patient/Family Goals  Goal: Patient/Family Long Term Goal  Description: Patient's Long Term Goal: Discharge from the hospital    Interventions:  - Monitor vital signs  - Monitor appropriate labs  - Pain management  - Administer medications per order  - Follow MD orders  - Diagnostics per order  - Update / inform patient and family on plan of care  - Discharge planning  - See additional Care Plan goals for specific interventions  Outcome: Progressing  Goal: Patient/Family Short Term Goal  Description: Patient's Short Term Goal: Manage pain    Interventions:   - Monitor vital signs  - Monitor appropriate labs  - Pain management  - Administer medications per order  - Follow MD orders  - Diagnostics per order  - Update / inform patient and family on plan of care  - See additional Care Plan  goals for specific interventions  Outcome: Progressing     Problem: METABOLIC/FLUID AND ELECTROLYTES - ADULT  Goal: Electrolytes maintained within normal limits  Description: INTERVENTIONS:  - Monitor labs and rhythm and assess patient for signs and symptoms of electrolyte imbalances  - Administer electrolyte replacement as ordered  - Monitor response to electrolyte replacements, including rhythm and repeat lab results as appropriate  - Fluid restriction as ordered  - Instruct patient on fluid and nutrition restrictions as appropriate  Outcome: Progressing  Goal: Hemodynamic stability and optimal renal function maintained  Description: INTERVENTIONS:  - Monitor labs and assess for signs and symptoms of volume excess or deficit  - Monitor intake, output and patient weight  - Monitor urine specific gravity, serum osmolarity and serum sodium as indicated or ordered  - Monitor response to interventions for patient's volume status, including labs, urine output, blood pressure (other measures as available)  - Encourage oral intake as appropriate  - Instruct patient on fluid and nutrition restrictions as appropriate  Outcome: Progressing     Problem: Impaired Functional Mobility  Goal: Achieve highest/safest level of mobility/gait  Description: Interventions:  - Assess patient's functional ability and stability  - Promote increasing activity/tolerance for mobility and gait  - Educate and engage patient/family in tolerated activity level and precautions  - Recommend use of  RW for transfers and ambulation  - Recommend patient transfer to bedside chair toward strongest side  - When transferring patient, block weaker knee for safety  - Recommend use of chair position in bed 3 times per day  Outcome: Progressing     Problem: Patient Centered Care  Goal: Patient preferences are identified and integrated in the patient's plan of care  Description: Interventions:  - What would you like us to know as we care for you? From home  with grandson  - Provide timely, complete, and accurate information to patient/family  - Incorporate patient and family knowledge, values, beliefs, and cultural backgrounds into the planning and delivery of care  - Encourage patient/family to participate in care and decision-making at the level they choose  - Honor patient and family perspectives and choices  Outcome: Progressing     Problem: SKIN/TISSUE INTEGRITY - ADULT  Goal: Skin integrity remains intact  Description: INTERVENTIONS  - Assess and document risk factors for pressure ulcer development  - Assess and document skin integrity  - Monitor for areas of redness and/or skin breakdown  - Initiate interventions, skin care algorithm/standards of care as needed  Outcome: Progressing     Problem: MUSCULOSKELETAL - ADULT  Goal: Return mobility to safest level of function  Description: INTERVENTIONS:  - Assess patient stability and activity tolerance for standing, transferring and ambulating w/ or w/o assistive devices  - Assist with transfers and ambulation using safe patient handling equipment as needed  - Ensure adequate protection for wounds/incisions during mobilization  - Obtain PT/OT consults as needed  - Advance activity as appropriate  - Communicate ordered activity level and limitations with patient/family  Outcome: Progressing     Problem: Impaired Activities of Daily Living  Goal: Achieve highest/safest level of independence in self care  Description: Interventions:  - Assess ability and encourage patient to participate in ADLs to maximize function  - Promote sitting position while performing ADLs such as feeding, grooming, and bathing  - Educate and encourage patient/family in tolerated functional activity level and precautions during self-care  Outcome: Progressing

## 2024-01-30 NOTE — CM/SW NOTE
BECKY received MDO for [facility]/discharge planning. Pt was initially observation status, and was changed to inpt today. Pt will need 3 midnights as long as medically necessary to go to [facility]. BECKY initiated [facility] Referrals via aidin. PASRR level 1 screen submitted and completed and uploaded to aidin referral    1042am- BECKY uploaded HD flowsheet into aidin.     332pm- BECKY provided [facility] list to patient and family.     Plan: Pending medical clearance, DC to [facility], PASRR completed, *list/choice, *hep b profile, *HD approval, *3MN    SW/CM to remain available for support and/or discharge planning.     Pauly Holloway, MSW, LSW   x [phone]

## 2024-01-30 NOTE — PROGRESS NOTES
[facility] Hospitalist Progress Note     CC: Hospital Follow up    PCP: Shannon Bahena MD       Assessment/Plan:     Principal Problem:    Inability to walk  Active Problems:    Osteoarthritis of hip    Anemia due to chronic kidney disease, on chronic dialysis (HCC)    ESRD (end stage renal disease) on dialysis (HCC)    Ms. Cook is an 83-year-old female with a history of ESRD, paroxysmal atrial fibrillation, carotid stenosis, hypertension, hyperlipidemia, diastolic congestive heart failure, presents to the hospital for evaluation of left leg and hip pain, likely radicular pain fro L4-L5 disc herniation     Food bolus  - pt with nausea, vomiting, globus sensation on 1/29  - CT neck reviewed, showed patulous esophagus with multifocal areas of retained ingested material  - had EGD in 03/2023 at [facility] for globus sensation, per report esophageal hypomotility diagnosed but no achalasia, she did have a very dry oropharynx, pathology results were negative  - NPO, IVF  - s/p glucagon yesterday, plan to repeat today  - GI colleagues consulted, appreciate recommendations  - plan for EGD tomorrow    Left hip/ Leg pain  Left hip OA  Spinal stenosis, L4-L5 disc extrusion  - xray neg for fracture  - outpatient MRI with Left hip OA and trets of hamstring tendon and gluteus minimus  - pain does appear to radiate to her lower leg   - lumbar spine with DDD  - continue lidoderm patch start low dose gabapentin  - continue norco    - PT/OT  - recently had steroid injection in hip with ortho, therefore not candidate for at least 1-2 more months  - continue prednisone   - Lumbar spine MRI, pre romero read with L4-L5 disc herniation  - continue PT/OT, may need [facility] on DC pending progress  - IV pain medications while strict NPO, had a lot of pain while working with PT/OT yesterday but that was after she vomited her pain medications, after EGD tomorrow and once able to tolerate PO medications, will ask PT/OT to see again, depending  on how she does can see if SNF/home most appropriate     ESRD  -Nephrology consulted  -MWF HD     Paroxysmal atrial fibrillation  -Follows with [facility]  -She is on Eliquis 2.5 mg twice daily, held  -Normally on metoprolol and diltiazem,      Chronic HFpEF  - euvolemic  - volume management per HD     Endometrial wall thickening  - noted on outpatient MRI and US  - needs outpatient GYN follow up     Hypertension  -She takes a Norvasc as needed but not daily  -resume lopressor     FN:  - IVF:NS  - Diet: none     DVT Prophy:scd, eliquis held  Lines: PIV     Dispo: pending clinical course    Questions/concerns were discussed with patient and/or family by bedside.    Thank You,  Ashley Jewell, DO    Hospitalist with [facility]     Subjective:     Patient having leg pain. No vomiting today or overnight. Still with globus sensation    OBJECTIVE:    Blood pressure 114/71, pulse 110, temperature 98.5 °F (36.9 °C), temperature source Oral, resp. rate 16, height 5' 3\" (1.6 m), weight 129 lb 13.6 oz (58.9 kg), SpO2 93%, not currently breastfeeding.    Temp:  [98 °F (36.7 °C)-98.5 °F (36.9 °C)] 98.5 °F (36.9 °C)  Pulse:  [] 110  Resp:  [16-17] 16  BP: (109-151)/(65-74) 114/71  SpO2:  [93 %-97 %] 93 %      Intake/Output:    Intake/Output Summary (Last 24 hours) at 1/30/2024 1106  Last data filed at 1/30/2024 0600  Gross per 24 hour   Intake 845 ml   Output --   Net 845 ml       Last 3 Weights   01/30/24 0400 129 lb 13.6 oz (58.9 kg)   01/29/24 0630 130 lb 15.3 oz (59.4 kg)   01/28/24 0551 127 lb 13.9 oz (58 kg)   01/27/24 0642 124 lb 12.5 oz (56.6 kg)   01/26/24 0246 135 lb 12.9 oz (61.6 kg)   01/25/24 2148 126 lb 12.2 oz (57.5 kg)   07/18/23 1000 133 lb 6.1 oz (60.5 kg)   07/16/23 1942 134 lb 12.8 oz (61.1 kg)   07/16/23 1203 135 lb (61.2 kg)   06/20/23 1508 135 lb (61.2 kg)       Exam    Gen: No acute distress, alert and oriented x3   Heent: NC AT, neck supple  Pulm: Lungs clear, normal  respiratory effort  CV: Heart with regular rate and rhythm   Abd: Abdomen soft, nontender, nondistended   MSK: no clubbing, no cyanosis  Skin: no rashes or lesions  Neuro: AO*3, motor intact, no sensory deficits  Psyc: appropriate mood and affect      Data Review:       Labs:     Recent Labs   Lab 01/26/24  0132 01/27/24  0651   RBC 3.50* 3.93   HGB 10.8* 12.1   HCT 33.7* 38.3   MCV 96.3 97.5   MCH 30.9 30.8   MCHC 32.0 31.6   RDW 16.7* 16.9*   NEPRELIM 6.24  --    WBC 8.8 9.7   .0* 147.0*         Recent Labs   Lab 01/26/24  0132 01/27/24  0651   * 108*   BUN 46* 33*   CREATSERUM 5.67* 4.36*   EGFRCR 7* 10*   CA 7.3* 8.0*    138   K 4.1 5.1    101   CO2 24.0 24.0       No results for input(s): \"ALT\", \"AST\", \"ALB\", \"AMYLASE\", \"LIPASE\", \"LDH\" in the last 168 hours.    Invalid input(s): \"ALPHOS\", \"TBIL\", \"DBIL\", \"TPROT\"      Imaging:  CT SOFT TISSUE OF NECK (CPT=70490)    Result Date: 1/29/2024  CONCLUSION:   1. Patulous esophagus with multifocal areas of retained ingested material involving the visualized upper thoracic esophagus. 2. Small clustered area of nodularity involving the right upper lobe with proximal mucous plugging.  These findings are likely infectious/inflammatory. 3. Small left pleural effusion, which is only partially visualized. 4. Heterogeneous and enlarged thyroid gland with multiple thyroid nodules.  Consider correlation with thyroid ultrasound. 5. Lesser incidental findings described above.    Dictated by (CST): Jamar Driver MD on 1/29/2024 at 4:08 PM     Finalized by (CST): Jamar Driver MD on 1/29/2024 at 4:20 PM          MRI SPINE LUMBAR (CPT=72148)    Result Date: 1/28/2024  CONCLUSION:  1. Moderate spinal canal stenosis at L4-L5.  2. Additional multilevel degenerative changes as detailed above.  3. No acute osseous injury of the lumbar spine.  4. Nonspecific heterogeneous appearance of the bone marrow of the osseous structures.  5. Moderate volume free pelvic  fluid.  6. Imaging findings suggesting polycystic kidney disease.  7. Lesser incidental findings as above.    A preliminary report was issued by the [facility] teleradiology service. There are no major discrepancies.    elm-remote.   Dictated by (CST): Josue Melgoza MD on 1/28/2024 at 10:32 AM     Finalized by (CST): Josue Melgoza MD on 1/28/2024 at 10:38 AM             Meds:      gabapentin  100 mg Oral BID    pantoprazole  40 mg Oral QAM AC    predniSONE  40 mg Oral Daily with breakfast    [Held by provider] apixaban  2.5 mg Oral 2 times per day    metoprolol tartrate  50 mg Oral 2x Daily(Beta Blocker)    midodrine  5 mg Oral TID    montelukast  10 mg Oral Nightly    sevelamer carbonate  2,400 mg Oral TID    lidocaine-menthol  1 patch Transdermal Daily    sennosides  17.2 mg Oral Nightly    atorvastatin  5 mg Oral Nightly      sodium chloride 75 mL/hr at 01/29/24 9329     acetaminophen, HYDROmorphone **OR** HYDROmorphone **OR** HYDROmorphone, sodium chloride **AND** albumin human, calcium carbonate, amLODIPine, polyethylene glycol (PEG 3350), bisacodyl, fleet enema, acetaminophen, HYDROcodone-acetaminophen

## 2024-01-31 ENCOUNTER — APPOINTMENT (OUTPATIENT)
Dept: CT IMAGING | Facility: HOSPITAL | Age: 84
End: 2024-01-31
Attending: INTERNAL MEDICINE
Payer: MEDICARE

## 2024-01-31 ENCOUNTER — APPOINTMENT (OUTPATIENT)
Dept: CT IMAGING | Facility: HOSPITAL | Age: 84
DRG: 553 | End: 2024-01-31
Attending: INTERNAL MEDICINE
Payer: MEDICARE

## 2024-01-31 LAB
ANION GAP SERPL CALC-SCNC: 17 MMOL/L (ref 0–18)
BUN BLD-MCNC: 58 MG/DL (ref 9–23)
BUN/CREAT SERPL: 9.1 (ref 10–20)
CALCIUM BLD-MCNC: 7.8 MG/DL (ref 8.7–10.4)
CHLORIDE SERPL-SCNC: 105 MMOL/L (ref 98–112)
CO2 SERPL-SCNC: 18 MMOL/L (ref 21–32)
CREAT BLD-MCNC: 6.35 MG/DL
DEPRECATED RDW RBC AUTO: 58.4 FL (ref 35.1–46.3)
EGFRCR SERPLBLD CKD-EPI 2021: 6 ML/MIN/1.73M2 (ref 60–?)
ERYTHROCYTE [DISTWIDTH] IN BLOOD BY AUTOMATED COUNT: 16.7 % (ref 11–15)
GLUCOSE BLD-MCNC: 77 MG/DL (ref 70–99)
HCT VFR BLD AUTO: 37.6 %
HGB BLD-MCNC: 11.8 G/DL
MCH RBC QN AUTO: 30 PG (ref 26–34)
MCHC RBC AUTO-ENTMCNC: 31.4 G/DL (ref 31–37)
MCV RBC AUTO: 95.7 FL
OSMOLALITY SERPL CALC.SUM OF ELEC: 305 MOSM/KG (ref 275–295)
PLATELET # BLD AUTO: 145 10(3)UL (ref 150–450)
POTASSIUM SERPL-SCNC: 4 MMOL/L (ref 3.5–5.1)
POTASSIUM SERPL-SCNC: 5.6 MMOL/L (ref 3.5–5.1)
RBC # BLD AUTO: 3.93 X10(6)UL
SODIUM SERPL-SCNC: 140 MMOL/L (ref 136–145)
WBC # BLD AUTO: 9.2 X10(3) UL (ref 4–11)

## 2024-01-31 PROCEDURE — 90935 HEMODIALYSIS ONE EVALUATION: CPT | Performed by: INTERNAL MEDICINE

## 2024-01-31 PROCEDURE — 70491 CT SOFT TISSUE NECK W/DYE: CPT | Performed by: INTERNAL MEDICINE

## 2024-01-31 RX ORDER — DILTIAZEM HYDROCHLORIDE 5 MG/ML
10 INJECTION INTRAVENOUS ONCE
Status: COMPLETED | OUTPATIENT
Start: 2024-01-31 | End: 2024-01-31

## 2024-01-31 RX ORDER — METOPROLOL TARTRATE 1 MG/ML
5 INJECTION, SOLUTION INTRAVENOUS ONCE
Status: COMPLETED | OUTPATIENT
Start: 2024-01-31 | End: 2024-01-31

## 2024-01-31 RX ORDER — METOPROLOL TARTRATE 1 MG/ML
5 INJECTION, SOLUTION INTRAVENOUS EVERY 6 HOURS
Status: DISCONTINUED | OUTPATIENT
Start: 2024-01-31 | End: 2024-02-02

## 2024-01-31 NOTE — PROGRESS NOTES
[facility] Hospitalist Progress Note     CC: Hospital Follow up    PCP: Shannon Bahena MD       Assessment/Plan:     Principal Problem:    Inability to walk  Active Problems:    Osteoarthritis of hip    Anemia due to chronic kidney disease, on chronic dialysis (HCC)    ESRD (end stage renal disease) on dialysis (HCC)    Ms. Cook is an 83-year-old female with a history of ESRD, paroxysmal atrial fibrillation, carotid stenosis, hypertension, hyperlipidemia, diastolic congestive heart failure, presents to the hospital for evaluation of left leg and hip pain, likely radicular pain fro L4-L5 disc herniation     Food bolus  - pt with nausea, vomiting, globus sensation on 1/29  - CT neck reviewed, showed patulous esophagus with multifocal areas of retained ingested material  - had EGD in 03/2023 at [facility] for globus sensation, per report esophageal hypomotility diagnosed but no achalasia, she did have a very dry oropharynx, pathology results were negative  - NPO, IVF  - s/p glucagon x 2  - GI colleagues consulted, appreciate recommendations  - plan for EGD today    Left hip/ Leg pain  Left hip OA  Spinal stenosis, L4-L5 disc extrusion  - xray neg for fracture  - outpatient MRI with Left hip OA and trets of hamstring tendon and gluteus minimus  - pain does appear to radiate to her lower leg   - lumbar spine with DDD  - continue lidoderm patch start low dose gabapentin  - continue norco    - PT/OT  - recently had steroid injection in hip with ortho, therefore not candidate for at least 1-2 more months  - continue prednisone   - Lumbar spine MRI, pre romero read with L4-L5 disc herniation  - continue PT/OT, may need [facility] on DC pending progress  - IV pain medications while strict NPO, had a lot of pain while working with PT/OT yesterday but that was after she vomited her pain medications, after EGD tomorrow and once able to tolerate PO medications, will ask PT/OT to see again, depending on how she does can see if  SNF/home most appropriate     ESRD  -Nephrology consulted  -MWF HD     Paroxysmal atrial fibrillation  -Follows with [facility]  -She is on Eliquis 2.5 mg twice daily, held  -Normally on metoprolol, IV 5 mg q 6 for now until able to tolerate PO again     Chronic HFpEF  - euvolemic  - volume management per HD     Endometrial wall thickening  - noted on outpatient MRI and US  - needs outpatient GYN follow up     Hypertension  -She takes a Norvasc as needed but not daily  -resume lopressor     FN:  - IVF:NS  - Diet: none     DVT Prophy:scd, eliquis held  Lines: PIV     Dispo: pending clinical course    Questions/concerns were discussed with patient and/or family by bedside.    Thank You,  Ashley Jewell, DO    Hospitalist with [facility]     Subjective:   Patient having pain in hip. Plan for EGD today    OBJECTIVE:    Blood pressure 129/74, pulse 116, temperature 98.3 °F (36.8 °C), temperature source Oral, resp. rate 18, height 5' 3\" (1.6 m), weight 129 lb 13.6 oz (58.9 kg), SpO2 96%, not currently breastfeeding.    Temp:  [98.3 °F (36.8 °C)-98.7 °F (37.1 °C)] 98.3 °F (36.8 °C)  Pulse:  [] 116  Resp:  [16-18] 18  BP: (117-132)/(62-83) 129/74  SpO2:  [87 %-96 %] 96 %      Intake/Output:    Intake/Output Summary (Last 24 hours) at 1/31/2024 1343  Last data filed at 1/31/2024 1141  Gross per 24 hour   Intake 330 ml   Output 2500 ml   Net -2170 ml       Last 3 Weights   01/30/24 0400 129 lb 13.6 oz (58.9 kg)   01/29/24 0630 130 lb 15.3 oz (59.4 kg)   01/28/24 0551 127 lb 13.9 oz (58 kg)   01/27/24 0642 124 lb 12.5 oz (56.6 kg)   01/26/24 0246 135 lb 12.9 oz (61.6 kg)   01/25/24 2148 126 lb 12.2 oz (57.5 kg)   07/18/23 1000 133 lb 6.1 oz (60.5 kg)   07/16/23 1942 134 lb 12.8 oz (61.1 kg)   07/16/23 1203 135 lb (61.2 kg)   06/20/23 1508 135 lb (61.2 kg)       Exam    Gen: No acute distress, alert and oriented x3   Heent: NC AT, neck supple  Pulm: Lungs clear, normal respiratory effort  CV:  Heart with regular rate and rhythm   Abd: Abdomen soft, nontender, nondistended   MSK: no clubbing, no cyanosis  Skin: no rashes or lesions        Data Review:       Labs:     Recent Labs   Lab 01/26/24 0132 01/27/24  0651 01/31/24  0543   RBC 3.50* 3.93 3.93   HGB 10.8* 12.1 11.8*   HCT 33.7* 38.3 37.6   MCV 96.3 97.5 95.7   MCH 30.9 30.8 30.0   MCHC 32.0 31.6 31.4   RDW 16.7* 16.9* 16.7*   NEPRELIM 6.24  --   --    WBC 8.8 9.7 9.2   .0* 147.0* 145.0*         Recent Labs   Lab 01/26/24 0132 01/27/24  0651 01/31/24  0543   * 108* 77   BUN 46* 33* 58*   CREATSERUM 5.67* 4.36* 6.35*   EGFRCR 7* 10* 6*   CA 7.3* 8.0* 7.8*    138 140   K 4.1 5.1 5.6*    101 105   CO2 24.0 24.0 18.0*       No results for input(s): \"ALT\", \"AST\", \"ALB\", \"AMYLASE\", \"LIPASE\", \"LDH\" in the last 168 hours.    Invalid input(s): \"ALPHOS\", \"TBIL\", \"DBIL\", \"TPROT\"      Imaging:  CT SOFT TISSUE OF NECK (CPT=70490)    Result Date: 1/29/2024  CONCLUSION:   1. Patulous esophagus with multifocal areas of retained ingested material involving the visualized upper thoracic esophagus. 2. Small clustered area of nodularity involving the right upper lobe with proximal mucous plugging.  These findings are likely infectious/inflammatory. 3. Small left pleural effusion, which is only partially visualized. 4. Heterogeneous and enlarged thyroid gland with multiple thyroid nodules.  Consider correlation with thyroid ultrasound. 5. Lesser incidental findings described above.    Dictated by (CST): Jamar Driver MD on 1/29/2024 at 4:08 PM     Finalized by (CST): Jamar Driver MD on 1/29/2024 at 4:20 PM             Meds:      gabapentin  100 mg Oral BID    pantoprazole  40 mg Oral QAM AC    predniSONE  40 mg Oral Daily with breakfast    [Held by provider] apixaban  2.5 mg Oral 2 times per day    metoprolol tartrate  50 mg Oral 2x Daily(Beta Blocker)    midodrine  5 mg Oral TID    montelukast  10 mg Oral Nightly    sevelamer carbonate   2,400 mg Oral TID    lidocaine-menthol  1 patch Transdermal Daily    sennosides  17.2 mg Oral Nightly    atorvastatin  5 mg Oral Nightly         acetaminophen, HYDROmorphone **OR** HYDROmorphone **OR** HYDROmorphone, metoprolol, calcium carbonate, amLODIPine, polyethylene glycol (PEG 3350), bisacodyl, fleet enema, acetaminophen, HYDROcodone-acetaminophen

## 2024-01-31 NOTE — PLAN OF CARE
Problem: PAIN - ADULT  Goal: Verbalizes/displays adequate comfort level or patient's stated pain goal  Description: INTERVENTIONS:  - Encourage pt to monitor pain and request assistance  - Assess pain using appropriate pain scale  - Administer analgesics based on type and severity of pain and evaluate response  - Implement non-pharmacological measures as appropriate and evaluate response  - Consider cultural and social influences on pain and pain management  - Manage/alleviate anxiety  - Utilize distraction and/or relaxation techniques  - Monitor for opioid side effects  - Notify MD/LIP if interventions unsuccessful or patient reports new pain  - Anticipate increased pain with activity and pre-medicate as appropriate  Outcome: Progressing     Problem: SAFETY ADULT - FALL  Goal: Free from fall injury  Description: INTERVENTIONS:  - Assess pt frequently for physical needs  - Identify cognitive and physical deficits and behaviors that affect risk of falls.  - [place] fall precautions as indicated by assessment.  - Educate pt/family on patient safety including physical limitations  - Instruct pt to call for assistance with activity based on assessment  - Modify environment to reduce risk of injury  - Provide assistive devices as appropriate  - Consider OT/PT consult to assist with strengthening/mobility  - Encourage toileting schedule  Outcome: Progressing     Problem: DISCHARGE PLANNING  Goal: Discharge to home or other facility with appropriate resources  Description: INTERVENTIONS:  - Identify barriers to discharge w/pt and caregiver  - Include patient/family/discharge partner in discharge planning  - Arrange for needed discharge resources and transportation as appropriate  - Identify discharge learning needs (meds, wound care, etc)  - Arrange for interpreters to assist at discharge as needed  - Consider post-discharge preferences of patient/family/discharge partner  - Complete POLST form as appropriate  - Assess  patient's ability to be responsible for managing their own health  - Refer to Case Management Department for coordinating discharge planning if the patient needs post-hospital services based on physician/LIP order or complex needs related to functional status, cognitive ability or social support system  Outcome: Progressing     Problem: Patient/Family Goals  Goal: Patient/Family Long Term Goal  Description: Patient's Long Term Goal: Discharge from the hospital    Interventions:  - Monitor vital signs  - Monitor appropriate labs  - Pain management  - Administer medications per order  - Follow MD orders  - Diagnostics per order  - Update / inform patient and family on plan of care  - Discharge planning  - See additional Care Plan goals for specific interventions  Outcome: Progressing  Goal: Patient/Family Short Term Goal  Description: Patient's Short Term Goal: Manage pain    Interventions:   - Monitor vital signs  - Monitor appropriate labs  - Pain management  - Administer medications per order  - Follow MD orders  - Diagnostics per order  - Update / inform patient and family on plan of care  - See additional Care Plan goals for specific interventions  Outcome: Progressing     Problem: METABOLIC/FLUID AND ELECTROLYTES - ADULT  Goal: Electrolytes maintained within normal limits  Description: INTERVENTIONS:  - Monitor labs and rhythm and assess patient for signs and symptoms of electrolyte imbalances  - Administer electrolyte replacement as ordered  - Monitor response to electrolyte replacements, including rhythm and repeat lab results as appropriate  - Fluid restriction as ordered  - Instruct patient on fluid and nutrition restrictions as appropriate  Outcome: Progressing  Goal: Hemodynamic stability and optimal renal function maintained  Description: INTERVENTIONS:  - Monitor labs and assess for signs and symptoms of volume excess or deficit  - Monitor intake, output and patient weight  - Monitor urine specific  gravity, serum osmolarity and serum sodium as indicated or ordered  - Monitor response to interventions for patient's volume status, including labs, urine output, blood pressure (other measures as available)  - Encourage oral intake as appropriate  - Instruct patient on fluid and nutrition restrictions as appropriate  Outcome: Progressing     Problem: Impaired Functional Mobility  Goal: Achieve highest/safest level of mobility/gait  Description: Interventions:  - Assess patient's functional ability and stability  - Promote increasing activity/tolerance for mobility and gait  - Educate and engage patient/family in tolerated activity level and precautions  - Recommend use of  RW for transfers and ambulation  - Recommend patient transfer to bedside chair toward strongest side  - When transferring patient, block weaker knee for safety  - Recommend use of chair position in bed 3 times per day  Outcome: Progressing     Problem: Patient Centered Care  Goal: Patient preferences are identified and integrated in the patient's plan of care  Description: Interventions:  - What would you like us to know as we care for you? From home with grandson  - Provide timely, complete, and accurate information to patient/family  - Incorporate patient and family knowledge, values, beliefs, and cultural backgrounds into the planning and delivery of care  - Encourage patient/family to participate in care and decision-making at the level they choose  - Honor patient and family perspectives and choices  Outcome: Progressing     Problem: SKIN/TISSUE INTEGRITY - ADULT  Goal: Skin integrity remains intact  Description: INTERVENTIONS  - Assess and document risk factors for pressure ulcer development  - Assess and document skin integrity  - Monitor for areas of redness and/or skin breakdown  - Initiate interventions, skin care algorithm/standards of care as needed  Outcome: Progressing     Problem: MUSCULOSKELETAL - ADULT  Goal: Return mobility to  safest level of function  Description: INTERVENTIONS:  - Assess patient stability and activity tolerance for standing, transferring and ambulating w/ or w/o assistive devices  - Assist with transfers and ambulation using safe patient handling equipment as needed  - Ensure adequate protection for wounds/incisions during mobilization  - Obtain PT/OT consults as needed  - Advance activity as appropriate  - Communicate ordered activity level and limitations with patient/family  Outcome: Progressing     Problem: Impaired Activities of Daily Living  Goal: Achieve highest/safest level of independence in self care  Description: Interventions:  - Assess ability and encourage patient to participate in ADLs to maximize function  - Promote sitting position while performing ADLs such as feeding, grooming, and bathing  - Educate and encourage patient/family in tolerated functional activity level and precautions during self-care  Outcome: Progressing

## 2024-01-31 NOTE — PROGRESS NOTES
EGD scheduled for today; however, patient underwent dialysis and anesthesia assessment resulted in plan for intubation prior to procedure and anesthesia team recommending deferring general anesthesia for 12 hours after dialysis.  Unfortunately anesthesia unable to provide a provider in the morning, will attempt to get scheduled as soon as possible when provider available.    Patient denies any pain at this time, explained reason for delay of case to her and her daughter at bedside who both voiced understanding.  I will obtain a repeat CT of the soft tissue of the neck to assess for any improvement in the amount and location of the food bolus and to assess for any esophageal wall thickening or inflammation.  On schedule for EGD tomorrow, will follow-up on CT results in the meantime.

## 2024-01-31 NOTE — OCCUPATIONAL THERAPY NOTE
Attempted to see patient this AM for follow up treat. Patient off the floor this morning for dialysis. Upon returning to the floor, patient's HR in 130s per RN. Patient is also awaiting EGD this afternoon. Will hold for today and follow up as appropriate.    Lois Elizondo, OTR/L  [facility]  [phone]

## 2024-01-31 NOTE — CM/SW NOTE
BECKY sent updated clinicals and labs for HD approval via aidin to [facility] referral. If pt wishes for [facility], pt would need 2 more midnights as long as medical necessary. BECKY will await for updated PT OT recs.   Plan: Pending medical clearance, DC to [facility], PASRR completed, *choice, *2 MN, *HD approval    SW/RONIT to remain available for support and/or discharge planning.     Pauly Holloway, MSW, LSW   x [phone]

## 2024-02-01 ENCOUNTER — ANESTHESIA (OUTPATIENT)
Dept: ENDOSCOPY | Facility: HOSPITAL | Age: 84
End: 2024-02-01
Payer: MEDICARE

## 2024-02-01 ENCOUNTER — APPOINTMENT (OUTPATIENT)
Dept: PICC SERVICES | Facility: HOSPITAL | Age: 84
DRG: 553 | End: 2024-02-01
Attending: HOSPITALIST
Payer: MEDICARE

## 2024-02-01 ENCOUNTER — ANESTHESIA EVENT (OUTPATIENT)
Dept: ENDOSCOPY | Facility: HOSPITAL | Age: 84
End: 2024-02-01
Payer: MEDICARE

## 2024-02-01 ENCOUNTER — APPOINTMENT (OUTPATIENT)
Dept: PICC SERVICES | Facility: HOSPITAL | Age: 84
End: 2024-02-01
Attending: HOSPITALIST
Payer: MEDICARE

## 2024-02-01 LAB
ANION GAP SERPL CALC-SCNC: 22 MMOL/L (ref 0–18)
BUN BLD-MCNC: 39 MG/DL (ref 9–23)
BUN/CREAT SERPL: 8.7 (ref 10–20)
CALCIUM BLD-MCNC: 7.8 MG/DL (ref 8.7–10.4)
CHLORIDE SERPL-SCNC: 100 MMOL/L (ref 98–112)
CO2 SERPL-SCNC: 20 MMOL/L (ref 21–32)
CREAT BLD-MCNC: 4.49 MG/DL
DEPRECATED RDW RBC AUTO: 57 FL (ref 35.1–46.3)
EGFRCR SERPLBLD CKD-EPI 2021: 9 ML/MIN/1.73M2 (ref 60–?)
ERYTHROCYTE [DISTWIDTH] IN BLOOD BY AUTOMATED COUNT: 16.5 % (ref 11–15)
GLUCOSE BLD-MCNC: 67 MG/DL (ref 70–99)
GLUCOSE BLDC GLUCOMTR-MCNC: 76 MG/DL (ref 70–99)
HCT VFR BLD AUTO: 37.4 %
HGB BLD-MCNC: 12.2 G/DL
MCH RBC QN AUTO: 31.2 PG (ref 26–34)
MCHC RBC AUTO-ENTMCNC: 32.6 G/DL (ref 31–37)
MCV RBC AUTO: 95.7 FL
OSMOLALITY SERPL CALC.SUM OF ELEC: 302 MOSM/KG (ref 275–295)
PHOSPHATE SERPL-MCNC: 5.4 MG/DL (ref 2.4–5.1)
PLATELET # BLD AUTO: 147 10(3)UL (ref 150–450)
POTASSIUM SERPL-SCNC: 4.4 MMOL/L (ref 3.5–5.1)
RBC # BLD AUTO: 3.91 X10(6)UL
SODIUM SERPL-SCNC: 142 MMOL/L (ref 136–145)
WBC # BLD AUTO: 8.3 X10(3) UL (ref 4–11)

## 2024-02-01 PROCEDURE — 99232 SBSQ HOSP IP/OBS MODERATE 35: CPT | Performed by: INTERNAL MEDICINE

## 2024-02-01 PROCEDURE — 0DB58ZX EXCISION OF ESOPHAGUS, VIA NATURAL OR ARTIFICIAL OPENING ENDOSCOPIC, DIAGNOSTIC: ICD-10-PCS | Performed by: INTERNAL MEDICINE

## 2024-02-01 PROCEDURE — 0DC58ZZ EXTIRPATION OF MATTER FROM ESOPHAGUS, VIA NATURAL OR ARTIFICIAL OPENING ENDOSCOPIC: ICD-10-PCS | Performed by: INTERNAL MEDICINE

## 2024-02-01 RX ORDER — ONDANSETRON 2 MG/ML
INJECTION INTRAMUSCULAR; INTRAVENOUS AS NEEDED
Status: DISCONTINUED | OUTPATIENT
Start: 2024-02-01 | End: 2024-02-01 | Stop reason: SURG

## 2024-02-01 RX ORDER — ALBUMIN, HUMAN INJ 5% 5 %
12.5 SOLUTION INTRAVENOUS ONCE
Status: COMPLETED | OUTPATIENT
Start: 2024-02-01 | End: 2024-02-01

## 2024-02-01 RX ORDER — LIDOCAINE HYDROCHLORIDE 20 MG/ML
INJECTION, SOLUTION EPIDURAL; INFILTRATION; INTRACAUDAL; PERINEURAL AS NEEDED
Status: DISCONTINUED | OUTPATIENT
Start: 2024-02-01 | End: 2024-02-01 | Stop reason: SURG

## 2024-02-01 RX ORDER — DEXAMETHASONE SODIUM PHOSPHATE 4 MG/ML
VIAL (ML) INJECTION AS NEEDED
Status: DISCONTINUED | OUTPATIENT
Start: 2024-02-01 | End: 2024-02-01 | Stop reason: SURG

## 2024-02-01 RX ORDER — EPHEDRINE SULFATE 50 MG/ML
INJECTION, SOLUTION INTRAVENOUS AS NEEDED
Status: DISCONTINUED | OUTPATIENT
Start: 2024-02-01 | End: 2024-02-01 | Stop reason: SURG

## 2024-02-01 RX ORDER — SUCRALFATE ORAL 1 G/10ML
1 SUSPENSION ORAL
Status: DISPENSED | OUTPATIENT
Start: 2024-02-01 | End: 2024-02-06

## 2024-02-01 RX ORDER — SODIUM CHLORIDE 9 MG/ML
INJECTION, SOLUTION INTRAVENOUS CONTINUOUS PRN
Status: DISCONTINUED | OUTPATIENT
Start: 2024-02-01 | End: 2024-02-01 | Stop reason: SURG

## 2024-02-01 RX ORDER — PHENYLEPHRINE HCL 10 MG/ML
VIAL (ML) INJECTION AS NEEDED
Status: DISCONTINUED | OUTPATIENT
Start: 2024-02-01 | End: 2024-02-01 | Stop reason: SURG

## 2024-02-01 RX ADMIN — PHENYLEPHRINE HCL 100 MCG: 10 MG/ML VIAL (ML) INJECTION at 13:03:00

## 2024-02-01 RX ADMIN — ONDANSETRON 4 MG: 2 INJECTION INTRAMUSCULAR; INTRAVENOUS at 12:54:00

## 2024-02-01 RX ADMIN — PHENYLEPHRINE HCL 100 MCG: 10 MG/ML VIAL (ML) INJECTION at 13:08:00

## 2024-02-01 RX ADMIN — DEXAMETHASONE SODIUM PHOSPHATE 4 MG: 4 MG/ML VIAL (ML) INJECTION at 12:54:00

## 2024-02-01 RX ADMIN — PHENYLEPHRINE HCL 100 MCG: 10 MG/ML VIAL (ML) INJECTION at 12:58:00

## 2024-02-01 RX ADMIN — LIDOCAINE HYDROCHLORIDE 40 MG: 20 INJECTION, SOLUTION EPIDURAL; INFILTRATION; INTRACAUDAL; PERINEURAL at 12:46:00

## 2024-02-01 RX ADMIN — SODIUM CHLORIDE: 9 INJECTION, SOLUTION INTRAVENOUS at 12:21:00

## 2024-02-01 RX ADMIN — SODIUM CHLORIDE: 9 INJECTION, SOLUTION INTRAVENOUS at 13:20:00

## 2024-02-01 RX ADMIN — EPHEDRINE SULFATE 5 MG: 50 INJECTION, SOLUTION INTRAVENOUS at 12:56:00

## 2024-02-01 RX ADMIN — EPHEDRINE SULFATE 5 MG: 50 INJECTION, SOLUTION INTRAVENOUS at 12:47:00

## 2024-02-01 NOTE — PLAN OF CARE
Received patient transferred from [facility]. Afib aflutter -130s. MD notified. Cardizem drip initiated with 10mg bolus. Currently on cardizem drip running at 10 ml/hr - HR improved. Report of pain to the left hip and groin, PRN pain medication administered, heat pack applied, pillow support provided for comfort. Right arm precaution- AV fistula. Repositioning and oral care provided to promote comfort. Strict NPO. Plan to continue monitor HR and schedule for EGD for food bolus retrieval with possible dilation.     Endo need cardiologist clearance for EGD. Dr. Baig notified. Endorsed to day shift RN.    Problem: PAIN - ADULT  Goal: Verbalizes/displays adequate comfort level or patient's stated pain goal  Description: INTERVENTIONS:  - Encourage pt to monitor pain and request assistance  - Assess pain using appropriate pain scale  - Administer analgesics based on type and severity of pain and evaluate response  - Implement non-pharmacological measures as appropriate and evaluate response  - Consider cultural and social influences on pain and pain management  - Manage/alleviate anxiety  - Utilize distraction and/or relaxation techniques  - Monitor for opioid side effects  - Notify MD/LIP if interventions unsuccessful or patient reports new pain  - Anticipate increased pain with activity and pre-medicate as appropriate  Outcome: Progressing     Problem: SAFETY ADULT - FALL  Goal: Free from fall injury  Description: INTERVENTIONS:  - Assess pt frequently for physical needs  - Identify cognitive and physical deficits and behaviors that affect risk of falls.  - [place] fall precautions as indicated by assessment.  - Educate pt/family on patient safety including physical limitations  - Instruct pt to call for assistance with activity based on assessment  - Modify environment to reduce risk of injury  - Provide assistive devices as appropriate  - Consider OT/PT consult to assist with strengthening/mobility  -  Encourage toileting schedule  Outcome: Progressing     Problem: DISCHARGE PLANNING  Goal: Discharge to home or other facility with appropriate resources  Description: INTERVENTIONS:  - Identify barriers to discharge w/pt and caregiver  - Include patient/family/discharge partner in discharge planning  - Arrange for needed discharge resources and transportation as appropriate  - Identify discharge learning needs (meds, wound care, etc)  - Arrange for interpreters to assist at discharge as needed  - Consider post-discharge preferences of patient/family/discharge partner  - Complete POLST form as appropriate  - Assess patient's ability to be responsible for managing their own health  - Refer to Case Management Department for coordinating discharge planning if the patient needs post-hospital services based on physician/LIP order or complex needs related to functional status, cognitive ability or social support system  Outcome: Progressing     Problem: Patient/Family Goals  Goal: Patient/Family Long Term Goal  Description: Patient's Long Term Goal: Discharge from the hospital    Interventions:  - Monitor vital signs  - Monitor appropriate labs  - Pain management  - Administer medications per order  - Follow MD orders  - Diagnostics per order  - Update / inform patient and family on plan of care  - Discharge planning  - See additional Care Plan goals for specific interventions  Outcome: Progressing  Goal: Patient/Family Short Term Goal  Description: Patient's Short Term Goal: Manage pain    Interventions:   - Monitor vital signs  - Monitor appropriate labs  - Pain management  - Administer medications per order  - Follow MD orders  - Diagnostics per order  - Update / inform patient and family on plan of care  - See additional Care Plan goals for specific interventions  Outcome: Progressing     Problem: METABOLIC/FLUID AND ELECTROLYTES - ADULT  Goal: Electrolytes maintained within normal limits  Description:  INTERVENTIONS:  - Monitor labs and rhythm and assess patient for signs and symptoms of electrolyte imbalances  - Administer electrolyte replacement as ordered  - Monitor response to electrolyte replacements, including rhythm and repeat lab results as appropriate  - Fluid restriction as ordered  - Instruct patient on fluid and nutrition restrictions as appropriate  Outcome: Progressing  Goal: Hemodynamic stability and optimal renal function maintained  Description: INTERVENTIONS:  - Monitor labs and assess for signs and symptoms of volume excess or deficit  - Monitor intake, output and patient weight  - Monitor urine specific gravity, serum osmolarity and serum sodium as indicated or ordered  - Monitor response to interventions for patient's volume status, including labs, urine output, blood pressure (other measures as available)  - Encourage oral intake as appropriate  - Instruct patient on fluid and nutrition restrictions as appropriate  Outcome: Progressing     Problem: Impaired Functional Mobility  Goal: Achieve highest/safest level of mobility/gait  Description: Interventions:  - Assess patient's functional ability and stability  - Promote increasing activity/tolerance for mobility and gait  - Educate and engage patient/family in tolerated activity level and precautions  - Recommend use of  RW for transfers and ambulation  - Recommend patient transfer to bedside chair toward strongest side  - When transferring patient, block weaker knee for safety  - Recommend use of chair position in bed 3 times per day  Outcome: Progressing     Problem: SKIN/TISSUE INTEGRITY - ADULT  Goal: Skin integrity remains intact  Description: INTERVENTIONS  - Assess and document risk factors for pressure ulcer development  - Assess and document skin integrity  - Monitor for areas of redness and/or skin breakdown  - Initiate interventions, skin care algorithm/standards of care as needed  Outcome: Progressing     Problem: MUSCULOSKELETAL  - ADULT  Goal: Return mobility to safest level of function  Description: INTERVENTIONS:  - Assess patient stability and activity tolerance for standing, transferring and ambulating w/ or w/o assistive devices  - Assist with transfers and ambulation using safe patient handling equipment as needed  - Ensure adequate protection for wounds/incisions during mobilization  - Obtain PT/OT consults as needed  - Advance activity as appropriate  - Communicate ordered activity level and limitations with patient/family  Outcome: Progressing     Problem: Impaired Activities of Daily Living  Goal: Achieve highest/safest level of independence in self care  Description: Interventions:  - Assess ability and encourage patient to participate in ADLs to maximize function  - Promote sitting position while performing ADLs such as feeding, grooming, and bathing  - Educate and encourage patient/family in tolerated functional activity level and precautions during self-care  Outcome: Progressing     Problem: Patient Centered Care  Goal: Patient preferences are identified and integrated in the patient's plan of care  Description: Interventions:  - What would you like us to know as we care for you? From home with grandson  - Provide timely, complete, and accurate information to patient/family  - Incorporate patient and family knowledge, values, beliefs, and cultural backgrounds into the planning and delivery of care  - Encourage patient/family to participate in care and decision-making at the level they choose  - Honor patient and family perspectives and choices  Outcome: Progressing     Problem: CARDIOVASCULAR - ADULT  Goal: Maintains optimal cardiac output and hemodynamic stability  Description: INTERVENTIONS:  - Monitor vital signs, rhythm, and trends  - Monitor for bleeding, hypotension and signs of decreased cardiac output  - Evaluate effectiveness of vasoactive medications to optimize hemodynamic stability  - Monitor arterial and/or  venous puncture sites for bleeding and/or hematoma  - Assess quality of pulses, skin color and temperature  - Assess for signs of decreased coronary artery perfusion - ex. Angina  - Evaluate fluid balance, assess for edema, trend weights  Outcome: Progressing  Goal: Absence of cardiac arrhythmias or at baseline  Description: INTERVENTIONS:  - Continuous cardiac monitoring, monitor vital signs, obtain 12 lead EKG if indicated  - Evaluate effectiveness of antiarrhythmic and heart rate control medications as ordered  - Initiate emergency measures for life threatening arrhythmias  - Monitor electrolytes and administer replacement therapy as ordered  Outcome: Progressing

## 2024-02-01 NOTE — CONSULTS
[facility] CARDIOLOGY CONSULT      Helen Espana is a 83 year old female who I assessed as follows:  Chief Complaint   Patient presents with    Groin Pain    Leg Pain          REASON FOR CONSULTATION:    AF            ASSESSMENT/PLAN:     IMPRESSIONS:  Retained food, awaiting EGD  PAF/SSS (in the past, tachy when in AF, bruce when in SR)  ESRD on HD  HTN, now on midodrine for low BP  HL, on statin  Chronic diastolic CHF, controlled with HD  Very mild CAD at cath 2016        PLAN:  Ok for EGD later today. Discussed with Ronaldo Corral and Naseem. Holding eliquis, resume when able. Risk of stroke discussed  Discussed with daughter at bedside.  Echo 7/23 reviewed  IV diltiazem for HR control  Continue midodrine    Sees Dr Ojeda        --------------------------------------------------------------------------------------------------------------------------------    HPI:         History of PAF, ESRD admitted for left leg pain. Then had retained food. Awaiting EGD. Eliquis has been held. Went into AF with RVR so now on IV diltiazem drip.    HD for CHF fluid control. On midodrine for hypotension. Admission 7/23 with RVR when in AF and bruce when in SR.             No current outpatient medications on file.      Past Medical History:   Diagnosis Date    Arrhythmia     Afib    AVF (arteriovenous fistula) (Formerly [facility]) 10/12/2017    Biceps rupture, proximal, right, initial encounter 02/20/2018    Breast cancer (Formerly [facility]) 1998    Breast cancer in female (Formerly [facility]) 12/20/2018    CANCER 1989    right breast mastectomy    Cataract 2001    OU    Clostridioides difficile infection     Cup to disc asymmetry 2001    OU    Dialysis patient (Formerly [facility])     HD M, W, F    Essential hypertension     Floaters 2001    OU    Hearing impairment     hearing aides    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 Giordano @ [facility]    CATARACT EXTRACTION W/  INTRAOCULAR LENS IMPLANT Left 2021    Dr. Giordano @ [facility]    CHOLECYSTECTOMY      laparoscopic    COLONOSCOPY      c. diff colitis-Ali    ECHO 2D DP/CLRFL, CARDIO (INTERNAL)   [facility] estee    mod MR/LAE; border systolic LVEF    ECHO 2D, CARDIO (DMG)  2020    [facility] cardio: no change except mod mitral regurg mild now    HX BREAST CANCER      LEXISCAN NUC STRESS TST, CARDIO (INTERNAL)  05/15/2018    [facility]; normal ; EF 54%    LEXISCAN NUC STRESS TST, CARDIO (INTERNAL)  2021    [facility]; EF57%; normal wall motion; fixed perfusion defect inf. wall    LUMPECTOMY RIGHT      MASTECTOMY PARTIAL  2018    u [facility]: left breaswt seed loc partial mastectomy     MASTECTOMY RIGHT            OTHER SURGICAL HISTORY  12    cysto-Dr. Allred    OTHER SURGICAL HISTORY  1/21/15     lap sigmoid colectomy     OTHER SURGICAL HISTORY  16    Right Hemicolectomy by Dr. Chaudhry    PERITONEAL DIALYSIS SYSTEM      2009     Family History   Problem Relation Age of Onset    Heart Disorder Father         MI    Diabetes Father     Kidney Disease Father         polycystic kidney disease    Hypertension Mother     Heart Disorder Mother         stroke    Cataracts Mother     Thyroid Disorder Daughter         thyroid    Other (Other) Sister         Cushing's disease    Diabetes Sister     Hypertension Sister     Cancer Sister 69        lung tob     Kidney Disease Son         pckd    Kidney Disease Brother         PCKD     Breast Cancer Maternal Aunt 50        x2 ages 85 and in her 50's    Macular degeneration Other         Aunt    Glaucoma Neg          family h/o      Social History:  Social History     Socioeconomic History    Marital status:    Occupational History    Occupation: retired real estate   Tobacco Use    Smoking status: Never    Smokeless tobacco: Never   Vaping Use    Vaping Use: Never used    Passive vaping exposure: Yes   Substance and Sexual Activity    Alcohol use: No    Drug use: No    Sexual activity: Yes     Partners: Male     Comment:  december 2020-he had CHF    Other Topics Concern     Service No    Blood Transfusions Yes    Caffeine Concern Yes     Comment: coffee    Hobby Hazards No    Sleep Concern Yes    Stress Concern Yes    Weight Concern Yes    Special Diet Yes    Seat Belt Yes    Self-Exams Yes     Social Determinants of Health     Food Insecurity: No Food Insecurity (1/27/2024)    Food Insecurity     Food Insecurity: Never true   Transportation Needs: No Transportation Needs (1/27/2024)    Transportation Needs     Lack of Transportation: No   Housing Stability: Low Risk  (1/27/2024)    Housing Stability     Housing Instability: No          Medications:  Current Facility-Administered Medications   Medication Dose Route Frequency    [Held by provider] metoprolol (Lopressor) 5 mg/5mL injection 5 mg  5 mg Intravenous Q6H    dilTIAZem 10 mg BOLUS FROM BAG infusion  10 mg Intravenous Q1H PRN    dilTIAZem (cardIZEM) 100 mg in sodium chloride 0.9% 100 mL IVPB-ADDV  2.5-20 mg/hr Intravenous Continuous    acetaminophen (Ofirmev) 10 mg/mL infusion premix 1,000 mg  1,000 mg Intravenous Q6H PRN    HYDROmorphone (Dilaudid) 1 MG/ML injection 0.2 mg  0.2 mg Intravenous Q2H PRN    Or    HYDROmorphone (Dilaudid) 1 MG/ML injection 0.4 mg  0.4 mg Intravenous Q2H PRN    Or    HYDROmorphone (Dilaudid) 1 MG/ML injection 0.8 mg  0.8 mg Intravenous Q2H PRN    gabapentin (Neurontin) cap 100 mg  100 mg Oral BID    calcium carbonate (Tums) chewable tab 1,000 mg  1,000 mg Oral Q8H PRN    pantoprazole (Protonix) DR tab 40 mg  40 mg Oral  QAM AC    amLODIPine (Norvasc) tab 5 mg  5 mg Oral Daily PRN    [Held by provider] apixaban (Eliquis) tab 2.5 mg  2.5 mg Oral 2 times per day    [Held by provider] metoprolol tartrate (Lopressor) tab 50 mg  50 mg Oral 2x Daily(Beta Blocker)    midodrine (ProAmatine) tab 5 mg  5 mg Oral TID    montelukast (Singulair) tab 10 mg  10 mg Oral Nightly    sevelamer carbonate (Renvela) tab 2,400 mg  2,400 mg Oral TID    polyethylene glycol (PEG 3350) (Miralax) 17 g oral packet 17 g  17 g Oral Daily PRN    bisacodyl (Dulcolax) 10 MG rectal suppository 10 mg  10 mg Rectal Daily PRN    fleet enema (Fleet) 7-19 GM/118ML rectal enema 133 mL  1 enema Rectal Once PRN    acetaminophen (Tylenol) tab 650 mg  650 mg Oral Q6H PRN    HYDROcodone-acetaminophen (Norco)  MG per tab 1 tablet  1 tablet Oral Q4H PRN    lidocaine-menthol 4-1 % patch 1 patch  1 patch Transdermal Daily    sennosides (Senokot) tab 17.2 mg  17.2 mg Oral Nightly    atorvastatin (Lipitor) tab 5 mg  5 mg Oral Nightly           Allergies  Allergies   Allergen Reactions    Adhesive Tape SWELLING    Denosumab OTHER (SEE COMMENTS)     hypocalcemia  Lowered calcium level  Lowered calcium level  Lowered calcium level      Docosahexaenoic Acid, Dha HIVES    Ficus HIVES    Penicillins HIVES     Thinks she has tolerated penicillin in the past    Zithromax HIVES    Eicosapentaenoic Acid, Epa HIVES    Fig HIVES    Levofloxacin HIVES    Prolia OTHER (SEE COMMENTS)     Lowered calcium level    Vitamin E HIVES               REVIEW OF SYSTEMS:   GENERAL HEALTH: no fevers  SKIN: denies any unusual skin lesions or rashes   EARS: no recent changes in hearing  EYE: no recent vision changes  THROAT: denies sore throat  RESPIRATORY: denies cough or shortness of breath with exertion  CARDIOVASCULAR: denies chest pain, syncope, or palpitations  GI: denies abdominal pain, nausea and vomiting  NEURO: denies headaches and focal neurologic symptoms  PSYCH: no recent  depression  ENDOCRINE: no change in sleep pattern  HEME: no easy bruising  All other systems reviewed and negative.          EXAM:         TELE: AF          /64 (BP Location: Left arm)   Pulse 110   Temp 97.8 °F (36.6 °C) (Oral)   Resp 16   Ht 5' 3\" (1.6 m)   Wt 124 lb 9 oz (56.5 kg)   SpO2 94%   BMI 22.06 kg/m²   Temp (24hrs), Av.5 °F (36.9 °C), Min:97.8 °F (36.6 °C), Max:99.1 °F (37.3 °C)    Wt Readings from Last 3 Encounters:   24 124 lb 9 oz (56.5 kg)   23 133 lb 6.1 oz (60.5 kg)   23 135 lb (61.2 kg)         Intake/Output Summary (Last 24 hours) at 2024 0807  Last data filed at 2024 0427  Gross per 24 hour   Intake 870 ml   Output 2500 ml   Net -1630 ml         GENERAL: well developed, well nourished, in no apparent distress  SKIN: no rashes  HEENT: atraumatic, normocephalic, throat without erythema  NECK: supple, no bruits  LUNGS: clear to auscultation  CARDIO: irregular rhythm without murmur or S3   GI: soft, nontender  EXTREMITIES: no cyanosis, clubbing or edema  NEUROLOGY: alert  PSYCH: cooperative          LABORATORY DATA:               ECG:        ECHO :  ECHO:  1. Study data: Atrial fibrillation   2. Left ventricle: The cavity size was normal. Wall thickness was normal.      Systolic function was normal. The estimated ejection fraction was 55-60%,      by biplane method of disks. Wall motion is normal; there are no regional      wall motion abnormalities. Doppler parameters are consistent with a      reversible restrictive pattern, indicative of decreased left ventricular      diastolic compliance and/or increased left atrial pressure - grade 3      diastolic dysfunction.   3. Left atrium: The atrium was markedly dilated.   4. Right atrium: The atrium was moderately to markedly dilated.   5. Mitral valve: There was mild regurgitation.   6. Tricuspid valve: There was mild-moderate regurgitation.   7. Pulmonary arteries: Systolic pressure was within the normal  range,      estimated to be 30mm Hg.            Labs:  Recent Labs   Lab 01/27/24  0651 01/31/24  0543 01/31/24  1325 02/01/24  0626   * 77  --  67*   BUN 33* 58*  --  39*   CREATSERUM 4.36* 6.35*  --  4.49*   CA 8.0* 7.8*  --  7.8*    140  --  142   K 5.1 5.6* 4.0 4.4    105  --  100   CO2 24.0 18.0*  --  20.0*     No results for input(s): \"TROP\" in the last 168 hours.  Recent Labs   Lab 01/26/24  0132 01/27/24  0651 02/01/24  0626   RBC 3.50*   < > 3.91   HGB 10.8*   < > 12.2   HCT 33.7*   < > 37.4   MCV 96.3   < > 95.7   MCH 30.9   < > 31.2   MCHC 32.0   < > 32.6   RDW 16.7*   < > 16.5*   NEPRELIM 6.24  --   --    WBC 8.8   < > 8.3   .0*   < > 147.0*    < > = values in this interval not displayed.     No results for input(s): \"TROP\", \"TROPHS\", \"CK\" in the last 168 hours.    Imaging:  CT SOFT TISSUE OF NECK(CONTRAST ONLY) (CPT=70491)    Result Date: 1/31/2024  CONCLUSION:   Patulous proximal to mid esophagus redemonstrated with multifocal areas of retained ingested material similar to the prior examination.    Dictated by (CST): Karl Hill MD on 1/31/2024 at 5:10 PM     Finalized by (CST): Karl Hill MD on 1/31/2024 at 5:15 PM          CT SOFT TISSUE OF NECK (CPT=70490)    Result Date: 1/29/2024  CONCLUSION:   1. Patulous esophagus with multifocal areas of retained ingested material involving the visualized upper thoracic esophagus. 2. Small clustered area of nodularity involving the right upper lobe with proximal mucous plugging.  These findings are likely infectious/inflammatory. 3. Small left pleural effusion, which is only partially visualized. 4. Heterogeneous and enlarged thyroid gland with multiple thyroid nodules.  Consider correlation with thyroid ultrasound. 5. Lesser incidental findings described above.    Dictated by (CST): Jamar Driver MD on 1/29/2024 at 4:08 PM     Finalized by (CST): Jamar Driver MD on 1/29/2024 at 4:20 PM                Familia  MD Romeo

## 2024-02-01 NOTE — OPERATIVE REPORT
EGD Operative Report    Helen Espana Patient Status:  Inpatient    1940 MRN [id number]   Location [facility] ENDOSCOPY LAB SUITES Attending Miguel Corral MD   Hosp Day #   2 PCP Shannon Bahena MD       Pre-op Diagnosis: food impaction    Post-Op Diagnosis: Esophagitis and food bolus with suspected underlying esophageal dysmotility    Pre-procedure: The patient was assessed in the procedure room immediately before induction of sedation which included, at a minimum, vital signs, NPO status, and airway assessment.  The patient was deemed and appropriate candidate for procedural sedation.    Informed Consent: Informed consent for both the procedure and sedation were obtained from the patient.  The risks, benefits and alternatives to the procedure including potentially life-threatening complications of sedation, bleeding, perforation, missed lesions or need for repeat endoscopy were reviewed along with the possible need for hospitalization, surgical management, transfusion of blood products or repeat endoscopy should one of these complications arise.  The patient and/or POA voiced their understanding and was agreeable to proceed.     Sedation Type: MAC-Patient received sedation with monitored anesthesia provided by an anesthesiologist    Procedure Description: The patient was placed in the left lateral decubitus position.  A bite block was placed in the patient's mouth and the endoscope was passed through the mouth under direct vision and advanced to the second portion of the duodenum.  The endoscope was then withdrawn to examine the duodenal bulb, pylorus and gastric antrum, body, incisura and fundus and then retroflexed to  to examine the GE junction and cardia. The upper endoscopy was performed without difficulty, the patient tolerated the procedure well  with no immediate complications. The patient was transferred to the recovery area in stable condition.   Findings: Esophageal food impaction was noted approximately 20 to 30 cm from the incisors, consisting of soft partially digested material that did not cause complete obstruction.  Impaction was approximately 10 cm in length and was carefully traversed with passage of the scope into the stomach without great resistance.  Scope was then withdrawn back into the esophagus and food bolus slowly cleared with lavage and suction revealing a healthy, pink appearing mucosa beneath with no evidence of ischemia.  No gross strictures or webs or masses were noted; esophageal dysmotility is suspected due to lack of adequate tertiary contractions noted during exam.  Stomach and duodenum were otherwise grossly normal.  Few biopsies were taken with pediatric cold biopsy forceps from the esophagus to evaluate for underlying eosinophilic esophagitis given patient's family history and procedure was then terminated.    Impression: Esophagitis (inflamation of the esophagus lining) and food bolus, suspected dysmotility  Recommendations: Discharge patient when discharge criteria are met., Await pathology results, Further recommendations will be made after pathology results are available.  Discharge:  The patient was given an after visit summary detailing the procedure, findings, recommendations and follow up plans.    Jacqueline Gusman MD  2/1/2024  1:14 PM

## 2024-02-01 NOTE — PROGRESS NOTES
[facility] Hospitalist Progress Note     CC: Hospital Follow up    PCP: Shannon Bahena MD       Assessment/Plan:     Principal Problem:    Inability to walk  Active Problems:    Osteoarthritis of hip    Anemia due to chronic kidney disease, on chronic dialysis (HCC)    ESRD (end stage renal disease) on dialysis (HCC)    Ms. Cook is an 83-year-old female with a history of ESRD, paroxysmal atrial fibrillation, carotid stenosis, hypertension, hyperlipidemia, diastolic congestive heart failure, presents to the hospital for evaluation of left leg and hip pain, likely radicular pain fro L4-L5 disc herniation     Food bolus  - pt with nausea, vomiting, globus sensation on 1/29  - CT neck reviewed, showed patulous esophagus with multifocal areas of retained ingested material  - had EGD in 03/2023 at [facility] for globus sensation, per report esophageal hypomotility diagnosed but no achalasia, she did have a very dry oropharynx, pathology results were negative  - NPO, IVF  - s/p glucagon x 2  - GI colleagues consulted, discussed with Dr. Gusman  - plan for EGD today    Left hip/ Leg pain  Left hip OA  Spinal stenosis, L4-L5 disc extrusion  - xray neg for fracture  - outpatient MRI with Left hip OA and trets of hamstring tendon and gluteus minimus  - pain does appear to radiate to her lower leg   - lumbar spine with DDD  - continue lidoderm patch start low dose gabapentin  - continue norco    - PT/OT  - recently had steroid injection in hip with ortho, therefore not candidate for at least 1-2 more months  - continue prednisone   - Lumbar spine MRI, pre romero read with L4-L5 disc herniation  - continue PT/OT, may need RADHA on DC pending progress  - IV pain medications while strict NPO, had a lot of pain while working with PT/OT yesterday but that was after she vomited her pain medications, after EGD and once able to tolerate PO medications, will ask PT/OT to see again, depending on how she does can see if SNF/home most  appropriate     ESRD  -Nephrology consulted  -MWF HD     Paroxysmal atrial fibrillation  Afib with RVR  -Follows with [facility]  -She is on Eliquis 2.5 mg twice daily, held  -Normally on metoprolol, IV 5 mg q 6  -Afib with RVR on 1/31, on dilt drip, now improved  -cardiology consulted, ok for EGD today     Chronic HFpEF  - euvolemic  - volume management per HD     Endometrial wall thickening  - noted on outpatient MRI and US  - needs outpatient GYN follow up     Hypertension  -She takes a Norvasc as needed but not daily  -resume lopressor     FN:  - IVF:NS  - Diet: none     DVT Prophy:scd, eliquis held  Lines: PIV     Dispo: pending clinical course    Questions/concerns were discussed with patient and/or family by bedside.    Thank You,  Miguel Corral MD    Hospitalist with [facility]     Subjective:   No hip pain this am, Plan for EGD today, no chest pain or sob, no nausea or vomiting    OBJECTIVE:    Blood pressure 107/64, pulse 110, temperature 97.8 °F (36.6 °C), temperature source Oral, resp. rate 16, height 5' 3\" (1.6 m), weight 124 lb 9 oz (56.5 kg), SpO2 94%, not currently breastfeeding.    Temp:  [97.8 °F (36.6 °C)-99.1 °F (37.3 °C)] 97.8 °F (36.6 °C)  Pulse:  [] 110  Resp:  [16-20] 16  BP: (107-139)/(64-81) 107/64  SpO2:  [87 %-100 %] 94 %      Intake/Output:    Intake/Output Summary (Last 24 hours) at 2/1/2024 0815  Last data filed at 2/1/2024 0427  Gross per 24 hour   Intake 870 ml   Output 2500 ml   Net -1630 ml       Last 3 Weights   02/01/24 0509 124 lb 9 oz (56.5 kg)   01/31/24 1517 129 lb (58.5 kg)   01/30/24 0400 129 lb 13.6 oz (58.9 kg)   01/29/24 0630 130 lb 15.3 oz (59.4 kg)   01/28/24 0551 127 lb 13.9 oz (58 kg)   01/27/24 0642 124 lb 12.5 oz (56.6 kg)   01/26/24 0246 135 lb 12.9 oz (61.6 kg)   01/25/24 2148 126 lb 12.2 oz (57.5 kg)   07/18/23 1000 133 lb 6.1 oz (60.5 kg)   07/16/23 1942 134 lb 12.8 oz (61.1 kg)   07/16/23 1203 135 lb (61.2 kg)   06/20/23 1508 135 lb  (61.2 kg)       Exam    Gen: No acute distress, alert and oriented x3   Heent: NC AT, neck supple  Pulm: Lungs clear, normal respiratory effort  CV: Heart with regular rate and rhythm   Abd: Abdomen soft, nontender, nondistended   MSK: no clubbing, no cyanosis  Skin: no rashes or lesions        Data Review:       Labs:     Recent Labs   Lab 01/26/24  0132 01/27/24  0651 01/31/24  0543 02/01/24  0626   RBC 3.50* 3.93 3.93 3.91   HGB 10.8* 12.1 11.8* 12.2   HCT 33.7* 38.3 37.6 37.4   MCV 96.3 97.5 95.7 95.7   MCH 30.9 30.8 30.0 31.2   MCHC 32.0 31.6 31.4 32.6   RDW 16.7* 16.9* 16.7* 16.5*   NEPRELIM 6.24  --   --   --    WBC 8.8 9.7 9.2 8.3   .0* 147.0* 145.0* 147.0*         Recent Labs   Lab 01/27/24  0651 01/31/24  0543 01/31/24  1325 02/01/24  0626   * 77  --  67*   BUN 33* 58*  --  39*   CREATSERUM 4.36* 6.35*  --  4.49*   EGFRCR 10* 6*  --  9*   CA 8.0* 7.8*  --  7.8*    140  --  142   K 5.1 5.6* 4.0 4.4    105  --  100   CO2 24.0 18.0*  --  20.0*       No results for input(s): \"ALT\", \"AST\", \"ALB\", \"AMYLASE\", \"LIPASE\", \"LDH\" in the last 168 hours.    Invalid input(s): \"ALPHOS\", \"TBIL\", \"DBIL\", \"TPROT\"      Imaging:  CT SOFT TISSUE OF NECK(CONTRAST ONLY) (CPT=70491)    Result Date: 1/31/2024  CONCLUSION:   Patulous proximal to mid esophagus redemonstrated with multifocal areas of retained ingested material similar to the prior examination.    Dictated by (CST): Karl Hill MD on 1/31/2024 at 5:10 PM     Finalized by (CST): Karl Hill MD on 1/31/2024 at 5:15 PM          CT SOFT TISSUE OF NECK (CPT=70490)    Result Date: 1/29/2024  CONCLUSION:   1. Patulous esophagus with multifocal areas of retained ingested material involving the visualized upper thoracic esophagus. 2. Small clustered area of nodularity involving the right upper lobe with proximal mucous plugging.  These findings are likely infectious/inflammatory. 3. Small left pleural effusion, which is only partially  visualized. 4. Heterogeneous and enlarged thyroid gland with multiple thyroid nodules.  Consider correlation with thyroid ultrasound. 5. Lesser incidental findings described above.    Dictated by (CST): Jamar Driver MD on 1/29/2024 at 4:08 PM     Finalized by (CST): Jamar Driver MD on 1/29/2024 at 4:20 PM             Meds:      [Held by provider] metoprolol  5 mg Intravenous Q6H    gabapentin  100 mg Oral BID    pantoprazole  40 mg Oral QAM AC    [Held by provider] apixaban  2.5 mg Oral 2 times per day    [Held by provider] metoprolol tartrate  50 mg Oral 2x Daily(Beta Blocker)    midodrine  5 mg Oral TID    montelukast  10 mg Oral Nightly    sevelamer carbonate  2,400 mg Oral TID    lidocaine-menthol  1 patch Transdermal Daily    sennosides  17.2 mg Oral Nightly    atorvastatin  5 mg Oral Nightly      dilTIAZem 10 mg/hr (02/01/24 0427)       dilTIAZem, acetaminophen, HYDROmorphone **OR** HYDROmorphone **OR** HYDROmorphone, calcium carbonate, amLODIPine, polyethylene glycol (PEG 3350), bisacodyl, fleet enema, acetaminophen, HYDROcodone-acetaminophen

## 2024-02-01 NOTE — PROGRESS NOTES
[facility]    Nephrology Progress Note    Chief Complaint   Patient presents with    Groin Pain    Leg Pain       Subjective:   83 year old female, following for ESRD on HD.     HR in 120s-130s yesterday after HD.   EGD delayed until today.   Left leg pain, not much better today.     Review of Systems:   Review of Systems   Constitutional:  Positive for fatigue. Negative for chills and fever.   Respiratory:  Negative for cough and shortness of breath.    Cardiovascular:  Negative for chest pain and leg swelling.   Gastrointestinal:  Negative for abdominal pain, constipation, diarrhea, nausea and vomiting.   Genitourinary:  Negative for difficulty urinating, dysuria and flank pain.   Musculoskeletal:         Left leg pain       Objective:   Temp:  [97.8 °F (36.6 °C)-99.1 °F (37.3 °C)] 97.9 °F (36.6 °C)  Pulse:  [] 105  Resp:  [16-20] 18  BP: (107-139)/(60-81) 107/60  SpO2:  [87 %-100 %] 95 %  SpO2: 95 %     Intake/Output Summary (Last 24 hours) at 2/1/2024 1136  Last data filed at 2/1/2024 0427  Gross per 24 hour   Intake 540 ml   Output 2500 ml   Net -1960 ml     Wt Readings from Last 3 Encounters:   02/01/24 124 lb 9 oz (56.5 kg)   07/18/23 133 lb 6.1 oz (60.5 kg)   06/20/23 135 lb (61.2 kg)     Physical Exam  Constitutional:       Appearance: Normal appearance.   Cardiovascular:      Rate and Rhythm: Normal rate and regular rhythm.      Heart sounds: Normal heart sounds.      Comments: R AVF-pos bruit/thrill  Pulmonary:      Effort: Pulmonary effort is normal.      Breath sounds: Normal breath sounds.   Musculoskeletal:         General: Normal range of motion.   Skin:     General: Skin is warm and dry.   Neurological:      General: No focal deficit present.      Mental Status: She is alert and oriented to person, place, and time.   Psychiatric:         Mood and Affect: Mood normal.         Behavior: Behavior normal.         Medications:               Results:     Recent Labs   Lab  01/26/24  0132 01/27/24  0651 01/31/24  0543 02/01/24  0626   RBC 3.50* 3.93 3.93 3.91   HGB 10.8* 12.1 11.8* 12.2   HCT 33.7* 38.3 37.6 37.4   MCV 96.3 97.5 95.7 95.7   NEPRELIM 6.24  --   --   --    WBC 8.8 9.7 9.2 8.3   .0* 147.0* 145.0* 147.0*     Recent Labs   Lab 01/27/24  0651 01/31/24  0543 01/31/24  1325 02/01/24  0626   * 77  --  67*   BUN 33* 58*  --  39*   CREATSERUM 4.36* 6.35*  --  4.49*   CA 8.0* 7.8*  --  7.8*    140  --  142   K 5.1 5.6* 4.0 4.4    105  --  100   CO2 24.0 18.0*  --  20.0*     PTT   Date Value Ref Range Status   11/06/2018 23.6 (L) 24.0 - 33.7 sec Final     INR   Date Value Ref Range Status   11/06/2018 1.0 0.9 - 1.2 ratio Final     Comment:     An INR of 2.0-3.0 is the usual therapeutic interval.  Some patients (e.g., those with recurrent thrombotic events, a mechanical heart valve) may require more intense therapy with a therapeutic INR interval of 2.5-3.5   08/11/2015 3.0 (A) 0.8 - 1.2 Final     No results for input(s): \"BNP\" in the last 168 hours.  Recent Labs   Lab 01/27/24  0651   MG 2.0        No results for input(s): \"PHOS\", \"ALB\" in the last 168 hours.    Invalid input(s): \"BMP\"    CT SOFT TISSUE OF NECK(CONTRAST ONLY) (CPT=70491)    Result Date: 1/31/2024  CONCLUSION:   Patulous proximal to mid esophagus redemonstrated with multifocal areas of retained ingested material similar to the prior examination.    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    ESRD HD MWF:   HD yesterday.     HTN:   BP better. Cont Midodrine.      Anemia:   Hb 12.2, at goal.      Hyperphosphatemia:   Cont Renvela 3 tabs with meals.    Phos  ordered.     Left leg and hip pain:   Pain control.   PT following.     Globus sensation:   For an EGD today.   GI following       Ana Bay MD  2/1/2024

## 2024-02-01 NOTE — PHYSICAL THERAPY NOTE
Chart reviewed. Pt to EGD today at noon, noted that Dr. Corral's note today said that PT/OT could see pt again after EGD and pt on PO meds. Pt still in PACU, will reattempt later as able.

## 2024-02-01 NOTE — OCCUPATIONAL THERAPY NOTE
Chart reviewed. Patient off unit for EGD. OT will f/u for treatment session as appropriate/able.      Mavis Phan OTR/L  [facility]  #[id number]

## 2024-02-01 NOTE — ANESTHESIA PREPROCEDURE EVALUATION
Anesthesia PreOp Note    HPI:     Helen Espana is a 83 year old female who presents for preoperative consultation requested by: Jacqueline Gusman MD    Date of Surgery: 1/25/2024 - 2/1/2024    Procedure(s):  ESOPHAGOGASTRODUODENOSCOPY (EGD)  Indication: food impaction    Relevant Problems   No relevant active problems       NPO:  Last Liquid Consumption Date: 01/30/24     Last Solid Consumption Date: 01/30/24     Last Liquid Consumption Date: 01/30/24          History Review:  Patient Active Problem List    Diagnosis Date Noted   • Inability to walk 01/26/2024   • Osteoarthritis of hip 01/26/2024   • Anemia due to chronic kidney disease, on chronic dialysis ([facility]) 01/26/2024   • ESRD (end stage renal disease) on dialysis ([facility]) 01/26/2024   • Symptomatic bradycardia 07/16/2023   • ESRD on hemodialysis ([facility]) 12/24/2020   • ESRD (end stage renal disease) ([facility])    • Seasonal allergic rhinitis due to pollen 04/28/2020   • 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 06/06/2019   • Internal derangement of left knee 05/22/2019   • Squamous blepharitis of upper and lower eyelids of both eyes 02/22/2019   • Malignant neoplasm of upper-outer quadrant of left breast in female, estrogen receptor positive  ([facility]) 12/20/2018   • AVF (arteriovenous fistula) ([facility]) 10/12/2017   • Gastroesophageal reflux disease with esophagitis 01/30/2017   • Hemodialysis access site with arteriovenous graft ([facility]) 01/14/2017   • Scoliosis of lumbosacral spine, unspecified scoliosis type 08/05/2016   • Chronic left-sided low back pain with left-sided sciatica 08/05/2016   • Complete tear of right rotator cuff 11/02/2015   • Osteoporosis of multiple sites associated with endocrine disorder 09/02/2015   • Age-related nuclear cataract of both eyes 06/23/2015   • Vitreous floaters of both eyes 06/23/2015   • Chorioretinal scar of right eye 06/23/2015   • Spondylosis of lumbar region without myelopathy or radiculopathy 02/26/2013   • Left knee DJD 01/22/2013   •  Paroxysmal atrial fibrillation (HCC) 05/07/2012   • Mixed hyperlipidemia 04/14/2009   • Polycystic kidney, autosomal dominant 09/03/2008   • BENIGN HYPERTENSION 09/04/2007       Past Medical History:   Diagnosis Date   • Arrhythmia     Afib   • AVF (arteriovenous fistula) ([facility]) 10/12/2017   • Biceps rupture, proximal, right, initial encounter 02/20/2018   • Breast cancer ([facility]) 1998   • Breast cancer in female ([facility]) 12/20/2018   • CANCER 1989    right breast mastectomy   • Cataract 2001    OU   • Clostridioides difficile infection    • Cup to disc asymmetry 2001    OU   • Dialysis patient ([facility])     HD M, W, F   • Essential hypertension    • Floaters 2001    OU   • Hearing impairment     hearing aides   • Hemodialysis AV fistula aneurysm ([facility]) 01/23/2021   • High blood pressure    • High cholesterol    • History of blood transfusion     @[facility]   • Hyperlipidemia    • Osteoarthritis    • Osteoporosis    • OTHER DISEASES     polycystic kidney disease familial   • Pulmonary embolism ([facility])    • Renal disorder    • Right wrist pain 10/12/2017   • Visual impairment     wears glasses       Past Surgical History:   Procedure Laterality Date   • CATARACT EXTRACTION W/  INTRAOCULAR LENS IMPLANT Right 11/02/2021    Dr. Giordano @ [facility]   • CATARACT EXTRACTION W/  INTRAOCULAR LENS IMPLANT Left 12/28/2021    Dr. Giordano @ [facility]   • CHOLECYSTECTOMY  12/04    laparoscopic   • COLONOSCOPY  6/07    c. diff colitis-Ali   • ECHO 2D DP/CLRFL, CARDIO (INTERNAL)  2016 [facility] estee    mod MR/LAE; border systolic LVEF   • ECHO 2D, CARDIO (DMG)  06/30/2020    [facility] cardio: no change except mod mitral regurg mild now   • HX BREAST CANCER     • LEXISCAN NUC STRESS TST, CARDIO (INTERNAL)  05/15/2018    [place] [facility]; normal ; EF 54%   • LEXISCAN NUC STRESS TST, CARDIO (INTERNAL)  02/11/2021    [place] [facility]; EF57%; normal wall motion; fixed perfusion defect inf. wall   • LUMPECTOMY RIGHT  2016   • MASTECTOMY PARTIAL  12/20/2018    [facility]: left  breaswt seed loc partial mastectomy    • MASTECTOMY RIGHT     •      • OTHER SURGICAL HISTORY  12    cysto-Dr. Allred   • OTHER SURGICAL HISTORY  1/21/15     lap sigmoid colectomy    • OTHER SURGICAL HISTORY  16    Right Hemicolectomy by Dr. Chaudhry   • PERITONEAL DIALYSIS SYSTEM      2009       Medications Prior to Admission   Medication Sig Dispense Refill Last Dose   • metoprolol tartrate 50 MG Oral Tab Take 1 tablet (50 mg total) by mouth 2x Daily(Beta Blocker). 60 tablet 0 2024   • midodrine 5 MG Oral Tab Take 1 tablet (5 mg total) by mouth in the morning and 1 tablet (5 mg total) at noon and 1 tablet (5 mg total) in the evening. 90 tablet 0 2024   • ELIQUIS 2.5 MG Oral Tab Take 1 tablet (2.5 mg total) by mouth Q12H.      • amLODIPine 5 MG Oral Tab Take 1 tablet (5 mg total) by mouth daily as needed (for HTN).      • SEVELAMER 800 MG Oral Tab TAKE 3 TABLETS BY MOUTH 3 TIMES DAILY 810 tablet 4    • ESOMEPRAZOLE MAGNESIUM 40 MG Oral Capsule Delayed Release TAKE 1 CAPSULE BY MOUTH DAILY. BEFORE MEAL 90 capsule 0    • MONTELUKAST 10 MG Oral Tab TAKE 1 TABLET BY MOUTH EVERY DAY AT NIGHT 90 tablet 1 2024   • TURMERIC OR Take by mouth.   2024   • atorvastatin 10 MG Oral Tab Take 0.5 tablets (5 mg total) by mouth daily. Every other day as stated by pt**   2024   • loratadine 10 MG Oral Tab Take 1 tablet (10 mg total) by mouth.      • Cholecalciferol (VITAMIN D) 2000 UNITS Oral Tab Take 2 tablets by mouth daily. 30 tablet 11    • Ergocalciferol 50 MCG (2000 UT) Oral Tab Take 50 mcg by mouth daily.        Current Facility-Administered Medications Ordered in Epic   Medication Dose Route Frequency Provider Last Rate Last Admin   • pantoprazole (Protonix) 40 mg in sodium chloride 0.9% PF 10 mL IV push  40 mg Intravenous Q12H Miguel Corral MD   40 mg at 24 0903   • metoprolol (Lopressor) 5 mg/5mL injection 5 mg  5 mg Intravenous Q6H Ashley Jewell DO   5 mg at 24  1904   • dilTIAZem 10 mg BOLUS FROM BAG infusion  10 mg Intravenous Q1H PRN Ashley Jewell DO       • dilTIAZem (cardIZEM) 100 mg in sodium chloride 0.9% 100 mL IVPB-ADDV  2.5-20 mg/hr Intravenous Continuous Ashley Jewell DO 10 mL/hr at 02/01/24 0427 10 mg/hr at 02/01/24 0427   • acetaminophen (Ofirmev) 10 mg/mL infusion premix 1,000 mg  1,000 mg Intravenous Q6H PRN Ashley Jewell  mL/hr at 01/31/24 2027 1,000 mg at 01/31/24 2027   • HYDROmorphone (Dilaudid) 1 MG/ML injection 0.2 mg  0.2 mg Intravenous Q2H PRN Ashley Jewell DO   0.2 mg at 02/01/24 0427    Or   • HYDROmorphone (Dilaudid) 1 MG/ML injection 0.4 mg  0.4 mg Intravenous Q2H PRN Ashley Jewell DO   0.4 mg at 02/01/24 0904    Or   • HYDROmorphone (Dilaudid) 1 MG/ML injection 0.8 mg  0.8 mg Intravenous Q2H PRN Ashley Jewell DO   0.8 mg at 01/31/24 1613   • gabapentin (Neurontin) cap 100 mg  100 mg Oral BID Miguel Corral MD   100 mg at 01/28/24 2023   • calcium carbonate (Tums) chewable tab 1,000 mg  1,000 mg Oral Q8H PRN Viraj Chi MD   1,000 mg at 01/28/24 2034   • amLODIPine (Norvasc) tab 5 mg  5 mg Oral Daily PRN Raquel Zhang DO       • [Held by provider] apixaban (Eliquis) tab 2.5 mg  2.5 mg Oral 2 times per day Raquel Zhang DO   2.5 mg at 01/29/24 0600   • [Held by provider] metoprolol tartrate (Lopressor) tab 50 mg  50 mg Oral 2x Daily(Beta Blocker) Miguel Corral MD   50 mg at 01/28/24 1708   • midodrine (ProAmatine) tab 5 mg  5 mg Oral TID Raquel Zhang DO   5 mg at 01/29/24 0600   • montelukast (Singulair) tab 10 mg  10 mg Oral Nightly Raquel Zhang DO   10 mg at 01/28/24 2023   • sevelamer carbonate (Renvela) tab 2,400 mg  2,400 mg Oral TID Raquel Zhang DO   2,400 mg at 01/28/24 1708   • polyethylene glycol (PEG 3350) (Miralax) 17 g oral packet 17 g  17 g Oral Daily PRN Raquel Zhang DO       • bisacodyl (Dulcolax) 10 MG rectal  suppository 10 mg  10 mg Rectal Daily PRN Raquel Zhang, DO       • fleet enema (Fleet) 7-19 GM/118ML rectal enema 133 mL  1 enema Rectal Once PRN Raquel Zhang, DO       • acetaminophen (Tylenol) tab 650 mg  650 mg Oral Q6H PRN Miguel Corral MD   650 mg at 01/27/24 2055   • HYDROcodone-acetaminophen (Norco)  MG per tab 1 tablet  1 tablet Oral Q4H PRN Miguel Corral MD   1 tablet at 01/29/24 0220   • lidocaine-menthol 4-1 % patch 1 patch  1 patch Transdermal Daily Miguel Corral MD   1 patch at 02/01/24 0904   • sennosides (Senokot) tab 17.2 mg  17.2 mg Oral Nightly Miguel Corral MD   17.2 mg at 01/28/24 2023   • atorvastatin (Lipitor) tab 5 mg  5 mg Oral Nightly Miguel Corral MD         No current Epic-ordered outpatient medications on file.       Allergies   Allergen Reactions   • Adhesive Tape SWELLING   • Denosumab OTHER (SEE COMMENTS)     hypocalcemia  Lowered calcium level  Lowered calcium level  Lowered calcium level     • Docosahexaenoic Acid, Dha HIVES   • Ficus HIVES   • Penicillins HIVES     Thinks she has tolerated penicillin in the past   • Zithromax HIVES   • Eicosapentaenoic Acid, Epa HIVES   • Fig HIVES   • Levofloxacin HIVES   • Prolia OTHER (SEE COMMENTS)     Lowered calcium level   • Vitamin E HIVES       Family History   Problem Relation Age of Onset   • Heart Disorder Father         MI   • Diabetes Father    • Kidney Disease Father         polycystic kidney disease   • Hypertension Mother    • Heart Disorder Mother         stroke   • Cataracts Mother    • Thyroid Disorder Daughter         thyroid   • Other (Other) Sister         Cushing's disease   • Diabetes Sister    • Hypertension Sister    • Cancer Sister 69        lung tob    • Kidney Disease Son         pckd   • Kidney Disease Brother         PCKD    • Breast Cancer Maternal Aunt 50        x2 ages 85 and in her 50's   • Macular degeneration Other         Aunt   • Glaucoma Neg         family h/o     Social History     Socioeconomic  History   • Marital status:    Occupational History   • Occupation: retired real estate   Tobacco Use   • Smoking status: Never   • Smokeless tobacco: Never   Vaping Use   • Vaping Use: Never used   • Passive vaping exposure: Yes   Substance and Sexual Activity   • Alcohol use: No   • Drug use: No   • Sexual activity: Yes     Partners: Male     Comment:  december 2020-he had CHF    Other Topics Concern   •  Service No   • Blood Transfusions Yes   • Caffeine Concern Yes     Comment: coffee   • Hobby Hazards No   • Sleep Concern Yes   • Stress Concern Yes   • Weight Concern Yes   • Special Diet Yes   • Seat Belt Yes   • Self-Exams Yes       Available pre-op labs reviewed.  Lab Results   Component Value Date    WBC 8.3 02/01/2024    RBC 3.91 02/01/2024    HGB 12.2 02/01/2024    HCT 37.4 02/01/2024    MCV 95.7 02/01/2024    MCH 31.2 02/01/2024    MCHC 32.6 02/01/2024    RDW 16.5 (H) 02/01/2024    .0 (L) 02/01/2024     Lab Results   Component Value Date     02/01/2024    K 4.4 02/01/2024     02/01/2024    CO2 20.0 (L) 02/01/2024    BUN 39 (H) 02/01/2024    CREATSERUM 4.49 (H) 02/01/2024    GLU 67 (L) 02/01/2024    PGLU 76 02/01/2024    CA 7.8 (L) 02/01/2024          Vital Signs:  Body mass index is 22.06 kg/m².   height is 1.6 m (5' 3\") and weight is 56.5 kg (124 lb 9 oz). Her oral temperature is 97.9 °F (36.6 °C). Her blood pressure is 107/60 and her pulse is 105. Her respiration is 18 and oxygen saturation is 95%.   Vitals:    02/01/24 0200 02/01/24 0416 02/01/24 0509 02/01/24 0900   BP:  107/64  107/60   Pulse: 97 110  105   Resp:  16  18   Temp:  97.8 °F (36.6 °C)  97.9 °F (36.6 °C)   TempSrc:  Oral  Oral   SpO2:  94%  95%   Weight:   56.5 kg (124 lb 9 oz)    Height:            Anesthesia Evaluation     Patient summary reviewed and Nursing notes reviewed    Airway   Mallampati: III  Dental    (+) implants    Pulmonary     breath sounds clear to auscultation  Cardiovascular   (+)  hypertension, dysrhythmias    Rhythm: irregular  ROS comment: AF on cardizem gtt on 10mg/hr    Neuro/Psych    (+)  neuromuscular disease,        GI/Hepatic/Renal      Endo/Other    Abdominal      Other findings: Upper front implants.  Discussed anesthetic risks such as but, not limited to, CVA, MI, dental damage, lip laceration d/t very dry mucous membranes, awareness and aspiration; verbalizes understanding and wishes to proceed.  The anesthetic dental exam does not represent a complete dental/oral exam performed by a dental professional. Notations on the dental diagram can help to highlight areas of concern and may no reflect all findings.          Anesthesia Plan:   ASA:  4  Emergent    Plan:   General  Airway:  ETT and Video laryngoscope  Informed Consent Plan and Risks Discussed With:  Patient and child/children  Consent Comment: Discussed plan with arturo Gusman and Shantal.  Discussed plan with:  Attending    I have informed Helen Espana and/or legal guardian or family member of the nature of the anesthetic plan, benefits, risks including possible dental damage if relevant, major complications, and any alternative forms of anesthetic management.   All of the patient's questions were answered to the best of my ability. The patient desires the anesthetic management as planned.  Juju Perera CRNA  2/1/2024 12:19 PM  Present on Admission:  **None**

## 2024-02-01 NOTE — PLAN OF CARE
Problem: PAIN - ADULT  Goal: Verbalizes/displays adequate comfort level or patient's stated pain goal  Description: INTERVENTIONS:  - Encourage pt to monitor pain and request assistance  - Assess pain using appropriate pain scale  - Administer analgesics based on type and severity of pain and evaluate response  - Implement non-pharmacological measures as appropriate and evaluate response  - Consider cultural and social influences on pain and pain management  - Manage/alleviate anxiety  - Utilize distraction and/or relaxation techniques  - Monitor for opioid side effects  - Notify MD/LIP if interventions unsuccessful or patient reports new pain  - Anticipate increased pain with activity and pre-medicate as appropriate  Outcome: Progressing     Problem: SAFETY ADULT - FALL  Goal: Free from fall injury  Description: INTERVENTIONS:  - Assess pt frequently for physical needs  - Identify cognitive and physical deficits and behaviors that affect risk of falls.  - [place] fall precautions as indicated by assessment.  - Educate pt/family on patient safety including physical limitations  - Instruct pt to call for assistance with activity based on assessment  - Modify environment to reduce risk of injury  - Provide assistive devices as appropriate  - Consider OT/PT consult to assist with strengthening/mobility  - Encourage toileting schedule  Outcome: Progressing     Problem: DISCHARGE PLANNING  Goal: Discharge to home or other facility with appropriate resources  Description: INTERVENTIONS:  - Identify barriers to discharge w/pt and caregiver  - Include patient/family/discharge partner in discharge planning  - Arrange for needed discharge resources and transportation as appropriate  - Identify discharge learning needs (meds, wound care, etc)  - Arrange for interpreters to assist at discharge as needed  - Consider post-discharge preferences of patient/family/discharge partner  - Complete POLST form as appropriate  - Assess  patient's ability to be responsible for managing their own health  - Refer to Case Management Department for coordinating discharge planning if the patient needs post-hospital services based on physician/LIP order or complex needs related to functional status, cognitive ability or social support system  Outcome: Progressing     Problem: METABOLIC/FLUID AND ELECTROLYTES - ADULT  Goal: Electrolytes maintained within normal limits  Description: INTERVENTIONS:  - Monitor labs and rhythm and assess patient for signs and symptoms of electrolyte imbalances  - Administer electrolyte replacement as ordered  - Monitor response to electrolyte replacements, including rhythm and repeat lab results as appropriate  - Fluid restriction as ordered  - Instruct patient on fluid and nutrition restrictions as appropriate  Outcome: Progressing  Goal: Hemodynamic stability and optimal renal function maintained  Description: INTERVENTIONS:  - Monitor labs and assess for signs and symptoms of volume excess or deficit  - Monitor intake, output and patient weight  - Monitor urine specific gravity, serum osmolarity and serum sodium as indicated or ordered  - Monitor response to interventions for patient's volume status, including labs, urine output, blood pressure (other measures as available)  - Encourage oral intake as appropriate  - Instruct patient on fluid and nutrition restrictions as appropriate  Outcome: Progressing     Problem: Impaired Functional Mobility  Goal: Achieve highest/safest level of mobility/gait  Description: Interventions:  - Assess patient's functional ability and stability  - Promote increasing activity/tolerance for mobility and gait  - Educate and engage patient/family in tolerated activity level and precautions  - Recommend use of  RW for transfers and ambulation  - Recommend patient transfer to bedside chair toward strongest side  - When transferring patient, block weaker knee for safety  - Recommend use of chair  position in bed 3 times per day  Outcome: Progressing     Problem: SKIN/TISSUE INTEGRITY - ADULT  Goal: Skin integrity remains intact  Description: INTERVENTIONS  - Assess and document risk factors for pressure ulcer development  - Assess and document skin integrity  - Monitor for areas of redness and/or skin breakdown  - Initiate interventions, skin care algorithm/standards of care as needed  Outcome: Progressing     Problem: MUSCULOSKELETAL - ADULT  Goal: Return mobility to safest level of function  Description: INTERVENTIONS:  - Assess patient stability and activity tolerance for standing, transferring and ambulating w/ or w/o assistive devices  - Assist with transfers and ambulation using safe patient handling equipment as needed  - Ensure adequate protection for wounds/incisions during mobilization  - Obtain PT/OT consults as needed  - Advance activity as appropriate  - Communicate ordered activity level and limitations with patient/family  Outcome: Progressing     Problem: Impaired Activities of Daily Living  Goal: Achieve highest/safest level of independence in self care  Description: Interventions:  - Assess ability and encourage patient to participate in ADLs to maximize function  - Promote sitting position while performing ADLs such as feeding, grooming, and bathing  - Educate and encourage patient/family in tolerated functional activity level and precautions during self-care  Outcome: Progressing     Problem: Patient Centered Care  Goal: Patient preferences are identified and integrated in the patient's plan of care  Description: Interventions:  - What would you like us to know as we care for you? From home with grandson  - Provide timely, complete, and accurate information to patient/family  - Incorporate patient and family knowledge, values, beliefs, and cultural backgrounds into the planning and delivery of care  - Encourage patient/family to participate in care and decision-making at the level they  choose  - Honor patient and family perspectives and choices  Outcome: Progressing  HD done today, per HD RN pt educated on fluid overload and potassium levels and fluid intake-education reinforced by this RN at the bedside, call light left within reach. Patient continue strict NPO, she went to ENDO but procedure was not  done do to patient's high heart rate. She was given prior procedure lopressor x2, no improvement. MD order IV cardizem, she will transfer to CV floor room 309 when bed available. Family at bedside, updated on care plan.

## 2024-02-01 NOTE — ANESTHESIA PROCEDURE NOTES
Airway  Date/Time: 2/1/2024 12:49 PM  Urgency: elective      General Information and Staff    Patient location during procedure: endo  Resident/CRNA: Juju Perera CRNA  Performed: CRNA   Performed by: Juju Perera CRNA  Authorized by: Juju Perera CRNA      Indications and Patient Condition  Indications for airway management: airway protection  Spontaneous Ventilation: absent  Sedation level: deep  Preoxygenated: yes  MILS maintained throughout  Mask difficulty assessment: 0 - not attempted    Final Airway Details  Final airway type: endotracheal airway      Successful airway: ETT  Cuffed: yes   Successful intubation technique: Video laryngoscopy  Facilitating devices/methods: intubating stylet, cricoid pressure and rapid sequence intubation  Endotracheal tube insertion site: oral  Blade: GlideScope  Blade size: #3  ETT size (mm): 7.0    Cormack-Lehane Classification: grade I - full view of glottis  Measured from: lips  ETT to lips (cm): 21    Additional Comments  Easy, atraumatic RSI.

## 2024-02-01 NOTE — PLAN OF CARE
Patient alert and oriented. Family at the bedside during the day. EGD done in the afternoon. Cardizem gtt stopped in PACU, Metoprolol and Eliquis restarted in the evening. Tolerating clear liquid diet. Resting in bed with fall precautions in place and call light in reach. Plan for physical therapy evaluation tomorrow.     Problem: PAIN - ADULT  Goal: Verbalizes/displays adequate comfort level or patient's stated pain goal  Description: INTERVENTIONS:  - Encourage pt to monitor pain and request assistance  - Assess pain using appropriate pain scale  - Administer analgesics based on type and severity of pain and evaluate response  - Implement non-pharmacological measures as appropriate and evaluate response  - Consider cultural and social influences on pain and pain management  - Manage/alleviate anxiety  - Utilize distraction and/or relaxation techniques  - Monitor for opioid side effects  - Notify MD/LIP if interventions unsuccessful or patient reports new pain  - Anticipate increased pain with activity and pre-medicate as appropriate  Outcome: Progressing     Problem: SAFETY ADULT - FALL  Goal: Free from fall injury  Description: INTERVENTIONS:  - Assess pt frequently for physical needs  - Identify cognitive and physical deficits and behaviors that affect risk of falls.  - [place] fall precautions as indicated by assessment.  - Educate pt/family on patient safety including physical limitations  - Instruct pt to call for assistance with activity based on assessment  - Modify environment to reduce risk of injury  - Provide assistive devices as appropriate  - Consider OT/PT consult to assist with strengthening/mobility  - Encourage toileting schedule  Outcome: Progressing     Problem: DISCHARGE PLANNING  Goal: Discharge to home or other facility with appropriate resources  Description: INTERVENTIONS:  - Identify barriers to discharge w/pt and caregiver  - Include patient/family/discharge partner in discharge  planning  - Arrange for needed discharge resources and transportation as appropriate  - Identify discharge learning needs (meds, wound care, etc)  - Arrange for interpreters to assist at discharge as needed  - Consider post-discharge preferences of patient/family/discharge partner  - Complete POLST form as appropriate  - Assess patient's ability to be responsible for managing their own health  - Refer to Case Management Department for coordinating discharge planning if the patient needs post-hospital services based on physician/LIP order or complex needs related to functional status, cognitive ability or social support system  Outcome: Progressing     Problem: Patient/Family Goals  Goal: Patient/Family Long Term Goal  Description: Patient's Long Term Goal: Discharge from the hospital    Interventions:  - Monitor vital signs  - Monitor appropriate labs  - Pain management  - Administer medications per order  - Follow MD orders  - Diagnostics per order  - Update / inform patient and family on plan of care  - Discharge planning  - See additional Care Plan goals for specific interventions  Outcome: Progressing  Goal: Patient/Family Short Term Goal  Description: Patient's Short Term Goal: Manage pain    Interventions:   - Monitor vital signs  - Monitor appropriate labs  - Pain management  - Administer medications per order  - Follow MD orders  - Diagnostics per order  - Update / inform patient and family on plan of care  - See additional Care Plan goals for specific interventions  Outcome: Progressing     Problem: METABOLIC/FLUID AND ELECTROLYTES - ADULT  Goal: Electrolytes maintained within normal limits  Description: INTERVENTIONS:  - Monitor labs and rhythm and assess patient for signs and symptoms of electrolyte imbalances  - Administer electrolyte replacement as ordered  - Monitor response to electrolyte replacements, including rhythm and repeat lab results as appropriate  - Fluid restriction as ordered  - Instruct  patient on fluid and nutrition restrictions as appropriate  Outcome: Progressing  Goal: Hemodynamic stability and optimal renal function maintained  Description: INTERVENTIONS:  - Monitor labs and assess for signs and symptoms of volume excess or deficit  - Monitor intake, output and patient weight  - Monitor urine specific gravity, serum osmolarity and serum sodium as indicated or ordered  - Monitor response to interventions for patient's volume status, including labs, urine output, blood pressure (other measures as available)  - Encourage oral intake as appropriate  - Instruct patient on fluid and nutrition restrictions as appropriate  Outcome: Progressing     Problem: Impaired Functional Mobility  Goal: Achieve highest/safest level of mobility/gait  Description: Interventions:  - Assess patient's functional ability and stability  - Promote increasing activity/tolerance for mobility and gait  - Educate and engage patient/family in tolerated activity level and precautions  - Recommend use of  RW for transfers and ambulation  - Recommend patient transfer to bedside chair toward strongest side  - When transferring patient, block weaker knee for safety  - Recommend use of chair position in bed 3 times per day  Outcome: Progressing     Problem: Patient Centered Care  Goal: Patient preferences are identified and integrated in the patient's plan of care  Description: Interventions:  - What would you like us to know as we care for you? From home with grandson  - Provide timely, complete, and accurate information to patient/family  - Incorporate patient and family knowledge, values, beliefs, and cultural backgrounds into the planning and delivery of care  - Encourage patient/family to participate in care and decision-making at the level they choose  - Honor patient and family perspectives and choices  Outcome: Progressing     Problem: SKIN/TISSUE INTEGRITY - ADULT  Goal: Skin integrity remains intact  Description:  INTERVENTIONS  - Assess and document risk factors for pressure ulcer development  - Assess and document skin integrity  - Monitor for areas of redness and/or skin breakdown  - Initiate interventions, skin care algorithm/standards of care as needed  Outcome: Progressing     Problem: MUSCULOSKELETAL - ADULT  Goal: Return mobility to safest level of function  Description: INTERVENTIONS:  - Assess patient stability and activity tolerance for standing, transferring and ambulating w/ or w/o assistive devices  - Assist with transfers and ambulation using safe patient handling equipment as needed  - Ensure adequate protection for wounds/incisions during mobilization  - Obtain PT/OT consults as needed  - Advance activity as appropriate  - Communicate ordered activity level and limitations with patient/family  Outcome: Progressing     Problem: Impaired Activities of Daily Living  Goal: Achieve highest/safest level of independence in self care  Description: Interventions:  - Assess ability and encourage patient to participate in ADLs to maximize function  - Promote sitting position while performing ADLs such as feeding, grooming, and bathing  - Educate and encourage patient/family in tolerated functional activity level and precautions during self-care  Outcome: Progressing     Problem: CARDIOVASCULAR - ADULT  Goal: Maintains optimal cardiac output and hemodynamic stability  Description: INTERVENTIONS:  - Monitor vital signs, rhythm, and trends  - Monitor for bleeding, hypotension and signs of decreased cardiac output  - Evaluate effectiveness of vasoactive medications to optimize hemodynamic stability  - Monitor arterial and/or venous puncture sites for bleeding and/or hematoma  - Assess quality of pulses, skin color and temperature  - Assess for signs of decreased coronary artery perfusion - ex. Angina  - Evaluate fluid balance, assess for edema, trend weights  Outcome: Progressing  Goal: Absence of cardiac arrhythmias or at  baseline  Description: INTERVENTIONS:  - Continuous cardiac monitoring, monitor vital signs, obtain 12 lead EKG if indicated  - Evaluate effectiveness of antiarrhythmic and heart rate control medications as ordered  - Initiate emergency measures for life threatening arrhythmias  - Monitor electrolytes and administer replacement therapy as ordered  Outcome: Progressing

## 2024-02-01 NOTE — INTERVAL H&P NOTE
Pre-op Diagnosis: food impaction    The patient was seen by me on 1/3024  and I reviewed the H&P today.    The patient was examined and no significant changes have occurred in the patient's condition since the H&P was performed.  I discussed with the patient and/or legal representative the potential benefits, risks and side effects of this procedure, including the risks of bleeding, perforation, sedation, missed lesions, possible need for repeat endoscopy, the likelihood of the patient achieving goals; and potential problems that might occur during recuperation. I discussed reasonable alternatives to the procedure, including risks, benefits and side effects related to the alternatives and risks related to not receiving this procedure.  We will proceed with procedure as planned.

## 2024-02-01 NOTE — DIETARY NOTE
BRIEF DIETITIAN NOTE     Pt re-screened for LOS (length of stay). Pt was on PO diet and eating % during admission up until 1/29 when she had c/o food feeling stuck in her throat per RN chart note. Pt was made NPO. 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 On 1/29 a CT was completed showing food bolus. EGD planned for today to evaluate and for possible dilation. Good PO intake prior to this issue. Pt noted to have 7% weight loss over the past ~7 months (not significant). Please consult RD if further nutrition intervention is needed. Will monitor results of EGD and f/u after weekend.   Addendum: diet advanced to clear liquids/renal today s/p EGD, will add ONS Ensure Clear for now.     Percent Meals Eaten (last 6 days)       Date/Time Percent Meals Eaten (%)    01/26/24 1400 75 %    01/27/24 0845 100 %    01/27/24 1533 85 %    01/27/24 1933 0 %    01/27/24 2054 0 %     Percent Meals Eaten (%): pt declined snack at 01/27/24 2054 01/28/24 0853 60 %    01/28/24 1300 50 %    01/28/24 2031 0 %     Percent Meals Eaten (%): pt declined snack at 01/28/24 2031 01/29/24 1700 0 %     Percent Meals Eaten (%): NPO at 01/29/24 1700    01/29/24 2013 0 %     Percent Meals Eaten (%): NPO at 01/29/24 2013 01/30/24 0500 0 %     Percent Meals Eaten (%): NPO at 01/30/24 0500    01/30/24 1330 0 %     Percent Meals Eaten (%): NPO at 01/30/24 1330             Patient Weight(s) for the past 336 hrs:   Weight   02/01/24 0509 56.5 kg (124 lb 9 oz)   01/31/24 1517 58.5 kg (129 lb)   01/30/24 0400 58.9 kg (129 lb 13.6 oz)   01/29/24 0630 59.4 kg (130 lb 15.3 oz)   01/28/24 0551 58 kg (127 lb 13.9 oz)   01/27/24 0642 56.6 kg (124 lb 12.5 oz)   01/26/24 0246 61.6 kg (135 lb 12.9 oz)   01/25/24 2148 57.5 kg (126 lb 12.2 oz)        Wt Readings from Last 6 Encounters:   02/01/24 56.5 kg (124 lb 9 oz)   07/18/23 60.5 kg (133 lb 6.1 oz)   06/20/23 61.2 kg (135 lb)    10/25/22 62.6 kg (138 lb)   07/12/22 62.6 kg (138 lb)   10/07/21 62.6 kg (138 lb)        F/u per protocol or as appropriate.       Mirian Lainez RD, LDN  Clinical Dietitian  P: [phone]

## 2024-02-02 LAB
ANION GAP SERPL CALC-SCNC: 15 MMOL/L (ref 0–18)
BASOPHILS # BLD AUTO: 0.01 X10(3) UL (ref 0–0.2)
BASOPHILS NFR BLD AUTO: 0.2 %
BUN BLD-MCNC: 51 MG/DL (ref 9–23)
BUN/CREAT SERPL: 9.6 (ref 10–20)
CALCIUM BLD-MCNC: 6.6 MG/DL (ref 8.7–10.4)
CHLORIDE SERPL-SCNC: 102 MMOL/L (ref 98–112)
CO2 SERPL-SCNC: 22 MMOL/L (ref 21–32)
CREAT BLD-MCNC: 5.33 MG/DL
DEPRECATED RDW RBC AUTO: 57.3 FL (ref 35.1–46.3)
EGFRCR SERPLBLD CKD-EPI 2021: 8 ML/MIN/1.73M2 (ref 60–?)
EOSINOPHIL # BLD AUTO: 0 X10(3) UL (ref 0–0.7)
EOSINOPHIL NFR BLD AUTO: 0 %
ERYTHROCYTE [DISTWIDTH] IN BLOOD BY AUTOMATED COUNT: 16.6 % (ref 11–15)
GLUCOSE BLD-MCNC: 151 MG/DL (ref 70–99)
HCT VFR BLD AUTO: 33.2 %
HGB BLD-MCNC: 11 G/DL
IMM GRANULOCYTES # BLD AUTO: 0.03 X10(3) UL (ref 0–1)
IMM GRANULOCYTES NFR BLD: 0.5 %
LYMPHOCYTES # BLD AUTO: 0.37 X10(3) UL (ref 1–4)
LYMPHOCYTES NFR BLD AUTO: 5.6 %
MAGNESIUM SERPL-MCNC: 2 MG/DL (ref 1.6–2.6)
MCH RBC QN AUTO: 31.3 PG (ref 26–34)
MCHC RBC AUTO-ENTMCNC: 33.1 G/DL (ref 31–37)
MCV RBC AUTO: 94.3 FL
MONOCYTES # BLD AUTO: 0.44 X10(3) UL (ref 0.1–1)
MONOCYTES NFR BLD AUTO: 6.6 %
NEUTROPHILS # BLD AUTO: 5.8 X10 (3) UL (ref 1.5–7.7)
NEUTROPHILS # BLD AUTO: 5.8 X10(3) UL (ref 1.5–7.7)
NEUTROPHILS NFR BLD AUTO: 87.1 %
OSMOLALITY SERPL CALC.SUM OF ELEC: 305 MOSM/KG (ref 275–295)
PLATELET # BLD AUTO: 157 10(3)UL (ref 150–450)
POTASSIUM SERPL-SCNC: 5.4 MMOL/L (ref 3.5–5.1)
Q-T INTERVAL: 446 MS
QRS DURATION: 88 MS
QTC CALCULATION (BEZET): 539 MS
R AXIS: -33 DEGREES
RBC # BLD AUTO: 3.52 X10(6)UL
SODIUM SERPL-SCNC: 139 MMOL/L (ref 136–145)
T AXIS: -58 DEGREES
VENTRICULAR RATE: 88 BPM
WBC # BLD AUTO: 6.7 X10(3) UL (ref 4–11)

## 2024-02-02 PROCEDURE — 90935 HEMODIALYSIS ONE EVALUATION: CPT | Performed by: INTERNAL MEDICINE

## 2024-02-02 RX ORDER — METOPROLOL TARTRATE 50 MG/1
50 TABLET, FILM COATED ORAL
Status: DISCONTINUED | OUTPATIENT
Start: 2024-02-02 | End: 2024-02-03

## 2024-02-02 NOTE — OCCUPATIONAL THERAPY NOTE
OT was requested to see pt following HD. TANYA Mccollum asked if pt could be seen when family present around 3:30/4. This OT called RN at 4 and pt's family not present. OT came to pt's room at 5PM, pt's dtr-in-law present and PT present. PT reported HR up to 125 at rest with conversation. Per MD, HR should stay below 120. Pt is not stable for therapy at this time. Will re-attempt when pt is medically appropriate.    Yin Pelaez, OTR/L

## 2024-02-02 NOTE — CM/SW NOTE
02/02/24 1000   CM/SW Referral Data   Referral Source Social Work (self-referral)   Reason for Referral Discharge planning   Informant Daughter   Medical Hx   Does patient have an established PCP? Yes  ([facility])   Patient Info   Patient's Home Environment House   Number of Levels in Home 1   Number of Stair in Home 2 external, 6 up   Patient lives with Grandchild   Patient Status Prior to Admission   Independent with ADLs and Mobility No   Services in place prior to admission DME/Supplies at home   Type of DME/Supplies Pricila[facility] [place]   Dialysis Clinic  Renal   Scheduled Dialysis days M-W-F   Dialysis scheduled time 0500   Discharge Needs   Anticipated D/C needs Home health care;Subacute rehab;To be determined   Services Requested   PASRR Level 1 Submitted Yes   Choice of Post-Acute Provider   Informed patient of right to choose their preferred provider Yes   List of appropriate post-acute services provided to patient/family with quality data   (HH ref in [facility]. [facility] list provided)   Patient/family choice TBD       10:25AM  SW met w/ pt's dtr Emily at bedside. Pt was out of  the room for HD at this time.    Above assessment completed.    Pt's dtr confirmed they would like pt to DC home w/ HH at time of DC. Pt's dtr will be staying w/ her upon DC.  Per Emily, pt did not have HH services prior to Admission and was pretty self sufficient until recently.    Per request, BECKY sent message to OT and asked that they see pt post HD around 3:30/4PM for update on pt's physical status/needs. SW mentioned pt's need to do approx 5-6 steps into/out of her home mainly for HD appts. BECKY also asked if they can speak w/ pt's dtr Emily to confirm if she would like to be present for their session.    Per chart, pt was pending w/ [facility]. BECKY confirmed w/ liaison/rep Alison, they have been waiting for pt's PCP to return their call and confirm they will sign HH orders post DC.    SW resent HH referral in  [place]. F2F entered/sent.    SW to f/up w/ pt's dtr once HH list is available and confirm PCP information at that time.    11:00AM  SW met w/ pt's dtr at bedside and discussed pt was pending services w/ [facility]. Pt's dtr confirmed she knows pt does like using [facility]. SW provided HH list of quality data. Dtr confirmed choice is [facility].    BECKY explained [facility] has attempted pt's PCP a couple time to get confirmation they will follow pt post DC for HH orders. Pt's dtr will also reach out to PCP's office and ask they f/up w/ [facility] as requested.    [facility] reserved in [place]. Notified liaison Alison - she is attempting PCP's office again.    BECKY also spoke to [facility] w/ rehab dept and requested PT see pt around 3:30/4PM. OT Yin acknowledged/responded to original message and confirmed OT will see pt at that time.    Need for DC:  1. Updated PT/OT  2. Confirm any additional DME needed    PLAN: Home w/ dtr & [facility] vs [facility] w/ onsite HD - pending above & med clear      SW/CM to remain available for support and/or discharge planning.           Veda Rivero, MSW, LSW [id number]

## 2024-02-02 NOTE — PROGRESS NOTES
[facility]  Gastroenterology Progress Note    Helen Espana Patient Status:  Inpatient    1940 MRN [id number]   Location [facility] 3W/SW Attending Miguel Corral MD   Hosp Day # 3 PCP Shannon Bahena MD     Subjective:  Helen Espana is a(n) 83 year old female.    Current complaints: EGD on  food bolus removed, feeling better, tolerating diet   No gi complaints      Objective:  Blood pressure 92/55, pulse 83, temperature 97.9 °F (36.6 °C), temperature source Oral, resp. rate 18, height 5' 3\" (1.6 m), weight 124 lb 9 oz (56.5 kg), SpO2 93%, not currently breastfeeding.  Respiratory: no labored breathing  CV: RRR  Abdomen: nondistended, soft, nontender  Extremities: no calf tenderness, rt arm swelling  Neurologic: Ox3    Labs:   Recent Labs   Lab 24  0651 24  0543 24  1325 24  0626 24  0428   WBC 9.7 9.2  --  8.3 6.7   HGB 12.1 11.8*  --  12.2 11.0*   HCT 38.3 37.6  --  37.4 33.2*   .0* 145.0*  --  147.0* 157.0   CREATSERUM 4.36* 6.35*  --  4.49* 5.33*   BUN 33* 58*  --  39* 51*    140  --  142 139   K 5.1 5.6* 4.0 4.4 5.4*    105  --  100 102   CO2 24.0 18.0*  --  20.0* 22.0   * 77  --  67* 151*   CA 8.0* 7.8*  --  7.8* 6.6*   MG 2.0  --   --   --  2.0   PHOS  --   --   --  5.4*  --        No results for input(s): \"PADMINI\", \"LIP\", \"GGT\", \"PSA\", \"DDIMER\", \"ESRML\", \"ESRPF\", \"CRP\", \"BNP\", \"TROP\", \"CK\", \"CKMB\", \"REBEL\", \"RPR\", \"B12\", \"ETOH\", \"POCGLU\" in the last 168 hours.    Invalid input(s): \"RF\"     Imaging:  CT SOFT TISSUE OF NECK(CONTRAST ONLY) (CPT=70491)    Result Date: 2024  CONCLUSION:   Patulous proximal to mid esophagus redemonstrated with multifocal areas of retained ingested material similar to the prior examination.    Dictated by (CST): Karl Hill MD on 2024 at 5:10 PM     Finalized by (CST): Karl Hill MD on 2024 at 5:15 PM            Problem list:  Patient Active Problem List   Diagnosis     BENIGN HYPERTENSION    Polycystic kidney, autosomal dominant    Mixed hyperlipidemia    Paroxysmal atrial fibrillation ([facility])    Left knee DJD    Spondylosis of lumbar region without myelopathy or radiculopathy    Age-related nuclear cataract of both eyes    Vitreous floaters of both eyes    Chorioretinal scar of right eye    Osteoporosis of multiple sites associated with endocrine disorder    Complete tear of right rotator cuff    Scoliosis of lumbosacral spine, unspecified scoliosis type    Chronic left-sided low back pain with left-sided sciatica    Hemodialysis access site with arteriovenous graft ([facility])    Gastroesophageal reflux disease with esophagitis    ESRD on hemodialysis ([facility])    AVF (arteriovenous fistula) ([facility])    Malignant neoplasm of upper-outer quadrant of left breast in female, estrogen receptor positive  ([facility])    Squamous blepharitis of upper and lower eyelids of both eyes    Internal derangement of left knee    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    Seasonal allergic rhinitis due to pollen    ESRD (end stage renal disease) ([facility])    Symptomatic bradycardia    Inability to walk    Osteoarthritis of hip    Anemia due to chronic kidney disease, on chronic dialysis ([facility])    ESRD (end stage renal disease) on dialysis ([facility])       Assessment    Food impaction  - history esophageal hypomotility  - pt with nausea, vomiting, globus sensation on 1/29 s/p glucagon  -  EGD on 2/1 food bolus removed   - pathology mild acute inflammation, features of reflux esophagitis:  other wise benign  -  feeling better, tolerating diet    Plan  - continue lansoprazole 30 mg po BID AC, carafate suspension 1 gm po qid AC, HS total 5 days  - low fiber soft consistency easy to chew       Is this a shared or split note between Advanced Practice Provider and Physician? Yes    Bibiana Parker, AME    2/2/2024  1:14 PM

## 2024-02-02 NOTE — PLAN OF CARE
Patient alert and oriented. Dialysis done in the afternoon. Advanced to soft diet. Norco PRN for pain. Family at the bedside in the morning. Resting in bed with fall precautions in place and call light in reach. Plan for therapy evaluation.     Problem: PAIN - ADULT  Goal: Verbalizes/displays adequate comfort level or patient's stated pain goal  Description: INTERVENTIONS:  - Encourage pt to monitor pain and request assistance  - Assess pain using appropriate pain scale  - Administer analgesics based on type and severity of pain and evaluate response  - Implement non-pharmacological measures as appropriate and evaluate response  - Consider cultural and social influences on pain and pain management  - Manage/alleviate anxiety  - Utilize distraction and/or relaxation techniques  - Monitor for opioid side effects  - Notify MD/LIP if interventions unsuccessful or patient reports new pain  - Anticipate increased pain with activity and pre-medicate as appropriate  Outcome: Progressing     Problem: SAFETY ADULT - FALL  Goal: Free from fall injury  Description: INTERVENTIONS:  - Assess pt frequently for physical needs  - Identify cognitive and physical deficits and behaviors that affect risk of falls.  - [place] fall precautions as indicated by assessment.  - Educate pt/family on patient safety including physical limitations  - Instruct pt to call for assistance with activity based on assessment  - Modify environment to reduce risk of injury  - Provide assistive devices as appropriate  - Consider OT/PT consult to assist with strengthening/mobility  - Encourage toileting schedule  Outcome: Progressing     Problem: DISCHARGE PLANNING  Goal: Discharge to home or other facility with appropriate resources  Description: INTERVENTIONS:  - Identify barriers to discharge w/pt and caregiver  - Include patient/family/discharge partner in discharge planning  - Arrange for needed discharge resources and transportation as  appropriate  - Identify discharge learning needs (meds, wound care, etc)  - Arrange for interpreters to assist at discharge as needed  - Consider post-discharge preferences of patient/family/discharge partner  - Complete POLST form as appropriate  - Assess patient's ability to be responsible for managing their own health  - Refer to Case Management Department for coordinating discharge planning if the patient needs post-hospital services based on physician/LIP order or complex needs related to functional status, cognitive ability or social support system  Outcome: Progressing     Problem: Patient/Family Goals  Goal: Patient/Family Long Term Goal  Description: Patient's Long Term Goal: Discharge from the hospital    Interventions:  - Monitor vital signs  - Monitor appropriate labs  - Pain management  - Administer medications per order  - Follow MD orders  - Diagnostics per order  - Update / inform patient and family on plan of care  - Discharge planning  - See additional Care Plan goals for specific interventions  Outcome: Progressing  Goal: Patient/Family Short Term Goal  Description: Patient's Short Term Goal: Manage pain    Interventions:   - Monitor vital signs  - Monitor appropriate labs  - Pain management  - Administer medications per order  - Follow MD orders  - Diagnostics per order  - Update / inform patient and family on plan of care  - See additional Care Plan goals for specific interventions  Outcome: Progressing     Problem: METABOLIC/FLUID AND ELECTROLYTES - ADULT  Goal: Electrolytes maintained within normal limits  Description: INTERVENTIONS:  - Monitor labs and rhythm and assess patient for signs and symptoms of electrolyte imbalances  - Administer electrolyte replacement as ordered  - Monitor response to electrolyte replacements, including rhythm and repeat lab results as appropriate  - Fluid restriction as ordered  - Instruct patient on fluid and nutrition restrictions as appropriate  Outcome:  Progressing  Goal: Hemodynamic stability and optimal renal function maintained  Description: INTERVENTIONS:  - Monitor labs and assess for signs and symptoms of volume excess or deficit  - Monitor intake, output and patient weight  - Monitor urine specific gravity, serum osmolarity and serum sodium as indicated or ordered  - Monitor response to interventions for patient's volume status, including labs, urine output, blood pressure (other measures as available)  - Encourage oral intake as appropriate  - Instruct patient on fluid and nutrition restrictions as appropriate  Outcome: Progressing     Problem: Impaired Functional Mobility  Goal: Achieve highest/safest level of mobility/gait  Description: Interventions:  - Assess patient's functional ability and stability  - Promote increasing activity/tolerance for mobility and gait  - Educate and engage patient/family in tolerated activity level and precautions  - Recommend use of  RW for transfers and ambulation  - Recommend patient transfer to bedside chair toward strongest side  - When transferring patient, block weaker knee for safety  - Recommend use of chair position in bed 3 times per day  Outcome: Progressing     Problem: Patient Centered Care  Goal: Patient preferences are identified and integrated in the patient's plan of care  Description: Interventions:  - What would you like us to know as we care for you? From home with grandson  - Provide timely, complete, and accurate information to patient/family  - Incorporate patient and family knowledge, values, beliefs, and cultural backgrounds into the planning and delivery of care  - Encourage patient/family to participate in care and decision-making at the level they choose  - Honor patient and family perspectives and choices  Outcome: Progressing     Problem: SKIN/TISSUE INTEGRITY - ADULT  Goal: Skin integrity remains intact  Description: INTERVENTIONS  - Assess and document risk factors for pressure ulcer  development  - Assess and document skin integrity  - Monitor for areas of redness and/or skin breakdown  - Initiate interventions, skin care algorithm/standards of care as needed  Outcome: Progressing     Problem: MUSCULOSKELETAL - ADULT  Goal: Return mobility to safest level of function  Description: INTERVENTIONS:  - Assess patient stability and activity tolerance for standing, transferring and ambulating w/ or w/o assistive devices  - Assist with transfers and ambulation using safe patient handling equipment as needed  - Ensure adequate protection for wounds/incisions during mobilization  - Obtain PT/OT consults as needed  - Advance activity as appropriate  - Communicate ordered activity level and limitations with patient/family  Outcome: Progressing     Problem: Impaired Activities of Daily Living  Goal: Achieve highest/safest level of independence in self care  Description: Interventions:  - Assess ability and encourage patient to participate in ADLs to maximize function  - Promote sitting position while performing ADLs such as feeding, grooming, and bathing  - Educate and encourage patient/family in tolerated functional activity level and precautions during self-care  Outcome: Progressing     Problem: CARDIOVASCULAR - ADULT  Goal: Maintains optimal cardiac output and hemodynamic stability  Description: INTERVENTIONS:  - Monitor vital signs, rhythm, and trends  - Monitor for bleeding, hypotension and signs of decreased cardiac output  - Evaluate effectiveness of vasoactive medications to optimize hemodynamic stability  - Monitor arterial and/or venous puncture sites for bleeding and/or hematoma  - Assess quality of pulses, skin color and temperature  - Assess for signs of decreased coronary artery perfusion - ex. Angina  - Evaluate fluid balance, assess for edema, trend weights  Outcome: Progressing  Goal: Absence of cardiac arrhythmias or at baseline  Description: INTERVENTIONS:  - Continuous cardiac  monitoring, monitor vital signs, obtain 12 lead EKG if indicated  - Evaluate effectiveness of antiarrhythmic and heart rate control medications as ordered  - Initiate emergency measures for life threatening arrhythmias  - Monitor electrolytes and administer replacement therapy as ordered  Outcome: Progressing

## 2024-02-02 NOTE — HOME CARE LIAISON
Patient is pending services with [facility] for RN AND PT. Patient MD has not been able to be confirmed Dr. Bahena in order for services to be started. Calls have been made to the office with no return call back. Informed RONIT Mccollum as well.

## 2024-02-02 NOTE — PROGRESS NOTES
[facility] Hospitalist Progress Note     CC: Hospital Follow up    PCP: Shannon Bahena MD       Assessment/Plan:     Principal Problem:    Inability to walk  Active Problems:    Osteoarthritis of hip    Anemia due to chronic kidney disease, on chronic dialysis (HCC)    ESRD (end stage renal disease) on dialysis (HCC)    Ms. Cook is an 83-year-old female with a history of ESRD, paroxysmal atrial fibrillation, carotid stenosis, hypertension, hyperlipidemia, diastolic congestive heart failure, presents to the hospital for evaluation of left leg and hip pain, likely radicular pain fro L4-L5 disc herniation, course complicated by food bolus, EGD on 2/1 with resolution, diet advanced     Food bolus  - pt with nausea, vomiting, globus sensation on 1/29  - CT neck reviewed, showed patulous esophagus with multifocal areas of retained ingested material  - had EGD in 03/2023 at [facility] for globus sensation, per report esophageal hypomotility diagnosed but no achalasia, she did have a very dry oropharynx, pathology results were negative  - GI consulted  - EGD on 2/1 food bolus removed  - feeling better, tolerating diet, adv per GI    Left hip/ Leg pain  Left hip OA  Spinal stenosis, L4-L5 disc extrusion  - xray neg for fracture  - outpatient MRI with Left hip OA and trets of hamstring tendon and gluteus minimus  - pain does appear to radiate to her lower leg   - lumbar spine with DDD  - continue lidoderm patch start low dose gabapentin  - continue norco    - PT/OT  - recently had steroid injection in hip with ortho, therefore not candidate for at least 1-2 more months  - continue prednisone   - Lumbar spine MRI, pre romero read with L4-L5 disc herniation  - continue PT/OT, may need RADHA on DC pending progress  - resume oral pain meds, PT/OT eval      ESRD  -Nephrology consulted  -MWF HD     Paroxysmal atrial fibrillation  Afib with RVR  -Follows with [facility]  -She is on Eliquis 2.5 mg twice daily,    -Normally on metoprolol, IV 5 mg q 6  -Afib with RVR on 1/31, on dilt drip, now improved  -cardiology consulted, ok for EGD       Chronic HFpEF  - euvolemic  - volume management per HD     Endometrial wall thickening  - noted on outpatient MRI and US  - needs outpatient GYN follow up     Hypertension  -She takes a Norvasc as needed but not daily  -resume lopressor     FN:  - IVF:NS  - Diet: none     DVT Prophy:scd, eliquis resumed  Lines: PIV     Dispo: pending clinical course    Discussed with daughter at bedside     Questions/concerns were discussed with patient and/or family by bedside.    Thank You,  Miguel Corral MD    Hospitalist with [facility]     Subjective:   No hip pain this am, no chest pain, no nausea, tolerating CLD no pain     OBJECTIVE:    Blood pressure 92/55, pulse 83, temperature 97.9 °F (36.6 °C), temperature source Oral, resp. rate 18, height 5' 3\" (1.6 m), weight 124 lb 9 oz (56.5 kg), SpO2 93%, not currently breastfeeding.    Temp:  [97.5 °F (36.4 °C)-98.5 °F (36.9 °C)] 97.9 °F (36.6 °C)  Pulse:  [] 83  Resp:  [11-26] 18  BP: ()/(45-69) 92/55  SpO2:  [91 %-100 %] 93 %      Intake/Output:    Intake/Output Summary (Last 24 hours) at 2/2/2024 1228  Last data filed at 2/2/2024 0941  Gross per 24 hour   Intake 1600 ml   Output --   Net 1600 ml       Last 3 Weights   02/01/24 0509 124 lb 9 oz (56.5 kg)   01/31/24 1517 129 lb (58.5 kg)   01/30/24 0400 129 lb 13.6 oz (58.9 kg)   01/29/24 0630 130 lb 15.3 oz (59.4 kg)   01/28/24 0551 127 lb 13.9 oz (58 kg)   01/27/24 0642 124 lb 12.5 oz (56.6 kg)   01/26/24 0246 135 lb 12.9 oz (61.6 kg)   01/25/24 2148 126 lb 12.2 oz (57.5 kg)   07/18/23 1000 133 lb 6.1 oz (60.5 kg)   07/16/23 1942 134 lb 12.8 oz (61.1 kg)   07/16/23 1203 135 lb (61.2 kg)   06/20/23 1508 135 lb (61.2 kg)       Exam    Gen: No acute distress, alert and oriented x3   Heent: NC AT, neck supple  Pulm: Lungs clear, normal respiratory effort  CV: Heart with regular rate  and rhythm   Abd: Abdomen soft, nontender, nondistended   MSK: no clubbing, no cyanosis, warm well perfused  Skin: no rashes or lesions        Data Review:       Labs:     Recent Labs   Lab 01/31/24  0543 02/01/24  0626 02/02/24  0428   RBC 3.93 3.91 3.52*   HGB 11.8* 12.2 11.0*   HCT 37.6 37.4 33.2*   MCV 95.7 95.7 94.3   MCH 30.0 31.2 31.3   MCHC 31.4 32.6 33.1   RDW 16.7* 16.5* 16.6*   NEPRELIM  --   --  5.80   WBC 9.2 8.3 6.7   .0* 147.0* 157.0         Recent Labs   Lab 01/31/24 0543 01/31/24  1325 02/01/24 0626 02/02/24  0428   GLU 77  --  67* 151*   BUN 58*  --  39* 51*   CREATSERUM 6.35*  --  4.49* 5.33*   EGFRCR 6*  --  9* 8*   CA 7.8*  --  7.8* 6.6*     --  142 139   K 5.6* 4.0 4.4 5.4*     --  100 102   CO2 18.0*  --  20.0* 22.0       No results for input(s): \"ALT\", \"AST\", \"ALB\", \"AMYLASE\", \"LIPASE\", \"LDH\" in the last 168 hours.    Invalid input(s): \"ALPHOS\", \"TBIL\", \"DBIL\", \"TPROT\"      Imaging:  CT SOFT TISSUE OF NECK(CONTRAST ONLY) (CPT=70491)    Result Date: 1/31/2024  CONCLUSION:   Patulous proximal to mid esophagus redemonstrated with multifocal areas of retained ingested material similar to the prior examination.    Dictated by (CST): Karl Hill MD on 1/31/2024 at 5:10 PM     Finalized by (CST): Karl Hill MD on 1/31/2024 at 5:15 PM             Meds:      metoprolol tartrate  50 mg Oral 2x Daily(Beta Blocker)    lansoprazole  30 mg Oral BID AC    sucralfate  1 g Oral TID AC and HS (2200)    gabapentin  100 mg Oral BID    apixaban  2.5 mg Oral 2 times per day    midodrine  5 mg Oral TID    montelukast  10 mg Oral Nightly    sevelamer carbonate  2,400 mg Oral TID    lidocaine-menthol  1 patch Transdermal Daily    sennosides  17.2 mg Oral Nightly    atorvastatin  5 mg Oral Nightly           calcium carbonate, amLODIPine, polyethylene glycol (PEG 3350), bisacodyl, fleet enema, acetaminophen, HYDROcodone-acetaminophen

## 2024-02-02 NOTE — PROGRESS NOTES
[facility]    Nephrology Progress Note    Chief Complaint   Patient presents with    Groin Pain    Leg Pain       Subjective:   83 year old female, following for ESRD on HD.     Seen and examined during dialysis. HR in 100s during HD.   EGD yesterday, food bolus removed. Hypotensive afterwards, received fluids and Albumin.   Left leg pain, not much improving.     Review of Systems:   Review of Systems   Constitutional:  Positive for fatigue. Negative for chills and fever.   Respiratory:  Negative for cough and shortness of breath.    Cardiovascular:  Negative for chest pain and leg swelling.   Gastrointestinal:  Negative for abdominal pain, constipation, diarrhea, nausea and vomiting.   Genitourinary:  Negative for difficulty urinating, dysuria and flank pain.   Musculoskeletal:         Left leg pain       Objective:   Temp:  [97.5 °F (36.4 °C)-98.5 °F (36.9 °C)] 97.9 °F (36.6 °C)  Pulse:  [] 83  Resp:  [11-26] 18  BP: ()/(45-69) 92/55  SpO2:  [91 %-100 %] 93 %  SpO2: 93 %     Intake/Output Summary (Last 24 hours) at 2/2/2024 1234  Last data filed at 2/2/2024 0941  Gross per 24 hour   Intake 1600 ml   Output --   Net 1600 ml     Wt Readings from Last 3 Encounters:   02/01/24 124 lb 9 oz (56.5 kg)   07/18/23 133 lb 6.1 oz (60.5 kg)   06/20/23 135 lb (61.2 kg)     Physical Exam  Constitutional:       Appearance: Normal appearance.   Cardiovascular:      Rate and Rhythm: Normal rate and regular rhythm.      Heart sounds: Normal heart sounds.      Comments: R AVF-pos bruit/thrill  Pulmonary:      Effort: Pulmonary effort is normal.      Breath sounds: Normal breath sounds.   Musculoskeletal:         General: Normal range of motion.   Skin:     General: Skin is warm and dry.   Neurological:      General: No focal deficit present.      Mental Status: She is alert and oriented to person, place, and time.   Psychiatric:         Mood and Affect: Mood normal.         Behavior: Behavior normal.          Medications:               Results:     Recent Labs   Lab 01/31/24  0543 02/01/24  0626 02/02/24 0428   RBC 3.93 3.91 3.52*   HGB 11.8* 12.2 11.0*   HCT 37.6 37.4 33.2*   MCV 95.7 95.7 94.3   NEPRELIM  --   --  5.80   WBC 9.2 8.3 6.7   .0* 147.0* 157.0     Recent Labs   Lab 01/31/24  0543 01/31/24  1325 02/01/24 0626 02/02/24 0428   GLU 77  --  67* 151*   BUN 58*  --  39* 51*   CREATSERUM 6.35*  --  4.49* 5.33*   CA 7.8*  --  7.8* 6.6*     --  142 139   K 5.6* 4.0 4.4 5.4*     --  100 102   CO2 18.0*  --  20.0* 22.0     PTT   Date Value Ref Range Status   11/06/2018 23.6 (L) 24.0 - 33.7 sec Final     INR   Date Value Ref Range Status   11/06/2018 1.0 0.9 - 1.2 ratio Final     Comment:     An INR of 2.0-3.0 is the usual therapeutic interval.  Some patients (e.g., those with recurrent thrombotic events, a mechanical heart valve) may require more intense therapy with a therapeutic INR interval of 2.5-3.5   08/11/2015 3.0 (A) 0.8 - 1.2 Final     No results for input(s): \"BNP\" in the last 168 hours.  Recent Labs   Lab 01/27/24  0651 02/01/24 0626 02/02/24 0428   MG 2.0  --  2.0   PHOS  --  5.4*  --         Recent Labs   Lab 02/01/24 0626   PHOS 5.4*       CT SOFT TISSUE OF NECK(CONTRAST ONLY) (CPT=70491)    Result Date: 1/31/2024  CONCLUSION:   Patulous proximal to mid esophagus redemonstrated with multifocal areas of retained ingested material similar to the prior examination.    Dictated by (CST): Karl Hill MD on 1/31/2024 at 5:10 PM     Finalized by (CST): Karl Hill MD on 1/31/2024 at 5:15 PM         EKG 12 Lead    Result Date: 2/2/2024  Sinus rhythm Marked first degree AV boock ( ms) Left axis deviation Low voltage QRS, consider pulmonary disease, pericardial effusion, or normal variant Nonspecific T wave abnormality Prolonged QT interval or tu fusion, consider myocardial disease, electrolyte imbalance, or drug effects Abnormal ECG When compared with ECG of  16-JUL-2023 12:10, PA interval has lengthened Nonspecific T wave abnormality now evident in Lateral leads QT has lengthened Confirmed by CHRISTA JOHNS ([id number]) on 2/2/2024 8:31:58 AM     Assessment and Plan:     83 year old female with past medical history of HTN, 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    ESRD HD MWF:   HD today.     HTN:   Hypotensive yesterday, BP better in the morning.  Cont Midodrine.      Anemia:   Hb 11, at goal.      Hyperphosphatemia:   Phos 5.4. Cont Renvela 3 tabs with meals.    Phos ordered.     Left leg and hip pain:   Pain control.   PT following.     Globus sensation:   EGD yesterday, food bolus removed.   GI following       Ana Bay MD  2/2/2024

## 2024-02-02 NOTE — PROGRESS NOTES
[facility] CARDIOLOGY Progress Note      Helen Espana is a 83 year old female who I assessed as follows:  Chief Complaint   Patient presents with    Groin Pain    Leg Pain          REASON FOR CONSULTATION:    AF            ASSESSMENT/PLAN:     IMPRESSIONS:  Retained food, awaiting EGD  PAF/SSS (in the past, tachy when in AF, bruce when in SR)  ESRD on HD  HTN, now on midodrine for low BP  HL, on statin  Chronic diastolic CHF, controlled with HD  Very mild CAD at cath 2016        PLAN:  EGD from yesterday reviewed  Discussed with daughter at bedside.  Echo 7/23 reviewed  IV diltiazem for HR control; when able to come off, would increase beta blocker  Back on eliquis  Continue midodrine    Sees Dr Ojeda        SUBJECTIVE:    Feels good. No chest pain or dyspnea.    --------------------------------------------------------------------------------------------------------------------------------    HPI:         History of PAF, ESRD admitted for left leg pain. Then had retained food. Awaiting EGD. Eliquis has been held. Went into AF with RVR so now on IV diltiazem drip.    HD for CHF fluid control. On midodrine for hypotension. Admission 7/23 with RVR when in AF and bruce when in SR.             No current outpatient medications on file.      Past Medical History:   Diagnosis Date    Arrhythmia     Afib    AVF (arteriovenous fistula) ([facility]) 10/12/2017    Biceps rupture, proximal, right, initial encounter 02/20/2018    Breast cancer ([facility]) 1998    Breast cancer in female ([facility]) 12/20/2018    CANCER 1989    right breast mastectomy    Cataract 2001    OU    Clostridioides difficile infection     Cup to disc asymmetry 2001    OU    Dialysis patient ([facility])     HD M, W, F    Essential hypertension     Floaters 2001    OU    Hearing impairment     hearing aides    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 Giordano @ [facility]    CATARACT EXTRACTION W/  INTRAOCULAR LENS IMPLANT Left 2021    Dr. Giordano @ [facility]    CHOLECYSTECTOMY      laparoscopic    COLONOSCOPY      c. diff colitis-Ali    ECHO 2D DP/CLRFL, CARDIO (INTERNAL)   [facility] estee    mod MR/LAE; border systolic LVEF    ECHO 2D, CARDIO (DMG)  2020    [facility] cardio: no change except mod mitral regurg mild now    HX BREAST CANCER      LEXISCAN NUC STRESS TST, CARDIO (INTERNAL)  05/15/2018    [place] [facility]; normal ; EF 54%    LEXISCAN NUC STRESS TST, CARDIO (INTERNAL)  2021    [place] [facility]; EF57%; normal wall motion; fixed perfusion defect inf. wall    LUMPECTOMY RIGHT      MASTECTOMY PARTIAL  2018    u [facility]: left breaswt seed loc partial mastectomy     MASTECTOMY RIGHT            OTHER SURGICAL HISTORY  12    cysto-Dr. Allred    OTHER SURGICAL HISTORY  1/21/15     lap sigmoid colectomy     OTHER SURGICAL HISTORY  16    Right Hemicolectomy by Dr. Chaudhyr    PERITONEAL DIALYSIS SYSTEM      2009     Family History   Problem Relation Age of Onset    Heart Disorder Father         MI    Diabetes Father     Kidney Disease Father         polycystic kidney disease    Hypertension Mother     Heart Disorder Mother         stroke    Cataracts Mother     Thyroid Disorder Daughter         thyroid    Other (Other) Sister         Cushing's disease    Diabetes Sister     Hypertension Sister     Cancer Sister 69        lung tob     Kidney Disease Son         pckd    Kidney Disease Brother         PCKD     Breast Cancer Maternal Aunt 50        x2 ages 85 and in her 50's    Macular  degeneration Other         Aunt    Glaucoma Neg         family h/o      Social History:  Social History     Socioeconomic History    Marital status:    Occupational History    Occupation: retired real estate   Tobacco Use    Smoking status: Never    Smokeless tobacco: Never   Vaping Use    Vaping Use: Never used    Passive vaping exposure: Yes   Substance and Sexual Activity    Alcohol use: No    Drug use: No    Sexual activity: Yes     Partners: Male     Comment:  2020-he had CHF    Other Topics Concern     Service No    Blood Transfusions Yes    Caffeine Concern Yes     Comment: coffee    Hobby Hazards No    Sleep Concern Yes    Stress Concern Yes    Weight Concern Yes    Special Diet Yes    Seat Belt Yes    Self-Exams Yes     Social Determinants of Health     Food Insecurity: No Food Insecurity (2024)    Food Insecurity     Food Insecurity: Never true   Transportation Needs: No Transportation Needs (2024)    Transportation Needs     Lack of Transportation: No   Housing Stability: Low Risk  (2024)    Housing Stability     Housing Instability: No          Medications:            Allergies  Allergies   Allergen Reactions    Adhesive Tape SWELLING    Denosumab OTHER (SEE COMMENTS)     hypocalcemia  Lowered calcium level  Lowered calcium level  Lowered calcium level      Docosahexaenoic Acid, Dha HIVES    Ficus HIVES    Penicillins HIVES     Thinks she has tolerated penicillin in the past    Zithromax HIVES    Eicosapentaenoic Acid, Epa HIVES    Fig HIVES    Levofloxacin HIVES    Prolia OTHER (SEE COMMENTS)     Lowered calcium level    Vitamin E HIVES                   EXAM:         TELE: AF          /59 (BP Location: Left arm)   Pulse 93   Temp 97.5 °F (36.4 °C) (Oral)   Resp 18   Ht 5' 3\" (1.6 m)   Wt 124 lb 9 oz (56.5 kg)   SpO2 100%   BMI 22.06 kg/m²   Temp (24hrs), Av.1 °F (36.7 °C), Min:97.5 °F (36.4 °C), Max:98.5 °F (36.9 °C)    Wt Readings from Last  3 Encounters:   02/01/24 124 lb 9 oz (56.5 kg)   07/18/23 133 lb 6.1 oz (60.5 kg)   06/20/23 135 lb (61.2 kg)         Intake/Output Summary (Last 24 hours) at 2/2/2024 0726  Last data filed at 2/1/2024 1828  Gross per 24 hour   Intake 1360 ml   Output --   Net 1360 ml         GENERAL: well developed, well nourished, in no apparent distress  SKIN: no rashes  HEENT: atraumatic, normocephalic, throat without erythema  NECK: supple, no bruits  LUNGS: clear to auscultation  CARDIO: irregular rhythm without murmur or S3   GI: soft, nontender  EXTREMITIES: no cyanosis, clubbing or edema  NEUROLOGY: alert  PSYCH: cooperative          LABORATORY DATA:               ECG:        ECHO 7/23:  ECHO:  1. Study data: Atrial fibrillation   2. Left ventricle: The cavity size was normal. Wall thickness was normal.      Systolic function was normal. The estimated ejection fraction was 55-60%,      by biplane method of disks. Wall motion is normal; there are no regional      wall motion abnormalities. Doppler parameters are consistent with a      reversible restrictive pattern, indicative of decreased left ventricular      diastolic compliance and/or increased left atrial pressure - grade 3      diastolic dysfunction.   3. Left atrium: The atrium was markedly dilated.   4. Right atrium: The atrium was moderately to markedly dilated.   5. Mitral valve: There was mild regurgitation.   6. Tricuspid valve: There was mild-moderate regurgitation.   7. Pulmonary arteries: Systolic pressure was within the normal range,      estimated to be 30mm Hg.            Labs:  Recent Labs   Lab 01/31/24  0543 01/31/24  1325 02/01/24  0626 02/02/24  0428   GLU 77  --  67* 151*   BUN 58*  --  39* 51*   CREATSERUM 6.35*  --  4.49* 5.33*   CA 7.8*  --  7.8* 6.6*     --  142 139   K 5.6* 4.0 4.4 5.4*     --  100 102   CO2 18.0*  --  20.0* 22.0     No results for input(s): \"TROP\" in the last 168 hours.  Recent Labs   Lab 02/02/24  0428   RBC 3.52*    HGB 11.0*   HCT 33.2*   MCV 94.3   MCH 31.3   MCHC 33.1   RDW 16.6*   NEPRELIM 5.80   WBC 6.7   .0     No results for input(s): \"TROP\", \"TROPHS\", \"CK\" in the last 168 hours.    Imaging:  CT SOFT TISSUE OF NECK(CONTRAST ONLY) (CPT=70491)    Result Date: 1/31/2024  CONCLUSION:   Patulous proximal to mid esophagus redemonstrated with multifocal areas of retained ingested material similar to the prior examination.    Dictated by (CST): Karl Hill MD on 1/31/2024 at 5:10 PM     Finalized by (CST): Karl Hill MD on 1/31/2024 at 5:15 PM                Familia Walters MD

## 2024-02-02 NOTE — PHYSICAL THERAPY NOTE
Attempted to see pt for PT treatment.  Per MD, pt HR to stay <120bpm.  Pt recd in supine, educated in goals for session.  Pt HR currently 119-125bpm during conversation while pt at rest.  Discussed with rn, not currently appropriate for therapy.  Discussed that if pt HR improves, she can transfer oob to chair via overhead lift.   Will re attempt to see pt 2/3/24.    Per case management notes, pt's daughter hopes to bring pt home and will be staying with pt, pt does have 7 stairs to enter per pt and confirmed with pt's DIL, pt has HD 3x/week.   PT recommendation has been for [facility].

## 2024-02-02 NOTE — PLAN OF CARE
Problem: PAIN - ADULT  Goal: Verbalizes/displays adequate comfort level or patient's stated pain goal  Description: INTERVENTIONS:  - Encourage pt to monitor pain and request assistance  - Assess pain using appropriate pain scale  - Administer analgesics based on type and severity of pain and evaluate response  - Implement non-pharmacological measures as appropriate and evaluate response  - Consider cultural and social influences on pain and pain management  - Manage/alleviate anxiety  - Utilize distraction and/or relaxation techniques  - Monitor for opioid side effects  - Notify MD/LIP if interventions unsuccessful or patient reports new pain  - Anticipate increased pain with activity and pre-medicate as appropriate  Outcome: Progressing     Problem: SAFETY ADULT - FALL  Goal: Free from fall injury  Description: INTERVENTIONS:  - Assess pt frequently for physical needs  - Identify cognitive and physical deficits and behaviors that affect risk of falls.  - [place] fall precautions as indicated by assessment.  - Educate pt/family on patient safety including physical limitations  - Instruct pt to call for assistance with activity based on assessment  - Modify environment to reduce risk of injury  - Provide assistive devices as appropriate  - Consider OT/PT consult to assist with strengthening/mobility  - Encourage toileting schedule  Outcome: Progressing     Problem: DISCHARGE PLANNING  Goal: Discharge to home or other facility with appropriate resources  Description: INTERVENTIONS:  - Identify barriers to discharge w/pt and caregiver  - Include patient/family/discharge partner in discharge planning  - Arrange for needed discharge resources and transportation as appropriate  - Identify discharge learning needs (meds, wound care, etc)  - Arrange for interpreters to assist at discharge as needed  - Consider post-discharge preferences of patient/family/discharge partner  - Complete POLST form as appropriate  - Assess  patient's ability to be responsible for managing their own health  - Refer to Case Management Department for coordinating discharge planning if the patient needs post-hospital services based on physician/LIP order or complex needs related to functional status, cognitive ability or social support system  Outcome: Progressing     Problem: Patient/Family Goals  Goal: Patient/Family Long Term Goal  Description: Patient's Long Term Goal: Discharge from the hospital    Interventions:  - Monitor vital signs  - Monitor appropriate labs  - Pain management  - Administer medications per order  - Follow MD orders  - Diagnostics per order  - Update / inform patient and family on plan of care  - Discharge planning  - See additional Care Plan goals for specific interventions  Outcome: Progressing  Goal: Patient/Family Short Term Goal  Description: Patient's Short Term Goal: Manage pain    Interventions:   - Monitor vital signs  - Monitor appropriate labs  - Pain management  - Administer medications per order  - Follow MD orders  - Diagnostics per order  - Update / inform patient and family on plan of care  - See additional Care Plan goals for specific interventions  Outcome: Progressing     Problem: METABOLIC/FLUID AND ELECTROLYTES - ADULT  Goal: Electrolytes maintained within normal limits  Description: INTERVENTIONS:  - Monitor labs and rhythm and assess patient for signs and symptoms of electrolyte imbalances  - Administer electrolyte replacement as ordered  - Monitor response to electrolyte replacements, including rhythm and repeat lab results as appropriate  - Fluid restriction as ordered  - Instruct patient on fluid and nutrition restrictions as appropriate  Outcome: Progressing  Goal: Hemodynamic stability and optimal renal function maintained  Description: INTERVENTIONS:  - Monitor labs and assess for signs and symptoms of volume excess or deficit  - Monitor intake, output and patient weight  - Monitor urine specific  gravity, serum osmolarity and serum sodium as indicated or ordered  - Monitor response to interventions for patient's volume status, including labs, urine output, blood pressure (other measures as available)  - Encourage oral intake as appropriate  - Instruct patient on fluid and nutrition restrictions as appropriate  Outcome: Progressing     Problem: Impaired Functional Mobility  Goal: Achieve highest/safest level of mobility/gait  Description: Interventions:  - Assess patient's functional ability and stability  - Promote increasing activity/tolerance for mobility and gait  - Educate and engage patient/family in tolerated activity level and precautions  - Recommend use of  RW for transfers and ambulation  - Recommend patient transfer to bedside chair toward strongest side  - When transferring patient, block weaker knee for safety  - Recommend use of chair position in bed 3 times per day  Outcome: Progressing     Problem: Patient Centered Care  Goal: Patient preferences are identified and integrated in the patient's plan of care  Description: Interventions:  - What would you like us to know as we care for you? From home with grandson  - Provide timely, complete, and accurate information to patient/family  - Incorporate patient and family knowledge, values, beliefs, and cultural backgrounds into the planning and delivery of care  - Encourage patient/family to participate in care and decision-making at the level they choose  - Honor patient and family perspectives and choices  Outcome: Progressing     Problem: SKIN/TISSUE INTEGRITY - ADULT  Goal: Skin integrity remains intact  Description: INTERVENTIONS  - Assess and document risk factors for pressure ulcer development  - Assess and document skin integrity  - Monitor for areas of redness and/or skin breakdown  - Initiate interventions, skin care algorithm/standards of care as needed  Outcome: Progressing     Problem: MUSCULOSKELETAL - ADULT  Goal: Return mobility to  safest level of function  Description: INTERVENTIONS:  - Assess patient stability and activity tolerance for standing, transferring and ambulating w/ or w/o assistive devices  - Assist with transfers and ambulation using safe patient handling equipment as needed  - Ensure adequate protection for wounds/incisions during mobilization  - Obtain PT/OT consults as needed  - Advance activity as appropriate  - Communicate ordered activity level and limitations with patient/family  Outcome: Progressing     Problem: Impaired Activities of Daily Living  Goal: Achieve highest/safest level of independence in self care  Description: Interventions:  - Assess ability and encourage patient to participate in ADLs to maximize function  - Promote sitting position while performing ADLs such as feeding, grooming, and bathing  - Educate and encourage patient/family in tolerated functional activity level and precautions during self-care  Outcome: Progressing     Problem: CARDIOVASCULAR - ADULT  Goal: Maintains optimal cardiac output and hemodynamic stability  Description: INTERVENTIONS:  - Monitor vital signs, rhythm, and trends  - Monitor for bleeding, hypotension and signs of decreased cardiac output  - Evaluate effectiveness of vasoactive medications to optimize hemodynamic stability  - Monitor arterial and/or venous puncture sites for bleeding and/or hematoma  - Assess quality of pulses, skin color and temperature  - Assess for signs of decreased coronary artery perfusion - ex. Angina  - Evaluate fluid balance, assess for edema, trend weights  Outcome: Progressing  Goal: Absence of cardiac arrhythmias or at baseline  Description: INTERVENTIONS:  - Continuous cardiac monitoring, monitor vital signs, obtain 12 lead EKG if indicated  - Evaluate effectiveness of antiarrhythmic and heart rate control medications as ordered  - Initiate emergency measures for life threatening arrhythmias  - Monitor electrolytes and administer replacement  therapy as ordered  Outcome: Progressing

## 2024-02-03 LAB
ANION GAP SERPL CALC-SCNC: 9 MMOL/L (ref 0–18)
BASOPHILS # BLD AUTO: 0.01 X10(3) UL (ref 0–0.2)
BASOPHILS NFR BLD AUTO: 0.1 %
BUN BLD-MCNC: 26 MG/DL (ref 9–23)
BUN/CREAT SERPL: 7.4 (ref 10–20)
CALCIUM BLD-MCNC: 7.5 MG/DL (ref 8.7–10.4)
CHLORIDE SERPL-SCNC: 97 MMOL/L (ref 98–112)
CO2 SERPL-SCNC: 29 MMOL/L (ref 21–32)
CREAT BLD-MCNC: 3.49 MG/DL
DEPRECATED RDW RBC AUTO: 57.1 FL (ref 35.1–46.3)
EGFRCR SERPLBLD CKD-EPI 2021: 13 ML/MIN/1.73M2 (ref 60–?)
EOSINOPHIL # BLD AUTO: 0.11 X10(3) UL (ref 0–0.7)
EOSINOPHIL NFR BLD AUTO: 1.5 %
ERYTHROCYTE [DISTWIDTH] IN BLOOD BY AUTOMATED COUNT: 16.3 % (ref 11–15)
GLUCOSE BLD-MCNC: 93 MG/DL (ref 70–99)
HCT VFR BLD AUTO: 34.1 %
HGB BLD-MCNC: 11 G/DL
IMM GRANULOCYTES # BLD AUTO: 0.01 X10(3) UL (ref 0–1)
IMM GRANULOCYTES NFR BLD: 0.1 %
LYMPHOCYTES # BLD AUTO: 0.75 X10(3) UL (ref 1–4)
LYMPHOCYTES NFR BLD AUTO: 9.9 %
MAGNESIUM SERPL-MCNC: 1.8 MG/DL (ref 1.6–2.6)
MCH RBC QN AUTO: 30.9 PG (ref 26–34)
MCHC RBC AUTO-ENTMCNC: 32.3 G/DL (ref 31–37)
MCV RBC AUTO: 95.8 FL
MONOCYTES # BLD AUTO: 0.82 X10(3) UL (ref 0.1–1)
MONOCYTES NFR BLD AUTO: 10.8 %
NEUTROPHILS # BLD AUTO: 5.86 X10 (3) UL (ref 1.5–7.7)
NEUTROPHILS # BLD AUTO: 5.86 X10(3) UL (ref 1.5–7.7)
NEUTROPHILS NFR BLD AUTO: 77.6 %
OSMOLALITY SERPL CALC.SUM OF ELEC: 284 MOSM/KG (ref 275–295)
PLATELET # BLD AUTO: 151 10(3)UL (ref 150–450)
POTASSIUM SERPL-SCNC: 4 MMOL/L (ref 3.5–5.1)
RBC # BLD AUTO: 3.56 X10(6)UL
SODIUM SERPL-SCNC: 135 MMOL/L (ref 136–145)
WBC # BLD AUTO: 7.6 X10(3) UL (ref 4–11)

## 2024-02-03 PROCEDURE — 99232 SBSQ HOSP IP/OBS MODERATE 35: CPT | Performed by: INTERNAL MEDICINE

## 2024-02-03 RX ORDER — MIDODRINE HYDROCHLORIDE 5 MG/1
5 TABLET ORAL ONCE
Status: COMPLETED | OUTPATIENT
Start: 2024-02-03 | End: 2024-02-03

## 2024-02-03 RX ORDER — ONDANSETRON 2 MG/ML
4 INJECTION INTRAMUSCULAR; INTRAVENOUS EVERY 6 HOURS PRN
Status: DISCONTINUED | OUTPATIENT
Start: 2024-02-03 | End: 2024-02-08

## 2024-02-03 RX ORDER — ALBUMIN (HUMAN) 12.5 G/50ML
25 SOLUTION INTRAVENOUS ONCE
Status: COMPLETED | OUTPATIENT
Start: 2024-02-03 | End: 2024-02-03

## 2024-02-03 RX ORDER — METOPROLOL TARTRATE 50 MG/1
50 TABLET, FILM COATED ORAL
Status: DISCONTINUED | OUTPATIENT
Start: 2024-02-05 | End: 2024-02-04

## 2024-02-03 RX ORDER — METOPROLOL TARTRATE 50 MG/1
50 TABLET, FILM COATED ORAL
Status: DISCONTINUED | OUTPATIENT
Start: 2024-02-03 | End: 2024-02-04

## 2024-02-03 NOTE — PROGRESS NOTES
Attempted follow up treatment. Patient received supine with RN at bedside. Patient with low Bp: 78/49mmHg. Will defer PT intervention until vitals stabilized. Will re-attempt as appropriate.    Thank you,  Radha Mccarty, PT, DPT

## 2024-02-03 NOTE — PROGRESS NOTES
[facility] CARDIOLOGY Progress Note      Helen Espana is a 83 year old female who I assessed as follows:  Chief Complaint   Patient presents with    Groin Pain    Leg Pain          REASON FOR CONSULTATION:    AF            ASSESSMENT/PLAN:     IMPRESSIONS:  Retained food, awaiting EGD  PAF/SSS (in the past, tachy when in AF, bruce when in SR)  ESRD on HD  HTN, now on midodrine for low BP  HL, on statin  Chronic diastolic CHF, controlled with HD  Very mild CAD at cath 2016        PLAN:  Echo 7/23 reviewed  Back on oral meds for HR control. Off IV diltiazem drip. Rates controlled at rest  Back on eliquis  Continue midodrine    Sees Dr Ojeda        SUBJECTIVE:    No chest pain or dyspnea. No lightheadedness.         --------------------------------------------------------------------------------------------------------------------------------    HPI:         History of PAF, ESRD admitted for left leg pain. Then had retained food. Awaiting EGD. Eliquis has been held. Went into AF with RVR so now on IV diltiazem drip.    HD for CHF fluid control. On midodrine for hypotension. Admission 7/23 with RVR when in AF and bruce when in SR.             No current outpatient medications on file.      Past Medical History:   Diagnosis Date    Arrhythmia     Afib    AVF (arteriovenous fistula) ([facility]) 10/12/2017    Biceps rupture, proximal, right, initial encounter 02/20/2018    Breast cancer ([facility]) 1998    Breast cancer in female ([facility]) 12/20/2018    CANCER 1989    right breast mastectomy    Cataract 2001    OU    Clostridioides difficile infection     Cup to disc asymmetry 2001    OU    Dialysis patient ([facility])     HD M, W, F    Essential hypertension     Floaters 2001    OU    Hearing impairment     hearing aides    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 Giordano @ [facility]    CATARACT EXTRACTION W/  INTRAOCULAR LENS IMPLANT Left 2021    Dr. Giordano @ [facility]    CHOLECYSTECTOMY      laparoscopic    COLONOSCOPY      c. diff colitis-Ali    ECHO 2D DP/CLRFL, CARDIO (INTERNAL)   [facility]ee    mod MR/LAE; border systolic LVEF    ECHO 2D, CARDIO (DMG)  2020    [facility] cardio: no change except mod mitral regurg mild now    HX BREAST CANCER      LEXISCAN NUC STRESS TST, CARDIO (INTERNAL)  05/15/2018    [facility]; normal ; EF 54%    LEXISCAN NUC STRESS TST, CARDIO (INTERNAL)  2021    [facility]; EF57%; normal wall motion; fixed perfusion defect inf. wall    LUMPECTOMY RIGHT      MASTECTOMY PARTIAL  2018    u [facility]: left breaswt seed loc partial mastectomy     MASTECTOMY RIGHT            OTHER SURGICAL HISTORY  12    cysto-Dr. Allred    OTHER SURGICAL HISTORY  1/21/15     lap sigmoid colectomy     OTHER SURGICAL HISTORY  16    Right Hemicolectomy by Dr. Chaudhry    PERITONEAL DIALYSIS SYSTEM      2009     Family History   Problem Relation Age of Onset    Heart Disorder Father         MI    Diabetes Father     Kidney Disease Father         polycystic kidney disease    Hypertension Mother     Heart Disorder Mother         stroke    Cataracts Mother     Thyroid Disorder Daughter         thyroid    Other (Other) Sister         Cushing's disease    Diabetes Sister     Hypertension Sister     Cancer Sister 69        lung tob     Kidney Disease Son         pckd    Kidney Disease Brother         PCKD     Breast Cancer Maternal Aunt 50        x2 ages 85 and in her 50's    Macular degeneration Other         Aunt    Glaucoma Neg          family h/o      Social History:  Social History     Socioeconomic History    Marital status:    Occupational History    Occupation: retired real estate   Tobacco Use    Smoking status: Never    Smokeless tobacco: Never   Vaping Use    Vaping Use: Never used    Passive vaping exposure: Yes   Substance and Sexual Activity    Alcohol use: No    Drug use: No    Sexual activity: Yes     Partners: Male     Comment:  2020-he had CHF    Other Topics Concern     Service No    Blood Transfusions Yes    Caffeine Concern Yes     Comment: coffee    Hobby Hazards No    Sleep Concern Yes    Stress Concern Yes    Weight Concern Yes    Special Diet Yes    Seat Belt Yes    Self-Exams Yes     Social Determinants of Health     Food Insecurity: No Food Insecurity (2024)    Food Insecurity     Food Insecurity: Never true   Transportation Needs: No Transportation Needs (2024)    Transportation Needs     Lack of Transportation: No   Housing Stability: Low Risk  (2024)    Housing Stability     Housing Instability: No          Medications:            Allergies  Allergies   Allergen Reactions    Adhesive Tape SWELLING    Denosumab OTHER (SEE COMMENTS)     hypocalcemia  Lowered calcium level  Lowered calcium level  Lowered calcium level      Docosahexaenoic Acid, Dha HIVES    Ficus HIVES    Penicillins HIVES     Thinks she has tolerated penicillin in the past    Zithromax HIVES    Eicosapentaenoic Acid, Epa HIVES    Fig HIVES    Levofloxacin HIVES    Prolia OTHER (SEE COMMENTS)     Lowered calcium level    Vitamin E HIVES                   EXAM:         TELE: AF          BP (!) 89/59 (BP Location: Left arm)   Pulse 108   Temp 98 °F (36.7 °C) (Oral)   Resp 16   Ht 5' 3\" (1.6 m)   Wt 126 lb (57.2 kg)   SpO2 93%   BMI 22.32 kg/m²   Temp (24hrs), Av.3 °F (36.8 °C), Min:98 °F (36.7 °C), Max:98.6 °F (37 °C)    Wt Readings from Last 3 Encounters:   24 126 lb (57.2 kg)   23 133 lb  6.1 oz (60.5 kg)   06/20/23 135 lb (61.2 kg)         Intake/Output Summary (Last 24 hours) at 2/3/2024 0943  Last data filed at 2/3/2024 0900  Gross per 24 hour   Intake 240 ml   Output 2500 ml   Net -2260 ml         GENERAL: well developed, well nourished, in no apparent distress  SKIN: no rashes  HEENT: atraumatic, normocephalic, throat without erythema  NECK: supple, no bruits  LUNGS: clear to auscultation  CARDIO: irregular rhythm without murmur or S3   GI: soft, nontender  EXTREMITIES: no cyanosis, clubbing or edema  NEUROLOGY: alert  PSYCH: cooperative          LABORATORY DATA:               ECG:        ECHO 7/23:  ECHO:  1. Study data: Atrial fibrillation   2. Left ventricle: The cavity size was normal. Wall thickness was normal.      Systolic function was normal. The estimated ejection fraction was 55-60%,      by biplane method of disks. Wall motion is normal; there are no regional      wall motion abnormalities. Doppler parameters are consistent with a      reversible restrictive pattern, indicative of decreased left ventricular      diastolic compliance and/or increased left atrial pressure - grade 3      diastolic dysfunction.   3. Left atrium: The atrium was markedly dilated.   4. Right atrium: The atrium was moderately to markedly dilated.   5. Mitral valve: There was mild regurgitation.   6. Tricuspid valve: There was mild-moderate regurgitation.   7. Pulmonary arteries: Systolic pressure was within the normal range,      estimated to be 30mm Hg.            Labs:  Recent Labs   Lab 02/01/24  0626 02/02/24  0428 02/03/24  0641   GLU 67* 151* 93   BUN 39* 51* 26*   CREATSERUM 4.49* 5.33* 3.49*   CA 7.8* 6.6* 7.5*    139 135*   K 4.4 5.4* 4.0    102 97*   CO2 20.0* 22.0 29.0     No results for input(s): \"TROP\" in the last 168 hours.  Recent Labs   Lab 02/03/24  0641   RBC 3.56*   HGB 11.0*   HCT 34.1*   MCV 95.8   MCH 30.9   MCHC 32.3   RDW 16.3*   NEPRELIM 5.86   WBC 7.6   .0     No  results for input(s): \"TROP\", \"TROPHS\", \"CK\" in the last 168 hours.    Imaging:  CT SOFT TISSUE OF NECK(CONTRAST ONLY) (CPT=70491)    Result Date: 1/31/2024  CONCLUSION:   Patulous proximal to mid esophagus redemonstrated with multifocal areas of retained ingested material similar to the prior examination.    Dictated by (CST): Karl Hill MD on 1/31/2024 at 5:10 PM     Finalized by (CST): Karl Hill MD on 1/31/2024 at 5:15 PM                Familia Walters MD

## 2024-02-03 NOTE — PLAN OF CARE
No acute changes overnight, pt slept. Complaints of pain, PRN norco given. Safety precautions maintained and in place. Plan for PT to evaluate.     Problem: PAIN - ADULT  Goal: Verbalizes/displays adequate comfort level or patient's stated pain goal  Description: INTERVENTIONS:  - Encourage pt to monitor pain and request assistance  - Assess pain using appropriate pain scale  - Administer analgesics based on type and severity of pain and evaluate response  - Implement non-pharmacological measures as appropriate and evaluate response  - Consider cultural and social influences on pain and pain management  - Manage/alleviate anxiety  - Utilize distraction and/or relaxation techniques  - Monitor for opioid side effects  - Notify MD/LIP if interventions unsuccessful or patient reports new pain  - Anticipate increased pain with activity and pre-medicate as appropriate  Outcome: Progressing     Problem: SAFETY ADULT - FALL  Goal: Free from fall injury  Description: INTERVENTIONS:  - Assess pt frequently for physical needs  - Identify cognitive and physical deficits and behaviors that affect risk of falls.  - Bigler fall precautions as indicated by assessment.  - Educate pt/family on patient safety including physical limitations  - Instruct pt to call for assistance with activity based on assessment  - Modify environment to reduce risk of injury  - Provide assistive devices as appropriate  - Consider OT/PT consult to assist with strengthening/mobility  - Encourage toileting schedule  Outcome: Progressing     Problem: DISCHARGE PLANNING  Goal: Discharge to home or other facility with appropriate resources  Description: INTERVENTIONS:  - Identify barriers to discharge w/pt and caregiver  - Include patient/family/discharge partner in discharge planning  - Arrange for needed discharge resources and transportation as appropriate  - Identify discharge learning needs (meds, wound care, etc)  - Arrange for interpreters to assist  at discharge as needed  - Consider post-discharge preferences of patient/family/discharge partner  - Complete POLST form as appropriate  - Assess patient's ability to be responsible for managing their own health  - Refer to Case Management Department for coordinating discharge planning if the patient needs post-hospital services based on physician/LIP order or complex needs related to functional status, cognitive ability or social support system  Outcome: Progressing     Problem: Patient/Family Goals  Goal: Patient/Family Long Term Goal  Description: Patient's Long Term Goal: Discharge from the hospital    Interventions:  - Monitor vital signs  - Monitor appropriate labs  - Pain management  - Administer medications per order  - Follow MD orders  - Diagnostics per order  - Update / inform patient and family on plan of care  - Discharge planning  - See additional Care Plan goals for specific interventions  Outcome: Progressing  Goal: Patient/Family Short Term Goal  Description: Patient's Short Term Goal: Manage pain    Interventions:   - Monitor vital signs  - Monitor appropriate labs  - Pain management  - Administer medications per order  - Follow MD orders  - Diagnostics per order  - Update / inform patient and family on plan of care  - See additional Care Plan goals for specific interventions  Outcome: Progressing     Problem: METABOLIC/FLUID AND ELECTROLYTES - ADULT  Goal: Electrolytes maintained within normal limits  Description: INTERVENTIONS:  - Monitor labs and rhythm and assess patient for signs and symptoms of electrolyte imbalances  - Administer electrolyte replacement as ordered  - Monitor response to electrolyte replacements, including rhythm and repeat lab results as appropriate  - Fluid restriction as ordered  - Instruct patient on fluid and nutrition restrictions as appropriate  Outcome: Progressing  Goal: Hemodynamic stability and optimal renal function maintained  Description: INTERVENTIONS:  -  Monitor labs and assess for signs and symptoms of volume excess or deficit  - Monitor intake, output and patient weight  - Monitor urine specific gravity, serum osmolarity and serum sodium as indicated or ordered  - Monitor response to interventions for patient's volume status, including labs, urine output, blood pressure (other measures as available)  - Encourage oral intake as appropriate  - Instruct patient on fluid and nutrition restrictions as appropriate  Outcome: Progressing     Problem: Impaired Functional Mobility  Goal: Achieve highest/safest level of mobility/gait  Description: Interventions:  - Assess patient's functional ability and stability  - Promote increasing activity/tolerance for mobility and gait  - Educate and engage patient/family in tolerated activity level and precautions  - Recommend use of  RW for transfers and ambulation  - Recommend patient transfer to bedside chair toward strongest side  - When transferring patient, block weaker knee for safety  - Recommend use of chair position in bed 3 times per day  Outcome: Progressing     Problem: Patient Centered Care  Goal: Patient preferences are identified and integrated in the patient's plan of care  Description: Interventions:  - What would you like us to know as we care for you? From home with grandson  - Provide timely, complete, and accurate information to patient/family  - Incorporate patient and family knowledge, values, beliefs, and cultural backgrounds into the planning and delivery of care  - Encourage patient/family to participate in care and decision-making at the level they choose  - Honor patient and family perspectives and choices  Outcome: Progressing     Problem: SKIN/TISSUE INTEGRITY - ADULT  Goal: Skin integrity remains intact  Description: INTERVENTIONS  - Assess and document risk factors for pressure ulcer development  - Assess and document skin integrity  - Monitor for areas of redness and/or skin breakdown  - Initiate  interventions, skin care algorithm/standards of care as needed  Outcome: Progressing     Problem: MUSCULOSKELETAL - ADULT  Goal: Return mobility to safest level of function  Description: INTERVENTIONS:  - Assess patient stability and activity tolerance for standing, transferring and ambulating w/ or w/o assistive devices  - Assist with transfers and ambulation using safe patient handling equipment as needed  - Ensure adequate protection for wounds/incisions during mobilization  - Obtain PT/OT consults as needed  - Advance activity as appropriate  - Communicate ordered activity level and limitations with patient/family  Outcome: Progressing     Problem: Impaired Activities of Daily Living  Goal: Achieve highest/safest level of independence in self care  Description: Interventions:  - Assess ability and encourage patient to participate in ADLs to maximize function  - Promote sitting position while performing ADLs such as feeding, grooming, and bathing  - Educate and encourage patient/family in tolerated functional activity level and precautions during self-care  Outcome: Progressing     Problem: CARDIOVASCULAR - ADULT  Goal: Maintains optimal cardiac output and hemodynamic stability  Description: INTERVENTIONS:  - Monitor vital signs, rhythm, and trends  - Monitor for bleeding, hypotension and signs of decreased cardiac output  - Evaluate effectiveness of vasoactive medications to optimize hemodynamic stability  - Monitor arterial and/or venous puncture sites for bleeding and/or hematoma  - Assess quality of pulses, skin color and temperature  - Assess for signs of decreased coronary artery perfusion - ex. Angina  - Evaluate fluid balance, assess for edema, trend weights  Outcome: Progressing  Goal: Absence of cardiac arrhythmias or at baseline  Description: INTERVENTIONS:  - Continuous cardiac monitoring, monitor vital signs, obtain 12 lead EKG if indicated  - Evaluate effectiveness of antiarrhythmic and heart rate  control medications as ordered  - Initiate emergency measures for life threatening arrhythmias  - Monitor electrolytes and administer replacement therapy as ordered  Outcome: Progressing

## 2024-02-03 NOTE — PROGRESS NOTES
[facility]    Nephrology Progress Note    Chief Complaint   Patient presents with    Groin Pain    Leg Pain       Subjective:   83 year old female, following for ESRD on HD.     HD yesterday.   Hypotensive today. Albumin and additional Midodrine given.   Left leg pain, not much improving.     Review of Systems:   Review of Systems   Constitutional:  Positive for fatigue. Negative for chills and fever.   Respiratory:  Negative for cough and shortness of breath.    Cardiovascular:  Negative for chest pain and leg swelling.   Gastrointestinal:  Negative for abdominal pain, constipation, diarrhea, nausea and vomiting.   Genitourinary:  Negative for difficulty urinating, dysuria and flank pain.   Musculoskeletal:         Left leg pain       Objective:   Temp:  [98 °F (36.7 °C)-98.6 °F (37 °C)] 98 °F (36.7 °C)  Pulse:  [100-115] 104  Resp:  [16-19] 16  BP: ()/(55-69) 89/58  SpO2:  [92 %-99 %] 92 %  SpO2: 92 %     Intake/Output Summary (Last 24 hours) at 2/3/2024 1056  Last data filed at 2/3/2024 0900  Gross per 24 hour   Intake 240 ml   Output 2500 ml   Net -2260 ml     Wt Readings from Last 3 Encounters:   02/03/24 126 lb (57.2 kg)   07/18/23 133 lb 6.1 oz (60.5 kg)   06/20/23 135 lb (61.2 kg)     Physical Exam  Constitutional:       Appearance: Normal appearance.   Cardiovascular:      Rate and Rhythm: Normal rate and regular rhythm.      Heart sounds: Normal heart sounds.      Comments: R AVF-pos bruit/thrill  Pulmonary:      Effort: Pulmonary effort is normal.      Breath sounds: Normal breath sounds.   Musculoskeletal:         General: Normal range of motion.      Comments: Trace edema   Skin:     General: Skin is warm and dry.      Comments: Venous stasis changes   Neurological:      General: No focal deficit present.      Mental Status: She is alert and oriented to person, place, and time.   Psychiatric:         Mood and Affect: Mood normal.         Behavior: Behavior normal.          Medications:               Results:     Recent Labs   Lab 02/01/24 0626 02/02/24 0428 02/03/24  0641   RBC 3.91 3.52* 3.56*   HGB 12.2 11.0* 11.0*   HCT 37.4 33.2* 34.1*   MCV 95.7 94.3 95.8   NEPRELIM  --  5.80 5.86   WBC 8.3 6.7 7.6   .0* 157.0 151.0     Recent Labs   Lab 02/01/24 0626 02/02/24 0428 02/03/24  0641   GLU 67* 151* 93   BUN 39* 51* 26*   CREATSERUM 4.49* 5.33* 3.49*   CA 7.8* 6.6* 7.5*    139 135*   K 4.4 5.4* 4.0    102 97*   CO2 20.0* 22.0 29.0     PTT   Date Value Ref Range Status   11/06/2018 23.6 (L) 24.0 - 33.7 sec Final     INR   Date Value Ref Range Status   11/06/2018 1.0 0.9 - 1.2 ratio Final     Comment:     An INR of 2.0-3.0 is the usual therapeutic interval.  Some patients (e.g., those with recurrent thrombotic events, a mechanical heart valve) may require more intense therapy with a therapeutic INR interval of 2.5-3.5   08/11/2015 3.0 (A) 0.8 - 1.2 Final     No results for input(s): \"BNP\" in the last 168 hours.  Recent Labs   Lab 02/01/24 0626 02/02/24 0428 02/03/24 0641   MG  --  2.0 1.8   PHOS 5.4*  --   --         Recent Labs   Lab 02/01/24 0626   PHOS 5.4*       No results found.  EKG 12 Lead    Result Date: 2/2/2024  Sinus rhythm Marked first degree AV boock ( ms) Left axis deviation Low voltage QRS, consider pulmonary disease, pericardial effusion, or normal variant Nonspecific T wave abnormality Prolonged QT interval or tu fusion, consider myocardial disease, electrolyte imbalance, or drug effects Abnormal ECG When compared with ECG of 16-JUL-2023 12:10, CO interval has lengthened Nonspecific T wave abnormality now evident in Lateral leads QT has lengthened Confirmed by CHRISTA JOHNS ([id number]) on 2/2/2024 8:31:58 AM     Assessment and Plan:     83 year old female with past medical history of HTN, 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    ESRD HD MWF:   HD yesterday. Next HD Monday.   Fluid removal limited due to hypotension.     Hypotension:   Cont Midodrine.      Anemia:   Hb 11, at goal.      Hyperphosphatemia:   Phos 5.4. Cont Renvela 3 tabs with meals.      Left leg and hip pain:   Pain control.   PT following.     Globus sensation:   EGD 2/1, food bolus removed.   Feels better.       Ana Bay MD  2/3/2024

## 2024-02-03 NOTE — PROGRESS NOTES
[facility] Hospitalist Progress Note     CC: Hospital Follow up    PCP: Shannon Bahena MD       Assessment/Plan:     Principal Problem:    Inability to walk  Active Problems:    Osteoarthritis of hip    Anemia due to chronic kidney disease, on chronic dialysis (HCC)    ESRD (end stage renal disease) on dialysis (HCC)    Ms. Cook is an 83-year-old female with a history of ESRD, paroxysmal atrial fibrillation, carotid stenosis, hypertension, hyperlipidemia, diastolic congestive heart failure, presents to the hospital for evaluation of left leg and hip pain, likely radicular pain fro L4-L5 disc herniation, course complicated by food bolus, EGD on 2/1 with resolution, diet advanced     Left hip/ Leg pain  Left hip OA  Spinal stenosis, L4-L5 disc extrusion  - xray neg for fracture  - outpatient MRI with Left hip OA and trets of hamstring tendon and gluteus minimus  - pain does appear to radiate to her lower leg   - lumbar spine with DDD  - continue lidoderm patch start low dose gabapentin  - continue norco    - PT/OT  - recently had steroid injection in hip with ortho, therefore not candidate for at least 1-2 more months  - continue prednisone   - Lumbar spine MRI, pre romero read with L4-L5 disc herniation  - continue PT/OT, may need RADHA on DC pending progress  - resume oral pain meds, PT/OT eval     Food bolus  - pt with nausea, vomiting, globus sensation on 1/29  - CT neck reviewed, showed patulous esophagus with multifocal areas of retained ingested material  - had EGD in 03/2023 at [facility] for globus sensation, per report esophageal hypomotility diagnosed but no achalasia, she did have a very dry oropharynx, pathology results were negative  - GI consulted  - EGD on 2/1 food bolus removed  - feeling better, tolerating diet, adv per GI    ESRD  -Nephrology consulted  -MWF HD  -hypotension on 2/3 gave some albumin, renal ok with one time extra dose of midodrine      Paroxysmal atrial fibrillation  Afib with  RVR  -Follows with [facility]  -She is on Eliquis 2.5 mg twice daily,   -Normally on metoprolol, IV 5 mg q 6  -Afib with RVR on 1/31, on dilt drip, now improved  -cardiology consulted, ok for EGD       Chronic HFpEF  - euvolemic  - volume management per HD     Endometrial wall thickening  - noted on outpatient MRI and US  - needs outpatient GYN follow up     Hypertension  -She takes a Norvasc as needed but not daily  -resume lopressor     FN:  - IVF:none  - Diet: adv     DVT Prophy:scd, eliquis resumed  Lines: PIV     Dispo: pending clinical course    Discussed with daughter over phone 2/3    Questions/concerns were discussed with patient and/or family by bedside.    Thank You,  Miguel Corral MD    Hospitalist with [facility]     Subjective:   No hip pain this am, no chest pain, no nausea, tolerating diet, anxious about working with therapy    OBJECTIVE:    Blood pressure 100/66, pulse (!) 124, temperature 98 °F (36.7 °C), temperature source Oral, resp. rate 16, height 5' 3\" (1.6 m), weight 126 lb (57.2 kg), SpO2 94%, not currently breastfeeding.    Temp:  [98 °F (36.7 °C)-98.6 °F (37 °C)] 98 °F (36.7 °C)  Pulse:  [100-124] 124  Resp:  [16-19] 16  BP: ()/(51-69) 100/66  SpO2:  [92 %-99 %] 94 %      Intake/Output:    Intake/Output Summary (Last 24 hours) at 2/3/2024 1309  Last data filed at 2/3/2024 0900  Gross per 24 hour   Intake 240 ml   Output 2500 ml   Net -2260 ml       Last 3 Weights   02/03/24 0431 126 lb (57.2 kg)   02/01/24 0509 124 lb 9 oz (56.5 kg)   01/31/24 1517 129 lb (58.5 kg)   01/30/24 0400 129 lb 13.6 oz (58.9 kg)   01/29/24 0630 130 lb 15.3 oz (59.4 kg)   01/28/24 0551 127 lb 13.9 oz (58 kg)   01/27/24 0642 124 lb 12.5 oz (56.6 kg)   01/26/24 0246 135 lb 12.9 oz (61.6 kg)   01/25/24 2148 126 lb 12.2 oz (57.5 kg)   07/18/23 1000 133 lb 6.1 oz (60.5 kg)   07/16/23 1942 134 lb 12.8 oz (61.1 kg)   07/16/23 1203 135 lb (61.2 kg)   06/20/23 1508 135 lb (61.2 kg)       Exam    Gen:  No acute distress, alert and oriented x3   Heent: NC AT, neck supple  Pulm: Lungs clear, normal respiratory effort  CV: Heart with regular rate and rhythm   Abd: Abdomen soft, nontender, nondistended   MSK: nimisha appearance (chronic for patient) warm well perfused  Skin: no rashes or lesions        Data Review:       Labs:     Recent Labs   Lab 02/01/24 0626 02/02/24 0428 02/03/24  0641   RBC 3.91 3.52* 3.56*   HGB 12.2 11.0* 11.0*   HCT 37.4 33.2* 34.1*   MCV 95.7 94.3 95.8   MCH 31.2 31.3 30.9   MCHC 32.6 33.1 32.3   RDW 16.5* 16.6* 16.3*   NEPRELIM  --  5.80 5.86   WBC 8.3 6.7 7.6   .0* 157.0 151.0         Recent Labs   Lab 02/01/24 0626 02/02/24 0428 02/03/24  0641   GLU 67* 151* 93   BUN 39* 51* 26*   CREATSERUM 4.49* 5.33* 3.49*   EGFRCR 9* 8* 13*   CA 7.8* 6.6* 7.5*    139 135*   K 4.4 5.4* 4.0    102 97*   CO2 20.0* 22.0 29.0       No results for input(s): \"ALT\", \"AST\", \"ALB\", \"AMYLASE\", \"LIPASE\", \"LDH\" in the last 168 hours.    Invalid input(s): \"ALPHOS\", \"TBIL\", \"DBIL\", \"TPROT\"      Imaging:  CT SOFT TISSUE OF NECK(CONTRAST ONLY) (CPT=70491)    Result Date: 1/31/2024  CONCLUSION:   Patulous proximal to mid esophagus redemonstrated with multifocal areas of retained ingested material similar to the prior examination.    Dictated by (CST): Karl Hill MD on 1/31/2024 at 5:10 PM     Finalized by (CST): Karl Hill MD on 1/31/2024 at 5:15 PM             Meds:      metoprolol tartrate  50 mg Oral 3 times per day on Sunday Tuesday Thursday Saturday    [START ON 2/5/2024] metoprolol tartrate  50 mg Oral 2 times per day on Monday Wednesday Friday    albumin human  25 g Intravenous Once    lansoprazole  30 mg Oral BID AC    sucralfate  1 g Oral TID AC and HS (2200)    gabapentin  100 mg Oral BID    apixaban  2.5 mg Oral 2 times per day    midodrine  5 mg Oral TID    montelukast  10 mg Oral Nightly    sevelamer carbonate  2,400 mg Oral TID    lidocaine-menthol  1 patch Transdermal  Daily    sennosides  17.2 mg Oral Nightly    atorvastatin  5 mg Oral Nightly           calcium carbonate, amLODIPine, polyethylene glycol (PEG 3350), bisacodyl, fleet enema, acetaminophen, HYDROcodone-acetaminophen

## 2024-02-03 NOTE — PHYSICAL THERAPY NOTE
PHYSICAL THERAPY TREATMENT NOTE - INPATIENT     Room Number: 309/309-A       Presenting Problem: inability to walk       Problem List  Principal Problem:    Inability to walk  Active Problems:    Osteoarthritis of hip    Anemia due to chronic kidney disease, on chronic dialysis ([facility])    ESRD (end stage renal disease) on dialysis ([facility])      PHYSICAL THERAPY ASSESSMENT     Patient received supine in bed. RN approved activity. Therapist educated pt on POC and physiological benefits of mobilization. Cues for activity pacing and energy conservation technique. Patient agreeable to participate. Primary complaint is L knee and thigh pain which she does not quantify but reports increases with weight bearing position.   Bed mobility: Min A x 1 supine>sit. Assist in guiding bilateral LE's toward EOB. Task requiring increased time to complete with HOB elevated ~20*. Sat EOB for 6 minutes requiring supervision to maintain sitting balance.   Transfers: Min A x 1 with use of RW. Cues for appropriate UE positioning with transitional movements. Cues for upright posture. Tolerated 2 minutes of static standing with bilateral UE support on RW requiring Min A x 1 for balance.   Ambulation: Mod A x 1 with use of RW and close chair follow. 5ft. Shuffled, step-to gait pattern with decreased stance time on LLE. Narrow base of support. Antalgic gait. Distance ambulated limited by pain and fatigue. Patient became more quiet when upright and therapist concern for drop in Bp, therefore, pt assisted in sitting position for monitoring of vitals.   *Bp sitting EOB: 99/68mmHg. Bp sitting post activity: 108/70mmHg. HR: 114-130bpm. SpO2 on 1L/min supplemental O2 via NC post activity: 94%.     Patient in chair at end of session with needs in reach and alarm activated. RN made aware of session findings.     Patient's progress toward IP PT goals limited by pain this session.  The patient's Approx Degree of Impairment: 61.29% has been calculated based on  documentation in the [facility] '6 clicks' Inpatient Basic Mobility Short Form.  Research supports that patients with this level of impairment may benefit from sub-acute rehab to optimize functional outcomes.    DISCHARGE RECOMMENDATIONS  PT Discharge Recommendations: Sub-acute rehabilitation     PLAN  PT Treatment Plan: Bed mobility;Body mechanics;Endurance;Patient education;Energy conservation;Family education;Gait training;Strengthening;Stair training;Transfer training;Balance training  Frequency (Obs): 5x/week    SUBJECTIVE  \"I'll tell you if my leg hurts once I'm on it\"     OBJECTIVE  Precautions: Limb alert - right (per MD: HR <120's bpm)    PAIN ASSESSMENT   Ratin  Location: L knee and L thigh  Management Techniques: Activity promotion;Body mechanics;Repositioning    BALANCE  Static Sitting: Fair  Dynamic Sitting: Fair -  Static Standing: Poor  Dynamic Standing: Poor -    ACTIVITY TOLERANCE  Pulse: 114 (130bpm with activity)  Heart Rate Source: Monitor     BP: 99/68 (108/70mmHg sitting in chair)  BP Location: Left arm  BP Method: Automatic  Patient Position: Sitting     O2 WALK  Oxygen Therapy  SPO2% Ambulation on Oxygen: 94  Ambulation oxygen flow (liters per minute): 1    AM-PAC '6-Clicks' INPATIENT SHORT FORM - BASIC MOBILITY  How much difficulty does the patient currently have...  Patient Difficulty: Turning over in bed (including adjusting bedclothes, sheets and blankets)?: A Little   Patient Difficulty: Sitting down on and standing up from a chair with arms (e.g., wheelchair, bedside commode, etc.): A Little   Patient Difficulty: Moving from lying on back to sitting on the side of the bed?: A Little   How much help from another person does the patient currently need...   Help from Another: Moving to and from a bed to a chair (including a wheelchair)?: A Lot   Help from Another: Need to walk in hospital room?: A Lot   Help from Another: Climbing 3-5 steps with a railing?: Total     AM-PAC Score:  Raw  Score: 14   Approx Degree of Impairment: 61.29%   Standardized Score (AM-PAC Scale): 38.1   CMS Modifier (G-Code): CL    FUNCTIONAL ABILITY STATUS  Functional Mobility/Gait Assessment  Gait Assistance: Moderate assistance  Distance (ft): 5ft  Assistive Device: Rolling walker  Pattern: Shuffle;L Decreased stance time (narrow base of support, antalgic gait, guarded posture)    THERAPEUTIC EXERCISES  Lower Extremity Ankle pumps  LAQ     Position Sitting       Patient End of Session: Up in chair;Needs met;Call light within reach;RN aware of session/findings;Alarm set    CURRENT GOALS   Goals to be met by: 24  Patient Goal Patient's self-stated goal is: go home   Goal #1 Patient is able to demonstrate supine - sit EOB @ level: modified independent      Goal #1   Current Status Min A x 1 supine>sit   Goal #2 Patient is able to demonstrate transfers Sit to/from Stand at assistance level: modified independent with walker - rolling      Goal #2  Current Status Min A with RW    Goal #3 Patient is able to ambulate 100 feet with assist device: walker - rolling at assistance level: supervision   Goal #3   Current Status Mod A x 1 with RW 5ft   Goal #4 Patient will negotiate 7 stairs/one curb w/ assistive device and supervision   Goal #4   Current Status NT    Goal #5 Patient to demonstrate independence with home activity/exercise instructions provided to patient in preparation for discharge.   Goal #5   Current Status In progress     Gait Trainin minutes  Therapeutic Activity: 14 minutes

## 2024-02-03 NOTE — OCCUPATIONAL THERAPY NOTE
Ongoing OT follow up- pt has been hypotensive this AM- last BP's charted 79/55, 89/59, 89/58- will defer treatment until BP is stabilized. Will put on priority for 2/4

## 2024-02-04 LAB
ANION GAP SERPL CALC-SCNC: 7 MMOL/L (ref 0–18)
BASOPHILS # BLD AUTO: 0.03 X10(3) UL (ref 0–0.2)
BASOPHILS NFR BLD AUTO: 0.5 %
BUN BLD-MCNC: 41 MG/DL (ref 9–23)
BUN/CREAT SERPL: 9.3 (ref 10–20)
CALCIUM BLD-MCNC: 8 MG/DL (ref 8.7–10.4)
CHLORIDE SERPL-SCNC: 96 MMOL/L (ref 98–112)
CO2 SERPL-SCNC: 30 MMOL/L (ref 21–32)
CREAT BLD-MCNC: 4.43 MG/DL
DEPRECATED RDW RBC AUTO: 56.4 FL (ref 35.1–46.3)
EGFRCR SERPLBLD CKD-EPI 2021: 9 ML/MIN/1.73M2 (ref 60–?)
EOSINOPHIL # BLD AUTO: 0.2 X10(3) UL (ref 0–0.7)
EOSINOPHIL NFR BLD AUTO: 3 %
ERYTHROCYTE [DISTWIDTH] IN BLOOD BY AUTOMATED COUNT: 15.9 % (ref 11–15)
GLUCOSE BLD-MCNC: 84 MG/DL (ref 70–99)
HCT VFR BLD AUTO: 35 %
HGB BLD-MCNC: 11.2 G/DL
IMM GRANULOCYTES # BLD AUTO: 0.02 X10(3) UL (ref 0–1)
IMM GRANULOCYTES NFR BLD: 0.3 %
LYMPHOCYTES # BLD AUTO: 1.05 X10(3) UL (ref 1–4)
LYMPHOCYTES NFR BLD AUTO: 15.8 %
MCH RBC QN AUTO: 30.8 PG (ref 26–34)
MCHC RBC AUTO-ENTMCNC: 32 G/DL (ref 31–37)
MCV RBC AUTO: 96.2 FL
MONOCYTES # BLD AUTO: 0.83 X10(3) UL (ref 0.1–1)
MONOCYTES NFR BLD AUTO: 12.5 %
NEUTROPHILS # BLD AUTO: 4.53 X10 (3) UL (ref 1.5–7.7)
NEUTROPHILS # BLD AUTO: 4.53 X10(3) UL (ref 1.5–7.7)
NEUTROPHILS NFR BLD AUTO: 67.9 %
OSMOLALITY SERPL CALC.SUM OF ELEC: 285 MOSM/KG (ref 275–295)
PLATELET # BLD AUTO: 164 10(3)UL (ref 150–450)
POTASSIUM SERPL-SCNC: 4.1 MMOL/L (ref 3.5–5.1)
RBC # BLD AUTO: 3.64 X10(6)UL
SODIUM SERPL-SCNC: 133 MMOL/L (ref 136–145)
WBC # BLD AUTO: 6.7 X10(3) UL (ref 4–11)

## 2024-02-04 PROCEDURE — 99232 SBSQ HOSP IP/OBS MODERATE 35: CPT | Performed by: INTERNAL MEDICINE

## 2024-02-04 RX ORDER — MIDODRINE HYDROCHLORIDE 5 MG/1
5 TABLET ORAL ONCE
Status: COMPLETED | OUTPATIENT
Start: 2024-02-04 | End: 2024-02-04

## 2024-02-04 RX ORDER — ACETAMINOPHEN 500 MG
1000 TABLET ORAL EVERY 6 HOURS PRN
Status: DISCONTINUED | OUTPATIENT
Start: 2024-02-04 | End: 2024-02-07

## 2024-02-04 RX ORDER — ALBUMIN (HUMAN) 12.5 G/50ML
25 SOLUTION INTRAVENOUS ONCE
Status: COMPLETED | OUTPATIENT
Start: 2024-02-04 | End: 2024-02-04

## 2024-02-04 NOTE — PLAN OF CARE
Complaints of nausea with one episode of emesis, PRN zofran given. No complaints of pain. Safety precautions maintained and in place. Plan to discharge to [facility] pending medical clearance.    Problem: PAIN - ADULT  Goal: Verbalizes/displays adequate comfort level or patient's stated pain goal  Description: INTERVENTIONS:  - Encourage pt to monitor pain and request assistance  - Assess pain using appropriate pain scale  - Administer analgesics based on type and severity of pain and evaluate response  - Implement non-pharmacological measures as appropriate and evaluate response  - Consider cultural and social influences on pain and pain management  - Manage/alleviate anxiety  - Utilize distraction and/or relaxation techniques  - Monitor for opioid side effects  - Notify MD/LIP if interventions unsuccessful or patient reports new pain  - Anticipate increased pain with activity and pre-medicate as appropriate  Outcome: Progressing     Problem: SAFETY ADULT - FALL  Goal: Free from fall injury  Description: INTERVENTIONS:  - Assess pt frequently for physical needs  - Identify cognitive and physical deficits and behaviors that affect risk of falls.  - [place] fall precautions as indicated by assessment.  - Educate pt/family on patient safety including physical limitations  - Instruct pt to call for assistance with activity based on assessment  - Modify environment to reduce risk of injury  - Provide assistive devices as appropriate  - Consider OT/PT consult to assist with strengthening/mobility  - Encourage toileting schedule  Outcome: Progressing     Problem: DISCHARGE PLANNING  Goal: Discharge to home or other facility with appropriate resources  Description: INTERVENTIONS:  - Identify barriers to discharge w/pt and caregiver  - Include patient/family/discharge partner in discharge planning  - Arrange for needed discharge resources and transportation as appropriate  - Identify discharge learning needs (meds, wound care,  etc)  - Arrange for interpreters to assist at discharge as needed  - Consider post-discharge preferences of patient/family/discharge partner  - Complete POLST form as appropriate  - Assess patient's ability to be responsible for managing their own health  - Refer to Case Management Department for coordinating discharge planning if the patient needs post-hospital services based on physician/LIP order or complex needs related to functional status, cognitive ability or social support system  Outcome: Progressing     Problem: Patient/Family Goals  Goal: Patient/Family Long Term Goal  Description: Patient's Long Term Goal: Discharge from the hospital    Interventions:  - Monitor vital signs  - Monitor appropriate labs  - Pain management  - Administer medications per order  - Follow MD orders  - Diagnostics per order  - Update / inform patient and family on plan of care  - Discharge planning  - See additional Care Plan goals for specific interventions  Outcome: Progressing  Goal: Patient/Family Short Term Goal  Description: Patient's Short Term Goal: Manage pain    Interventions:   - Monitor vital signs  - Monitor appropriate labs  - Pain management  - Administer medications per order  - Follow MD orders  - Diagnostics per order  - Update / inform patient and family on plan of care  - See additional Care Plan goals for specific interventions  Outcome: Progressing     Problem: METABOLIC/FLUID AND ELECTROLYTES - ADULT  Goal: Electrolytes maintained within normal limits  Description: INTERVENTIONS:  - Monitor labs and rhythm and assess patient for signs and symptoms of electrolyte imbalances  - Administer electrolyte replacement as ordered  - Monitor response to electrolyte replacements, including rhythm and repeat lab results as appropriate  - Fluid restriction as ordered  - Instruct patient on fluid and nutrition restrictions as appropriate  Outcome: Progressing  Goal: Hemodynamic stability and optimal renal function  maintained  Description: INTERVENTIONS:  - Monitor labs and assess for signs and symptoms of volume excess or deficit  - Monitor intake, output and patient weight  - Monitor urine specific gravity, serum osmolarity and serum sodium as indicated or ordered  - Monitor response to interventions for patient's volume status, including labs, urine output, blood pressure (other measures as available)  - Encourage oral intake as appropriate  - Instruct patient on fluid and nutrition restrictions as appropriate  Outcome: Progressing     Problem: Impaired Functional Mobility  Goal: Achieve highest/safest level of mobility/gait  Description: Interventions:  - Assess patient's functional ability and stability  - Promote increasing activity/tolerance for mobility and gait  - Educate and engage patient/family in tolerated activity level and precautions  - Recommend use of  RW for transfers and ambulation  - Recommend patient transfer to bedside chair toward strongest side  - When transferring patient, block weaker knee for safety  - Recommend use of chair position in bed 3 times per day  Outcome: Progressing     Problem: Patient Centered Care  Goal: Patient preferences are identified and integrated in the patient's plan of care  Description: Interventions:  - What would you like us to know as we care for you? From home with grandson  - Provide timely, complete, and accurate information to patient/family  - Incorporate patient and family knowledge, values, beliefs, and cultural backgrounds into the planning and delivery of care  - Encourage patient/family to participate in care and decision-making at the level they choose  - Honor patient and family perspectives and choices  Outcome: Progressing     Problem: SKIN/TISSUE INTEGRITY - ADULT  Goal: Skin integrity remains intact  Description: INTERVENTIONS  - Assess and document risk factors for pressure ulcer development  - Assess and document skin integrity  - Monitor for areas of  redness and/or skin breakdown  - Initiate interventions, skin care algorithm/standards of care as needed  Outcome: Progressing     Problem: MUSCULOSKELETAL - ADULT  Goal: Return mobility to safest level of function  Description: INTERVENTIONS:  - Assess patient stability and activity tolerance for standing, transferring and ambulating w/ or w/o assistive devices  - Assist with transfers and ambulation using safe patient handling equipment as needed  - Ensure adequate protection for wounds/incisions during mobilization  - Obtain PT/OT consults as needed  - Advance activity as appropriate  - Communicate ordered activity level and limitations with patient/family  Outcome: Progressing     Problem: Impaired Activities of Daily Living  Goal: Achieve highest/safest level of independence in self care  Description: Interventions:  - Assess ability and encourage patient to participate in ADLs to maximize function  - Promote sitting position while performing ADLs such as feeding, grooming, and bathing  - Educate and encourage patient/family in tolerated functional activity level and precautions during self-care  Outcome: Progressing     Problem: CARDIOVASCULAR - ADULT  Goal: Maintains optimal cardiac output and hemodynamic stability  Description: INTERVENTIONS:  - Monitor vital signs, rhythm, and trends  - Monitor for bleeding, hypotension and signs of decreased cardiac output  - Evaluate effectiveness of vasoactive medications to optimize hemodynamic stability  - Monitor arterial and/or venous puncture sites for bleeding and/or hematoma  - Assess quality of pulses, skin color and temperature  - Assess for signs of decreased coronary artery perfusion - ex. Angina  - Evaluate fluid balance, assess for edema, trend weights  Outcome: Progressing  Goal: Absence of cardiac arrhythmias or at baseline  Description: INTERVENTIONS:  - Continuous cardiac monitoring, monitor vital signs, obtain 12 lead EKG if indicated  - Evaluate  effectiveness of antiarrhythmic and heart rate control medications as ordered  - Initiate emergency measures for life threatening arrhythmias  - Monitor electrolytes and administer replacement therapy as ordered  Outcome: Progressing

## 2024-02-04 NOTE — PROGRESS NOTES
[facility] CARDIOLOGY Progress Note      Helen Espana is a 83 year old female who I assessed as follows:  Chief Complaint   Patient presents with    Groin Pain    Leg Pain          REASON FOR CONSULTATION:    AF            ASSESSMENT/PLAN:     IMPRESSIONS:  Retained food  PAF/SSS (in the past, tachy when in AF, bruce when in SR)  ESRD on HD  HTN, now on midodrine for low BP  HL, on statin  Chronic diastolic CHF, controlled with HD  Very mild CAD at cath 2016        PLAN:  Echo 7/23 reviewed  Back on oral meds for HR control. Rates reasonable at rest. BP borderline so will tolerate mildly elevated heart rates  continue eliquis  Continue midodrine. BP does decrease post norco  Discussed with daughter at bedside    Sees Dr Ojeda        SUBJECTIVE:    No chest pain or dyspnea. No lightheadedness.         --------------------------------------------------------------------------------------------------------------------------------    HPI:         History of PAF, ESRD admitted for left leg pain. Then had retained food. Awaiting EGD. Eliquis has been held. Went into AF with RVR so now on IV diltiazem drip.    HD for CHF fluid control. On midodrine for hypotension. Admission 7/23 with RVR when in AF and bruce when in SR.             No current outpatient medications on file.      Past Medical History:   Diagnosis Date    Arrhythmia     Afib    AVF (arteriovenous fistula) (Formerly [facility]) 10/12/2017    Biceps rupture, proximal, right, initial encounter 02/20/2018    Breast cancer (Formerly [facility]) 1998    Breast cancer in female (Formerly [facility]) 12/20/2018    CANCER 1989    right breast mastectomy    Cataract 2001    OU    Clostridioides difficile infection     Cup to disc asymmetry 2001    OU    Dialysis patient (Formerly [facility])     HD M, W, F    Essential hypertension     Floaters 2001    OU    Hearing impairment     hearing aides    Hemodialysis AV fistula aneurysm ([facility]ly [facility]) 01/23/2021    High blood pressure     High cholesterol     History of blood  transfusion     @[facility]    Hyperlipidemia     Osteoarthritis     Osteoporosis     OTHER DISEASES     polycystic kidney disease familial    Pulmonary embolism (HCC)     Renal disorder     Right wrist pain 10/12/2017    Visual impairment     wears glasses     Past Surgical History:   Procedure Laterality Date    CATARACT EXTRACTION W/  INTRAOCULAR LENS IMPLANT Right 2021    Dr. Giordano @ [facility]    CATARACT EXTRACTION W/  INTRAOCULAR LENS IMPLANT Left 2021    Dr. Giordano @ [facility]    CHOLECYSTECTOMY      laparoscopic    COLONOSCOPY      c. diff colitis-Ali    ECHO 2D DP/CLRFL, CARDIO (INTERNAL)   [facility]    mod MR/LAE; border systolic LVEF    ECHO 2D, CARDIO (DMG)  2020    [facility] cardio: no change except mod mitral regurg mild now    HX BREAST CANCER      LEXISCAN NUC STRESS TST, CARDIO (INTERNAL)  05/15/2018    [place] [facility]; normal ; EF 54%    LEXISCAN NUC STRESS TST, CARDIO (INTERNAL)  2021    [place] [facility]; EF57%; normal wall motion; fixed perfusion defect inf. wall    LUMPECTOMY RIGHT      MASTECTOMY PARTIAL  2018    u [facility]: left breaswt seed loc partial mastectomy     MASTECTOMY RIGHT            OTHER SURGICAL HISTORY  12    cysto-Dr. Allred    OTHER SURGICAL HISTORY  1/21/15     lap sigmoid colectomy     OTHER SURGICAL HISTORY  16    Right Hemicolectomy by Dr. Chaudhry    PERITONEAL DIALYSIS SYSTEM      2009     Family History   Problem Relation Age of Onset    Heart Disorder Father         MI    Diabetes Father     Kidney Disease Father         polycystic kidney disease    Hypertension Mother     Heart Disorder Mother         stroke    Cataracts Mother     Thyroid Disorder Daughter         thyroid    Other (Other) Sister         Cushing's disease    Diabetes Sister     Hypertension Sister     Cancer Sister 69        lung tob     Kidney Disease Son         pckd    Kidney Disease Brother         PCKD     Breast Cancer Maternal Aunt 50        x2  ages 85 and in her 50's    Macular degeneration Other         Aunt    Glaucoma Neg         family h/o      Social History:  Social History     Socioeconomic History    Marital status:    Occupational History    Occupation: retired real estate   Tobacco Use    Smoking status: Never    Smokeless tobacco: Never   Vaping Use    Vaping Use: Never used    Passive vaping exposure: Yes   Substance and Sexual Activity    Alcohol use: No    Drug use: No    Sexual activity: Yes     Partners: Male     Comment:  2020-he had CHF    Other Topics Concern     Service No    Blood Transfusions Yes    Caffeine Concern Yes     Comment: coffee    Hobby Hazards No    Sleep Concern Yes    Stress Concern Yes    Weight Concern Yes    Special Diet Yes    Seat Belt Yes    Self-Exams Yes     Social Determinants of Health     Food Insecurity: No Food Insecurity (2024)    Food Insecurity     Food Insecurity: Never true   Transportation Needs: No Transportation Needs (2024)    Transportation Needs     Lack of Transportation: No   Housing Stability: Low Risk  (2024)    Housing Stability     Housing Instability: No          Medications:            Allergies  Allergies   Allergen Reactions    Adhesive Tape SWELLING    Denosumab OTHER (SEE COMMENTS)     hypocalcemia  Lowered calcium level  Lowered calcium level  Lowered calcium level      Docosahexaenoic Acid, Dha HIVES    Ficus HIVES    Penicillins HIVES     Thinks she has tolerated penicillin in the past    Zithromax HIVES    Eicosapentaenoic Acid, Epa HIVES    Fig HIVES    Levofloxacin HIVES    Prolia OTHER (SEE COMMENTS)     Lowered calcium level    Vitamin E HIVES                   EXAM:         TELE: AF          BP 96/60 (BP Location: Left arm)   Pulse 106   Temp 98.7 °F (37.1 °C) (Axillary)   Resp 18   Ht 5' 3\" (1.6 m)   Wt 130 lb 1.1 oz (59 kg)   SpO2 98%   BMI 23.04 kg/m²   Temp (24hrs), Av.2 °F (36.8 °C), Min:97.7 °F (36.5 °C), Max:98.7  °F (37.1 °C)    Wt Readings from Last 3 Encounters:   02/04/24 130 lb 1.1 oz (59 kg)   07/18/23 133 lb 6.1 oz (60.5 kg)   06/20/23 135 lb (61.2 kg)         Intake/Output Summary (Last 24 hours) at 2/4/2024 0922  Last data filed at 2/4/2024 0812  Gross per 24 hour   Intake 700 ml   Output 0 ml   Net 700 ml         GENERAL: well developed, well nourished, in no apparent distress  SKIN: no rashes  HEENT: atraumatic, normocephalic, throat without erythema  NECK: supple, no bruits  LUNGS: clear to auscultation  CARDIO: irregular rhythm without murmur or S3   GI: soft, nontender  EXTREMITIES: no cyanosis, clubbing or edema  NEUROLOGY: alert  PSYCH: cooperative          LABORATORY DATA:               ECG:        ECHO 7/23:  ECHO:  1. Study data: Atrial fibrillation   2. Left ventricle: The cavity size was normal. Wall thickness was normal.      Systolic function was normal. The estimated ejection fraction was 55-60%,      by biplane method of disks. Wall motion is normal; there are no regional      wall motion abnormalities. Doppler parameters are consistent with a      reversible restrictive pattern, indicative of decreased left ventricular      diastolic compliance and/or increased left atrial pressure - grade 3      diastolic dysfunction.   3. Left atrium: The atrium was markedly dilated.   4. Right atrium: The atrium was moderately to markedly dilated.   5. Mitral valve: There was mild regurgitation.   6. Tricuspid valve: There was mild-moderate regurgitation.   7. Pulmonary arteries: Systolic pressure was within the normal range,      estimated to be 30mm Hg.            Labs:  Recent Labs   Lab 02/02/24  0428 02/03/24  0641 02/04/24  0618   * 93 84   BUN 51* 26* 41*   CREATSERUM 5.33* 3.49* 4.43*   CA 6.6* 7.5* 8.0*    135* 133*   K 5.4* 4.0 4.1    97* 96*   CO2 22.0 29.0 30.0     No results for input(s): \"TROP\" in the last 168 hours.  Recent Labs   Lab 02/04/24  0618   RBC 3.64*   HGB 11.2*   HCT  35.0   MCV 96.2   MCH 30.8   MCHC 32.0   RDW 15.9*   NEPRELIM 4.53   WBC 6.7   .0     No results for input(s): \"TROP\", \"TROPHS\", \"CK\" in the last 168 hours.    Imaging:  No results found.         Familia Walters MD

## 2024-02-04 NOTE — PLAN OF CARE
Patient is alert and oriented x4 and is on 1L of O2. Early during the shift pt was hypotensive and was asymptomatic, MD notified see orders and documentation. Frequent rounding done throughout the shift. Safety precautions in place.    Problem: PAIN - ADULT  Goal: Verbalizes/displays adequate comfort level or patient's stated pain goal  Description: INTERVENTIONS:  - Encourage pt to monitor pain and request assistance  - Assess pain using appropriate pain scale  - Administer analgesics based on type and severity of pain and evaluate response  - Implement non-pharmacological measures as appropriate and evaluate response  - Consider cultural and social influences on pain and pain management  - Manage/alleviate anxiety  - Utilize distraction and/or relaxation techniques  - Monitor for opioid side effects  - Notify MD/LIP if interventions unsuccessful or patient reports new pain  - Anticipate increased pain with activity and pre-medicate as appropriate  Outcome: Progressing     Problem: SAFETY ADULT - FALL  Goal: Free from fall injury  Description: INTERVENTIONS:  - Assess pt frequently for physical needs  - Identify cognitive and physical deficits and behaviors that affect risk of falls.  - [place] fall precautions as indicated by assessment.  - Educate pt/family on patient safety including physical limitations  - Instruct pt to call for assistance with activity based on assessment  - Modify environment to reduce risk of injury  - Provide assistive devices as appropriate  - Consider OT/PT consult to assist with strengthening/mobility  - Encourage toileting schedule  Outcome: Progressing     Problem: DISCHARGE PLANNING  Goal: Discharge to home or other facility with appropriate resources  Description: INTERVENTIONS:  - Identify barriers to discharge w/pt and caregiver  - Include patient/family/discharge partner in discharge planning  - Arrange for needed discharge resources and transportation as appropriate  -  Identify discharge learning needs (meds, wound care, etc)  - Arrange for interpreters to assist at discharge as needed  - Consider post-discharge preferences of patient/family/discharge partner  - Complete POLST form as appropriate  - Assess patient's ability to be responsible for managing their own health  - Refer to Case Management Department for coordinating discharge planning if the patient needs post-hospital services based on physician/LIP order or complex needs related to functional status, cognitive ability or social support system  Outcome: Progressing     Problem: Patient/Family Goals  Goal: Patient/Family Long Term Goal  Description: Patient's Long Term Goal: Discharge from the hospital    Interventions:  - Monitor vital signs  - Monitor appropriate labs  - Pain management  - Administer medications per order  - Follow MD orders  - Diagnostics per order  - Update / inform patient and family on plan of care  - Discharge planning  - See additional Care Plan goals for specific interventions  Outcome: Progressing  Goal: Patient/Family Short Term Goal  Description: Patient's Short Term Goal: Manage pain    Interventions:   - Monitor vital signs  - Monitor appropriate labs  - Pain management  - Administer medications per order  - Follow MD orders  - Diagnostics per order  - Update / inform patient and family on plan of care  - See additional Care Plan goals for specific interventions  Outcome: Progressing     Problem: METABOLIC/FLUID AND ELECTROLYTES - ADULT  Goal: Electrolytes maintained within normal limits  Description: INTERVENTIONS:  - Monitor labs and rhythm and assess patient for signs and symptoms of electrolyte imbalances  - Administer electrolyte replacement as ordered  - Monitor response to electrolyte replacements, including rhythm and repeat lab results as appropriate  - Fluid restriction as ordered  - Instruct patient on fluid and nutrition restrictions as appropriate  Outcome: Progressing  Goal:  Hemodynamic stability and optimal renal function maintained  Description: INTERVENTIONS:  - Monitor labs and assess for signs and symptoms of volume excess or deficit  - Monitor intake, output and patient weight  - Monitor urine specific gravity, serum osmolarity and serum sodium as indicated or ordered  - Monitor response to interventions for patient's volume status, including labs, urine output, blood pressure (other measures as available)  - Encourage oral intake as appropriate  - Instruct patient on fluid and nutrition restrictions as appropriate  Outcome: Progressing     Problem: Impaired Functional Mobility  Goal: Achieve highest/safest level of mobility/gait  Description: Interventions:  - Assess patient's functional ability and stability  - Promote increasing activity/tolerance for mobility and gait  - Educate and engage patient/family in tolerated activity level and precautions  - Recommend use of  RW for transfers and ambulation  - Recommend patient transfer to bedside chair toward strongest side  - When transferring patient, block weaker knee for safety  - Recommend use of chair position in bed 3 times per day  Outcome: Progressing     Problem: SKIN/TISSUE INTEGRITY - ADULT  Goal: Skin integrity remains intact  Description: INTERVENTIONS  - Assess and document risk factors for pressure ulcer development  - Assess and document skin integrity  - Monitor for areas of redness and/or skin breakdown  - Initiate interventions, skin care algorithm/standards of care as needed  Outcome: Progressing     Problem: MUSCULOSKELETAL - ADULT  Goal: Return mobility to safest level of function  Description: INTERVENTIONS:  - Assess patient stability and activity tolerance for standing, transferring and ambulating w/ or w/o assistive devices  - Assist with transfers and ambulation using safe patient handling equipment as needed  - Ensure adequate protection for wounds/incisions during mobilization  - Obtain PT/OT consults as  needed  - Advance activity as appropriate  - Communicate ordered activity level and limitations with patient/family  Outcome: Progressing     Problem: Impaired Activities of Daily Living  Goal: Achieve highest/safest level of independence in self care  Description: Interventions:  - Assess ability and encourage patient to participate in ADLs to maximize function  - Promote sitting position while performing ADLs such as feeding, grooming, and bathing  - Educate and encourage patient/family in tolerated functional activity level and precautions during self-care  Outcome: Progressing     Problem: Patient Centered Care  Goal: Patient preferences are identified and integrated in the patient's plan of care  Description: Interventions:  - What would you like us to know as we care for you? From home with grandson  - Provide timely, complete, and accurate information to patient/family  - Incorporate patient and family knowledge, values, beliefs, and cultural backgrounds into the planning and delivery of care  - Encourage patient/family to participate in care and decision-making at the level they choose  - Honor patient and family perspectives and choices  Outcome: Progressing

## 2024-02-04 NOTE — OCCUPATIONAL THERAPY NOTE
OCCUPATIONAL THERAPY TREATMENT NOTE - INPATIENT        Room Number: 309/309-A     Presenting Problem: 83-year-old female with a history of ESRD, paroxysmal atrial fibrillation, carotid stenosis, hypertension, hyperlipidemia, diastolic congestive heart failure, presents to the hospital for evaluation of left leg and hip pain.    Problem List  Principal Problem:    Inability to walk  Active Problems:    Osteoarthritis of hip    Anemia due to chronic kidney disease, on chronic dialysis (HCC)    ESRD (end stage renal disease) on dialysis ([facility])      OCCUPATIONAL THERAPY ASSESSMENT     RN approved pt participation in OT/PT co-tx session on this date. Pt received in bed and left in chair at end of session. Family present throughout..     Prior Level of [place]: mod I    Currently, patient is requiring up to max a x2 for bed mobility, reports pain in LLE however does not rate when asked. Cga for seated balance at EOB, tolerates sitting up approx 5 min prior to standing. STS form EOB and chair with mod a x2. Pt having difficulty placing wt on LLE and therefore cannot take steps with RLE. Mod a with rw to pivot to chair. Increased time required 2/2 pain with transitional movement - slow pace to move RLE.     The patient's Approx Degree of Impairment: 53.32% has been calculated based on documentation in the [facility] '6 clicks' Inpatient Daily Activity Short Form.  Research supports that patients with this level of impairment may benefit from RADHA. Pt will continue to benefit from skilled IP OT services while at [facility] in preparation for d/c.     DISCHARGE RECOMMENDATIONS  OT Discharge Recommendations: Sub-acute rehabilitation     PLAN  OT Treatment Plan: Balance activities;Energy conservation/work simplification techniques;ADL training;Functional transfer training;Endurance training;Patient/Family education;Equipment eval/education;Patient/Family training;Compensatory technique education    SUBJECTIVE  Pt agreeable to therapy  session    OBJECTIVE  Precautions: Limb alert - right (per MD: HR <120's bpm)    PAIN ASSESSMENT  Rating: Non-verbal signs of pain/discomfort observed    ACTIVITY TOLERANCE  BP: (!) 85/55 (post activity)  BP Location: Left arm  BP Method: Automatic  Patient Position: Sitting    ACTIVITIES OF DAILY LIVING ASSESSMENT  AM-PAC ‘6-Clicks’ Inpatient Daily Activity Short Form  How much help from another person does the patient currently need…  -   Putting on and taking off regular lower body clothing?: A Lot  -   Bathing (including washing, rinsing, drying)?: A Lot  -   Toileting, which includes using toilet, bedpan or urinal? : A Lot  -   Putting on and taking off regular upper body clothing?: A Little  -   Taking care of personal grooming such as brushing teeth?: A Little  -   Eating meals?: None    AM-PAC Score:  Score: 16  Approx Degree of Impairment: 53.32%  Standardized Score (AM-PAC Scale): 35.96  CMS Modifier (G-Code): CK    FUNCTIONAL TRANSFER ASSESSMENT  Sit to Stand: Edge of Bed  Edge of Bed: Minimal Assist  Chair: Moderate Assist    BED MOBILITY  Rolling: Not Tested  Supine to Sit : Minimal Assist  Sit to Supine (OT): Maximum Assist (x2)  Scooting: Min A    BALANCE ASSESSMENT  Static Sitting: Minimal Assist  Static Standing: Moderate Assist    Skilled Therapy Provided:   Transfer training  Facilitated OOB activity  Monitoring of vitals throughout session  Educated family and pt on need to complete BLE and UE exercises while in bed/chair - both verbalized understanding.    EDUCATION PROVIDED  Patient : Discharge Recommendations; Functional Transfer Techniques; Fall Prevention; Posture/Positioning  Patient's Response to Education: Requires Further Education  Family/Caregiver: Role of Occupational Therapy; Plan of Care; Discharge Recommendations  Family/Caregiver's Response to Education: Verbalized Understanding    Patient End of Session: Up in chair;Needs met;Call light within reach;RN aware of  session/findings;All patient questions and concerns addressed;Alarm set;Family present    OT Goals:  Patients self stated goal is: \"find out what is causing this pain!\"     Patient will complete functional transfer with mod I and fww  Comment:  cont    Patient will complete toileting with distant supervision  Comment:  cont    Patient will tolerate standing for 1 minutes in prep for adls with supervision   Comment:  cont    Patient will complete item retrieval with supervision   Comment:  cont          Goals  on: 2/10/24  Frequency: 3x a week    Opal Leon OTЕЛЕНА/L  [facility]     Therapeutic Activity: 30 minutes

## 2024-02-04 NOTE — PROGRESS NOTES
[facility]    Nephrology Progress Note    Chief Complaint   Patient presents with    Groin Pain    Leg Pain       Subjective:   83 year old female, following for ESRD on HD.     Continues to have intermittent hypotension.   Left leg pain, not much improving.     Review of Systems:   Review of Systems   Constitutional:  Positive for fatigue. Negative for chills and fever.   Respiratory:  Negative for cough and shortness of breath.    Cardiovascular:  Negative for chest pain and leg swelling.   Gastrointestinal:  Negative for abdominal pain, constipation, diarrhea, nausea and vomiting.   Genitourinary:  Negative for difficulty urinating, dysuria and flank pain.   Musculoskeletal:         Left leg pain       Objective:   Temp:  [97.7 °F (36.5 °C)-98.7 °F (37.1 °C)] 98.7 °F (37.1 °C)  Pulse:  [] 110  Resp:  [16-18] 18  BP: ()/(50-68) 92/55  SpO2:  [95 %-98 %] 98 %  SpO2: 98 %     Intake/Output Summary (Last 24 hours) at 2/4/2024 1302  Last data filed at 2/4/2024 1200  Gross per 24 hour   Intake 850 ml   Output 0 ml   Net 850 ml     Wt Readings from Last 3 Encounters:   02/04/24 130 lb 1.1 oz (59 kg)   07/18/23 133 lb 6.1 oz (60.5 kg)   06/20/23 135 lb (61.2 kg)     Physical Exam  Constitutional:       Appearance: Normal appearance.   Cardiovascular:      Rate and Rhythm: Normal rate and regular rhythm.      Heart sounds: Normal heart sounds.      Comments: R AVF-pos bruit/thrill  Pulmonary:      Effort: Pulmonary effort is normal.      Breath sounds: Normal breath sounds.   Musculoskeletal:         General: Normal range of motion.      Comments: Trace edema   Skin:     General: Skin is warm and dry.      Comments: Venous stasis changes   Neurological:      General: No focal deficit present.      Mental Status: She is alert and oriented to person, place, and time.   Psychiatric:         Mood and Affect: Mood normal.         Behavior: Behavior normal.         Medications:               Results:      Recent Labs   Lab 02/02/24 0428 02/03/24 0641 02/04/24  0618   RBC 3.52* 3.56* 3.64*   HGB 11.0* 11.0* 11.2*   HCT 33.2* 34.1* 35.0   MCV 94.3 95.8 96.2   NEPRELIM 5.80 5.86 4.53   WBC 6.7 7.6 6.7   .0 151.0 164.0     Recent Labs   Lab 02/02/24 0428 02/03/24 0641 02/04/24 0618   * 93 84   BUN 51* 26* 41*   CREATSERUM 5.33* 3.49* 4.43*   CA 6.6* 7.5* 8.0*    135* 133*   K 5.4* 4.0 4.1    97* 96*   CO2 22.0 29.0 30.0     PTT   Date Value Ref Range Status   11/06/2018 23.6 (L) 24.0 - 33.7 sec Final     INR   Date Value Ref Range Status   11/06/2018 1.0 0.9 - 1.2 ratio Final     Comment:     An INR of 2.0-3.0 is the usual therapeutic interval.  Some patients (e.g., those with recurrent thrombotic events, a mechanical heart valve) may require more intense therapy with a therapeutic INR interval of 2.5-3.5   08/11/2015 3.0 (A) 0.8 - 1.2 Final     No results for input(s): \"BNP\" in the last 168 hours.  Recent Labs   Lab 02/01/24 0626 02/02/24 0428 02/03/24 0641   MG  --  2.0 1.8   PHOS 5.4*  --   --         Recent Labs   Lab 02/01/24 0626   PHOS 5.4*       No results found.        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    ESRD HD MWF:   Next HD Monday.   Fluid removal limited due to hypotension.     Hypotension:   Cont Midodrine.      Anemia:   Hb 11.2, at goal.      Hyperphosphatemia:   Phos 5.4. Cont Renvela 3 tabs with meals.      Left leg and hip pain:   Pain control.   PT following.     Globus sensation:   EGD 2/1, food bolus removed.   Feels better.       Ana Bay MD  2/4/2024

## 2024-02-04 NOTE — OCCUPATIONAL THERAPY NOTE
Attempted to see patient for f/u occupational therapy tx session, however per RN pt has been hypotensive this AM and unable to give pain meds. RN to call therapy if/when pt is appropriate this date.     Opal CANTOR/L  [facility]

## 2024-02-04 NOTE — CM/SW NOTE
Received notice from RN - PT/OT worked w/ pt again today and she is not able to do much on her own. Per RN, pt/family considering [facility].    SW confirmed pt's family was provided list for [facility] w/ onsite HD last week for review.    Clinical updates sent to [facility] referral in [place].    PLAN: Likely [facility] w/ onsite HD - pending choice, onsite HD approval, & med clear      SW/CM to remain available for support and/or discharge planning.         Veda Rivero, MSW, LSW [id number]

## 2024-02-04 NOTE — PROGRESS NOTES
[facility] Hospitalist Progress Note     CC: Hospital Follow up    PCP: Shannon Bahena MD       Assessment/Plan:     Principal Problem:    Inability to walk  Active Problems:    Osteoarthritis of hip    Anemia due to chronic kidney disease, on chronic dialysis (HCC)    ESRD (end stage renal disease) on dialysis (HCC)    Ms. Cook is an 83-year-old female with a history of ESRD, paroxysmal atrial fibrillation, carotid stenosis, hypertension, hyperlipidemia, diastolic congestive heart failure, presents to the hospital for evaluation of left leg and hip pain, likely radicular pain fro L4-L5 disc herniation, course complicated by food bolus, EGD on 2/1 with resolution, diet advanced, reinitiated PT OT, still having pain but overall improved, functionally weak, now with intermittent hypotension     Left hip/ Leg pain  Left hip OA  Spinal stenosis, L4-L5 disc extrusion  - xray neg for fracture  - outpatient MRI with Left hip OA and trets of hamstring tendon and gluteus minimus  - pain does appear to radiate to her lower leg   - lumbar spine with DDD  - continue lidoderm patch start low dose gabapentin  - continue norco    - PT/OT  - recently had steroid injection in hip with ortho, therefore not candidate for at least 1-2 more months  - continue prednisone   - Lumbar spine MRI, pre romero read with L4-L5 disc herniation  - continue PT/OT, may need [facility] on DC pending progress  - resume oral pain meds, PT/OT eval     Food bolus  - pt with nausea, vomiting, globus sensation on 1/29  - CT neck reviewed, showed patulous esophagus with multifocal areas of retained ingested material  - had EGD in 03/2023 at [facility] for globus sensation, per report esophageal hypomotility diagnosed but no achalasia, she did have a very dry oropharynx, pathology results were negative  - GI consulted  - EGD on 2/1 food bolus removed  - feeling better, tolerating diet, adv per GI    ESRD  -Nephrology consulted  -MWF HD    Hypotension   -No  signs of sepsis or infection  -Likely volume depletion, from limited oral intake (with food bolus) then fluid removal with dialysis  -on 2/3 gave some albumin, renal ok with one time extra dose of midodrine   -Recurrent on 2/4 in a.m., will give extra dose of midodrine, and add 1 more dose of albumin  -will decrease metoprolol 25mg      Paroxysmal atrial fibrillation  Afib with RVR  -Follows with [facility]  -She is on Eliquis 2.5 mg twice daily,   -Normally on metoprolol, 50mg TID on non HD days and 50mg twice daily onHD days  -Afib with RVR on 1/31, on dilt drip, now improved  -cardiology consulted, ok for EGD    -will decrease metoprolol to 25mg given intermittent hypotension     Chronic HFpEF  - euvolemic  - volume management per HD     Endometrial wall thickening  - noted on outpatient MRI and US  - needs outpatient GYN follow up     Hypertension  -She takes a Norvasc as needed but not daily  -resume lopressor     FN:  - IVF:none  - Diet: adv     DVT Prophy:scd, eliquis resumed  Lines: PIV     Dispo: pending clinical course    Discussed with daughter over phone 2/3, 2/4    Questions/concerns were discussed with patient and/or family by bedside.    Thank You,  Miguel Corral MD    Hospitalist with [facility]     Subjective:   No hip pain this am, no chest pain, no nausea, tolerating diet, walked a few steps yesterday    OBJECTIVE:    Blood pressure (!) 83/50, pulse 95, temperature 98.7 °F (37.1 °C), temperature source Axillary, resp. rate 18, height 5' 3\" (1.6 m), weight 130 lb 1.1 oz (59 kg), SpO2 98%, not currently breastfeeding.    Temp:  [97.7 °F (36.5 °C)-98.7 °F (37.1 °C)] 98.7 °F (37.1 °C)  Pulse:  [] 95  Resp:  [16-18] 18  BP: ()/(50-68) 83/50  SpO2:  [94 %-98 %] 98 %      Intake/Output:    Intake/Output Summary (Last 24 hours) at 2/4/2024 1119  Last data filed at 2/4/2024 0812  Gross per 24 hour   Intake 600 ml   Output 0 ml   Net 600 ml       Last 3 Weights   02/04/24 0559  130 lb 1.1 oz (59 kg)   02/03/24 0431 126 lb (57.2 kg)   02/01/24 0509 124 lb 9 oz (56.5 kg)   01/31/24 1517 129 lb (58.5 kg)   01/30/24 0400 129 lb 13.6 oz (58.9 kg)   01/29/24 0630 130 lb 15.3 oz (59.4 kg)   01/28/24 0551 127 lb 13.9 oz (58 kg)   01/27/24 0642 124 lb 12.5 oz (56.6 kg)   01/26/24 0246 135 lb 12.9 oz (61.6 kg)   01/25/24 2148 126 lb 12.2 oz (57.5 kg)   07/18/23 1000 133 lb 6.1 oz (60.5 kg)   07/16/23 1942 134 lb 12.8 oz (61.1 kg)   07/16/23 1203 135 lb (61.2 kg)   06/20/23 1508 135 lb (61.2 kg)       Exam    Gen: No acute distress, alert and oriented x3   Heent: NC AT, neck supple  Pulm: Lungs clear, normal respiratory effort  CV: Heart with regular rate and rhythm   Abd: Abdomen soft, nontender, nondistended   MSK: nimisha appearance (chronic for patient) warm well perfused  Skin: no rashes or lesions        Data Review:       Labs:     Recent Labs   Lab 02/02/24 0428 02/03/24  0641 02/04/24  0618   RBC 3.52* 3.56* 3.64*   HGB 11.0* 11.0* 11.2*   HCT 33.2* 34.1* 35.0   MCV 94.3 95.8 96.2   MCH 31.3 30.9 30.8   MCHC 33.1 32.3 32.0   RDW 16.6* 16.3* 15.9*   NEPRELIM 5.80 5.86 4.53   WBC 6.7 7.6 6.7   .0 151.0 164.0         Recent Labs   Lab 02/02/24 0428 02/03/24  0641 02/04/24  0618   * 93 84   BUN 51* 26* 41*   CREATSERUM 5.33* 3.49* 4.43*   EGFRCR 8* 13* 9*   CA 6.6* 7.5* 8.0*    135* 133*   K 5.4* 4.0 4.1    97* 96*   CO2 22.0 29.0 30.0       No results for input(s): \"ALT\", \"AST\", \"ALB\", \"AMYLASE\", \"LIPASE\", \"LDH\" in the last 168 hours.    Invalid input(s): \"ALPHOS\", \"TBIL\", \"DBIL\", \"TPROT\"      Imaging:  No results found.      Meds:      albumin human  25 g Intravenous Once    metoprolol tartrate  50 mg Oral 3 times per day on Sunday Tuesday Thursday Saturday    [START ON 2/5/2024] metoprolol tartrate  50 mg Oral 2 times per day on Monday Wednesday Friday    lansoprazole  30 mg Oral BID AC    sucralfate  1 g Oral TID AC and HS (2200)    gabapentin  100 mg Oral BID     apixaban  2.5 mg Oral 2 times per day    midodrine  5 mg Oral TID    montelukast  10 mg Oral Nightly    sevelamer carbonate  2,400 mg Oral TID    lidocaine-menthol  1 patch Transdermal Daily    sennosides  17.2 mg Oral Nightly    atorvastatin  5 mg Oral Nightly           acetaminophen, ondansetron, calcium carbonate, amLODIPine, polyethylene glycol (PEG 3350), bisacodyl, fleet enema, HYDROcodone-acetaminophen

## 2024-02-04 NOTE — PHYSICAL THERAPY NOTE
PHYSICAL THERAPY TREATMENT NOTE - INPATIENT     Room Number: 309/309-A       Presenting Problem: inability to walk    Problem List  Principal Problem:    Inability to walk  Active Problems:    Osteoarthritis of hip    Anemia due to chronic kidney disease, on chronic dialysis (HCC)    ESRD (end stage renal disease) on dialysis ([facility])      PHYSICAL THERAPY ASSESSMENT   Chart reviewed. GERARDO Perez approved participation in physical therapy. PPE worn by therapist: mask and gloves. Patient was not wearing a mask during session. Patient presented in bed with did not rate pain. Patient with fair  progress towards goals during this session. Education provided on Physical therapy plan of care and physiological benefits of out of bed mobility. Patient with good carryover. Pt is received in the bed with family present and was cleared for therapy session. Pt's pain meds were coordinated with RN before the session. Co treat session with NOEL Joseph. Pt is max A x2 with bed mobility and to transfer to the EOB. Pt required assist with her B LE's and her upper trunk to sit up. Pt also required max A with scooting to the EOB. Pt sat EOB for a few minutes and denied any dizziness and light headedness. Pt is mod A x2 with sit<>stand transfers with the RW. Pt is cued for proper hand placement for safety. Pt attempted to take steps but was having difficulty lifting her R LE and was able to perform a pivot to the chair with overall mod A for balance and safety. Pt required extra time to perform. Pt sat in the chair to rest for a few minutes and attempted a 2nd trail to stand and take steps. Pt was able to stand for a few minutes with min A for standing balance but still not able to take steps. Pt fatigued quickly with activity and returned back to sitting in the chair with all needs within reach. Reported to the RN on the status of the pt.     Bed mobility: Max assist x2  Transfers: Mod assist x2  Gait Assistance: Moderate  assistance  Distance (ft): SPT  Assistive Device: Rolling walker  Pattern: R Foot drag;Shuffle          . Patient was left in bedside chair at end of session with all needs in reach. The patient's Approx Degree of Impairment: 76.75% has been calculated based on documentation in the [facility] '6 clicks' Inpatient Basic Mobility Short Form.  Research supports that patients with this level of impairment may benefit from Subacute Rehab. RN aware of patient status post session.    DISCHARGE RECOMMENDATIONS  PT Discharge Recommendations: Sub-acute rehabilitation     PLAN  PT Treatment Plan: Bed mobility;Body mechanics;Coordination;Endurance;Patient education;Gait training;Strengthening;Transfer training;Balance training    SUBJECTIVE  Pt was agreeable to therapy session.         OBJECTIVE  Precautions: Limb alert - right (per MD: HR <120's bpm)    WEIGHT BEARING RESTRICTION  Weight Bearing Restriction: None                PAIN ASSESSMENT   Rating:  (did not rate)  Location: L LE/knee  Management Techniques: Activity promotion;Body mechanics;Relaxation;Repositioning    BALANCE                                                                                                                       Static Sitting: Fair  Dynamic Sitting: Fair -           Static Standing: Poor  Dynamic Standing: Poor    ACTIVITY TOLERANCE  Pulse: 106  Heart Rate Source: Monitor     BP: (!) 85/65  BP Location: Left arm  BP Method: Automatic  Patient Position: Sitting    O2 WALK  Oxygen Therapy  SPO2% on Room Air at Rest: 91    AM-PAC '6-Clicks' INPATIENT SHORT FORM - BASIC MOBILITY  How much difficulty does the patient currently have...  Patient Difficulty: Turning over in bed (including adjusting bedclothes, sheets and blankets)?: A Lot   Patient Difficulty: Sitting down on and standing up from a chair with arms (e.g., wheelchair, bedside commode, etc.): A Lot   Patient Difficulty: Moving from lying on back to sitting on the side of the bed?: A Lot    How much help from another person does the patient currently need...   Help from Another: Moving to and from a bed to a chair (including a wheelchair)?: A Lot   Help from Another: Need to walk in hospital room?: Total   Help from Another: Climbing 3-5 steps with a railing?: Total     AM-PAC Score:  Raw Score: 10   Approx Degree of Impairment: 76.75%   Standardized Score (AM-PAC Scale): 32.29   CMS Modifier (G-Code): CL          Patient End of Session: Up in chair;Needs met;Call light within reach;RN aware of session/findings;All patient questions and concerns addressed;Family present    CURRENT GOALS   Goals to be met by: 2/7/24  Patient Goal Patient's self-stated goal is: go home   Goal #1 Patient is able to demonstrate supine - sit EOB @ level: modified independent      Goal #1   Current Status Max A x2   Goal #2 Patient is able to demonstrate transfers Sit to/from Stand at assistance level: modified independent with walker - rolling      Goal #2  Current Status Mod A x2 with RW x2 reps   Goal #3 Patient is able to ambulate 100 feet with assist device: walker - rolling at assistance level: supervision   Goal #3   Current Status Mod A x 1 with RW SPT    Goal #4 Patient will negotiate 7 stairs/one curb w/ assistive device and supervision   Goal #4   Current Status NT    Goal #5 Patient to demonstrate independence with home activity/exercise instructions provided to patient in preparation for discharge.   Goal #5   Current Status In progress           Therapeutic Activity: 30 minutes

## 2024-02-04 NOTE — PLAN OF CARE
Patient with hypotension but denies dizziness. See MAR for medications related to hypotension. Pain managed with prn medications. PT/OT eval this afternoon. Plan is for dialysis tomorrow and to monitor blood pressures and heart rates. Patient and family updated on plan of care.    Problem: PAIN - ADULT  Goal: Verbalizes/displays adequate comfort level or patient's stated pain goal  Description: INTERVENTIONS:  - Encourage pt to monitor pain and request assistance  - Assess pain using appropriate pain scale  - Administer analgesics based on type and severity of pain and evaluate response  - Implement non-pharmacological measures as appropriate and evaluate response  - Consider cultural and social influences on pain and pain management  - Manage/alleviate anxiety  - Utilize distraction and/or relaxation techniques  - Monitor for opioid side effects  - Notify MD/LIP if interventions unsuccessful or patient reports new pain  - Anticipate increased pain with activity and pre-medicate as appropriate  Outcome: Progressing     Problem: SAFETY ADULT - FALL  Goal: Free from fall injury  Description: INTERVENTIONS:  - Assess pt frequently for physical needs  - Identify cognitive and physical deficits and behaviors that affect risk of falls.  - [place] fall precautions as indicated by assessment.  - Educate pt/family on patient safety including physical limitations  - Instruct pt to call for assistance with activity based on assessment  - Modify environment to reduce risk of injury  - Provide assistive devices as appropriate  - Consider OT/PT consult to assist with strengthening/mobility  - Encourage toileting schedule  Outcome: Progressing     Problem: DISCHARGE PLANNING  Goal: Discharge to home or other facility with appropriate resources  Description: INTERVENTIONS:  - Identify barriers to discharge w/pt and caregiver  - Include patient/family/discharge partner in discharge planning  - Arrange for needed discharge  resources and transportation as appropriate  - Identify discharge learning needs (meds, wound care, etc)  - Arrange for interpreters to assist at discharge as needed  - Consider post-discharge preferences of patient/family/discharge partner  - Complete POLST form as appropriate  - Assess patient's ability to be responsible for managing their own health  - Refer to Case Management Department for coordinating discharge planning if the patient needs post-hospital services based on physician/LIP order or complex needs related to functional status, cognitive ability or social support system  Outcome: Progressing     Problem: Patient/Family Goals  Goal: Patient/Family Long Term Goal  Description: Patient's Long Term Goal: Discharge from the hospital    Interventions:  - Monitor vital signs  - Monitor appropriate labs  - Pain management  - Administer medications per order  - Follow MD orders  - Diagnostics per order  - Update / inform patient and family on plan of care  - Discharge planning  - See additional Care Plan goals for specific interventions  Outcome: Progressing  Goal: Patient/Family Short Term Goal  Description: Patient's Short Term Goal: Manage pain    Interventions:   - Monitor vital signs  - Monitor appropriate labs  - Pain management  - Administer medications per order  - Follow MD orders  - Diagnostics per order  - Update / inform patient and family on plan of care  - See additional Care Plan goals for specific interventions  Outcome: Progressing     Problem: METABOLIC/FLUID AND ELECTROLYTES - ADULT  Goal: Electrolytes maintained within normal limits  Description: INTERVENTIONS:  - Monitor labs and rhythm and assess patient for signs and symptoms of electrolyte imbalances  - Administer electrolyte replacement as ordered  - Monitor response to electrolyte replacements, including rhythm and repeat lab results as appropriate  - Fluid restriction as ordered  - Instruct patient on fluid and nutrition  restrictions as appropriate  Outcome: Progressing  Goal: Hemodynamic stability and optimal renal function maintained  Description: INTERVENTIONS:  - Monitor labs and assess for signs and symptoms of volume excess or deficit  - Monitor intake, output and patient weight  - Monitor urine specific gravity, serum osmolarity and serum sodium as indicated or ordered  - Monitor response to interventions for patient's volume status, including labs, urine output, blood pressure (other measures as available)  - Encourage oral intake as appropriate  - Instruct patient on fluid and nutrition restrictions as appropriate  Outcome: Progressing     Problem: Impaired Functional Mobility  Goal: Achieve highest/safest level of mobility/gait  Description: Interventions:  - Assess patient's functional ability and stability  - Promote increasing activity/tolerance for mobility and gait  - Educate and engage patient/family in tolerated activity level and precautions  - Recommend use of  RW for transfers and ambulation  - Recommend patient transfer to bedside chair toward strongest side  - When transferring patient, block weaker knee for safety  - Recommend use of chair position in bed 3 times per day  Outcome: Progressing     Problem: Patient Centered Care  Goal: Patient preferences are identified and integrated in the patient's plan of care  Description: Interventions:  - What would you like us to know as we care for you? From home with grandson  - Provide timely, complete, and accurate information to patient/family  - Incorporate patient and family knowledge, values, beliefs, and cultural backgrounds into the planning and delivery of care  - Encourage patient/family to participate in care and decision-making at the level they choose  - Honor patient and family perspectives and choices  Outcome: Progressing     Problem: SKIN/TISSUE INTEGRITY - ADULT  Goal: Skin integrity remains intact  Description: INTERVENTIONS  - Assess and document  risk factors for pressure ulcer development  - Assess and document skin integrity  - Monitor for areas of redness and/or skin breakdown  - Initiate interventions, skin care algorithm/standards of care as needed  Outcome: Progressing     Problem: MUSCULOSKELETAL - ADULT  Goal: Return mobility to safest level of function  Description: INTERVENTIONS:  - Assess patient stability and activity tolerance for standing, transferring and ambulating w/ or w/o assistive devices  - Assist with transfers and ambulation using safe patient handling equipment as needed  - Ensure adequate protection for wounds/incisions during mobilization  - Obtain PT/OT consults as needed  - Advance activity as appropriate  - Communicate ordered activity level and limitations with patient/family  Outcome: Progressing     Problem: Impaired Activities of Daily Living  Goal: Achieve highest/safest level of independence in self care  Description: Interventions:  - Assess ability and encourage patient to participate in ADLs to maximize function  - Promote sitting position while performing ADLs such as feeding, grooming, and bathing  - Educate and encourage patient/family in tolerated functional activity level and precautions during self-care  Outcome: Progressing     Problem: CARDIOVASCULAR - ADULT  Goal: Maintains optimal cardiac output and hemodynamic stability  Description: INTERVENTIONS:  - Monitor vital signs, rhythm, and trends  - Monitor for bleeding, hypotension and signs of decreased cardiac output  - Evaluate effectiveness of vasoactive medications to optimize hemodynamic stability  - Monitor arterial and/or venous puncture sites for bleeding and/or hematoma  - Assess quality of pulses, skin color and temperature  - Assess for signs of decreased coronary artery perfusion - ex. Angina  - Evaluate fluid balance, assess for edema, trend weights  Outcome: Progressing  Goal: Absence of cardiac arrhythmias or at baseline  Description:  INTERVENTIONS:  - Continuous cardiac monitoring, monitor vital signs, obtain 12 lead EKG if indicated  - Evaluate effectiveness of antiarrhythmic and heart rate control medications as ordered  - Initiate emergency measures for life threatening arrhythmias  - Monitor electrolytes and administer replacement therapy as ordered  Outcome: Progressing

## 2024-02-05 LAB
ANION GAP SERPL CALC-SCNC: 7 MMOL/L (ref 0–18)
ANION GAP SERPL CALC-SCNC: 7 MMOL/L (ref 0–18)
BASOPHILS # BLD AUTO: 0.03 X10(3) UL (ref 0–0.2)
BASOPHILS NFR BLD AUTO: 0.3 %
BUN BLD-MCNC: 21 MG/DL (ref 9–23)
BUN BLD-MCNC: 36 MG/DL (ref 9–23)
BUN/CREAT SERPL: 6.9 (ref 10–20)
BUN/CREAT SERPL: 9.6 (ref 10–20)
CALCIUM BLD-MCNC: 8.1 MG/DL (ref 8.7–10.4)
CALCIUM BLD-MCNC: 9 MG/DL (ref 8.7–10.4)
CHLORIDE SERPL-SCNC: 96 MMOL/L (ref 98–112)
CHLORIDE SERPL-SCNC: 97 MMOL/L (ref 98–112)
CO2 SERPL-SCNC: 28 MMOL/L (ref 21–32)
CO2 SERPL-SCNC: 31 MMOL/L (ref 21–32)
CREAT BLD-MCNC: 3.04 MG/DL
CREAT BLD-MCNC: 3.76 MG/DL
DEPRECATED RDW RBC AUTO: 53.8 FL (ref 35.1–46.3)
EGFRCR SERPLBLD CKD-EPI 2021: 11 ML/MIN/1.73M2 (ref 60–?)
EGFRCR SERPLBLD CKD-EPI 2021: 15 ML/MIN/1.73M2 (ref 60–?)
EOSINOPHIL # BLD AUTO: 0.12 X10(3) UL (ref 0–0.7)
EOSINOPHIL NFR BLD AUTO: 1.4 %
ERYTHROCYTE [DISTWIDTH] IN BLOOD BY AUTOMATED COUNT: 15.7 % (ref 11–15)
GLUCOSE BLD-MCNC: 100 MG/DL (ref 70–99)
GLUCOSE BLD-MCNC: 100 MG/DL (ref 70–99)
HCT VFR BLD AUTO: 35.6 %
HGB BLD-MCNC: 11.8 G/DL
IMM GRANULOCYTES # BLD AUTO: 0.01 X10(3) UL (ref 0–1)
IMM GRANULOCYTES NFR BLD: 0.1 %
LYMPHOCYTES # BLD AUTO: 0.54 X10(3) UL (ref 1–4)
LYMPHOCYTES NFR BLD AUTO: 6.1 %
MAGNESIUM SERPL-MCNC: 1.7 MG/DL (ref 1.6–2.6)
MAGNESIUM SERPL-MCNC: 1.9 MG/DL (ref 1.6–2.6)
MCH RBC QN AUTO: 31.1 PG (ref 26–34)
MCHC RBC AUTO-ENTMCNC: 33.1 G/DL (ref 31–37)
MCV RBC AUTO: 93.7 FL
MONOCYTES # BLD AUTO: 0.76 X10(3) UL (ref 0.1–1)
MONOCYTES NFR BLD AUTO: 8.6 %
NEUTROPHILS # BLD AUTO: 7.34 X10 (3) UL (ref 1.5–7.7)
NEUTROPHILS # BLD AUTO: 7.34 X10(3) UL (ref 1.5–7.7)
NEUTROPHILS NFR BLD AUTO: 83.5 %
OSMOLALITY SERPL CALC.SUM OF ELEC: 281 MOSM/KG (ref 275–295)
OSMOLALITY SERPL CALC.SUM OF ELEC: 282 MOSM/KG (ref 275–295)
PHOSPHATE SERPL-MCNC: 2.9 MG/DL (ref 2.4–5.1)
PLATELET # BLD AUTO: 163 10(3)UL (ref 150–450)
POTASSIUM SERPL-SCNC: 3.9 MMOL/L (ref 3.5–5.1)
POTASSIUM SERPL-SCNC: 4 MMOL/L (ref 3.5–5.1)
RBC # BLD AUTO: 3.8 X10(6)UL
SODIUM SERPL-SCNC: 132 MMOL/L (ref 136–145)
SODIUM SERPL-SCNC: 134 MMOL/L (ref 136–145)
WBC # BLD AUTO: 8.8 X10(3) UL (ref 4–11)

## 2024-02-05 PROCEDURE — 90935 HEMODIALYSIS ONE EVALUATION: CPT | Performed by: INTERNAL MEDICINE

## 2024-02-05 RX ORDER — ALBUMIN (HUMAN) 12.5 G/50ML
25 SOLUTION INTRAVENOUS ONCE
Status: COMPLETED | OUTPATIENT
Start: 2024-02-05 | End: 2024-02-05

## 2024-02-05 RX ORDER — MIDODRINE HYDROCHLORIDE 5 MG/1
10 TABLET ORAL 3 TIMES DAILY
Status: DISCONTINUED | OUTPATIENT
Start: 2024-02-05 | End: 2024-02-08

## 2024-02-05 RX ORDER — GABAPENTIN 100 MG/1
100 CAPSULE ORAL 2 TIMES DAILY
Qty: 60 CAPSULE | Refills: 0 | Status: SHIPPED | OUTPATIENT
Start: 2024-02-05 | End: 2024-02-08

## 2024-02-05 RX ORDER — MIDODRINE HYDROCHLORIDE 5 MG/1
5 TABLET ORAL ONCE
Status: COMPLETED | OUTPATIENT
Start: 2024-02-05 | End: 2024-02-05

## 2024-02-05 NOTE — PLAN OF CARE
Problem: PAIN - ADULT  Goal: Verbalizes/displays adequate comfort level or patient's stated pain goal  Description: INTERVENTIONS:  - Encourage pt to monitor pain and request assistance  - Assess pain using appropriate pain scale  - Administer analgesics based on type and severity of pain and evaluate response  - Implement non-pharmacological measures as appropriate and evaluate response  - Consider cultural and social influences on pain and pain management  - Manage/alleviate anxiety  - Utilize distraction and/or relaxation techniques  - Monitor for opioid side effects  - Notify MD/LIP if interventions unsuccessful or patient reports new pain  - Anticipate increased pain with activity and pre-medicate as appropriate  Outcome: Progressing     Problem: SAFETY ADULT - FALL  Goal: Free from fall injury  Description: INTERVENTIONS:  - Assess pt frequently for physical needs  - Identify cognitive and physical deficits and behaviors that affect risk of falls.  - [place] fall precautions as indicated by assessment.  - Educate pt/family on patient safety including physical limitations  - Instruct pt to call for assistance with activity based on assessment  - Modify environment to reduce risk of injury  - Provide assistive devices as appropriate  - Consider OT/PT consult to assist with strengthening/mobility  - Encourage toileting schedule  Outcome: Progressing     Problem: DISCHARGE PLANNING  Goal: Discharge to home or other facility with appropriate resources  Description: INTERVENTIONS:  - Identify barriers to discharge w/pt and caregiver  - Include patient/family/discharge partner in discharge planning  - Arrange for needed discharge resources and transportation as appropriate  - Identify discharge learning needs (meds, wound care, etc)  - Arrange for interpreters to assist at discharge as needed  - Consider post-discharge preferences of patient/family/discharge partner  - Complete POLST form as appropriate  - Assess  patient's ability to be responsible for managing their own health  - Refer to Case Management Department for coordinating discharge planning if the patient needs post-hospital services based on physician/LIP order or complex needs related to functional status, cognitive ability or social support system  Outcome: Progressing     Problem: Patient/Family Goals  Goal: Patient/Family Long Term Goal  Description: Patient's Long Term Goal: Discharge from the hospital    Interventions:  - Monitor vital signs  - Monitor appropriate labs  - Pain management  - Administer medications per order  - Follow MD orders  - Diagnostics per order  - Update / inform patient and family on plan of care  - Discharge planning  - See additional Care Plan goals for specific interventions  Outcome: Progressing  Goal: Patient/Family Short Term Goal  Description: Patient's Short Term Goal: Manage pain    Interventions:   - Monitor vital signs  - Monitor appropriate labs  - Pain management  - Administer medications per order  - Follow MD orders  - Diagnostics per order  - Update / inform patient and family on plan of care  - See additional Care Plan goals for specific interventions  Outcome: Progressing     Problem: METABOLIC/FLUID AND ELECTROLYTES - ADULT  Goal: Electrolytes maintained within normal limits  Description: INTERVENTIONS:  - Monitor labs and rhythm and assess patient for signs and symptoms of electrolyte imbalances  - Administer electrolyte replacement as ordered  - Monitor response to electrolyte replacements, including rhythm and repeat lab results as appropriate  - Fluid restriction as ordered  - Instruct patient on fluid and nutrition restrictions as appropriate  Outcome: Progressing  Goal: Hemodynamic stability and optimal renal function maintained  Description: INTERVENTIONS:  - Monitor labs and assess for signs and symptoms of volume excess or deficit  - Monitor intake, output and patient weight  - Monitor urine specific  gravity, serum osmolarity and serum sodium as indicated or ordered  - Monitor response to interventions for patient's volume status, including labs, urine output, blood pressure (other measures as available)  - Encourage oral intake as appropriate  - Instruct patient on fluid and nutrition restrictions as appropriate  Outcome: Progressing     Problem: Impaired Functional Mobility  Goal: Achieve highest/safest level of mobility/gait  Description: Interventions:  - Assess patient's functional ability and stability  - Promote increasing activity/tolerance for mobility and gait  - Educate and engage patient/family in tolerated activity level and precautions  - Recommend use of  RW for transfers and ambulation  - Recommend patient transfer to bedside chair toward strongest side  - When transferring patient, block weaker knee for safety  - Recommend use of chair position in bed 3 times per day  Outcome: Progressing     Problem: Patient Centered Care  Goal: Patient preferences are identified and integrated in the patient's plan of care  Description: Interventions:  - What would you like us to know as we care for you? From home with grandson  - Provide timely, complete, and accurate information to patient/family  - Incorporate patient and family knowledge, values, beliefs, and cultural backgrounds into the planning and delivery of care  - Encourage patient/family to participate in care and decision-making at the level they choose  - Honor patient and family perspectives and choices  Outcome: Progressing     Problem: SKIN/TISSUE INTEGRITY - ADULT  Goal: Skin integrity remains intact  Description: INTERVENTIONS  - Assess and document risk factors for pressure ulcer development  - Assess and document skin integrity  - Monitor for areas of redness and/or skin breakdown  - Initiate interventions, skin care algorithm/standards of care as needed  Outcome: Progressing     Problem: MUSCULOSKELETAL - ADULT  Goal: Return mobility to  safest level of function  Description: INTERVENTIONS:  - Assess patient stability and activity tolerance for standing, transferring and ambulating w/ or w/o assistive devices  - Assist with transfers and ambulation using safe patient handling equipment as needed  - Ensure adequate protection for wounds/incisions during mobilization  - Obtain PT/OT consults as needed  - Advance activity as appropriate  - Communicate ordered activity level and limitations with patient/family  Outcome: Progressing     Problem: Impaired Activities of Daily Living  Goal: Achieve highest/safest level of independence in self care  Description: Interventions:  - Assess ability and encourage patient to participate in ADLs to maximize function  - Promote sitting position while performing ADLs such as feeding, grooming, and bathing  - Educate and encourage patient/family in tolerated functional activity level and precautions during self-care  Outcome: Progressing     Problem: CARDIOVASCULAR - ADULT  Goal: Maintains optimal cardiac output and hemodynamic stability  Description: INTERVENTIONS:  - Monitor vital signs, rhythm, and trends  - Monitor for bleeding, hypotension and signs of decreased cardiac output  - Evaluate effectiveness of vasoactive medications to optimize hemodynamic stability  - Monitor arterial and/or venous puncture sites for bleeding and/or hematoma  - Assess quality of pulses, skin color and temperature  - Assess for signs of decreased coronary artery perfusion - ex. Angina  - Evaluate fluid balance, assess for edema, trend weights  Outcome: Progressing  Goal: Absence of cardiac arrhythmias or at baseline  Description: INTERVENTIONS:  - Continuous cardiac monitoring, monitor vital signs, obtain 12 lead EKG if indicated  - Evaluate effectiveness of antiarrhythmic and heart rate control medications as ordered  - Initiate emergency measures for life threatening arrhythmias  - Monitor electrolytes and administer replacement  therapy as ordered  Outcome: Progressing    HD 1.5 L removed today. Norco given for leg pain. Frequent repositioning on patient. Plan; RADHA once medically cleared per MD.

## 2024-02-05 NOTE — PROGRESS NOTES
[facility] Hospitalist Progress Note     CC: Hospital Follow up    PCP: Shannon Bahena MD       Assessment/Plan:     Principal Problem:    Inability to walk  Active Problems:    Osteoarthritis of hip    Anemia due to chronic kidney disease, on chronic dialysis (HCC)    ESRD (end stage renal disease) on dialysis ([facility])    Ms. Cook is an 83-year-old female with a history of ESRD, paroxysmal atrial fibrillation, carotid stenosis, hypertension, hyperlipidemia, diastolic congestive heart failure, presents to the hospital for evaluation of left leg and hip pain, likely radicular pain fro L4-L5 disc herniation, course complicated by food bolus, EGD on 2/1 with resolution, diet advanced, reinitiated PT OT, still having pain but overall improved, functionally weak, now with intermittent hypotension. Plans for RADHA on dc.      Left hip/ Leg pain  Left hip OA  Spinal stenosis, L4-L5 disc extrusion  - xray neg for fracture  - outpatient MRI with Left hip OA and trets of hamstring tendon and gluteus minimus  - pain does appear to radiate to her lower leg   - lumbar spine with DDD  - continue lidoderm patch start low dose gabapentin  - continue norco    - PT/OT for RADHA  - recently had steroid injection in hip with ortho, therefore not candidate for at least 1-2 more months  - Lumbar spine MRI with L4-L5 disc herniation  - continue PT/OT for RADHA on DC   - resume oral pain meds    Food bolus  - pt with nausea, vomiting, globus sensation on 1/29  - CT neck reviewed, showed patulous esophagus with multifocal areas of retained ingested material  - had EGD in 03/2023 at [facility] for globus sensation, per report esophageal hypomotility diagnosed but no achalasia, she did have a very dry oropharynx, pathology results were negative  - GI consulted  - EGD on 2/1 food bolus removed  - feeling better, tolerating diet, adv per GI    ESRD  - Nephrology consulted  - MWF HD    Hypotension   - No signs of sepsis or infection  - Likely  volume depletion, from limited oral intake (with food bolus) then fluid removal with dialysis  - on 2/3 gave some albumin, renal ok with one time extra dose of midodrine   - Recurrent on 2/4 in a.m., will give extra dose of midodrine, and add 1 more dose of albumin  - will decrease metoprolol 25mg  - midodrine 10mg TID      Paroxysmal atrial fibrillation  Afib with RVR  Tachycardic   - Follows with [facility]  - She is on Eliquis 2.5 mg twice daily,   - Normally on metoprolol, 50mg TID on non HD days and 50mg twice daily onHD days  - Afib with RVR on 1/31, on dilt drip, now improved  - cardiology consulted, ok for EGD    - will decrease metoprolol to 25mg given intermittent hypotension     Chronic HFpEF  - euvolemic  - volume management per HD     Endometrial wall thickening  - noted on outpatient MRI and US  - needs outpatient GYN follow up     Hypertension  -She takes a Norvasc as needed but not daily  - resume lopressor     FN:  - IVF: none  - Diet: adv     DVT Prophy: scd, eliquis resumed  Lines: PIV     Dispo: pending clinical course    Discussed with daughter 2/5, willing to transfer to [facility]     Questions/concerns were discussed with patient and/or family by bedside.    Thank You,  Davion Grove MD    Hospitalist with [facility]     Subjective:   No hip pain this am   Didn't sleep much   No chest pain, no nausea, tolerating diet    OBJECTIVE:    Blood pressure 105/60, pulse 108, temperature 98.5 °F (36.9 °C), temperature source Oral, resp. rate 18, height 5' 3\" (1.6 m), weight 132 lb 0.9 oz (59.9 kg), SpO2 96%, not currently breastfeeding.    Temp:  [98.5 °F (36.9 °C)] 98.5 °F (36.9 °C)  Pulse:  [] 108  Resp:  [18] 18  BP: ()/(55-66) 105/60  SpO2:  [87 %-96 %] 96 %      Intake/Output:    Intake/Output Summary (Last 24 hours) at 2/5/2024 1045  Last data filed at 2/5/2024 0631  Gross per 24 hour   Intake 460 ml   Output --   Net 460 ml       Last 3 Weights   02/05/24 0628 132 lb  0.9 oz (59.9 kg)   02/04/24 0546 130 lb 1.1 oz (59 kg)   02/03/24 0431 126 lb (57.2 kg)   02/01/24 0509 124 lb 9 oz (56.5 kg)   01/31/24 1517 129 lb (58.5 kg)   01/30/24 0400 129 lb 13.6 oz (58.9 kg)   01/29/24 0630 130 lb 15.3 oz (59.4 kg)   01/28/24 0551 127 lb 13.9 oz (58 kg)   01/27/24 0642 124 lb 12.5 oz (56.6 kg)   01/26/24 0246 135 lb 12.9 oz (61.6 kg)   01/25/24 2148 126 lb 12.2 oz (57.5 kg)   07/18/23 1000 133 lb 6.1 oz (60.5 kg)   07/16/23 1942 134 lb 12.8 oz (61.1 kg)   07/16/23 1203 135 lb (61.2 kg)   06/20/23 1508 135 lb (61.2 kg)       Exam    Gen: No acute distress, alert and oriented x3   Heent: NC AT, neck supple  Pulm: Lungs clear, normal respiratory effort  CV: Heart with regular rate and rhythm   Abd: Abdomen soft, nontender, nondistended   MSK: nimisha appearance (chronic for patient) warm well perfused  Skin: no rashes or lesions        Data Review:       Labs:     Recent Labs   Lab 02/03/24  0641 02/04/24  0618 02/05/24  0843   RBC 3.56* 3.64* 3.80   HGB 11.0* 11.2* 11.8*   HCT 34.1* 35.0 35.6   MCV 95.8 96.2 93.7   MCH 30.9 30.8 31.1   MCHC 32.3 32.0 33.1   RDW 16.3* 15.9* 15.7*   NEPRELIM 5.86 4.53 7.34   WBC 7.6 6.7 8.8   .0 164.0 163.0         Recent Labs   Lab 02/03/24  0641 02/04/24  0618 02/05/24  0843   GLU 93 84 100*   BUN 26* 41* 36*   CREATSERUM 3.49* 4.43* 3.76*   EGFRCR 13* 9* 11*   CA 7.5* 8.0* 8.1*   * 133* 132*   K 4.0 4.1 3.9   CL 97* 96* 97*   CO2 29.0 30.0 28.0       No results for input(s): \"ALT\", \"AST\", \"ALB\", \"AMYLASE\", \"LIPASE\", \"LDH\" in the last 168 hours.    Invalid input(s): \"ALPHOS\", \"TBIL\", \"DBIL\", \"TPROT\"      Imaging:  No results found.      Meds:      midodrine  10 mg Oral TID    midodrine  5 mg Oral Once    metoprolol tartrate  25 mg Oral 3 times per day on Sunday Tuesday Thursday Saturday    metoprolol tartrate  25 mg Oral 2 times per day on Monday Wednesday Friday    lansoprazole  30 mg Oral BID AC    sucralfate  1 g Oral TID AC and HS (2200)     gabapentin  100 mg Oral BID    apixaban  2.5 mg Oral 2 times per day    montelukast  10 mg Oral Nightly    sevelamer carbonate  2,400 mg Oral TID    lidocaine-menthol  1 patch Transdermal Daily    sennosides  17.2 mg Oral Nightly    atorvastatin  5 mg Oral Nightly           acetaminophen, ondansetron, calcium carbonate, amLODIPine, polyethylene glycol (PEG 3350), bisacodyl, fleet enema, HYDROcodone-acetaminophen

## 2024-02-05 NOTE — CM/SW NOTE
CM followed up with patient and daughter at bedside. Patient arrived back to room after hemodialysis, very sleepy. Daughter Emily assisting with discharge planning. She reviewed list of [facility] with onsite HD. Daughter considering [facility] but would like to tour facility first. Message sent to facility to confirm bed availability and arrangement of tour. CM discussed once patient is medically ready for discharge, a facility will need be chosen, daughter verbalized understanding.     Mee with [facility] notified and will reach out to patient's daughter to arrange tour of facility today. Mee will also start working on hemodialysis approval as well.     Plan: [facility] pending final facility choice and onsite HD approval    Emilee Schwartz, RN, BSN

## 2024-02-05 NOTE — PROGRESS NOTES
[facility]    Progress Note      Subjective:     Seen during dialysis-tolerating fairly.  Blood pressure in 90s but asymptomatic.  Heart rate 100-110    Review of Systems:   Constitutional: Appears weak and fatigue  Eyes: negative for irritation, redness and visual disturbance  Ears, nose, mouth, throat, and face: negative for hearing loss and sore throat  Respiratory: negative for cough, hemoptysis and wheezing  Cardiovascular: negative for chest pain, exertional dyspnea  Gastrointestinal: negative for abdominal pain, diarrhea and nausea  Hematologic/lymphatic: negative for bleeding and easy bruising  Musculoskeletal:negative for back pain, bone pain and muscle weakness  Behavioral/Psych: Low energy    Objective:   Temp:  [97.6 °F (36.4 °C)-98.5 °F (36.9 °C)] 97.6 °F (36.4 °C)  Pulse:  [] 115  Resp:  [18] 18  BP: ()/(56-91) 122/91  SpO2:  [87 %-97 %] 97 %  SpO2: 97 %     Intake/Output Summary (Last 24 hours) at 2/5/2024 1420  Last data filed at 2/5/2024 1200  Gross per 24 hour   Intake 210 ml   Output 1500 ml   Net -1290 ml     Wt Readings from Last 3 Encounters:   02/05/24 132 lb 0.9 oz (59.9 kg)   07/18/23 133 lb 6.1 oz (60.5 kg)   06/20/23 135 lb (61.2 kg)       General appearance: alert, appears stated age and cooperative  Head: Normocephalic, atraumatic  Eyes: conjunctivae/corneas clear  Throat: lips, mucosa, and tongue normal; teeth and gums normal  Neck:  no JVD, supple  Extremities: extremities normal, no edema  Skin: No rashes or lesions  Neurologic: Grossly normal  Psychiatric: calm    Medications:  Current Facility-Administered Medications   Medication Dose Route Frequency    midodrine (ProAmatine) tab 10 mg  10 mg Oral TID    acetaminophen (Tylenol Extra Strength) tab 1,000 mg  1,000 mg Oral Q6H PRN    metoprolol tartrate (Lopressor) tab 25 mg  25 mg Oral 3 times per day on Sunday Tuesday Thursday Saturday    metoprolol tartrate (Lopressor) tab 25 mg  25 mg Oral 2 times per  day on Monday Wednesday Friday    ondansetron (Zofran) 4 MG/2ML injection 4 mg  4 mg Intravenous Q6H PRN    lansoprazole (Prevacid Solutab) disintegrating tab 30 mg  30 mg Oral BID AC    sucralfate (Carafate) 1 GM/10ML oral suspension 1 g  1 g Oral TID AC and HS (2200)    gabapentin (Neurontin) cap 100 mg  100 mg Oral BID    calcium carbonate (Tums) chewable tab 1,000 mg  1,000 mg Oral Q8H PRN    amLODIPine (Norvasc) tab 5 mg  5 mg Oral Daily PRN    apixaban (Eliquis) tab 2.5 mg  2.5 mg Oral 2 times per day    montelukast (Singulair) tab 10 mg  10 mg Oral Nightly    sevelamer carbonate (Renvela) tab 2,400 mg  2,400 mg Oral TID    polyethylene glycol (PEG 3350) (Miralax) 17 g oral packet 17 g  17 g Oral Daily PRN    bisacodyl (Dulcolax) 10 MG rectal suppository 10 mg  10 mg Rectal Daily PRN    fleet enema (Fleet) 7-19 GM/118ML rectal enema 133 mL  1 enema Rectal Once PRN    HYDROcodone-acetaminophen (Norco)  MG per tab 1 tablet  1 tablet Oral Q4H PRN    lidocaine-menthol 4-1 % patch 1 patch  1 patch Transdermal Daily    sennosides (Senokot) tab 17.2 mg  17.2 mg Oral Nightly    atorvastatin (Lipitor) tab 5 mg  5 mg Oral Nightly              Results:     Recent Labs   Lab 02/03/24  0641 02/04/24  0618 02/05/24  0843   RBC 3.56* 3.64* 3.80   HGB 11.0* 11.2* 11.8*   HCT 34.1* 35.0 35.6   MCV 95.8 96.2 93.7   NEPRELIM 5.86 4.53 7.34   WBC 7.6 6.7 8.8   .0 164.0 163.0     Recent Labs   Lab 02/04/24  0618 02/05/24  0843 02/05/24  1327   GLU 84 100* 100*   BUN 41* 36* 21   CREATSERUM 4.43* 3.76* 3.04*   CA 8.0* 8.1* 9.0   * 132* 134*   K 4.1 3.9 4.0   CL 96* 97* 96*   CO2 30.0 28.0 31.0     PTT   Date Value Ref Range Status   11/06/2018 23.6 (L) 24.0 - 33.7 sec Final     INR   Date Value Ref Range Status   11/06/2018 1.0 0.9 - 1.2 ratio Final     Comment:     An INR of 2.0-3.0 is the usual therapeutic interval.  Some patients (e.g., those with recurrent thrombotic events, a mechanical heart valve) may  require more intense therapy with a therapeutic INR interval of 2.5-3.5   08/11/2015 3.0 (A) 0.8 - 1.2 Final     No results for input(s): \"BNP\" in the last 168 hours.  Recent Labs   Lab 02/01/24  0626 02/02/24  0428 02/03/24  0641 02/05/24  0843 02/05/24  1327   MG  --    < > 1.8 1.7 1.9   PHOS 5.4*  --   --   --  2.9    < > = values in this interval not displayed.        Recent Labs   Lab 02/01/24  0626 02/05/24  1327   PHOS 5.4* 2.9       No results found.        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  1.2 L UF  Appears euvolemic on exam  Serum Phos within normal limits  Blood pressure in 90s-on midodrine 10 mg 3 times daily  Metoprolol dose decreased to 25 mg-a-fib with RVR      2.anemia: Anemia secondary to chronic kidney disease  Hemoglobin acceptable     3.inability to walk: Secondary to osteoarthritis of hip/spinal stenosis  PT OT recommended RADHA     Discussed with dialysis nurse        Greg Castillo MD  2/5/2024

## 2024-02-05 NOTE — PHYSICAL THERAPY NOTE
Chart reviewed and attempted treatment pt just back from dialysis and declined therapy today. Will follow up tomorrow as appropriate. RN aware.

## 2024-02-05 NOTE — PHYSICAL THERAPY NOTE
Chart reviewed and pt having dialysis at this time. Will follow up as appropriate and time permitting.

## 2024-02-05 NOTE — OCCUPATIONAL THERAPY NOTE
Chart reviewed, RN stated patient okay to attempt. Patient received while lying in bed and politely declining to participate with therapy this afternoon s/t fatigue after hemodialysis. OT will f/u tomorrow as able/appropriate.    Mavis Phan OTR/L  [facility]  #[id number]

## 2024-02-05 NOTE — PROGRESS NOTES
[facility] CARDIOLOGY Progress Note      Helen Espana is a 83 year old female who I assessed as follows:  Chief Complaint   Patient presents with    Groin Pain    Leg Pain          REASON FOR CONSULTATION:    AF            ASSESSMENT/PLAN:     IMPRESSIONS:  Retained food  PAF/SSS (in the past, tachy when in AF, bruce when in SR)  ESRD on HD  HTN, now on midodrine for low BP  HL, on statin  Chronic diastolic CHF, controlled with HD  Very mild CAD at cath 2016        PLAN:  Echo 7/23 reviewed  Back on oral meds for HR control. Rates reasonable at rest. BP borderline so will tolerate mildly elevated heart rates  continue eliquis  Continue midodrine, increase dose to 10 TID    Sees Dr Ojeda        SUBJECTIVE:    No chest pain or dyspnea. No lightheadedness. Looks fatigued.         --------------------------------------------------------------------------------------------------------------------------------    HPI:         History of PAF, ESRD admitted for left leg pain. Then had retained food. Awaiting EGD. Eliquis has been held. Went into AF with RVR so now on IV diltiazem drip.    HD for CHF fluid control. On midodrine for hypotension. Admission 7/23 with RVR when in AF and bruce when in SR.             No current outpatient medications on file.      Past Medical History:   Diagnosis Date    Arrhythmia     Afib    AVF (arteriovenous fistula) ([facility]) 10/12/2017    Biceps rupture, proximal, right, initial encounter 02/20/2018    Breast cancer ([facility]) 1998    Breast cancer in female ([facility]) 12/20/2018    CANCER 1989    right breast mastectomy    Cataract 2001    OU    Clostridioides difficile infection     Cup to disc asymmetry 2001    OU    Dialysis patient ([facility])     HD M, W, F    Essential hypertension     Floaters 2001    OU    Hearing impairment     hearing aides    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 Giordano @ [facility]    CATARACT EXTRACTION W/  INTRAOCULAR LENS IMPLANT Left 2021    Dr. Giordano @ [facility]    CHOLECYSTECTOMY      laparoscopic    COLONOSCOPY      c. diff colitis-Ali    ECHO 2D DP/CLRFL, CARDIO (INTERNAL)   [facility] estee    mod MR/LAE; border systolic LVEF    ECHO 2D, CARDIO (DMG)  2020    [facility] cardio: no change except mod mitral regurg mild now    HX BREAST CANCER      LEXISCAN NUC STRESS TST, CARDIO (INTERNAL)  05/15/2018    [facility]; normal ; EF 54%    LEXISCAN NUC STRESS TST, CARDIO (INTERNAL)  2021    [facility]; EF57%; normal wall motion; fixed perfusion defect inf. wall    LUMPECTOMY RIGHT      MASTECTOMY PARTIAL  2018    u [facility]: left breaswt seed loc partial mastectomy     MASTECTOMY RIGHT            OTHER SURGICAL HISTORY  12    cysto-Dr. Allred    OTHER SURGICAL HISTORY  1/21/15     lap sigmoid colectomy     OTHER SURGICAL HISTORY  16    Right Hemicolectomy by Dr. Chaudhry    PERITONEAL DIALYSIS SYSTEM      2009     Family History   Problem Relation Age of Onset    Heart Disorder Father         MI    Diabetes Father     Kidney Disease Father         polycystic kidney disease    Hypertension Mother     Heart Disorder Mother         stroke    Cataracts Mother     Thyroid Disorder Daughter         thyroid    Other (Other) Sister         Cushing's disease    Diabetes Sister     Hypertension Sister     Cancer Sister 69        lung tob     Kidney Disease Son         pckd    Kidney Disease Brother         PCKD     Breast Cancer Maternal Aunt 50        x2 ages 85 and in her 50's     Macular degeneration Other         Aunt    Glaucoma Neg         family h/o      Social History:  Social History     Socioeconomic History    Marital status:    Occupational History    Occupation: retired real estate   Tobacco Use    Smoking status: Never    Smokeless tobacco: Never   Vaping Use    Vaping Use: Never used    Passive vaping exposure: Yes   Substance and Sexual Activity    Alcohol use: No    Drug use: No    Sexual activity: Yes     Partners: Male     Comment:  2020-he had CHF    Other Topics Concern     Service No    Blood Transfusions Yes    Caffeine Concern Yes     Comment: coffee    Hobby Hazards No    Sleep Concern Yes    Stress Concern Yes    Weight Concern Yes    Special Diet Yes    Seat Belt Yes    Self-Exams Yes     Social Determinants of Health     Food Insecurity: No Food Insecurity (2024)    Food Insecurity     Food Insecurity: Never true   Transportation Needs: No Transportation Needs (2024)    Transportation Needs     Lack of Transportation: No   Housing Stability: Low Risk  (2024)    Housing Stability     Housing Instability: No          Medications:            Allergies  Allergies   Allergen Reactions    Adhesive Tape SWELLING    Denosumab OTHER (SEE COMMENTS)     hypocalcemia  Lowered calcium level  Lowered calcium level  Lowered calcium level      Docosahexaenoic Acid, Dha HIVES    Ficus HIVES    Penicillins HIVES     Thinks she has tolerated penicillin in the past    Zithromax HIVES    Eicosapentaenoic Acid, Epa HIVES    Fig HIVES    Levofloxacin HIVES    Prolia OTHER (SEE COMMENTS)     Lowered calcium level    Vitamin E HIVES                   EXAM:         TELE: AF          /60 (BP Location: Left arm)   Pulse 108   Temp 98.5 °F (36.9 °C) (Oral)   Resp 18   Ht 5' 3\" (1.6 m)   Wt 132 lb 0.9 oz (59.9 kg)   SpO2 96%   BMI 23.39 kg/m²   Temp (24hrs), Av.6 °F (37 °C), Min:98.5 °F (36.9 °C), Max:98.7 °F (37.1 °C)    Wt Readings  from Last 3 Encounters:   02/05/24 132 lb 0.9 oz (59.9 kg)   07/18/23 133 lb 6.1 oz (60.5 kg)   06/20/23 135 lb (61.2 kg)         Intake/Output Summary (Last 24 hours) at 2/5/2024 0652  Last data filed at 2/4/2024 2115  Gross per 24 hour   Intake 590 ml   Output --   Net 590 ml         GENERAL: well developed, well nourished, looks tired  SKIN: no rashes  HEENT: atraumatic, normocephalic, throat without erythema  NECK: supple, no bruits  LUNGS: clear to auscultation  CARDIO: irregular rhythm without murmur or S3   GI: soft, nontender  EXTREMITIES: no cyanosis, clubbing or edema  NEUROLOGY: alert  PSYCH: cooperative          LABORATORY DATA:               ECG:        ECHO 7/23:  ECHO:  1. Study data: Atrial fibrillation   2. Left ventricle: The cavity size was normal. Wall thickness was normal.      Systolic function was normal. The estimated ejection fraction was 55-60%,      by biplane method of disks. Wall motion is normal; there are no regional      wall motion abnormalities. Doppler parameters are consistent with a      reversible restrictive pattern, indicative of decreased left ventricular      diastolic compliance and/or increased left atrial pressure - grade 3      diastolic dysfunction.   3. Left atrium: The atrium was markedly dilated.   4. Right atrium: The atrium was moderately to markedly dilated.   5. Mitral valve: There was mild regurgitation.   6. Tricuspid valve: There was mild-moderate regurgitation.   7. Pulmonary arteries: Systolic pressure was within the normal range,      estimated to be 30mm Hg.            Labs:  Recent Labs   Lab 02/02/24  0428 02/03/24  0641 02/04/24  0618   * 93 84   BUN 51* 26* 41*   CREATSERUM 5.33* 3.49* 4.43*   CA 6.6* 7.5* 8.0*    135* 133*   K 5.4* 4.0 4.1    97* 96*   CO2 22.0 29.0 30.0     No results for input(s): \"TROP\" in the last 168 hours.  Recent Labs   Lab 02/04/24  0618   RBC 3.64*   HGB 11.2*   HCT 35.0   MCV 96.2   MCH 30.8   MCHC 32.0    RDW 15.9*   NEPRELIM 4.53   WBC 6.7   .0     No results for input(s): \"TROP\", \"TROPHS\", \"CK\" in the last 168 hours.    Imaging:  No results found.         Familia Walters MD

## 2024-02-05 NOTE — PLAN OF CARE
Patient alert and oriented on 2L oxygen overnight. Patient satting in 80's on room air overnight. O2 levels ok this morning. Patient shaky in am post linen change and cleaning. PRN given in late morning for pain. Daughter made aware of morning events at bedside. Call light in reach and no needs noted at this time. Plan for dialysis today  Problem: PAIN - ADULT  Goal: Verbalizes/displays adequate comfort level or patient's stated pain goal  Description: INTERVENTIONS:  - Encourage pt to monitor pain and request assistance  - Assess pain using appropriate pain scale  - Administer analgesics based on type and severity of pain and evaluate response  - Implement non-pharmacological measures as appropriate and evaluate response  - Consider cultural and social influences on pain and pain management  - Manage/alleviate anxiety  - Utilize distraction and/or relaxation techniques  - Monitor for opioid side effects  - Notify MD/LIP if interventions unsuccessful or patient reports new pain  - Anticipate increased pain with activity and pre-medicate as appropriate  Outcome: Progressing     Problem: SAFETY ADULT - FALL  Goal: Free from fall injury  Description: INTERVENTIONS:  - Assess pt frequently for physical needs  - Identify cognitive and physical deficits and behaviors that affect risk of falls.  - [facility] fall precautions as indicated by assessment.  - Educate pt/family on patient safety including physical limitations  - Instruct pt to call for assistance with activity based on assessment  - Modify environment to reduce risk of injury  - Provide assistive devices as appropriate  - Consider OT/PT consult to assist with strengthening/mobility  - Encourage toileting schedule  Outcome: Progressing     Problem: DISCHARGE PLANNING  Goal: Discharge to home or other facility with appropriate resources  Description: INTERVENTIONS:  - Identify barriers to discharge w/pt and caregiver  - Include patient/family/discharge partner in  discharge planning  - Arrange for needed discharge resources and transportation as appropriate  - Identify discharge learning needs (meds, wound care, etc)  - Arrange for interpreters to assist at discharge as needed  - Consider post-discharge preferences of patient/family/discharge partner  - Complete POLST form as appropriate  - Assess patient's ability to be responsible for managing their own health  - Refer to Case Management Department for coordinating discharge planning if the patient needs post-hospital services based on physician/LIP order or complex needs related to functional status, cognitive ability or social support system  Outcome: Progressing     Problem: Patient/Family Goals  Goal: Patient/Family Long Term Goal  Description: Patient's Long Term Goal: Discharge from the hospital    Interventions:  - Monitor vital signs  - Monitor appropriate labs  - Pain management  - Administer medications per order  - Follow MD orders  - Diagnostics per order  - Update / inform patient and family on plan of care  - Discharge planning  - See additional Care Plan goals for specific interventions  Outcome: Progressing  Goal: Patient/Family Short Term Goal  Description: Patient's Short Term Goal: Manage pain    Interventions:   - Monitor vital signs  - Monitor appropriate labs  - Pain management  - Administer medications per order  - Follow MD orders  - Diagnostics per order  - Update / inform patient and family on plan of care  - See additional Care Plan goals for specific interventions  Outcome: Progressing     Problem: METABOLIC/FLUID AND ELECTROLYTES - ADULT  Goal: Electrolytes maintained within normal limits  Description: INTERVENTIONS:  - Monitor labs and rhythm and assess patient for signs and symptoms of electrolyte imbalances  - Administer electrolyte replacement as ordered  - Monitor response to electrolyte replacements, including rhythm and repeat lab results as appropriate  - Fluid restriction as ordered  -  Instruct patient on fluid and nutrition restrictions as appropriate  Outcome: Progressing  Goal: Hemodynamic stability and optimal renal function maintained  Description: INTERVENTIONS:  - Monitor labs and assess for signs and symptoms of volume excess or deficit  - Monitor intake, output and patient weight  - Monitor urine specific gravity, serum osmolarity and serum sodium as indicated or ordered  - Monitor response to interventions for patient's volume status, including labs, urine output, blood pressure (other measures as available)  - Encourage oral intake as appropriate  - Instruct patient on fluid and nutrition restrictions as appropriate  Outcome: Progressing     Problem: Impaired Functional Mobility  Goal: Achieve highest/safest level of mobility/gait  Description: Interventions:  - Assess patient's functional ability and stability  - Promote increasing activity/tolerance for mobility and gait  - Educate and engage patient/family in tolerated activity level and precautions  - Recommend use of  RW for transfers and ambulation  - Recommend patient transfer to bedside chair toward strongest side  - When transferring patient, block weaker knee for safety  - Recommend use of chair position in bed 3 times per day  Outcome: Progressing     Problem: SKIN/TISSUE INTEGRITY - ADULT  Goal: Skin integrity remains intact  Description: INTERVENTIONS  - Assess and document risk factors for pressure ulcer development  - Assess and document skin integrity  - Monitor for areas of redness and/or skin breakdown  - Initiate interventions, skin care algorithm/standards of care as needed  Outcome: Progressing     Problem: MUSCULOSKELETAL - ADULT  Goal: Return mobility to safest level of function  Description: INTERVENTIONS:  - Assess patient stability and activity tolerance for standing, transferring and ambulating w/ or w/o assistive devices  - Assist with transfers and ambulation using safe patient handling equipment as  needed  - Ensure adequate protection for wounds/incisions during mobilization  - Obtain PT/OT consults as needed  - Advance activity as appropriate  - Communicate ordered activity level and limitations with patient/family  Outcome: Progressing     Problem: Impaired Activities of Daily Living  Goal: Achieve highest/safest level of independence in self care  Description: Interventions:  - Assess ability and encourage patient to participate in ADLs to maximize function  - Promote sitting position while performing ADLs such as feeding, grooming, and bathing  - Educate and encourage patient/family in tolerated functional activity level and precautions during self-care  Outcome: Progressing     Problem: Patient Centered Care  Goal: Patient preferences are identified and integrated in the patient's plan of care  Description: Interventions:  - What would you like us to know as we care for you? From home with grandson  - Provide timely, complete, and accurate information to patient/family  - Incorporate patient and family knowledge, values, beliefs, and cultural backgrounds into the planning and delivery of care  - Encourage patient/family to participate in care and decision-making at the level they choose  - Honor patient and family perspectives and choices  Outcome: Progressing     Problem: CARDIOVASCULAR - ADULT  Goal: Maintains optimal cardiac output and hemodynamic stability  Description: INTERVENTIONS:  - Monitor vital signs, rhythm, and trends  - Monitor for bleeding, hypotension and signs of decreased cardiac output  - Evaluate effectiveness of vasoactive medications to optimize hemodynamic stability  - Monitor arterial and/or venous puncture sites for bleeding and/or hematoma  - Assess quality of pulses, skin color and temperature  - Assess for signs of decreased coronary artery perfusion - ex. Angina  - Evaluate fluid balance, assess for edema, trend weights  Outcome: Progressing  Goal: Absence of cardiac  arrhythmias or at baseline  Description: INTERVENTIONS:  - Continuous cardiac monitoring, monitor vital signs, obtain 12 lead EKG if indicated  - Evaluate effectiveness of antiarrhythmic and heart rate control medications as ordered  - Initiate emergency measures for life threatening arrhythmias  - Monitor electrolytes and administer replacement therapy as ordered  Outcome: Progressing

## 2024-02-06 LAB
ANION GAP SERPL CALC-SCNC: 7 MMOL/L (ref 0–18)
BASOPHILS # BLD AUTO: 0.04 X10(3) UL (ref 0–0.2)
BASOPHILS NFR BLD AUTO: 0.3 %
BUN BLD-MCNC: 29 MG/DL (ref 9–23)
BUN/CREAT SERPL: 7.4 (ref 10–20)
CALCIUM BLD-MCNC: 8.6 MG/DL (ref 8.7–10.4)
CHLORIDE SERPL-SCNC: 97 MMOL/L (ref 98–112)
CO2 SERPL-SCNC: 29 MMOL/L (ref 21–32)
CREAT BLD-MCNC: 3.9 MG/DL
DEPRECATED RDW RBC AUTO: 56 FL (ref 35.1–46.3)
EGFRCR SERPLBLD CKD-EPI 2021: 11 ML/MIN/1.73M2 (ref 60–?)
EOSINOPHIL # BLD AUTO: 0.11 X10(3) UL (ref 0–0.7)
EOSINOPHIL NFR BLD AUTO: 0.8 %
ERYTHROCYTE [DISTWIDTH] IN BLOOD BY AUTOMATED COUNT: 15.8 % (ref 11–15)
GLUCOSE BLD-MCNC: 102 MG/DL (ref 70–99)
GLUCOSE BLDC GLUCOMTR-MCNC: 152 MG/DL (ref 70–99)
HCT VFR BLD AUTO: 35.1 %
HGB BLD-MCNC: 11.2 G/DL
IMM GRANULOCYTES # BLD AUTO: 0.06 X10(3) UL (ref 0–1)
IMM GRANULOCYTES NFR BLD: 0.5 %
LACTATE SERPL-SCNC: 1.6 MMOL/L (ref 0.5–2)
LYMPHOCYTES # BLD AUTO: 0.69 X10(3) UL (ref 1–4)
LYMPHOCYTES NFR BLD AUTO: 5.3 %
MAGNESIUM SERPL-MCNC: 1.8 MG/DL (ref 1.6–2.6)
MCH RBC QN AUTO: 30.6 PG (ref 26–34)
MCHC RBC AUTO-ENTMCNC: 31.9 G/DL (ref 31–37)
MCV RBC AUTO: 95.9 FL
MONOCYTES # BLD AUTO: 1.76 X10(3) UL (ref 0.1–1)
MONOCYTES NFR BLD AUTO: 13.5 %
NEUTROPHILS # BLD AUTO: 10.35 X10 (3) UL (ref 1.5–7.7)
NEUTROPHILS # BLD AUTO: 10.35 X10(3) UL (ref 1.5–7.7)
NEUTROPHILS NFR BLD AUTO: 79.6 %
OSMOLALITY SERPL CALC.SUM OF ELEC: 282 MOSM/KG (ref 275–295)
PHOSPHATE SERPL-MCNC: 3.3 MG/DL (ref 2.4–5.1)
PLATELET # BLD AUTO: 195 10(3)UL (ref 150–450)
POTASSIUM SERPL-SCNC: 4.1 MMOL/L (ref 3.5–5.1)
RBC # BLD AUTO: 3.66 X10(6)UL
SODIUM SERPL-SCNC: 133 MMOL/L (ref 136–145)
WBC # BLD AUTO: 13 X10(3) UL (ref 4–11)

## 2024-02-06 PROCEDURE — 99232 SBSQ HOSP IP/OBS MODERATE 35: CPT | Performed by: INTERNAL MEDICINE

## 2024-02-06 RX ORDER — HYDROCODONE BITARTRATE AND ACETAMINOPHEN 10; 325 MG/1; MG/1
1 TABLET ORAL EVERY 4 HOURS PRN
Qty: 40 TABLET | Refills: 0 | Status: SHIPPED | OUTPATIENT
Start: 2024-02-06 | End: 2024-02-08

## 2024-02-06 NOTE — PROGRESS NOTES
[facility]    Progress Note      Subjective:     Appears weak and tired - stable breathing. On NC    Daughter at bedside    Review of Systems:   Constitutional: Appears weak and fatigue  Eyes: negative for irritation, redness and visual disturbance  Ears, nose, mouth, throat, and face: negative for hearing loss and sore throat  Respiratory: negative for cough, hemoptysis and wheezing  Cardiovascular: negative for chest pain, exertional dyspnea  Gastrointestinal: negative for abdominal pain, diarrhea and nausea  Hematologic/lymphatic: negative for bleeding and easy bruising  Musculoskeletal:negative for back pain, bone pain and muscle weakness  Behavioral/Psych: Low energy    Objective:   Temp:  [97.6 °F (36.4 °C)-99.2 °F (37.3 °C)] 99.2 °F (37.3 °C)  Pulse:  [] 120  Resp:  [18] 18  BP: ()/(51-91) 91/51  SpO2:  [96 %-98 %] 96 %  SpO2: 96 %     Intake/Output Summary (Last 24 hours) at 2/6/2024 1120  Last data filed at 2/6/2024 0544  Gross per 24 hour   Intake 160 ml   Output 1500 ml   Net -1340 ml     Wt Readings from Last 3 Encounters:   02/05/24 132 lb 0.9 oz (59.9 kg)   07/18/23 133 lb 6.1 oz (60.5 kg)   06/20/23 135 lb (61.2 kg)       General appearance: alert, appears stated age and cooperative  Head: Normocephalic, atraumatic  Eyes: conjunctivae/corneas clear  Throat: lips, mucosa, and tongue normal; teeth and gums normal  Neck:  no JVD, supple  Extremities: extremities normal, no edema  Skin: No rashes or lesions  Neurologic: Grossly normal  Psychiatric: calm    Medications:        Results:     Recent Labs   Lab 02/04/24  0618 02/05/24  0843 02/06/24  0708   RBC 3.64* 3.80 3.66*   HGB 11.2* 11.8* 11.2*   HCT 35.0 35.6 35.1   MCV 96.2 93.7 95.9   NEPRELIM 4.53 7.34 10.35*   WBC 6.7 8.8 13.0*   .0 163.0 195.0     Recent Labs   Lab 02/05/24  0843 02/05/24  1327 02/06/24  0708   * 100* 102*   BUN 36* 21 29*   CREATSERUM 3.76* 3.04* 3.90*   CA 8.1* 9.0 8.6*   * 134*  133*   K 3.9 4.0 4.1   CL 97* 96* 97*   CO2 28.0 31.0 29.0     PTT   Date Value Ref Range Status   11/06/2018 23.6 (L) 24.0 - 33.7 sec Final     INR   Date Value Ref Range Status   11/06/2018 1.0 0.9 - 1.2 ratio Final     Comment:     An INR of 2.0-3.0 is the usual therapeutic interval.  Some patients (e.g., those with recurrent thrombotic events, a mechanical heart valve) may require more intense therapy with a therapeutic INR interval of 2.5-3.5   08/11/2015 3.0 (A) 0.8 - 1.2 Final     No results for input(s): \"BNP\" in the last 168 hours.  Recent Labs   Lab 02/01/24  0626 02/02/24  0428 02/05/24  0843 02/05/24  1327 02/06/24  0708   MG  --    < > 1.7 1.9 1.8   PHOS 5.4*  --   --  2.9 3.3    < > = values in this interval not displayed.        Recent Labs   Lab 02/01/24  0626 02/05/24  1327 02/06/24  0708   PHOS 5.4* 2.9 3.3       No results found.        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  1.2 L UF  Appears euvolemic on exam  Serum Phos within normal limits  Blood pressure in 90s-on midodrine 10 mg 3 times daily  Metoprolol dose decreased to 25 mg-a-fib with RVR      2.anemia: Anemia secondary to chronic kidney disease  Hemoglobin acceptable     3.inability to walk: Secondary to osteoarthritis of hip/spinal stenosis  PT OT recommended RADHA     Discussed with Nursing and daughter at bedside    Awaiting Rehab placement         Greg Castillo MD  2/6/2024

## 2024-02-06 NOTE — PROGRESS NOTES
[facility]    Cardiology Progress Note    Helen Espana Patient Status:  Inpatient    1940 MRN [id number]   Location [facility] 3W/SW Attending Davion Grove MD   Hosp Day # 7 PCP Shannon Bahena MD       Impression/Plan:  83 year old female presenting with:    Retained food  PAF/SSS (in the past, tachy when in AF, bruce when in SR)  ESRD on HD  HTN, now on midodrine for low BP  HL, on statin  Chronic diastolic CHF, controlled with HD  Very mild CAD at cath 2016    - Cont eliquis, statin, beta blocker  - Cont midodrine for BP support  - HD as per nephrology  - Monitor on tele  - Will follow     Subjective: No events overnight.  Today patient without acute complaints.    Patient Active Problem List   Diagnosis    BENIGN HYPERTENSION    Polycystic kidney, autosomal dominant    Mixed hyperlipidemia    Paroxysmal atrial fibrillation (HCC)    Left knee DJD    Spondylosis of lumbar region without myelopathy or radiculopathy    Age-related nuclear cataract of both eyes    Vitreous floaters of both eyes    Chorioretinal scar of right eye    Osteoporosis of multiple sites associated with endocrine disorder    Complete tear of right rotator cuff    Scoliosis of lumbosacral spine, unspecified scoliosis type    Chronic left-sided low back pain with left-sided sciatica    Hemodialysis access site with arteriovenous graft (HCC)    Gastroesophageal reflux disease with esophagitis    ESRD on hemodialysis (HCC)    AVF (arteriovenous fistula) ([facility])    Malignant neoplasm of upper-outer quadrant of left breast in female, estrogen receptor positive  (HCC)    Squamous blepharitis of upper and lower eyelids of both eyes    Internal derangement of left knee    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    Seasonal allergic rhinitis due to pollen    ESRD (end stage renal disease) ([facility])    Symptomatic bradycardia    Inability to walk    Osteoarthritis of hip    Anemia due to chronic kidney disease, on chronic  dialysis ([facility])    ESRD (end stage renal disease) on dialysis ([facility])       Objective:   Temp: 99.2 °F (37.3 °C)  Pulse: 120  Resp: 18  BP: 91/51    Intake/Output:     Intake/Output Summary (Last 24 hours) at 2/6/2024 1138  Last data filed at 2/6/2024 0544  Gross per 24 hour   Intake 160 ml   Output 1500 ml   Net -1340 ml       Last 3 Weights   02/05/24 0628 132 lb 0.9 oz (59.9 kg)   02/04/24 0546 130 lb 1.1 oz (59 kg)   02/03/24 0431 126 lb (57.2 kg)   02/01/24 0509 124 lb 9 oz (56.5 kg)   01/31/24 1517 129 lb (58.5 kg)   01/30/24 0400 129 lb 13.6 oz (58.9 kg)   01/29/24 0630 130 lb 15.3 oz (59.4 kg)   01/28/24 0551 127 lb 13.9 oz (58 kg)   01/27/24 0642 124 lb 12.5 oz (56.6 kg)   01/26/24 0246 135 lb 12.9 oz (61.6 kg)   01/25/24 2148 126 lb 12.2 oz (57.5 kg)   07/18/23 1000 133 lb 6.1 oz (60.5 kg)   07/16/23 1942 134 lb 12.8 oz (61.1 kg)   07/16/23 1203 135 lb (61.2 kg)   06/20/23 1508 135 lb (61.2 kg)       Tele: AF    Physical Exam:    General: Alert and oriented x 3. No apparent distress. No respiratory or constitutional distress.  HEENT: Normocephalic, anicteric sclera, neck supple, no thyromegaly or adenopathy.  Neck: No JVD, carotids 2+, no bruits.  Cardiac: Irregularly irregular  Lungs: Clear without wheezes, rales, rhonchi or dullness.  Normal excursions and effort.  Abdomen: Soft, non-tender. No organosplenomegally, mass or rebound, BS-present.  Extremities: Without clubbing, cyanosis or edema.  Peripheral pulses are 2+.  Neurologic: Alert and oriented, normal affect. No motor or coordinational deficit.  Skin: Warm and dry.     Laboratory/Data:    Labs:         Recent Labs   Lab 02/02/24  0428 02/03/24  0641 02/04/24  0618 02/05/24  0843 02/06/24  0708   WBC 6.7 7.6 6.7 8.8 13.0*   HGB 11.0* 11.0* 11.2* 11.8* 11.2*   MCV 94.3 95.8 96.2 93.7 95.9   .0 151.0 164.0 163.0 195.0       Recent Labs   Lab 02/01/24  0626 02/02/24  0428 02/03/24  0641 02/04/24  0618 02/05/24  0843 02/05/24  1327 02/06/24  0708     139 135* 133* 132* 134* 133*   K 4.4 5.4* 4.0 4.1 3.9 4.0 4.1    102 97* 96* 97* 96* 97*   CO2 20.0* 22.0 29.0 30.0 28.0 31.0 29.0   BUN 39* 51* 26* 41* 36* 21 29*   CREATSERUM 4.49* 5.33* 3.49* 4.43* 3.76* 3.04* 3.90*   CA 7.8* 6.6* 7.5* 8.0* 8.1* 9.0 8.6*   MG  --  2.0 1.8  --  1.7 1.9 1.8   PHOS 5.4*  --   --   --   --  2.9 3.3   GLU 67* 151* 93 84 100* 100* 102*       No results for input(s): \"ALT\", \"AST\", \"ALB\", \"AMYLASE\", \"LIPASE\", \"LDH\" in the last 168 hours.    Invalid input(s): \"ALPHOS\", \"TBIL\", \"DBIL\", \"TPROT\"    No results for input(s): \"TROP\" in the last 168 hours.    Allergies:   Allergies   Allergen Reactions    Adhesive Tape SWELLING    Denosumab OTHER (SEE COMMENTS)     hypocalcemia  Lowered calcium level  Lowered calcium level  Lowered calcium level      Docosahexaenoic Acid, Dha HIVES    Ficus HIVES    Penicillins HIVES     Thinks she has tolerated penicillin in the past    Zithromax HIVES    Eicosapentaenoic Acid, Epa HIVES    Fig HIVES    Levofloxacin HIVES    Prolia OTHER (SEE COMMENTS)     Lowered calcium level    Vitamin E HIVES       Medications:  Current Facility-Administered Medications   Medication Dose Route Frequency    midodrine (ProAmatine) tab 10 mg  10 mg Oral TID    acetaminophen (Tylenol Extra Strength) tab 1,000 mg  1,000 mg Oral Q6H PRN    metoprolol tartrate (Lopressor) tab 25 mg  25 mg Oral 3 times per day on Sunday Tuesday Thursday Saturday    metoprolol tartrate (Lopressor) tab 25 mg  25 mg Oral 2 times per day on Monday Wednesday Friday    ondansetron (Zofran) 4 MG/2ML injection 4 mg  4 mg Intravenous Q6H PRN    lansoprazole (Prevacid Solutab) disintegrating tab 30 mg  30 mg Oral BID AC    gabapentin (Neurontin) cap 100 mg  100 mg Oral BID    calcium carbonate (Tums) chewable tab 1,000 mg  1,000 mg Oral Q8H PRN    amLODIPine (Norvasc) tab 5 mg  5 mg Oral Daily PRN    apixaban (Eliquis) tab 2.5 mg  2.5 mg Oral 2 times per day    montelukast (Singulair) tab 10  mg  10 mg Oral Nightly    sevelamer carbonate (Renvela) tab 2,400 mg  2,400 mg Oral TID    polyethylene glycol (PEG 3350) (Miralax) 17 g oral packet 17 g  17 g Oral Daily PRN    bisacodyl (Dulcolax) 10 MG rectal suppository 10 mg  10 mg Rectal Daily PRN    fleet enema (Fleet) 7-19 GM/118ML rectal enema 133 mL  1 enema Rectal Once PRN    HYDROcodone-acetaminophen (Norco)  MG per tab 1 tablet  1 tablet Oral Q4H PRN    lidocaine-menthol 4-1 % patch 1 patch  1 patch Transdermal Daily    sennosides (Senokot) tab 17.2 mg  17.2 mg Oral Nightly    atorvastatin (Lipitor) tab 5 mg  5 mg Oral Nightly       Juan Carlos Zamorano MD  2/6/2024  11:38 AM

## 2024-02-06 NOTE — SPIRITUAL CARE NOTE
Spiritual Care Visit Note    Patient Name: Helen Espana Date of Spiritual Care Visit: 24   : 1940 Primary Dx: Inability to walk       Referred By: Referral From:       Visit Type/Summary:  Summary:  received referral for visit from [facility]. Writer attempted to visit but patient was busy with staff.    - Spiritual Care: Attempted visit. Patient is unavailable. Left a Spiritual Care contact information card.    Spiritual Care support can be requested via an Epic consult. For urgent/immediate needs, please contact the On Call  at: [place]: ext [phone]    Chaplain Nabeel.

## 2024-02-06 NOTE — PROGRESS NOTES
[facility] Hospitalist Progress Note     CC: Hospital Follow up    PCP: Shannon Bahena MD       Assessment/Plan:     Principal Problem:    Inability to walk  Active Problems:    Osteoarthritis of hip    Anemia due to chronic kidney disease, on chronic dialysis (HCC)    ESRD (end stage renal disease) on dialysis ([facility])    Ms. Cook is an 83-year-old female with a history of ESRD, paroxysmal atrial fibrillation, carotid stenosis, hypertension, hyperlipidemia, diastolic congestive heart failure, presents to the hospital for evaluation of left leg and hip pain, likely radicular pain fro L4-L5 disc herniation, course complicated by food bolus, EGD on 2/1 with resolution, diet advanced, reinitiated PT OT, still having pain but overall improved, functionally weak, now with intermittent hypotension. Plans for [facility] on dc.      Left hip/ Leg pain  Left hip OA  Spinal stenosis, L4-L5 disc extrusion  - xray neg for fracture  - outpatient MRI with Left hip OA and trets of hamstring tendon and gluteus minimus  - pain does appear to radiate to her lower leg   - lumbar spine with DDD  - continue lidoderm patch start low dose gabapentin  - continue norco    - PT/OT for RADHA  - recently had steroid injection in hip with ortho, therefore not candidate for at least 1-2 more months  - Lumbar spine MRI with L4-L5 disc herniation  - continue PT/OT for RADHA on DC   - resume oral pain meds    Food bolus  - pt with nausea, vomiting, globus sensation on 1/29  - CT neck reviewed, showed patulous esophagus with multifocal areas of retained ingested material  - had EGD in 03/2023 at [facility] for globus sensation, per report esophageal hypomotility diagnosed but no achalasia, she did have a very dry oropharynx, pathology results were negative  - GI consulted  - EGD on 2/1 food bolus removed  - feeling better, tolerating diet, adv per GI    ESRD  - Nephrology consulted  - MWF HD    Hypotension   - No signs of sepsis or infection  - Likely  volume depletion, from limited oral intake (with food bolus) then fluid removal with dialysis  - on 2/3 gave some albumin, renal ok with one time extra dose of midodrine   - Recurrent on 2/4 in a.m., will give extra dose of midodrine, and add 1 more dose of albumin  - will decrease metoprolol 25mg  - midodrine 10mg TID      Paroxysmal atrial fibrillation  Afib with RVR  Tachycardic   - Follows with [facility]  - She is on Eliquis 2.5 mg twice daily,   - Normally on metoprolol, 50mg TID on non HD days and 50mg twice daily onHD days  - Afib with RVR on 1/31, on dilt drip, now improved  - cardiology consulted, ok for EGD    - will decrease metoprolol to 25mg given intermittent hypotension     Chronic HFpEF  - euvolemic  - volume management per HD     Endometrial wall thickening  - noted on outpatient MRI and US  - needs outpatient GYN follow up     Hypertension  -She takes a Norvasc as needed but not daily  - resume lopressor     FN:  - IVF: none  - Diet: adv     DVT Prophy: scd, eliquis resumed  Lines: PIV     Dispo: pending clinical course    Discussed with daughter at bedside 2/6.  Understand that patient is ready for dc as soon as RADHA is selected.     Questions/concerns were discussed with patient and/or family by bedside.    Thank You,  Davion Grove MD    Hospitalist with [facility]     Subjective:   Pain is well controlled at rest.   No chest pain, no nausea, tolerating diet.  Able to feed herself but weak.     OBJECTIVE:    Blood pressure 91/51, pulse 120, temperature 99.2 °F (37.3 °C), resp. rate 18, height 5' 3\" (1.6 m), weight 132 lb 0.9 oz (59.9 kg), SpO2 96%, not currently breastfeeding.    Temp:  [97.6 °F (36.4 °C)-99.2 °F (37.3 °C)] 99.2 °F (37.3 °C)  Pulse:  [] 120  Resp:  [18] 18  BP: ()/(51-91) 91/51  SpO2:  [96 %-98 %] 96 %      Intake/Output:    Intake/Output Summary (Last 24 hours) at 2/6/2024 1037  Last data filed at 2/6/2024 0526  Gross per 24 hour   Intake 160  ml   Output 1500 ml   Net -1340 ml       Last 3 Weights   02/05/24 0628 132 lb 0.9 oz (59.9 kg)   02/04/24 0546 130 lb 1.1 oz (59 kg)   02/03/24 0431 126 lb (57.2 kg)   02/01/24 0509 124 lb 9 oz (56.5 kg)   01/31/24 1517 129 lb (58.5 kg)   01/30/24 0400 129 lb 13.6 oz (58.9 kg)   01/29/24 0630 130 lb 15.3 oz (59.4 kg)   01/28/24 0551 127 lb 13.9 oz (58 kg)   01/27/24 0642 124 lb 12.5 oz (56.6 kg)   01/26/24 0246 135 lb 12.9 oz (61.6 kg)   01/25/24 2148 126 lb 12.2 oz (57.5 kg)   07/18/23 1000 133 lb 6.1 oz (60.5 kg)   07/16/23 1942 134 lb 12.8 oz (61.1 kg)   07/16/23 1203 135 lb (61.2 kg)   06/20/23 1508 135 lb (61.2 kg)       Exam    Gen: No acute distress, alert and oriented x3   Heent: NC AT, neck supple  Pulm: Lungs clear, normal respiratory effort  CV: Heart with regular rate and rhythm   Abd: Abdomen soft, nontender, nondistended   MSK: nimisha appearance (chronic for patient) warm well perfused  Skin: no rashes or lesions    Data Review:       Labs:     Recent Labs   Lab 02/04/24  0618 02/05/24  0843 02/06/24  0708   RBC 3.64* 3.80 3.66*   HGB 11.2* 11.8* 11.2*   HCT 35.0 35.6 35.1   MCV 96.2 93.7 95.9   MCH 30.8 31.1 30.6   MCHC 32.0 33.1 31.9   RDW 15.9* 15.7* 15.8*   NEPRELIM 4.53 7.34 10.35*   WBC 6.7 8.8 13.0*   .0 163.0 195.0         Recent Labs   Lab 02/05/24  0843 02/05/24  1327 02/06/24  0708   * 100* 102*   BUN 36* 21 29*   CREATSERUM 3.76* 3.04* 3.90*   EGFRCR 11* 15* 11*   CA 8.1* 9.0 8.6*   * 134* 133*   K 3.9 4.0 4.1   CL 97* 96* 97*   CO2 28.0 31.0 29.0       No results for input(s): \"ALT\", \"AST\", \"ALB\", \"AMYLASE\", \"LIPASE\", \"LDH\" in the last 168 hours.    Invalid input(s): \"ALPHOS\", \"TBIL\", \"DBIL\", \"TPROT\"      Imaging:  No results found.      Meds:      midodrine  10 mg Oral TID    metoprolol tartrate  25 mg Oral 3 times per day on Sunday Tuesday Thursday Saturday    metoprolol tartrate  25 mg Oral 2 times per day on Monday Wednesday Friday    lansoprazole  30 mg Oral BID AC     gabapentin  100 mg Oral BID    apixaban  2.5 mg Oral 2 times per day    montelukast  10 mg Oral Nightly    sevelamer carbonate  2,400 mg Oral TID    lidocaine-menthol  1 patch Transdermal Daily    sennosides  17.2 mg Oral Nightly    atorvastatin  5 mg Oral Nightly           acetaminophen, ondansetron, calcium carbonate, amLODIPine, polyethylene glycol (PEG 3350), bisacodyl, fleet enema, HYDROcodone-acetaminophen

## 2024-02-06 NOTE — PHYSICAL THERAPY NOTE
PHYSICAL THERAPY TREATMENT NOTE - INPATIENT     Room Number: 309/309-A       Presenting Problem: inability to walk       Problem List  Principal Problem:    Inability to walk  Active Problems:    Osteoarthritis of hip    Anemia due to chronic kidney disease, on chronic dialysis ([facility])    ESRD (end stage renal disease) on dialysis ([facility])      PHYSICAL THERAPY ASSESSMENT   Patient demonstrates fair progress this session, goals  remain in progress.    Patient continues to function below baseline with bed mobility, transfers, gait, and stair negotiation.  Contributing factors to remaining limitations include decreased functional strength, decreased endurance/aerobic capacity, pain, and limited left LE ROM.  Next session anticipate patient to progress bed mobility and transfers.  Physical Therapy will continue to follow patient for duration of hospitalization.    Patient continues to benefit from continued skilled PT services: to promote return to prior level of function and safety with continuous assistance and gradual rehabilitative therapy .    PLAN  PT Treatment Plan: Bed mobility;Body mechanics;Coordination;Endurance;Patient education;Gait training;Strengthening;Transfer training;Balance training  Frequency (Obs): 5x/week    SUBJECTIVE  Pt reporting left hip and thigh pain, unable to quantify, unable to bear wt on left LE    OBJECTIVE  Precautions: Limb alert - right (per MD: HR <120's bpm)    WEIGHT BEARING RESTRICTION                PAIN ASSESSMENT   Rating: Unable to rate  Location: left hip, tight  Management Techniques: Activity promotion    BALANCE  Static Sitting: Fair  Dynamic Sitting: Fair -  Static Standing: Poor -  Dynamic Standing: Poor -    ACTIVITY TOLERANCE  Pulse: 102 (to 115bpm with activity)  Heart Rate Source: Monitor                    O2 WALK  Oxygen Therapy  SPO2% on Oxygen at Rest: 99  At rest oxygen flow (liters per minute): 2    AM-PAC '6-Clicks' INPATIENT SHORT FORM - BASIC MOBILITY  How much  difficulty does the patient currently have...  Patient Difficulty: Turning over in bed (including adjusting bedclothes, sheets and blankets)?: A Lot   Patient Difficulty: Sitting down on and standing up from a chair with arms (e.g., wheelchair, bedside commode, etc.): A Lot   Patient Difficulty: Moving from lying on back to sitting on the side of the bed?: A Lot   How much help from another person does the patient currently need...   Help from Another: Moving to and from a bed to a chair (including a wheelchair)?: A Lot   Help from Another: Need to walk in hospital room?: Total   Help from Another: Climbing 3-5 steps with a railing?: Total     AM-PAC Score:  Raw Score: 10   Approx Degree of Impairment: 76.75%   Standardized Score (AM-PAC Scale): 32.29   CMS Modifier (G-Code): CL    FUNCTIONAL ABILITY STATUS  Functional Mobility/Gait Assessment  Gait Assistance: Maximum assistance  Distance (ft): stand pivot bed to chair, pt unable to stand without full assist  Assistive Device:  (na, pt uanble to stand to rw this session)  Pattern:  (max a of two stand pivot bed to chair)  Rolling: maximum assist  Supine to Sit: maximum assist  Sit to Stand: maximum assist and via stand pivot transfer, assist of two, unable to stand to rw despite attempts from bed or chair    The patient's Approx Degree of Impairment: 76.75% has been calculated based on documentation in the [facility] '6 clicks' Inpatient Daily Activity Short Form.  Research supports that patients with this level of impairment may benefit from RADHA.  Final disposition will be made by interdisciplinary medical team.    THERAPEUTIC EXERCISES  Lower Extremity Ankle pumps  Heel raises     Position Supine       Patient End of Session: Up in chair;Needs met;Call light within reach;RN aware of session/findings;All patient questions and concerns addressed;Family present    CURRENT GOALS   Goals to be met by: 2/7/24  Patient Goal Patient's self-stated goal is: go home   Goal #1  Patient is able to demonstrate supine - sit EOB @ level: modified independent      Goal #1   Current Status Max A x2   Goal #2 Patient is able to demonstrate transfers Sit to/from Stand at assistance level: modified independent with walker - rolling      Goal #2  Current Status Mod A x2 with RW x2 reps   Goal #3 Patient is able to ambulate 100 feet with assist device: walker - rolling at assistance level: supervision   Goal #3   Current Status Mod A x 1 with RW SPT    Goal #4 Patient will negotiate 7 stairs/one curb w/ assistive device and supervision   Goal #4   Current Status NT    Goal #5 Patient to demonstrate independence with home activity/exercise instructions provided to patient in preparation for discharge.   Goal #5   Current Status In progress     Therapeutic Activity: 30 minutes

## 2024-02-06 NOTE — PLAN OF CARE
Patient alert and oriented on 2L oxygen with complaints of leg pain overnight. Slept well. In AM patient repositioned and began twitching when sat up after laying flat to be boosted in bed. Patient had strength in extremities and able to raise arms but when asked to hold something or raise arms in the arm the muscles would contract and then fall/go limp. Md made aware and told that they would pass to dayshift. Day shift RN made aware. Call light in reach and no needs noted at this time.  Problem: PAIN - ADULT  Goal: Verbalizes/displays adequate comfort level or patient's stated pain goal  Description: INTERVENTIONS:  - Encourage pt to monitor pain and request assistance  - Assess pain using appropriate pain scale  - Administer analgesics based on type and severity of pain and evaluate response  - Implement non-pharmacological measures as appropriate and evaluate response  - Consider cultural and social influences on pain and pain management  - Manage/alleviate anxiety  - Utilize distraction and/or relaxation techniques  - Monitor for opioid side effects  - Notify MD/LIP if interventions unsuccessful or patient reports new pain  - Anticipate increased pain with activity and pre-medicate as appropriate  Outcome: Progressing     Problem: SAFETY ADULT - FALL  Goal: Free from fall injury  Description: INTERVENTIONS:  - Assess pt frequently for physical needs  - Identify cognitive and physical deficits and behaviors that affect risk of falls.  - [place] fall precautions as indicated by assessment.  - Educate pt/family on patient safety including physical limitations  - Instruct pt to call for assistance with activity based on assessment  - Modify environment to reduce risk of injury  - Provide assistive devices as appropriate  - Consider OT/PT consult to assist with strengthening/mobility  - Encourage toileting schedule  Outcome: Progressing     Problem: DISCHARGE PLANNING  Goal: Discharge to home or other facility with  appropriate resources  Description: INTERVENTIONS:  - Identify barriers to discharge w/pt and caregiver  - Include patient/family/discharge partner in discharge planning  - Arrange for needed discharge resources and transportation as appropriate  - Identify discharge learning needs (meds, wound care, etc)  - Arrange for interpreters to assist at discharge as needed  - Consider post-discharge preferences of patient/family/discharge partner  - Complete POLST form as appropriate  - Assess patient's ability to be responsible for managing their own health  - Refer to Case Management Department for coordinating discharge planning if the patient needs post-hospital services based on physician/LIP order or complex needs related to functional status, cognitive ability or social support system  Outcome: Progressing     Problem: Patient/Family Goals  Goal: Patient/Family Long Term Goal  Description: Patient's Long Term Goal: Discharge from the hospital    Interventions:  - Monitor vital signs  - Monitor appropriate labs  - Pain management  - Administer medications per order  - Follow MD orders  - Diagnostics per order  - Update / inform patient and family on plan of care  - Discharge planning  - See additional Care Plan goals for specific interventions  Outcome: Progressing  Goal: Patient/Family Short Term Goal  Description: Patient's Short Term Goal: Manage pain    Interventions:   - Monitor vital signs  - Monitor appropriate labs  - Pain management  - Administer medications per order  - Follow MD orders  - Diagnostics per order  - Update / inform patient and family on plan of care  - See additional Care Plan goals for specific interventions  Outcome: Progressing     Problem: METABOLIC/FLUID AND ELECTROLYTES - ADULT  Goal: Electrolytes maintained within normal limits  Description: INTERVENTIONS:  - Monitor labs and rhythm and assess patient for signs and symptoms of electrolyte imbalances  - Administer electrolyte replacement  as ordered  - Monitor response to electrolyte replacements, including rhythm and repeat lab results as appropriate  - Fluid restriction as ordered  - Instruct patient on fluid and nutrition restrictions as appropriate  Outcome: Progressing  Goal: Hemodynamic stability and optimal renal function maintained  Description: INTERVENTIONS:  - Monitor labs and assess for signs and symptoms of volume excess or deficit  - Monitor intake, output and patient weight  - Monitor urine specific gravity, serum osmolarity and serum sodium as indicated or ordered  - Monitor response to interventions for patient's volume status, including labs, urine output, blood pressure (other measures as available)  - Encourage oral intake as appropriate  - Instruct patient on fluid and nutrition restrictions as appropriate  Outcome: Progressing     Problem: Impaired Functional Mobility  Goal: Achieve highest/safest level of mobility/gait  Description: Interventions:  - Assess patient's functional ability and stability  - Promote increasing activity/tolerance for mobility and gait  - Educate and engage patient/family in tolerated activity level and precautions  - Recommend use of  RW for transfers and ambulation  - Recommend patient transfer to bedside chair toward strongest side  - When transferring patient, block weaker knee for safety  - Recommend use of chair position in bed 3 times per day  Outcome: Progressing     Problem: SKIN/TISSUE INTEGRITY - ADULT  Goal: Skin integrity remains intact  Description: INTERVENTIONS  - Assess and document risk factors for pressure ulcer development  - Assess and document skin integrity  - Monitor for areas of redness and/or skin breakdown  - Initiate interventions, skin care algorithm/standards of care as needed  Outcome: Progressing     Problem: MUSCULOSKELETAL - ADULT  Goal: Return mobility to safest level of function  Description: INTERVENTIONS:  - Assess patient stability and activity tolerance for  standing, transferring and ambulating w/ or w/o assistive devices  - Assist with transfers and ambulation using safe patient handling equipment as needed  - Ensure adequate protection for wounds/incisions during mobilization  - Obtain PT/OT consults as needed  - Advance activity as appropriate  - Communicate ordered activity level and limitations with patient/family  Outcome: Progressing     Problem: Impaired Activities of Daily Living  Goal: Achieve highest/safest level of independence in self care  Description: Interventions:  - Assess ability and encourage patient to participate in ADLs to maximize function  - Promote sitting position while performing ADLs such as feeding, grooming, and bathing  - Educate and encourage patient/family in tolerated functional activity level and precautions during self-care  Outcome: Progressing     Problem: Patient Centered Care  Goal: Patient preferences are identified and integrated in the patient's plan of care  Description: Interventions:  - What would you like us to know as we care for you? From home with grandson  - Provide timely, complete, and accurate information to patient/family  - Incorporate patient and family knowledge, values, beliefs, and cultural backgrounds into the planning and delivery of care  - Encourage patient/family to participate in care and decision-making at the level they choose  - Honor patient and family perspectives and choices  Outcome: Progressing     Problem: CARDIOVASCULAR - ADULT  Goal: Maintains optimal cardiac output and hemodynamic stability  Description: INTERVENTIONS:  - Monitor vital signs, rhythm, and trends  - Monitor for bleeding, hypotension and signs of decreased cardiac output  - Evaluate effectiveness of vasoactive medications to optimize hemodynamic stability  - Monitor arterial and/or venous puncture sites for bleeding and/or hematoma  - Assess quality of pulses, skin color and temperature  - Assess for signs of decreased coronary  artery perfusion - ex. Angina  - Evaluate fluid balance, assess for edema, trend weights  Outcome: Progressing  Goal: Absence of cardiac arrhythmias or at baseline  Description: INTERVENTIONS:  - Continuous cardiac monitoring, monitor vital signs, obtain 12 lead EKG if indicated  - Evaluate effectiveness of antiarrhythmic and heart rate control medications as ordered  - Initiate emergency measures for life threatening arrhythmias  - Monitor electrolytes and administer replacement therapy as ordered  Outcome: Progressing

## 2024-02-06 NOTE — PLAN OF CARE
Problem: PAIN - ADULT  Goal: Verbalizes/displays adequate comfort level or patient's stated pain goal  Description: INTERVENTIONS:  - Encourage pt to monitor pain and request assistance  - Assess pain using appropriate pain scale  - Administer analgesics based on type and severity of pain and evaluate response  - Implement non-pharmacological measures as appropriate and evaluate response  - Consider cultural and social influences on pain and pain management  - Manage/alleviate anxiety  - Utilize distraction and/or relaxation techniques  - Monitor for opioid side effects  - Notify MD/LIP if interventions unsuccessful or patient reports new pain  - Anticipate increased pain with activity and pre-medicate as appropriate  Outcome: Progressing     Problem: SAFETY ADULT - FALL  Goal: Free from fall injury  Description: INTERVENTIONS:  - Assess pt frequently for physical needs  - Identify cognitive and physical deficits and behaviors that affect risk of falls.  - [place] fall precautions as indicated by assessment.  - Educate pt/family on patient safety including physical limitations  - Instruct pt to call for assistance with activity based on assessment  - Modify environment to reduce risk of injury  - Provide assistive devices as appropriate  - Consider OT/PT consult to assist with strengthening/mobility  - Encourage toileting schedule  Outcome: Progressing     Problem: DISCHARGE PLANNING  Goal: Discharge to home or other facility with appropriate resources  Description: INTERVENTIONS:  - Identify barriers to discharge w/pt and caregiver  - Include patient/family/discharge partner in discharge planning  - Arrange for needed discharge resources and transportation as appropriate  - Identify discharge learning needs (meds, wound care, etc)  - Arrange for interpreters to assist at discharge as needed  - Consider post-discharge preferences of patient/family/discharge partner  - Complete POLST form as appropriate  - Assess  patient's ability to be responsible for managing their own health  - Refer to Case Management Department for coordinating discharge planning if the patient needs post-hospital services based on physician/LIP order or complex needs related to functional status, cognitive ability or social support system  Outcome: Progressing     Problem: Patient/Family Goals  Goal: Patient/Family Long Term Goal  Description: Patient's Long Term Goal: Discharge from the hospital    Interventions:  - Monitor vital signs  - Monitor appropriate labs  - Pain management  - Administer medications per order  - Follow MD orders  - Diagnostics per order  - Update / inform patient and family on plan of care  - Discharge planning  - See additional Care Plan goals for specific interventions  Outcome: Progressing  Goal: Patient/Family Short Term Goal  Description: Patient's Short Term Goal: Manage pain    Interventions:   - Monitor vital signs  - Monitor appropriate labs  - Pain management  - Administer medications per order  - Follow MD orders  - Diagnostics per order  - Update / inform patient and family on plan of care  - See additional Care Plan goals for specific interventions  Outcome: Progressing     Problem: METABOLIC/FLUID AND ELECTROLYTES - ADULT  Goal: Electrolytes maintained within normal limits  Description: INTERVENTIONS:  - Monitor labs and rhythm and assess patient for signs and symptoms of electrolyte imbalances  - Administer electrolyte replacement as ordered  - Monitor response to electrolyte replacements, including rhythm and repeat lab results as appropriate  - Fluid restriction as ordered  - Instruct patient on fluid and nutrition restrictions as appropriate  Outcome: Progressing  Goal: Hemodynamic stability and optimal renal function maintained  Description: INTERVENTIONS:  - Monitor labs and assess for signs and symptoms of volume excess or deficit  - Monitor intake, output and patient weight  - Monitor urine specific  gravity, serum osmolarity and serum sodium as indicated or ordered  - Monitor response to interventions for patient's volume status, including labs, urine output, blood pressure (other measures as available)  - Encourage oral intake as appropriate  - Instruct patient on fluid and nutrition restrictions as appropriate  Outcome: Progressing     Problem: Impaired Functional Mobility  Goal: Achieve highest/safest level of mobility/gait  Description: Interventions:  - Assess patient's functional ability and stability  - Promote increasing activity/tolerance for mobility and gait  - Educate and engage patient/family in tolerated activity level and precautions  - Recommend use of  RW for transfers and ambulation  - Recommend patient transfer to bedside chair toward strongest side  - When transferring patient, block weaker knee for safety  - Recommend use of chair position in bed 3 times per day  Outcome: Progressing     Problem: Patient Centered Care  Goal: Patient preferences are identified and integrated in the patient's plan of care  Description: Interventions:  - What would you like us to know as we care for you? From home with grandson  - Provide timely, complete, and accurate information to patient/family  - Incorporate patient and family knowledge, values, beliefs, and cultural backgrounds into the planning and delivery of care  - Encourage patient/family to participate in care and decision-making at the level they choose  - Honor patient and family perspectives and choices  Outcome: Progressing     Problem: SKIN/TISSUE INTEGRITY - ADULT  Goal: Skin integrity remains intact  Description: INTERVENTIONS  - Assess and document risk factors for pressure ulcer development  - Assess and document skin integrity  - Monitor for areas of redness and/or skin breakdown  - Initiate interventions, skin care algorithm/standards of care as needed  Outcome: Progressing     Problem: MUSCULOSKELETAL - ADULT  Goal: Return mobility to  safest level of function  Description: INTERVENTIONS:  - Assess patient stability and activity tolerance for standing, transferring and ambulating w/ or w/o assistive devices  - Assist with transfers and ambulation using safe patient handling equipment as needed  - Ensure adequate protection for wounds/incisions during mobilization  - Obtain PT/OT consults as needed  - Advance activity as appropriate  - Communicate ordered activity level and limitations with patient/family  Outcome: Progressing     Problem: Impaired Activities of Daily Living  Goal: Achieve highest/safest level of independence in self care  Description: Interventions:  - Assess ability and encourage patient to participate in ADLs to maximize function  - Promote sitting position while performing ADLs such as feeding, grooming, and bathing  - Educate and encourage patient/family in tolerated functional activity level and precautions during self-care  Outcome: Progressing     Problem: CARDIOVASCULAR - ADULT  Goal: Maintains optimal cardiac output and hemodynamic stability  Description: INTERVENTIONS:  - Monitor vital signs, rhythm, and trends  - Monitor for bleeding, hypotension and signs of decreased cardiac output  - Evaluate effectiveness of vasoactive medications to optimize hemodynamic stability  - Monitor arterial and/or venous puncture sites for bleeding and/or hematoma  - Assess quality of pulses, skin color and temperature  - Assess for signs of decreased coronary artery perfusion - ex. Angina  - Evaluate fluid balance, assess for edema, trend weights  Outcome: Progressing  Goal: Absence of cardiac arrhythmias or at baseline  Description: INTERVENTIONS:  - Continuous cardiac monitoring, monitor vital signs, obtain 12 lead EKG if indicated  - Evaluate effectiveness of antiarrhythmic and heart rate control medications as ordered  - Initiate emergency measures for life threatening arrhythmias  - Monitor electrolytes and administer replacement  therapy as ordered  Outcome: Progressing      Prn pain meds given for pain control. Frequent repositioning on patient. Plan; [facility] with onsite HD once medically clear per MD.

## 2024-02-06 NOTE — CM/SW NOTE
CM spoke with dtr to f/u on [facility] choice.    Dtr did tour [facility]  and plans to tour [facility] today.    CM encouraged dtr to make choice so bed can be secured and HD set up prior to pt being dc ready.    Per dtr doctor has not told her a dc date as of this time.    CM sent msg to MD re above.  Per MD dtr is fully aware that pt is dc ready, she even repeated to pt that the sooner she gets to rehab the better for her recovery.    RONIT called dtr back to upd her that pt is dc ready. Dtr sts she plans to tour 2 facilities today and asked CM to send a ref to a new location. Dtr not sure of name but will call CM once she identifies location.    [address]  Dtr [facility] in [facility]. CM called facility, they do not accept any HD pts. Dtr updated.    Plan  [facility] with onsite HD pending choice and HD auth    / to remain available for support and/or discharge planning.     Renetta Lawson RN    Ext [phone]

## 2024-02-07 PROBLEM — Z51.5 PALLIATIVE CARE BY SPECIALIST: Status: ACTIVE | Noted: 2024-02-07

## 2024-02-07 LAB
AMMONIA PLAS-MCNC: 18 UMOL/L (ref 11–32)
ANION GAP SERPL CALC-SCNC: 8 MMOL/L (ref 0–18)
BASOPHILS # BLD AUTO: 0.04 X10(3) UL (ref 0–0.2)
BASOPHILS NFR BLD AUTO: 0.3 %
BUN BLD-MCNC: 43 MG/DL (ref 9–23)
BUN/CREAT SERPL: 8.5 (ref 10–20)
CALCIUM BLD-MCNC: 8.5 MG/DL (ref 8.7–10.4)
CHLORIDE SERPL-SCNC: 94 MMOL/L (ref 98–112)
CO2 SERPL-SCNC: 28 MMOL/L (ref 21–32)
CREAT BLD-MCNC: 5.06 MG/DL
DEPRECATED RDW RBC AUTO: 55.8 FL (ref 35.1–46.3)
EGFRCR SERPLBLD CKD-EPI 2021: 8 ML/MIN/1.73M2 (ref 60–?)
EOSINOPHIL # BLD AUTO: 0.13 X10(3) UL (ref 0–0.7)
EOSINOPHIL NFR BLD AUTO: 1.1 %
ERYTHROCYTE [DISTWIDTH] IN BLOOD BY AUTOMATED COUNT: 15.9 % (ref 11–15)
GLUCOSE BLD-MCNC: 108 MG/DL (ref 70–99)
GLUCOSE BLDC GLUCOMTR-MCNC: 109 MG/DL (ref 70–99)
HCT VFR BLD AUTO: 32.4 %
HGB BLD-MCNC: 10.2 G/DL
IMM GRANULOCYTES # BLD AUTO: 0.04 X10(3) UL (ref 0–1)
IMM GRANULOCYTES NFR BLD: 0.3 %
LYMPHOCYTES # BLD AUTO: 0.94 X10(3) UL (ref 1–4)
LYMPHOCYTES NFR BLD AUTO: 8 %
MAGNESIUM SERPL-MCNC: 1.7 MG/DL (ref 1.6–2.6)
MCH RBC QN AUTO: 30 PG (ref 26–34)
MCHC RBC AUTO-ENTMCNC: 31.5 G/DL (ref 31–37)
MCV RBC AUTO: 95.3 FL
MONOCYTES # BLD AUTO: 1.71 X10(3) UL (ref 0.1–1)
MONOCYTES NFR BLD AUTO: 14.6 %
NEUTROPHILS # BLD AUTO: 8.89 X10 (3) UL (ref 1.5–7.7)
NEUTROPHILS # BLD AUTO: 8.89 X10(3) UL (ref 1.5–7.7)
NEUTROPHILS NFR BLD AUTO: 75.7 %
OSMOLALITY SERPL CALC.SUM OF ELEC: 281 MOSM/KG (ref 275–295)
PHOSPHATE SERPL-MCNC: 3.3 MG/DL (ref 2.4–5.1)
PLATELET # BLD AUTO: 228 10(3)UL (ref 150–450)
POTASSIUM SERPL-SCNC: 4.4 MMOL/L (ref 3.5–5.1)
RBC # BLD AUTO: 3.4 X10(6)UL
SODIUM SERPL-SCNC: 130 MMOL/L (ref 136–145)
TSI SER-ACNC: 1.73 MIU/ML (ref 0.55–4.78)
WBC # BLD AUTO: 11.8 X10(3) UL (ref 4–11)

## 2024-02-07 PROCEDURE — 99233 SBSQ HOSP IP/OBS HIGH 50: CPT | Performed by: INTERNAL MEDICINE

## 2024-02-07 PROCEDURE — 99221 1ST HOSP IP/OBS SF/LOW 40: CPT | Performed by: INTERNAL MEDICINE

## 2024-02-07 RX ORDER — HYDROCODONE BITARTRATE AND ACETAMINOPHEN 5; 325 MG/1; MG/1
1 TABLET ORAL EVERY 6 HOURS PRN
Status: DISCONTINUED | OUTPATIENT
Start: 2024-02-07 | End: 2024-02-08

## 2024-02-07 RX ORDER — ALBUMIN (HUMAN) 12.5 G/50ML
25 SOLUTION INTRAVENOUS
Status: DISCONTINUED | OUTPATIENT
Start: 2024-02-07 | End: 2024-02-08

## 2024-02-07 RX ORDER — DIGOXIN 125 MCG
250 TABLET ORAL ONCE
Status: DISCONTINUED | OUTPATIENT
Start: 2024-02-07 | End: 2024-02-07

## 2024-02-07 RX ORDER — ACETAMINOPHEN 325 MG/1
650 TABLET ORAL EVERY 6 HOURS
Status: DISCONTINUED | OUTPATIENT
Start: 2024-02-07 | End: 2024-02-08

## 2024-02-07 NOTE — PLAN OF CARE
Patient alert and oriented x 4. Patient's vitals stable on 1L O2. Patient's pain managed with PO medications. Patient received dialysis today. Plan for patient to discharge to [facility] once medically cleared. Call light within reach.    Problem: PAIN - ADULT  Goal: Verbalizes/displays adequate comfort level or patient's stated pain goal  Description: INTERVENTIONS:  - Encourage pt to monitor pain and request assistance  - Assess pain using appropriate pain scale  - Administer analgesics based on type and severity of pain and evaluate response  - Implement non-pharmacological measures as appropriate and evaluate response  - Consider cultural and social influences on pain and pain management  - Manage/alleviate anxiety  - Utilize distraction and/or relaxation techniques  - Monitor for opioid side effects  - Notify MD/LIP if interventions unsuccessful or patient reports new pain  - Anticipate increased pain with activity and pre-medicate as appropriate  Outcome: Progressing     Problem: SAFETY ADULT - FALL  Goal: Free from fall injury  Description: INTERVENTIONS:  - Assess pt frequently for physical needs  - Identify cognitive and physical deficits and behaviors that affect risk of falls.  - [place] fall precautions as indicated by assessment.  - Educate pt/family on patient safety including physical limitations  - Instruct pt to call for assistance with activity based on assessment  - Modify environment to reduce risk of injury  - Provide assistive devices as appropriate  - Consider OT/PT consult to assist with strengthening/mobility  - Encourage toileting schedule  Outcome: Progressing     Problem: DISCHARGE PLANNING  Goal: Discharge to home or other facility with appropriate resources  Description: INTERVENTIONS:  - Identify barriers to discharge w/pt and caregiver  - Include patient/family/discharge partner in discharge planning  - Arrange for needed discharge resources and transportation as appropriate  - Identify  discharge learning needs (meds, wound care, etc)  - Arrange for interpreters to assist at discharge as needed  - Consider post-discharge preferences of patient/family/discharge partner  - Complete POLST form as appropriate  - Assess patient's ability to be responsible for managing their own health  - Refer to Case Management Department for coordinating discharge planning if the patient needs post-hospital services based on physician/LIP order or complex needs related to functional status, cognitive ability or social support system  Outcome: Progressing     Problem: Patient/Family Goals  Goal: Patient/Family Long Term Goal  Description: Patient's Long Term Goal: Discharge from the hospital    Interventions:  - Monitor vital signs  - Monitor appropriate labs  - Pain management  - Administer medications per order  - Follow MD orders  - Diagnostics per order  - Update / inform patient and family on plan of care  - Discharge planning  - See additional Care Plan goals for specific interventions  Outcome: Progressing  Goal: Patient/Family Short Term Goal  Description: Patient's Short Term Goal: Manage pain    Interventions:   - Monitor vital signs  - Monitor appropriate labs  - Pain management  - Administer medications per order  - Follow MD orders  - Diagnostics per order  - Update / inform patient and family on plan of care  - See additional Care Plan goals for specific interventions  Outcome: Progressing     Problem: METABOLIC/FLUID AND ELECTROLYTES - ADULT  Goal: Electrolytes maintained within normal limits  Description: INTERVENTIONS:  - Monitor labs and rhythm and assess patient for signs and symptoms of electrolyte imbalances  - Administer electrolyte replacement as ordered  - Monitor response to electrolyte replacements, including rhythm and repeat lab results as appropriate  - Fluid restriction as ordered  - Instruct patient on fluid and nutrition restrictions as appropriate  Outcome: Progressing  Goal:  Hemodynamic stability and optimal renal function maintained  Description: INTERVENTIONS:  - Monitor labs and assess for signs and symptoms of volume excess or deficit  - Monitor intake, output and patient weight  - Monitor urine specific gravity, serum osmolarity and serum sodium as indicated or ordered  - Monitor response to interventions for patient's volume status, including labs, urine output, blood pressure (other measures as available)  - Encourage oral intake as appropriate  - Instruct patient on fluid and nutrition restrictions as appropriate  Outcome: Progressing     Problem: Impaired Functional Mobility  Goal: Achieve highest/safest level of mobility/gait  Description: Interventions:  - Assess patient's functional ability and stability  - Promote increasing activity/tolerance for mobility and gait  - Educate and engage patient/family in tolerated activity level and precautions  - Recommend use of  RW for transfers and ambulation  - Recommend patient transfer to bedside chair toward strongest side  - When transferring patient, block weaker knee for safety  - Recommend use of chair position in bed 3 times per day  Outcome: Progressing     Problem: Patient Centered Care  Goal: Patient preferences are identified and integrated in the patient's plan of care  Description: Interventions:  - What would you like us to know as we care for you? From home with grandson  - Provide timely, complete, and accurate information to patient/family  - Incorporate patient and family knowledge, values, beliefs, and cultural backgrounds into the planning and delivery of care  - Encourage patient/family to participate in care and decision-making at the level they choose  - Honor patient and family perspectives and choices  Outcome: Progressing     Problem: SKIN/TISSUE INTEGRITY - ADULT  Goal: Skin integrity remains intact  Description: INTERVENTIONS  - Assess and document risk factors for pressure ulcer development  - Assess and  document skin integrity  - Monitor for areas of redness and/or skin breakdown  - Initiate interventions, skin care algorithm/standards of care as needed  Outcome: Progressing     Problem: MUSCULOSKELETAL - ADULT  Goal: Return mobility to safest level of function  Description: INTERVENTIONS:  - Assess patient stability and activity tolerance for standing, transferring and ambulating w/ or w/o assistive devices  - Assist with transfers and ambulation using safe patient handling equipment as needed  - Ensure adequate protection for wounds/incisions during mobilization  - Obtain PT/OT consults as needed  - Advance activity as appropriate  - Communicate ordered activity level and limitations with patient/family  Outcome: Progressing     Problem: Impaired Activities of Daily Living  Goal: Achieve highest/safest level of independence in self care  Description: Interventions:  - Assess ability and encourage patient to participate in ADLs to maximize function  - Promote sitting position while performing ADLs such as feeding, grooming, and bathing  - Educate and encourage patient/family in tolerated functional activity level and precautions during self-care  Outcome: Progressing     Problem: CARDIOVASCULAR - ADULT  Goal: Maintains optimal cardiac output and hemodynamic stability  Description: INTERVENTIONS:  - Monitor vital signs, rhythm, and trends  - Monitor for bleeding, hypotension and signs of decreased cardiac output  - Evaluate effectiveness of vasoactive medications to optimize hemodynamic stability  - Monitor arterial and/or venous puncture sites for bleeding and/or hematoma  - Assess quality of pulses, skin color and temperature  - Assess for signs of decreased coronary artery perfusion - ex. Angina  - Evaluate fluid balance, assess for edema, trend weights  Outcome: Progressing  Goal: Absence of cardiac arrhythmias or at baseline  Description: INTERVENTIONS:  - Continuous cardiac monitoring, monitor vital signs,  obtain 12 lead EKG if indicated  - Evaluate effectiveness of antiarrhythmic and heart rate control medications as ordered  - Initiate emergency measures for life threatening arrhythmias  - Monitor electrolytes and administer replacement therapy as ordered  Outcome: Progressing

## 2024-02-07 NOTE — CONSULTS
Palliative Care Initial Inpatient Consult    Helen Espana Patient Status:  Inpatient    1940 MRN [id number]   Location [facility] 3W/SW Attending Davion Grove MD   Hosp Day # 8 PCP Shannon Bahena MD     Date of Consult: 2024  Patient seen at: [facility]    Reason for Consultation: Consult requested for goals of care and care coordination.    Subjective     History of Present Illness: Ms. Cook is an 83-year-old female with a history of ESRD, paroxysmal atrial fibrillation, carotid stenosis, hypertension, hyperlipidemia, diastolic congestive heart failure, presents to the hospital for evaluation of left leg and hip pain, likely radicular pain fro L4-L5 disc herniation, course complicated by food bolus, EGD on  with resolution, diet advanced, reinitiated PT OT, still having pain but overall improved, functionally weak, now with intermittent hypotension. Plans for RADHA on dc. Patient states she has had pain in her left hip for the past 2 months, she has been followed in the outpatient setting with orthopedic surgery, and has had steroid injections, MRIs, has recently been on a course of oral steroids, as well as Tylenol 3's, varying degrees of improvement, however pain has gotten progressively worse in the last few days prior to presentation.  History was obtained from Epic and patient interview.  Our palliative care service was asked to evaluate the patient for a goals of care discussion and symptom management.      Review of Systems: Palliative Care symptom needs assessed:     No dypsnea.   Denies cough or lightheadedness/dizziness.   current pain issues as above.   Normal appetite  No n/v and moved bowels last recently.   No constipation/diarrhea issues.   No depression or anxiety.      Palliative Care Social History:   Marital Status:    Children: Yes  Living Situation Prior to Admit: Home  Occupational History: Retired  Personal:     Spiritual  Helen Trivedi  Malorie Orosco requested: No    Medical History: obtained from [facility]  Past Medical History:   Diagnosis Date    Arrhythmia     Afib    AVF (arteriovenous fistula) (Former[facility]) 10/12/2017    Biceps rupture, proximal, right, initial encounter 2018    Breast cancer (HCC)     Breast cancer in female (HCC) 2018    CANCER     right breast mastectomy    Cataract     OU    Clostridioides difficile infection     Cup to disc asymmetry     OU    Dialysis patient (Formerly [facility])     HD M, W, F    Essential hypertension     Floaters     OU    Hearing impairment     hearing aides    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 Giordano @ [facility]    CATARACT EXTRACTION W/  INTRAOCULAR LENS IMPLANT Left 2021    Dr. Giordano @ [facility]    CHOLECYSTECTOMY      laparoscopic    COLONOSCOPY      c. diff colitis-Ali    ECHO 2D DP/CLRFL, CARDIO (INTERNAL)   [facility] estee    mod MR/LAE; border systolic LVEF    ECHO 2D, CARDIO (DMG)  2020    [facility] cardio: no change except mod mitral regurg mild now    HX BREAST CANCER      LEXISCAN NUC STRESS TST, CARDIO (INTERNAL)  05/15/2018    [facility]; normal ; EF 54%    LEXISCAN NUC STRESS TST, CARDIO (INTERNAL)  2021    [facility]; EF57%; normal wall motion; fixed perfusion defect inf. wall    LUMPECTOMY RIGHT      MASTECTOMY PARTIAL  2018    u [facility]: left breaswt seed loc partial mastectomy     MASTECTOMY RIGHT            OTHER SURGICAL HISTORY  12    cysto-Dr. Allred    OTHER  SURGICAL HISTORY  1/21/15     lap sigmoid colectomy     OTHER SURGICAL HISTORY  9/21/16    Right Hemicolectomy by Dr. Chaudhry    PERITONEAL DIALYSIS SYSTEM      8/2009       Family History: obtained from [facility]  Family History   Problem Relation Age of Onset    Heart Disorder Father         MI    Diabetes Father     Kidney Disease Father         polycystic kidney disease    Hypertension Mother     Heart Disorder Mother         stroke    Cataracts Mother     Thyroid Disorder Daughter         thyroid    Other (Other) Sister         Cushing's disease    Diabetes Sister     Hypertension Sister     Cancer Sister 69        lung tob     Kidney Disease Son         pckd    Kidney Disease Brother         PCKD     Breast Cancer Maternal Aunt 50        x2 ages 85 and in her 50's    Macular degeneration Other         Aunt    Glaucoma Neg         family h/o       Allergies:  Allergies   Allergen Reactions    Adhesive Tape SWELLING    Denosumab OTHER (SEE COMMENTS)     hypocalcemia  Lowered calcium level  Lowered calcium level  Lowered calcium level      Docosahexaenoic Acid, Dha HIVES    Ficus HIVES    Penicillins HIVES     Thinks she has tolerated penicillin in the past    Zithromax HIVES    Eicosapentaenoic Acid, Epa HIVES    Fig HIVES    Levofloxacin HIVES    Prolia OTHER (SEE COMMENTS)     Lowered calcium level    Vitamin E HIVES       Medications:     Current Facility-Administered Medications:     HYDROcodone-acetaminophen (Norco) 5-325 MG per tab 1 tablet, 1 tablet, Oral, Q6H PRN    sodium chloride 0.9 % IV bolus 100 mL, 100 mL, Intravenous, Q30 Min PRN **AND** albumin human (Albumin) 25% injection 25 g, 25 g, Intravenous, PRN Dialysis    acetaminophen (Tylenol) tab 650 mg, 650 mg, Oral, q6h    tiZANidine (Zanaflex) partial tab 1 mg, 1 mg, Oral, Daily PRN    midodrine (ProAmatine) tab 10 mg, 10 mg, Oral, TID    metoprolol tartrate (Lopressor) tab 25 mg, 25 mg, Oral, 3 times per day on Sunday Tuesday Thursday Saturday     metoprolol tartrate (Lopressor) tab 25 mg, 25 mg, Oral, 2 times per day on Monday Wednesday Friday    ondansetron (Zofran) 4 MG/2ML injection 4 mg, 4 mg, Intravenous, Q6H PRN    lansoprazole (Prevacid Solutab) disintegrating tab 30 mg, 30 mg, Oral, BID AC    gabapentin (Neurontin) cap 100 mg, 100 mg, Oral, BID    calcium carbonate (Tums) chewable tab 1,000 mg, 1,000 mg, Oral, Q8H PRN    amLODIPine (Norvasc) tab 5 mg, 5 mg, Oral, Daily PRN    apixaban (Eliquis) tab 2.5 mg, 2.5 mg, Oral, 2 times per day    montelukast (Singulair) tab 10 mg, 10 mg, Oral, Nightly    sevelamer carbonate (Renvela) tab 2,400 mg, 2,400 mg, Oral, TID    polyethylene glycol (PEG 3350) (Miralax) 17 g oral packet 17 g, 17 g, Oral, Daily PRN    bisacodyl (Dulcolax) 10 MG rectal suppository 10 mg, 10 mg, Rectal, Daily PRN    fleet enema (Fleet) 7-19 GM/118ML rectal enema 133 mL, 1 enema, Rectal, Once PRN    lidocaine-menthol 4-1 % patch 1 patch, 1 patch, Transdermal, Daily    sennosides (Senokot) tab 17.2 mg, 17.2 mg, Oral, Nightly    atorvastatin (Lipitor) tab 5 mg, 5 mg, Oral, Nightly  Prior to Admission Medications   Medication Sig    HYDROcodone-acetaminophen  MG Oral Tab Take 1 tablet by mouth every 4 (four) hours as needed for Pain.    gabapentin 100 MG Oral Cap Take 1 capsule (100 mg total) by mouth 2 (two) times daily.         Palliative Performance Scale: 50 %  % Ambulation Activity Level Self-Care Intake Consciousness   100 Full  Normal  No Disease Full Normal Full   90 Full  Normal  Some Disease Full Normal Full   80 Full  Normal w/effort  Some Disease Full Normal or reduced Full   70 Reduced  Can't Perform Job  Some Disease Full Normal or reduced Full   60 Reduced  Can't Perform Hobby   Significant Disease Occ Assist Normal or reduced Full or confused   50 Mainly sit/lie Can't do any work  Extensive Disease Partial Assist Normal or reduced Full or confused   40 Mainly in bed Can't do any work  Extensive Disease Mainly Assist  Normal or reduced Full or confused   30 Bed Bound Can't do any work  Extensive Disease Max Assist  Total Care Reduced  Drowsy/confused   20 Bed Bound Can't do any work  Extensive Disease Max Assist  Total Care Minimal  Drowsy/confused   10 Bed Bound Can't do any work  Extensive Disease Max Assist  Total Care Mouth Care  Drowsy/confused   0 Death        Objective      Vital Signs:  Blood pressure 92/54, pulse 115, temperature 99 °F (37.2 °C), temperature source Oral, resp. rate 20, height 5' 3\" (1.6 m), weight 129 lb 3 oz (58.6 kg), SpO2 91%, not currently breastfeeding.  Body mass index is 22.88 kg/m².  Non-verbal signs of pain present: No    Physical Exam:  General: Alert, awake and in no  apparent respiratory distress.  HEENT: AT/NC. No focal deficits.   Cardiac: RRR  Lungs: Normal effort on RA  Abdomen: Soft, non-tender, non-distended, normal bowel sounds X 4 quadrants   Extremities: FROM of all limbs, no  Edema present  Skin: Warm and dry.    Hematology:  Lab Results   Component Value Date    WBC 11.8 (H) 02/07/2024    HGB 10.2 (L) 02/07/2024    HCT 32.4 (L) 02/07/2024    .0 02/07/2024       Coags:  Lab Results   Component Value Date    INR 1.0 11/06/2018    PTT 23.6 (L) 11/06/2018       Chemistry:  Lab Results   Component Value Date    CREATSERUM 5.06 (H) 02/07/2024    BUN 43 (H) 02/07/2024     (L) 02/07/2024    K 4.4 02/07/2024    CL 94 (L) 02/07/2024    CO2 28.0 02/07/2024     (H) 02/07/2024    CA 8.5 (L) 02/07/2024    ALB 3.1 (L) 07/19/2023    ALKPHO 203 (H) 07/18/2023    BILT 0.8 07/18/2023    TP 5.7 (L) 07/18/2023    AST 18 07/18/2023    ALT 15 07/18/2023    MG 1.7 02/07/2024    PHOS 3.3 02/07/2024    TROP <0.045 02/01/2021       Imaging:  No results found.    Assessment and Recommendations        Inability to walk    Osteoarthritis of hip    Anemia due to chronic kidney disease, on chronic dialysis (HCC)    ESRD (end stage renal disease) on dialysis ([facility])      Symptom Management       Pain:  -radicular pain, worsening, has followed with ortho  -unfortunately limited options for analgesia  -agree with renal dose gabapentin  -schedule acetaminophen 650mg q6hrs  -start tizanadine 1mg qdaily as needed  -agree with dose reduction of Norco to 5mg. Per daughter, it snowed her and she did not think it had much if any analgesic effect  -opioid options limited in ESRD but could also try low dose hydromorphone  -anesthesia pain service may be able to contribute with injection?    Prevention of Constipation:    - Recommend Senna-S 8.6mg (2) tabs BID Scheduled   - Recommend Dulcolax suppository daily PRN    2.     Serious Illness Conversation:   Code Status: Full  POA: Daughter Joi  I spoke with Helen and her daughter. Daughter lives in [place] but patient has two sons that are local and a nephew who is staying with her.   I asked how we should take care of Helen if rehab did not allow her to return to her baseline (she was independent, gardening and driving herself to dialysis MWF)  Joi said she would not want her mom to be placed in a SNF and so would attempt to bring her into her home or make other arrangements for caregiving rather than a facility.   I did not directly address code status but will follow along and discuss tomorrow.         Palliative Care Follow Up: Palliative care team will Continue to follow while inpatient    Thank you for allowing Palliative Care services to participate in the care of Helen Espana.    A total of 50 minutes were spent on this visit, which included all of the following: direct face to face contact, history taking, physical examination, and >50% was spent counseling and coordinating care.    Sammy Zaldivar MD  2/7/2024  2:57 PM  Palliative Care Services  [facility] [phone]    Note to patient:  The 21st Century Cures Act makes medical notes like these available to patients in the interest of transparency. However, be advised this is a  medical document. It is intended as peer to peer communication. It is written in medical language and may contain abbreviations or verbiage that are unfamiliar. It may appear blunt or direct. Medical documents are intended to carry relevant information, facts as evident, and the clinical opinion of the practitioner.

## 2024-02-07 NOTE — CM/SW NOTE
Addendum 11:50:  B[facility]YK confirmed that pt family would like to reserve [facility].  Gala @ [facility] ([phone]) confirmed acceptance and bed availability pending HD approval.    Pt dtr states that her brother is touring [facility] today @9am.  SW explained that [facility] was not on the list of accepting facilities.  Dtr states she confirmed with Sandra from [facility] that bed was available.  SW sent message in [facility] to confirm bed availability.    Dtr states that second choice is [facility]. SW reserved in [facility].    SW/CM to remain available for support and/or discharge planning.      Prachi Key, MSW, LSW  Social Work   Ext:#[phone]

## 2024-02-07 NOTE — PROGRESS NOTES
[facility] Hospitalist Progress Note     CC: Hospital Follow up    PCP: Shannon Bahena MD       Assessment/Plan:     Principal Problem:    Inability to walk  Active Problems:    Osteoarthritis of hip    Anemia due to chronic kidney disease, on chronic dialysis (HCC)    ESRD (end stage renal disease) on dialysis ([facility])    Ms. Cook is an 83-year-old female with a history of ESRD, paroxysmal atrial fibrillation, carotid stenosis, hypertension, hyperlipidemia, diastolic congestive heart failure, presents to the hospital for evaluation of left leg and hip pain, likely radicular pain fro L4-L5 disc herniation, course complicated by food bolus, EGD on 2/1 with resolution, diet advanced, reinitiated PT OT, still having pain but overall improved, functionally weak, now with intermittent hypotension. Plans for [facility] on dc.      Metabolic status   Concern for sundowning   - ammonia 18  - lactic acid 1.6  - TSH 1.7  - discussed sleep hygiene     Left hip/ Leg pain  Left hip OA  Spinal stenosis, L4-L5 disc extrusion  - xray neg for fracture  - outpatient MRI with Left hip OA and trets of hamstring tendon and gluteus minimus  - pain does appear to radiate to her lower leg   - lumbar spine with DDD  - continue lidoderm patch start low dose gabapentin  - continue norco    - PT/OT for RADHA  - recently had steroid injection in hip with ortho, therefore not candidate for at least 1-2 more months  - Lumbar spine MRI with L4-L5 disc herniation  - continue PT/OT for RADHA on DC   - resume oral pain meds    Food bolus  - pt with nausea, vomiting, globus sensation on 1/29  - CT neck reviewed, showed patulous esophagus with multifocal areas of retained ingested material  - had EGD in 03/2023 at [facility] for globus sensation, per report esophageal hypomotility diagnosed but no achalasia, she did have a very dry oropharynx, pathology results were negative  - GI consulted  - EGD on 2/1 food bolus removed  - feeling better, tolerating  diet, adv per GI    ESRD  - Nephrology consulted  - MWF HD    Hypotension   - No signs of sepsis or infection  - Likely volume depletion, from limited oral intake (with food bolus) then fluid removal with dialysis  - on 2/3 gave some albumin, renal ok with one time extra dose of midodrine   - Recurrent on 2/4 in a.m., will give extra dose of midodrine, and add 1 more dose of albumin  - will decrease metoprolol 25mg  - midodrine 10mg TID      Paroxysmal atrial fibrillation  Afib with RVR  Tachycardic   - Follows with [facility]  - She is on Eliquis 2.5 mg twice daily,   - Normally on metoprolol, 50mg TID on non HD days and 50mg twice daily onHD days  - Afib with RVR on 1/31, on dilt drip, now improved  - cardiology consulted, ok for EGD    - will decrease metoprolol to 25mg given intermittent hypotension     Chronic HFpEF  - euvolemic  - volume management per HD     Endometrial wall thickening  - noted on outpatient MRI and US  - needs outpatient GYN follow up     Hypertension  - She takes a Norvasc as needed but not daily  - resume lopressor     FN:  - IVF: none  - Diet: adv     DVT Prophy: scd, eliquis resumed  Lines: PIV     Dispo: pending clinical course    Discussed with daughter at bedside 2/7.  Understand that patient is ready for dc as soon as RADHA is selected.     Questions/concerns were discussed with patient and/or family by bedside.    Thank You,  Davion Grove MD    Hospitalist with [facility]     Subjective:   Pain is well controlled at rest.   No chest pain, no nausea, tolerating diet.  Able to feed herself but weak.   Head bobbing when taking naps  Slept all day and was awake all night     OBJECTIVE:    Blood pressure 94/55, pulse (!) 121, temperature 99 °F (37.2 °C), temperature source Oral, resp. rate 20, height 5' 3\" (1.6 m), weight 129 lb 3 oz (58.6 kg), SpO2 91%, not currently breastfeeding.    Temp:  [98 °F (36.7 °C)-100.4 °F (38 °C)] 99 °F (37.2 °C)  Pulse:  [110-121]  121  Resp:  [20-24] 20  BP: ()/(49-68) 94/55  SpO2:  [91 %-100 %] 91 %      Intake/Output:    Intake/Output Summary (Last 24 hours) at 2/7/2024 1037  Last data filed at 2/6/2024 2200  Gross per 24 hour   Intake 440 ml   Output 0 ml   Net 440 ml       Last 3 Weights   02/07/24 0218 129 lb 3 oz (58.6 kg)   02/05/24 0628 132 lb 0.9 oz (59.9 kg)   02/04/24 0546 130 lb 1.1 oz (59 kg)   02/03/24 0431 126 lb (57.2 kg)   02/01/24 0509 124 lb 9 oz (56.5 kg)   01/31/24 1517 129 lb (58.5 kg)   01/30/24 0400 129 lb 13.6 oz (58.9 kg)   01/29/24 0630 130 lb 15.3 oz (59.4 kg)   01/28/24 0551 127 lb 13.9 oz (58 kg)   01/27/24 0642 124 lb 12.5 oz (56.6 kg)   01/26/24 0246 135 lb 12.9 oz (61.6 kg)   01/25/24 2148 126 lb 12.2 oz (57.5 kg)   07/18/23 1000 133 lb 6.1 oz (60.5 kg)   07/16/23 1942 134 lb 12.8 oz (61.1 kg)   07/16/23 1203 135 lb (61.2 kg)   06/20/23 1508 135 lb (61.2 kg)       Exam    Gen: No acute distress, alert and oriented x3   Heent: NC AT, neck supple  Pulm: Lungs clear, normal respiratory effort  CV: Heart with regular rate and rhythm   Abd: Abdomen soft, nontender, nondistended   MSK: nimisha appearance (chronic for patient) warm well perfused  Skin: no rashes or lesions    Data Review:       Labs:     Recent Labs   Lab 02/05/24  0843 02/06/24  0708 02/07/24  0635   RBC 3.80 3.66* 3.40*   HGB 11.8* 11.2* 10.2*   HCT 35.6 35.1 32.4*   MCV 93.7 95.9 95.3   MCH 31.1 30.6 30.0   MCHC 33.1 31.9 31.5   RDW 15.7* 15.8* 15.9*   NEPRELIM 7.34 10.35* 8.89*   WBC 8.8 13.0* 11.8*   .0 195.0 228.0         Recent Labs   Lab 02/05/24  1327 02/06/24  0708 02/07/24  0635   * 102* 108*   BUN 21 29* 43*   CREATSERUM 3.04* 3.90* 5.06*   EGFRCR 15* 11* 8*   CA 9.0 8.6* 8.5*   * 133* 130*   K 4.0 4.1 4.4   CL 96* 97* 94*   CO2 31.0 29.0 28.0       No results for input(s): \"ALT\", \"AST\", \"ALB\", \"AMYLASE\", \"LIPASE\", \"LDH\" in the last 168 hours.    Invalid input(s): \"ALPHOS\", \"TBIL\", \"DBIL\", \"TPROT\"      Imaging:  No  results found.      Meds:      midodrine  10 mg Oral TID    metoprolol tartrate  25 mg Oral 3 times per day on Sunday Tuesday Thursday Saturday    metoprolol tartrate  25 mg Oral 2 times per day on Monday Wednesday Friday    lansoprazole  30 mg Oral BID AC    gabapentin  100 mg Oral BID    apixaban  2.5 mg Oral 2 times per day    montelukast  10 mg Oral Nightly    sevelamer carbonate  2,400 mg Oral TID    lidocaine-menthol  1 patch Transdermal Daily    sennosides  17.2 mg Oral Nightly    atorvastatin  5 mg Oral Nightly           HYDROcodone-acetaminophen, sodium chloride **AND** albumin human, acetaminophen, ondansetron, calcium carbonate, amLODIPine, polyethylene glycol (PEG 3350), bisacodyl, fleet enema

## 2024-02-07 NOTE — PROGRESS NOTES
[facility]    Progress Note      Subjective:     Patient more awake and perkier this morning.  Finished almost the entire breakfast tray.  Daughter at bedside.  Per daughter yesterday patient was more confused and difficult to arouse and had poor oral intake.  Plan to reduce the dose of Norco    Review of Systems:   Constitutional: Appears weak and fatigue  Eyes: negative for irritation, redness and visual disturbance  Ears, nose, mouth, throat, and face: negative for hearing loss and sore throat  Respiratory: negative for cough, hemoptysis and wheezing  Cardiovascular: negative for chest pain, exertional dyspnea  Gastrointestinal: negative for abdominal pain, diarrhea and nausea  Hematologic/lymphatic: negative for bleeding and easy bruising  Musculoskeletal:negative for back pain, bone pain and muscle weakness  Behavioral/Psych: Low energy    Objective:   Temp:  [98 °F (36.7 °C)-100.4 °F (38 °C)] 99 °F (37.2 °C)  Pulse:  [110-121] 115  Resp:  [20-24] 20  BP: ()/(49-68) 92/54  SpO2:  [91 %-100 %] 91 %  SpO2: 91 %     Intake/Output Summary (Last 24 hours) at 2/7/2024 1201  Last data filed at 2/6/2024 2200  Gross per 24 hour   Intake 440 ml   Output 0 ml   Net 440 ml     Wt Readings from Last 3 Encounters:   02/07/24 129 lb 3 oz (58.6 kg)   07/18/23 133 lb 6.1 oz (60.5 kg)   06/20/23 135 lb (61.2 kg)       General appearance: alert, appears stated age and cooperative  Head: Normocephalic, atraumatic  Eyes: conjunctivae/corneas clear  Throat: lips, mucosa, and tongue normal; teeth and gums normal  Neck:  no JVD, supple  Extremities: extremities normal, no edema  Skin: No rashes or lesions  Neurologic: Grossly normal  Psychiatric: calm    Medications:        Results:     Recent Labs   Lab 02/05/24  0843 02/06/24  0708 02/07/24  0635   RBC 3.80 3.66* 3.40*   HGB 11.8* 11.2* 10.2*   HCT 35.6 35.1 32.4*   MCV 93.7 95.9 95.3   NEPRELIM 7.34 10.35* 8.89*   WBC 8.8 13.0* 11.8*   .0 195.0 228.0      Recent Labs   Lab 02/05/24  1327 02/06/24  0708 02/07/24  0635   * 102* 108*   BUN 21 29* 43*   CREATSERUM 3.04* 3.90* 5.06*   CA 9.0 8.6* 8.5*   * 133* 130*   K 4.0 4.1 4.4   CL 96* 97* 94*   CO2 31.0 29.0 28.0     PTT   Date Value Ref Range Status   11/06/2018 23.6 (L) 24.0 - 33.7 sec Final     INR   Date Value Ref Range Status   11/06/2018 1.0 0.9 - 1.2 ratio Final     Comment:     An INR of 2.0-3.0 is the usual therapeutic interval.  Some patients (e.g., those with recurrent thrombotic events, a mechanical heart valve) may require more intense therapy with a therapeutic INR interval of 2.5-3.5   08/11/2015 3.0 (A) 0.8 - 1.2 Final     No results for input(s): \"BNP\" in the last 168 hours.  Recent Labs   Lab 02/05/24  1327 02/06/24  0708 02/07/24  0635   MG 1.9 1.8 1.7   PHOS 2.9 3.3 3.3        Recent Labs   Lab 02/05/24  1327 02/06/24  0708 02/07/24  0635   PHOS 2.9 3.3 3.3       No results found.        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, breast cancer s/p right mastectomy, hypertension, A-fib presented to emergency room for inability to walk and left hip pain     1.ESRD: HD Monday Wednesday Friday via AV fistula  HD today-will attempt 1.5 L or more pending blood pressure  Appears euvolemic on exam  Serum Phos within normal limits  Blood pressure in 90s-on midodrine 10 mg 3 times daily  Metoprolol dose decreased to 25 mg     2.anemia: Anemia secondary to chronic kidney disease  Hemoglobin acceptable     3.inability to walk: Secondary to osteoarthritis of hip/spinal stenosis  PT OT recommended RADHA    4.hyponatremia: Will remove 1.5 L with dialysis today-fluid restriction to 1.2 L     Discussed with Nursing and daughter at bedside.  Discussed with dialysis nurse    Awaiting Rehab placement         Greg Castillo MD  2/7/2024

## 2024-02-07 NOTE — PROGRESS NOTES
[facility] [place]    Cardiology Progress Note    Helen Espana Patient Status:  Inpatient    1940 MRN [id number]   Location [facility] 3W/SW Attending Davion Grove MD   Hosp Day # 8 PCP Shannon Bahena MD       Impression/Plan:  83 year old female presenting with:     Retained food  PAF/SSS (in the past, tachy when in AF, bruce when in SR)  ESRD on HD  HTN, now on midodrine for low BP  HL, on statin  Chronic diastolic CHF, controlled with HD  Very mild CAD at cath 2016     - Cont eliquis, statin, beta blocker (beta blocker dose reduced 2/2 hypotension, now with elevated VR in afib.  Will try to increase back to home dose as tolerated)  - Cont midodrine for BP support  - HD as per nephrology  - Monitor on tele  - Will follow     Subjective: No events overnight.  Today patient denies chest pain, palpitations, dyspnea.  Daughter at the bedside.    Patient Active Problem List   Diagnosis    BENIGN HYPERTENSION    Polycystic kidney, autosomal dominant    Mixed hyperlipidemia    Paroxysmal atrial fibrillation (HCC)    Left knee DJD    Spondylosis of lumbar region without myelopathy or radiculopathy    Age-related nuclear cataract of both eyes    Vitreous floaters of both eyes    Chorioretinal scar of right eye    Osteoporosis of multiple sites associated with endocrine disorder    Complete tear of right rotator cuff    Scoliosis of lumbosacral spine, unspecified scoliosis type    Chronic left-sided low back pain with left-sided sciatica    Hemodialysis access site with arteriovenous graft (HCC)    Gastroesophageal reflux disease with esophagitis    ESRD on hemodialysis (HCC)    AVF (arteriovenous fistula) (HCC)    Malignant neoplasm of upper-outer quadrant of left breast in female, estrogen receptor positive  (HCC)    Squamous blepharitis of upper and lower eyelids of both eyes    Internal derangement of left knee    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    Seasonal allergic rhinitis due to  pollen    ESRD (end stage renal disease) ([facility])    Symptomatic bradycardia    Inability to walk    Osteoarthritis of hip    Anemia due to chronic kidney disease, on chronic dialysis (HCC)    ESRD (end stage renal disease) on dialysis ([facility])       Objective:   Temp: 99 °F (37.2 °C)  Pulse: 115  Resp: 20  BP: 92/54    Intake/Output:     Intake/Output Summary (Last 24 hours) at 2/7/2024 1148  Last data filed at 2/6/2024 2200  Gross per 24 hour   Intake 440 ml   Output 0 ml   Net 440 ml       Last 3 Weights   02/07/24 0218 129 lb 3 oz (58.6 kg)   02/05/24 0628 132 lb 0.9 oz (59.9 kg)   02/04/24 0546 130 lb 1.1 oz (59 kg)   02/03/24 0431 126 lb (57.2 kg)   02/01/24 0509 124 lb 9 oz (56.5 kg)   01/31/24 1517 129 lb (58.5 kg)   01/30/24 0400 129 lb 13.6 oz (58.9 kg)   01/29/24 0630 130 lb 15.3 oz (59.4 kg)   01/28/24 0551 127 lb 13.9 oz (58 kg)   01/27/24 0642 124 lb 12.5 oz (56.6 kg)   01/26/24 0246 135 lb 12.9 oz (61.6 kg)   01/25/24 2148 126 lb 12.2 oz (57.5 kg)   07/18/23 1000 133 lb 6.1 oz (60.5 kg)   07/16/23 1942 134 lb 12.8 oz (61.1 kg)   07/16/23 1203 135 lb (61.2 kg)   06/20/23 1508 135 lb (61.2 kg)       Tele: AF    Physical Exam:    General: Drowsy, arousable  HEENT: Normocephalic, anicteric sclera, neck supple, no thyromegaly or adenopathy.  Neck: No JVD, carotids 2+, no bruits.  Cardiac: Irregularly irregular, tachycardiac  Lungs: Clear without wheezes, rales, rhonchi or dullness.  Normal excursions and effort.  Abdomen: Soft, non-tender. No organosplenomegally, mass or rebound, BS-present.  Extremities: Without clubbing, cyanosis or edema.  Peripheral pulses are 2+.  Neurologic: Drowsy, arousable  Skin: Warm and dry.     Laboratory/Data:    Labs:    Lab Results   Component Value Date    TSH 1.734 02/07/2024       Recent Labs   Lab 02/03/24  0641 02/04/24  0618 02/05/24  0843 02/06/24  0708 02/07/24  0635   WBC 7.6 6.7 8.8 13.0* 11.8*   HGB 11.0* 11.2* 11.8* 11.2* 10.2*   MCV 95.8 96.2 93.7 95.9 95.3   PLT  151.0 164.0 163.0 195.0 228.0       Recent Labs   Lab 02/01/24  0626 02/02/24  0428 02/03/24  0641 02/04/24  0618 02/05/24  0843 02/05/24  1327 02/06/24  0708 02/07/24  0635      < > 135* 133* 132* 134* 133* 130*   K 4.4   < > 4.0 4.1 3.9 4.0 4.1 4.4      < > 97* 96* 97* 96* 97* 94*   CO2 20.0*   < > 29.0 30.0 28.0 31.0 29.0 28.0   BUN 39*   < > 26* 41* 36* 21 29* 43*   CREATSERUM 4.49*   < > 3.49* 4.43* 3.76* 3.04* 3.90* 5.06*   CA 7.8*   < > 7.5* 8.0* 8.1* 9.0 8.6* 8.5*   MG  --    < > 1.8  --  1.7 1.9 1.8 1.7   PHOS 5.4*  --   --   --   --  2.9 3.3 3.3   GLU 67*   < > 93 84 100* 100* 102* 108*    < > = values in this interval not displayed.       No results for input(s): \"ALT\", \"AST\", \"ALB\", \"AMYLASE\", \"LIPASE\", \"LDH\" in the last 168 hours.    Invalid input(s): \"ALPHOS\", \"TBIL\", \"DBIL\", \"TPROT\"    No results for input(s): \"TROP\" in the last 168 hours.    Allergies:   Allergies   Allergen Reactions    Adhesive Tape SWELLING    Denosumab OTHER (SEE COMMENTS)     hypocalcemia  Lowered calcium level  Lowered calcium level  Lowered calcium level      Docosahexaenoic Acid, Dha HIVES    Ficus HIVES    Penicillins HIVES     Thinks she has tolerated penicillin in the past    Zithromax HIVES    Eicosapentaenoic Acid, Epa HIVES    Fig HIVES    Levofloxacin HIVES    Prolia OTHER (SEE COMMENTS)     Lowered calcium level    Vitamin E HIVES       Medications:  Current Facility-Administered Medications   Medication Dose Route Frequency    HYDROcodone-acetaminophen (Norco) 5-325 MG per tab 1 tablet  1 tablet Oral Q6H PRN    sodium chloride 0.9 % IV bolus 100 mL  100 mL Intravenous Q30 Min PRN    And    albumin human (Albumin) 25% injection 25 g  25 g Intravenous PRN Dialysis    digoxin (Lanoxin) tab 250 mcg  250 mcg Oral Once    midodrine (ProAmatine) tab 10 mg  10 mg Oral TID    acetaminophen (Tylenol Extra Strength) tab 1,000 mg  1,000 mg Oral Q6H PRN    metoprolol tartrate (Lopressor) tab 25 mg  25 mg Oral 3 times  per day on Sunday Tuesday Thursday Saturday    metoprolol tartrate (Lopressor) tab 25 mg  25 mg Oral 2 times per day on Monday Wednesday Friday    ondansetron (Zofran) 4 MG/2ML injection 4 mg  4 mg Intravenous Q6H PRN    lansoprazole (Prevacid Solutab) disintegrating tab 30 mg  30 mg Oral BID AC    gabapentin (Neurontin) cap 100 mg  100 mg Oral BID    calcium carbonate (Tums) chewable tab 1,000 mg  1,000 mg Oral Q8H PRN    amLODIPine (Norvasc) tab 5 mg  5 mg Oral Daily PRN    apixaban (Eliquis) tab 2.5 mg  2.5 mg Oral 2 times per day    montelukast (Singulair) tab 10 mg  10 mg Oral Nightly    sevelamer carbonate (Renvela) tab 2,400 mg  2,400 mg Oral TID    polyethylene glycol (PEG 3350) (Miralax) 17 g oral packet 17 g  17 g Oral Daily PRN    bisacodyl (Dulcolax) 10 MG rectal suppository 10 mg  10 mg Rectal Daily PRN    fleet enema (Fleet) 7-19 GM/118ML rectal enema 133 mL  1 enema Rectal Once PRN    lidocaine-menthol 4-1 % patch 1 patch  1 patch Transdermal Daily    sennosides (Senokot) tab 17.2 mg  17.2 mg Oral Nightly    atorvastatin (Lipitor) tab 5 mg  5 mg Oral Nightly       Juan Carlos Zamorano MD  2/7/2024  11:48 AM

## 2024-02-07 NOTE — CM/SW NOTE
RONIT rec'd vm from dtr req ref be sent to [facility].    RONIT fwd msg to BECKY Velarde covering case today.    Renetta Lawson RN    Ext [phone]

## 2024-02-07 NOTE — PLAN OF CARE
Patient A&Ox3-4 on 2L/NC. Dr fernandez and MD notifed of SBP in 80's in the evening with slightly elevated temp. Daughter requested lactic lab to be ordered and this RN notified On-call provider for order and daughter's concern of increased WBC and patient lethargy. Morning labs ordered, see chart for results. No complaints of pain. Intermittent tremors. Safety precautions maintained, call light and personal belongings within reach. Plan is for [facility] when medically cleared.    Problem: PAIN - ADULT  Goal: Verbalizes/displays adequate comfort level or patient's stated pain goal  Description: INTERVENTIONS:  - Encourage pt to monitor pain and request assistance  - Assess pain using appropriate pain scale  - Administer analgesics based on type and severity of pain and evaluate response  - Implement non-pharmacological measures as appropriate and evaluate response  - Consider cultural and social influences on pain and pain management  - Manage/alleviate anxiety  - Utilize distraction and/or relaxation techniques  - Monitor for opioid side effects  - Notify MD/LIP if interventions unsuccessful or patient reports new pain  - Anticipate increased pain with activity and pre-medicate as appropriate  Outcome: Progressing     Problem: SAFETY ADULT - FALL  Goal: Free from fall injury  Description: INTERVENTIONS:  - Assess pt frequently for physical needs  - Identify cognitive and physical deficits and behaviors that affect risk of falls.  - [place] fall precautions as indicated by assessment.  - Educate pt/family on patient safety including physical limitations  - Instruct pt to call for assistance with activity based on assessment  - Modify environment to reduce risk of injury  - Provide assistive devices as appropriate  - Consider OT/PT consult to assist with strengthening/mobility  - Encourage toileting schedule  Outcome: Progressing     Problem: DISCHARGE PLANNING  Goal: Discharge to home or other facility with appropriate  resources  Description: INTERVENTIONS:  - Identify barriers to discharge w/pt and caregiver  - Include patient/family/discharge partner in discharge planning  - Arrange for needed discharge resources and transportation as appropriate  - Identify discharge learning needs (meds, wound care, etc)  - Arrange for interpreters to assist at discharge as needed  - Consider post-discharge preferences of patient/family/discharge partner  - Complete POLST form as appropriate  - Assess patient's ability to be responsible for managing their own health  - Refer to Case Management Department for coordinating discharge planning if the patient needs post-hospital services based on physician/LIP order or complex needs related to functional status, cognitive ability or social support system  Outcome: Progressing     Problem: Patient/Family Goals  Goal: Patient/Family Long Term Goal  Description: Patient's Long Term Goal: Discharge from the hospital    Interventions:  - Monitor vital signs  - Monitor appropriate labs  - Pain management  - Administer medications per order  - Follow MD orders  - Diagnostics per order  - Update / inform patient and family on plan of care  - Discharge planning  - See additional Care Plan goals for specific interventions  Outcome: Progressing  Goal: Patient/Family Short Term Goal  Description: Patient's Short Term Goal: Manage pain    Interventions:   - Monitor vital signs  - Monitor appropriate labs  - Pain management  - Administer medications per order  - Follow MD orders  - Diagnostics per order  - Update / inform patient and family on plan of care  - See additional Care Plan goals for specific interventions  Outcome: Progressing     Problem: METABOLIC/FLUID AND ELECTROLYTES - ADULT  Goal: Electrolytes maintained within normal limits  Description: INTERVENTIONS:  - Monitor labs and rhythm and assess patient for signs and symptoms of electrolyte imbalances  - Administer electrolyte replacement as  ordered  - Monitor response to electrolyte replacements, including rhythm and repeat lab results as appropriate  - Fluid restriction as ordered  - Instruct patient on fluid and nutrition restrictions as appropriate  Outcome: Progressing  Goal: Hemodynamic stability and optimal renal function maintained  Description: INTERVENTIONS:  - Monitor labs and assess for signs and symptoms of volume excess or deficit  - Monitor intake, output and patient weight  - Monitor urine specific gravity, serum osmolarity and serum sodium as indicated or ordered  - Monitor response to interventions for patient's volume status, including labs, urine output, blood pressure (other measures as available)  - Encourage oral intake as appropriate  - Instruct patient on fluid and nutrition restrictions as appropriate  Outcome: Progressing     Problem: Impaired Functional Mobility  Goal: Achieve highest/safest level of mobility/gait  Description: Interventions:  - Assess patient's functional ability and stability  - Promote increasing activity/tolerance for mobility and gait  - Educate and engage patient/family in tolerated activity level and precautions  - Recommend use of  RW for transfers and ambulation  - Recommend patient transfer to bedside chair toward strongest side  - When transferring patient, block weaker knee for safety  - Recommend use of chair position in bed 3 times per day  Outcome: Progressing     Problem: Patient Centered Care  Goal: Patient preferences are identified and integrated in the patient's plan of care  Description: Interventions:  - What would you like us to know as we care for you? From home with grandson  - Provide timely, complete, and accurate information to patient/family  - Incorporate patient and family knowledge, values, beliefs, and cultural backgrounds into the planning and delivery of care  - Encourage patient/family to participate in care and decision-making at the level they choose  - [place] patient and  family perspectives and choices  Outcome: Progressing     Problem: SKIN/TISSUE INTEGRITY - ADULT  Goal: Skin integrity remains intact  Description: INTERVENTIONS  - Assess and document risk factors for pressure ulcer development  - Assess and document skin integrity  - Monitor for areas of redness and/or skin breakdown  - Initiate interventions, skin care algorithm/standards of care as needed  Outcome: Progressing     Problem: MUSCULOSKELETAL - ADULT  Goal: Return mobility to safest level of function  Description: INTERVENTIONS:  - Assess patient stability and activity tolerance for standing, transferring and ambulating w/ or w/o assistive devices  - Assist with transfers and ambulation using safe patient handling equipment as needed  - Ensure adequate protection for wounds/incisions during mobilization  - Obtain PT/OT consults as needed  - Advance activity as appropriate  - Communicate ordered activity level and limitations with patient/family  Outcome: Progressing     Problem: Impaired Activities of Daily Living  Goal: Achieve highest/safest level of independence in self care  Description: Interventions:  - Assess ability and encourage patient to participate in ADLs to maximize function  - Promote sitting position while performing ADLs such as feeding, grooming, and bathing  - Educate and encourage patient/family in tolerated functional activity level and precautions during self-care  Outcome: Progressing     Problem: CARDIOVASCULAR - ADULT  Goal: Maintains optimal cardiac output and hemodynamic stability  Description: INTERVENTIONS:  - Monitor vital signs, rhythm, and trends  - Monitor for bleeding, hypotension and signs of decreased cardiac output  - Evaluate effectiveness of vasoactive medications to optimize hemodynamic stability  - Monitor arterial and/or venous puncture sites for bleeding and/or hematoma  - Assess quality of pulses, skin color and temperature  - Assess for signs of decreased coronary artery  perfusion - ex. Angina  - Evaluate fluid balance, assess for edema, trend weights  Outcome: Progressing  Goal: Absence of cardiac arrhythmias or at baseline  Description: INTERVENTIONS:  - Continuous cardiac monitoring, monitor vital signs, obtain 12 lead EKG if indicated  - Evaluate effectiveness of antiarrhythmic and heart rate control medications as ordered  - Initiate emergency measures for life threatening arrhythmias  - Monitor electrolytes and administer replacement therapy as ordered  Outcome: Progressing

## 2024-02-08 VITALS
BODY MASS INDEX: 23.12 KG/M2 | HEART RATE: 116 BPM | WEIGHT: 130.5 LBS | HEIGHT: 63 IN | RESPIRATION RATE: 18 BRPM | TEMPERATURE: 99 F | SYSTOLIC BLOOD PRESSURE: 92 MMHG | OXYGEN SATURATION: 93 % | DIASTOLIC BLOOD PRESSURE: 52 MMHG

## 2024-02-08 LAB
ANION GAP SERPL CALC-SCNC: 9 MMOL/L (ref 0–18)
BASOPHILS # BLD AUTO: 0.03 X10(3) UL (ref 0–0.2)
BASOPHILS NFR BLD AUTO: 0.3 %
BUN BLD-MCNC: 31 MG/DL (ref 9–23)
BUN/CREAT SERPL: 8.4 (ref 10–20)
CALCIUM BLD-MCNC: 8.6 MG/DL (ref 8.7–10.4)
CHLORIDE SERPL-SCNC: 103 MMOL/L (ref 98–112)
CO2 SERPL-SCNC: 27 MMOL/L (ref 21–32)
CREAT BLD-MCNC: 3.71 MG/DL
DEPRECATED RDW RBC AUTO: 55.7 FL (ref 35.1–46.3)
EGFRCR SERPLBLD CKD-EPI 2021: 12 ML/MIN/1.73M2 (ref 60–?)
EOSINOPHIL # BLD AUTO: 0.11 X10(3) UL (ref 0–0.7)
EOSINOPHIL NFR BLD AUTO: 1.1 %
ERYTHROCYTE [DISTWIDTH] IN BLOOD BY AUTOMATED COUNT: 16.1 % (ref 11–15)
GLUCOSE BLD-MCNC: 103 MG/DL (ref 70–99)
HCT VFR BLD AUTO: 32.9 %
HGB BLD-MCNC: 10.2 G/DL
IMM GRANULOCYTES # BLD AUTO: 0.07 X10(3) UL (ref 0–1)
IMM GRANULOCYTES NFR BLD: 0.7 %
LYMPHOCYTES # BLD AUTO: 0.66 X10(3) UL (ref 1–4)
LYMPHOCYTES NFR BLD AUTO: 6.4 %
MAGNESIUM SERPL-MCNC: 1.8 MG/DL (ref 1.6–2.6)
MCH RBC QN AUTO: 29.3 PG (ref 26–34)
MCHC RBC AUTO-ENTMCNC: 31 G/DL (ref 31–37)
MCV RBC AUTO: 94.5 FL
MONOCYTES # BLD AUTO: 1.93 X10(3) UL (ref 0.1–1)
MONOCYTES NFR BLD AUTO: 18.6 %
NEUTROPHILS # BLD AUTO: 7.59 X10 (3) UL (ref 1.5–7.7)
NEUTROPHILS # BLD AUTO: 7.59 X10(3) UL (ref 1.5–7.7)
NEUTROPHILS NFR BLD AUTO: 72.9 %
OSMOLALITY SERPL CALC.SUM OF ELEC: 295 MOSM/KG (ref 275–295)
PHOSPHATE SERPL-MCNC: 2.7 MG/DL (ref 2.4–5.1)
PLATELET # BLD AUTO: 274 10(3)UL (ref 150–450)
POTASSIUM SERPL-SCNC: 4.2 MMOL/L (ref 3.5–5.1)
RBC # BLD AUTO: 3.48 X10(6)UL
SODIUM SERPL-SCNC: 139 MMOL/L (ref 136–145)
WBC # BLD AUTO: 10.4 X10(3) UL (ref 4–11)

## 2024-02-08 PROCEDURE — 99233 SBSQ HOSP IP/OBS HIGH 50: CPT | Performed by: INTERNAL MEDICINE

## 2024-02-08 PROCEDURE — 99231 SBSQ HOSP IP/OBS SF/LOW 25: CPT | Performed by: INTERNAL MEDICINE

## 2024-02-08 RX ORDER — HYDROCODONE BITARTRATE AND ACETAMINOPHEN 5; 325 MG/1; MG/1
0.5 TABLET ORAL EVERY 6 HOURS PRN
Status: DISCONTINUED | OUTPATIENT
Start: 2024-02-08 | End: 2024-02-08

## 2024-02-08 RX ORDER — HYDROCODONE BITARTRATE AND ACETAMINOPHEN 5; 325 MG/1; MG/1
0.5 TABLET ORAL EVERY 6 HOURS PRN
Qty: 20 TABLET | Refills: 0 | Status: SHIPPED | OUTPATIENT
Start: 2024-02-08

## 2024-02-08 RX ORDER — PREGABALIN 25 MG/1
25 CAPSULE ORAL
Status: DISCONTINUED | OUTPATIENT
Start: 2024-02-09 | End: 2024-02-08

## 2024-02-08 RX ORDER — PREGABALIN 25 MG/1
25 CAPSULE ORAL
Qty: 24 CAPSULE | Refills: 0 | Status: SHIPPED | OUTPATIENT
Start: 2024-02-09

## 2024-02-08 RX ORDER — MIDODRINE HYDROCHLORIDE 10 MG/1
10 TABLET ORAL 3 TIMES DAILY
Qty: 90 TABLET | Refills: 0 | Status: SHIPPED | OUTPATIENT
Start: 2024-02-08

## 2024-02-08 NOTE — PLAN OF CARE
Problem: PAIN - ADULT  Goal: Verbalizes/displays adequate comfort level or patient's stated pain goal  Description: INTERVENTIONS:  - Encourage pt to monitor pain and request assistance  - Assess pain using appropriate pain scale  - Administer analgesics based on type and severity of pain and evaluate response  - Implement non-pharmacological measures as appropriate and evaluate response  - Consider cultural and social influences on pain and pain management  - Manage/alleviate anxiety  - Utilize distraction and/or relaxation techniques  - Monitor for opioid side effects  - Notify MD/LIP if interventions unsuccessful or patient reports new pain  - Anticipate increased pain with activity and pre-medicate as appropriate  Outcome: Progressing     Problem: SAFETY ADULT - FALL  Goal: Free from fall injury  Description: INTERVENTIONS:  - Assess pt frequently for physical needs  - Identify cognitive and physical deficits and behaviors that affect risk of falls.  - [place] fall precautions as indicated by assessment.  - Educate pt/family on patient safety including physical limitations  - Instruct pt to call for assistance with activity based on assessment  - Modify environment to reduce risk of injury  - Provide assistive devices as appropriate  - Consider OT/PT consult to assist with strengthening/mobility  - Encourage toileting schedule  Outcome: Progressing     Problem: DISCHARGE PLANNING  Goal: Discharge to home or other facility with appropriate resources  Description: INTERVENTIONS:  - Identify barriers to discharge w/pt and caregiver  - Include patient/family/discharge partner in discharge planning  - Arrange for needed discharge resources and transportation as appropriate  - Identify discharge learning needs (meds, wound care, etc)  - Arrange for interpreters to assist at discharge as needed  - Consider post-discharge preferences of patient/family/discharge partner  - Complete POLST form as appropriate  - Assess  patient's ability to be responsible for managing their own health  - Refer to Case Management Department for coordinating discharge planning if the patient needs post-hospital services based on physician/LIP order or complex needs related to functional status, cognitive ability or social support system  Outcome: Progressing     Problem: Patient/Family Goals  Goal: Patient/Family Long Term Goal  Description: Patient's Long Term Goal: Discharge from the hospital    Interventions:  - Monitor vital signs  - Monitor appropriate labs  - Pain management  - Administer medications per order  - Follow MD orders  - Diagnostics per order  - Update / inform patient and family on plan of care  - Discharge planning  - See additional Care Plan goals for specific interventions  Outcome: Progressing  Goal: Patient/Family Short Term Goal  Description: Patient's Short Term Goal: Manage pain    Interventions:   - Monitor vital signs  - Monitor appropriate labs  - Pain management  - Administer medications per order  - Follow MD orders  - Diagnostics per order  - Update / inform patient and family on plan of care  - See additional Care Plan goals for specific interventions  Outcome: Progressing     Problem: METABOLIC/FLUID AND ELECTROLYTES - ADULT  Goal: Electrolytes maintained within normal limits  Description: INTERVENTIONS:  - Monitor labs and rhythm and assess patient for signs and symptoms of electrolyte imbalances  - Administer electrolyte replacement as ordered  - Monitor response to electrolyte replacements, including rhythm and repeat lab results as appropriate  - Fluid restriction as ordered  - Instruct patient on fluid and nutrition restrictions as appropriate  Outcome: Progressing  Goal: Hemodynamic stability and optimal renal function maintained  Description: INTERVENTIONS:  - Monitor labs and assess for signs and symptoms of volume excess or deficit  - Monitor intake, output and patient weight  - Monitor urine specific  gravity, serum osmolarity and serum sodium as indicated or ordered  - Monitor response to interventions for patient's volume status, including labs, urine output, blood pressure (other measures as available)  - Encourage oral intake as appropriate  - Instruct patient on fluid and nutrition restrictions as appropriate  Outcome: Progressing     Problem: Impaired Functional Mobility  Goal: Achieve highest/safest level of mobility/gait  Description: Interventions:  - Assess patient's functional ability and stability  - Promote increasing activity/tolerance for mobility and gait  - Educate and engage patient/family in tolerated activity level and precautions  - Recommend use of  RW for transfers and ambulation  - Recommend patient transfer to bedside chair toward strongest side  - When transferring patient, block weaker knee for safety  - Recommend use of chair position in bed 3 times per day  Outcome: Progressing     Problem: MUSCULOSKELETAL - ADULT  Goal: Return mobility to safest level of function  Description: INTERVENTIONS:  - Assess patient stability and activity tolerance for standing, transferring and ambulating w/ or w/o assistive devices  - Assist with transfers and ambulation using safe patient handling equipment as needed  - Ensure adequate protection for wounds/incisions during mobilization  - Obtain PT/OT consults as needed  - Advance activity as appropriate  - Communicate ordered activity level and limitations with patient/family  Outcome: Progressing     Problem: Impaired Activities of Daily Living  Goal: Achieve highest/safest level of independence in self care  Description: Interventions:  - Assess ability and encourage patient to participate in ADLs to maximize function  - Promote sitting position while performing ADLs such as feeding, grooming, and bathing  - Educate and encourage patient/family in tolerated functional activity level and precautions during self-care  Outcome: Progressing     Problem:  Patient Centered Care  Goal: Patient preferences are identified and integrated in the patient's plan of care  Description: Interventions:  - What would you like us to know as we care for you? From home with grandson  - Provide timely, complete, and accurate information to patient/family  - Incorporate patient and family knowledge, values, beliefs, and cultural backgrounds into the planning and delivery of care  - Encourage patient/family to participate in care and decision-making at the level they choose  - [place] patient and family perspectives and choices  Outcome: Progressing     Problem: CARDIOVASCULAR - ADULT  Goal: Maintains optimal cardiac output and hemodynamic stability  Description: INTERVENTIONS:  - Monitor vital signs, rhythm, and trends  - Monitor for bleeding, hypotension and signs of decreased cardiac output  - Evaluate effectiveness of vasoactive medications to optimize hemodynamic stability  - Monitor arterial and/or venous puncture sites for bleeding and/or hematoma  - Assess quality of pulses, skin color and temperature  - Assess for signs of decreased coronary artery perfusion - ex. Angina  - Evaluate fluid balance, assess for edema, trend weights  Outcome: Progressing  Goal: Absence of cardiac arrhythmias or at baseline  Description: INTERVENTIONS:  - Continuous cardiac monitoring, monitor vital signs, obtain 12 lead EKG if indicated  - Evaluate effectiveness of antiarrhythmic and heart rate control medications as ordered  - Initiate emergency measures for life threatening arrhythmias  - Monitor electrolytes and administer replacement therapy as ordered  Outcome: Progressing

## 2024-02-08 NOTE — PHYSICAL THERAPY NOTE
Chart reviewed and attempted therapy. Pt declined at this time due to just returned back to the bed with nursing staff and was to fatigued at this time. Will follow up tomorrow as appropriate. RN aware.

## 2024-02-08 NOTE — DISCHARGE SUMMARY
General Medicine Discharge Summary     Patient ID:  Helen Espana  83 year old  2/13/1940    Admit date: 1/25/2024    Discharge date and time: 2/8/24    Attending Physician: Davion Grove MD     Consults: IP CONSULT TO NEPHROLOGY  IP CONSULT TO SOCIAL WORK  IP CONSULT TO GASTROENTEROLOGY  IP CONSULT TO SOCIAL WORK  IP CONSULT TO CARDIOLOGY  IP CONSULT TO VASCULAR ACCESS TEAM  IP CONSULT TO VASCULAR ACCESS TEAM  IP CONSULT PALLIATIVE CARE    Primary Care Physician: Shannon Bahena MD     Reason for admission:   Left hip/ Leg pain  Left hip OA    Risk For Readmission: high     Discharge Diagnoses: Inability to walk [R26.2]  See Additional Discharge Diagnoses in Hospital Course    Discharged Condition: stable    Follow-up with labs/images appointments: PCP    Exam  Gen: No acute distress, alert and oriented x3   Heent: NC AT, neck supple  Pulm: Lungs clear, normal respiratory effort  CV: Heart with regular rate and rhythm   Abd: Abdomen soft, nontender, nondistended   MSK: nimisha appearance (chronic for patient) warm well perfused  Skin: no rashes or lesions    HPI: per chart  Ms. Cook is an 83-year-old female with a history of ESRD, paroxysmal atrial fibrillation, carotid stenosis, hypertension, hyperlipidemia, diastolic congestive heart failure, presents to the hospital for evaluation of left leg and hip pain.  Patient states she has had pain in her left hip for the past 2 months, she has been followed in the outpatient setting with orthopedic surgery, and has had steroid injections, MRIs, has recently been on a course of oral steroids, as well as Tylenol 3's, varying degrees of improvement, however pain has gotten progressively worse in the last few days, yesterday she was unable to get up out of the chair after being out shopping.  Denies any trauma, no falls, no fevers chills, no nausea or vomiting, no chest pain or shortness of breath.  She does note pain in the left leg, going down to her knee, and at  times going down to her lower leg.  Any low back pain, no bowel or bladder incontinence, no saddle anesthesia.     Hospital Course:   Ms. Cook is an 83-year-old female with a history of ESRD, paroxysmal atrial fibrillation, carotid stenosis, hypertension, hyperlipidemia, diastolic congestive heart failure, presents to the hospital for evaluation of left leg and hip pain, likely radicular pain fro L4-L5 disc herniation, course complicated by food bolus, EGD on 2/1 with resolution, diet advanced, reinitiated PT OT, still having pain but overall improved, functionally weak, now with intermittent hypotension with metoprolol dose reduced and midodrine does increased. Seen by palliative and pain medications adjusted.  Refused PT several times on admission. Plans for [facility] on dc.      Metabolic status   Concern for sundowning   - ammonia 18  - lactic acid 1.6  - TSH 1.7  - discussed sleep hygiene      Left hip/ Leg pain  Left hip OA  Spinal stenosis, L4-L5 disc extrusion  - xray neg for fracture  - outpatient MRI with Left hip OA and trets of hamstring tendon and gluteus minimus  - pain does appear to radiate to her lower leg   - lumbar spine with DDD  - continue lidoderm patch start low dose gabapentin  - continue norco    - PT/OT for RADHA  - recently had steroid injection in hip with ortho, therefore not candidate for at least 1-2 more months  - Lumbar spine MRI with L4-L5 disc herniation  - continue PT/OT for RADHA on DC   - resume oral pain meds     Food bolus  - pt with nausea, vomiting, globus sensation on 1/29  - CT neck reviewed, showed patulous esophagus with multifocal areas of retained ingested material  - had EGD in 03/20[facility] at [facility] for globus sensation, per report esophageal hypomotility diagnosed but no achalasia, she did have a very dry oropharynx, pathology results were negative  - GI consulted  - EGD on 2/1 food bolus removed  - feeling better, tolerating diet, adv per GI     ESRD  - Nephrology consulted  -  MWF HD     Hypotension   - No signs of sepsis or infection  - Likely volume depletion, from limited oral intake (with food bolus) then fluid removal with dialysis  - on 2/3 gave some albumin, renal ok with one time extra dose of midodrine   - Recurrent on 2/4 in a.m., will give extra dose of midodrine, and add 1 more dose of albumin  - will decrease metoprolol 25mg  - midodrine 10mg TID      Paroxysmal atrial fibrillation  Afib with RVR  Tachycardic   - Follows with [facility]  - She is on Eliquis 2.5 mg twice daily,   - Normally on metoprolol, 50mg TID on non HD days and 50mg twice daily onHD days  - Afib with RVR on 1/31, on dilt drip, now improved  - cardiology consulted, ok for EGD    - will decrease metoprolol to 25mg bid given intermittent hypotension     Chronic HFpEF  - euvolemic  - volume management per HD     Endometrial wall thickening  - noted on outpatient MRI and US  - needs outpatient GYN follow up     Hypertension  - She takes a Norvasc as needed but not daily  - resume lopressor    Operative Procedures: Procedure(s) (LRB):  ESOPHAGOGASTRODUODENOSCOPY (EGD) (N/A)     Imaging: No results found.    Disposition: [facility]    Activity: as tolerated   Diet: general   Wound Care: no needs  Code Status: Full Code  O2: no needs    Home Medication Changes: as below   All discharge medications have been reconciled with current medication list.     Med list     Medication List        START taking these medications      HYDROcodone-acetaminophen 5-325 MG Tabs  Commonly known as: Norco  Take 0.5 tablets by mouth every 6 (six) hours as needed.     pregabalin 25 MG Caps  Commonly known as: Lyrica  Take 1 capsule (25 mg total) by mouth Every Monday, Wednesday, and Friday.  Start taking on: February 9, 2024            CHANGE how you take these medications      metoprolol tartrate 25 MG Tabs  Commonly known as: Lopressor  Take 1 tablet (25 mg total) by mouth 2 (two) times daily.  What changed:   medication  strength  how much to take  when to take this     midodrine 10 MG Tabs  Commonly known as: ProAmatine  Take 1 tablet (10 mg total) by mouth in the morning and 1 tablet (10 mg total) at noon and 1 tablet (10 mg total) in the evening.  What changed:   medication strength  how much to take            CONTINUE taking these medications      atorvastatin 10 MG Tabs  Commonly known as: Lipitor     Eliquis 2.5 MG Tabs  Generic drug: apixaban     Ergocalciferol 50 MCG (2000 UT) Tabs     Esomeprazole Magnesium 40 MG Cpdr  Commonly known as: NEXIUM  TAKE 1 CAPSULE BY MOUTH DAILY. BEFORE MEAL     loratadine 10 MG Tabs  Commonly known as: Claritin     montelukast 10 MG Tabs  Commonly known as: Singulair  TAKE 1 TABLET BY MOUTH EVERY DAY AT NIGHT     sevelamer carbonate 800 MG Tabs  Commonly known as: Renvela  TAKE 3 TABLETS BY MOUTH 3 TIMES DAILY     TURMERIC OR     Vitamin D 50 MCG (2000 UT) Tabs            STOP taking these medications      amLODIPine 5 MG Tabs  Commonly known as: Norvasc               Where to Get Your Medications        These medications were sent to [id number] - [place], [place] - [address] - [place], [place] [phone], [phone]  [address] [address]      Phone: [phone]   metoprolol tartrate 25 MG Tabs  midodrine 10 MG Tabs       You can get these medications from any pharmacy    Bring a paper prescription for each of these medications  HYDROcodone-acetaminophen 5-325 MG Tabs  pregabalin 25 MG Caps         FU   Follow-up Information       Shannon Bahena MD Follow up.    Specialties: Internal Medicine, PEDIATRICS  Why: within 1 week of discharge from rehab  Contact information:  [address]  [address]  [place] [address] [phone]                             ND instructions:      Other Discharge Instructions:         Sometimes managing your health at home requires assistance.  The [facility] team has recognized your preference to use [facility].  They can  be reached by phone at [phone].  The fax number for your reference is [phone].  A representative from the home health agency will contact you or your family to schedule your first visit.          POST-UPPER ENDOSCOPY (EGD) DISCHARGE INSTRUCTIONS      PROCEDURE PERFORMED: EGD with biopsies    ENDOSCOPIST: Jacqueline Gusman MD    FINDINGS: Esophagitis (inflamation of the esophagus lining) and food bolus and suspected esophageal dysmotility    MEDICATIONS: You may resume all other medications today    DIET: You may resume your regular diet today.    BIOPSIES: Biopsies were taken.  They will be sent to pathology and results will be available in 7-10 days.    X-RAYS: No X-rays/Labs were ordered today        Activity for remainder of today:  REST TODAY  DO NOT drive or operate heavy machinery  DO NOT drink any alcoholic beverages  DO NOT sign any legal documents or make any important decisions    After your procedure(s):  It is not unusual to feel bloated or gassy .  Passing gas and belching is encouraged. Lying on your left side with your knees flexed may relieve the discomfort. A hot pack to the abdomen may also help. If you had an upper endoscopy (EGD), you may experience a slight sore throat which will subside. Throat lozenges or salt water gargle can be used.    FOLLOW-UP:  Contact the office at [phone] if a follow-up appointment is needed or if you develop any of the following:    Severe abdominal pain/discomfort   Excessive bleeding  Black tarry stool  Difficulty breathing/swallowing  Persistent nausea/vomiting    Fever above 100 degrees or chills    Patient had opportunity to ask questions and state understand and agree with therapeutic plan as outlined    Thank You,    Davion Freeman [facility]

## 2024-02-08 NOTE — PROGRESS NOTES
[facility]    Progress Note      Subjective:     Family at bedside.  Bit sleepy this a.m. but had good breakfast.  Tachycardic and BP in 90s.  Denies any chest pain or shortness of breath.    Review of Systems:   Constitutional: Appears weak and fatigue  Eyes: negative for irritation, redness and visual disturbance  Ears, nose, mouth, throat, and face: negative for hearing loss and sore throat  Respiratory: negative for cough, hemoptysis and wheezing  Cardiovascular: negative for chest pain, exertional dyspnea  Gastrointestinal: negative for abdominal pain, diarrhea and nausea  Hematologic/lymphatic: negative for bleeding and easy bruising  Musculoskeletal:negative for back pain, bone pain and muscle weakness  Behavioral/Psych: Low energy    Objective:   Temp:  [99.2 °F (37.3 °C)-100.3 °F (37.9 °C)] 99.2 °F (37.3 °C)  Pulse:  [] 111  Resp:  [13-20] 20  BP: ()/(47-65) 88/51  SpO2:  [96 %-98 %] 98 %  SpO2: 98 %     Intake/Output Summary (Last 24 hours) at 2/8/2024 1027  Last data filed at 2/8/2024 0928  Gross per 24 hour   Intake 278 ml   Output 2500 ml   Net -2222 ml     Wt Readings from Last 3 Encounters:   02/08/24 130 lb 8.2 oz (59.2 kg)   07/18/23 133 lb 6.1 oz (60.5 kg)   06/20/23 135 lb (61.2 kg)       General appearance: alert, appears stated age and cooperative  Head: Normocephalic, atraumatic  Eyes: conjunctivae/corneas clear  Throat: lips, mucosa, and tongue normal; teeth and gums normal  Neck:  no JVD, supple  Extremities: extremities normal, no edema  Skin: No rashes or lesions  Neurologic: Grossly normal  Psychiatric: calm    Medications:        Results:     Recent Labs   Lab 02/06/24  0708 02/07/24  0635 02/08/24  0736   RBC 3.66* 3.40* 3.48*   HGB 11.2* 10.2* 10.2*   HCT 35.1 32.4* 32.9*   MCV 95.9 95.3 94.5   NEPRELIM 10.35* 8.89* 7.59   WBC 13.0* 11.8* 10.4   .0 228.0 274.0     Recent Labs   Lab 02/06/24  0708 02/07/24  0635 02/08/24  0736   * 108* 103*    BUN 29* 43* 31*   CREATSERUM 3.90* 5.06* 3.71*   CA 8.6* 8.5* 8.6*   * 130* 139   K 4.1 4.4 4.2   CL 97* 94* 103   CO2 29.0 28.0 27.0     PTT   Date Value Ref Range Status   11/06/2018 23.6 (L) 24.0 - 33.7 sec Final     INR   Date Value Ref Range Status   11/06/2018 1.0 0.9 - 1.2 ratio Final     Comment:     An INR of 2.0-3.0 is the usual therapeutic interval.  Some patients (e.g., those with recurrent thrombotic events, a mechanical heart valve) may require more intense therapy with a therapeutic INR interval of 2.5-3.5   08/11/2015 3.0 (A) 0.8 - 1.2 Final     No results for input(s): \"BNP\" in the last 168 hours.  Recent Labs   Lab 02/06/24  0708 02/07/24  0635 02/08/24  0736   MG 1.8 1.7 1.8   PHOS 3.3 3.3 2.7        Recent Labs   Lab 02/06/24  0708 02/07/24  0635 02/08/24  0736   PHOS 3.3 3.3 2.7       No results found.        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, breast cancer s/p right mastectomy, hypertension, A-fib presented to emergency room for inability to walk and left hip pain     1.ESRD: HD Monday Wednesday Friday via AV fistula  HD yesterday-s/p 2 L UF  Appears euvolemic on exam  Serum Phos within normal limits  Blood pressure in 90s-on midodrine 10 mg 3 times daily  Metoprolol dose decreased to 25 mg     2.anemia: Anemia secondary to chronic kidney disease  Hemoglobin acceptable     3.inability to walk: Secondary to osteoarthritis of hip/spinal stenosis  PT OT recommended RAD[facility]  Palliative care input noted-patient on Lyrica 25 mg Monday Wednesday Friday.  Off of tizanidine.  Currently full code    4.hyponatremia: Within normal limits dialysis.  Fluid restriction 1.2 L a day    5.A-fib with RVR: Heart rate in 110s  Cardiology input noted-tolerate mildly elevated heart rate.  On Eliquis     Discussed with Nursing and daughter at bedside.      Greg Castillo MD  2/8/2024

## 2024-02-08 NOTE — OCCUPATIONAL THERAPY NOTE
Attempted to see pt for OT today. Pt is refusing 2/2 fatigue and just returned to bed with the RN staff. Will re-attempt when pt is agreeable.    Yin Pelaez, OTR/L

## 2024-02-08 NOTE — PLAN OF CARE
O2 1 l n/c. On scheduled tylenol . Hemodialysis  MWF. Plan: [facility] at [facility].    Problem: PAIN - ADULT  Goal: Verbalizes/displays adequate comfort level or patient's stated pain goal  Description: INTERVENTIONS:  - Encourage pt to monitor pain and request assistance  - Assess pain using appropriate pain scale  - Administer analgesics based on type and severity of pain and evaluate response  - Implement non-pharmacological measures as appropriate and evaluate response  - Consider cultural and social influences on pain and pain management  - Manage/alleviate anxiety  - Utilize distraction and/or relaxation techniques  - Monitor for opioid side effects  - Notify MD/LIP if interventions unsuccessful or patient reports new pain  - Anticipate increased pain with activity and pre-medicate as appropriate  Outcome: Progressing     Problem: SAFETY ADULT - FALL  Goal: Free from fall injury  Description: INTERVENTIONS:  - Assess pt frequently for physical needs  - Identify cognitive and physical deficits and behaviors that affect risk of falls.  - [place] fall precautions as indicated by assessment.  - Educate pt/family on patient safety including physical limitations  - Instruct pt to call for assistance with activity based on assessment  - Modify environment to reduce risk of injury  - Provide assistive devices as appropriate  - Consider OT/PT consult to assist with strengthening/mobility  - Encourage toileting schedule  Outcome: Progressing     Problem: DISCHARGE PLANNING  Goal: Discharge to home or other facility with appropriate resources  Description: INTERVENTIONS:  - Identify barriers to discharge w/pt and caregiver  - Include patient/family/discharge partner in discharge planning  - Arrange for needed discharge resources and transportation as appropriate  - Identify discharge learning needs (meds, wound care, etc)  - Arrange for interpreters to assist at discharge as needed  - Consider  post-discharge preferences of patient/family/discharge partner  - Complete POLST form as appropriate  - Assess patient's ability to be responsible for managing their own health  - Refer to Case Management Department for coordinating discharge planning if the patient needs post-hospital services based on physician/LIP order or complex needs related to functional status, cognitive ability or social support system  Outcome: Progressing     Problem: METABOLIC/FLUID AND ELECTROLYTES - ADULT  Goal: Electrolytes maintained within normal limits  Description: INTERVENTIONS:  - Monitor labs and rhythm and assess patient for signs and symptoms of electrolyte imbalances  - Administer electrolyte replacement as ordered  - Monitor response to electrolyte replacements, including rhythm and repeat lab results as appropriate  - Fluid restriction as ordered  - Instruct patient on fluid and nutrition restrictions as appropriate  Outcome: Progressing  Goal: Hemodynamic stability and optimal renal function maintained  Description: INTERVENTIONS:  - Monitor labs and assess for signs and symptoms of volume excess or deficit  - Monitor intake, output and patient weight  - Monitor urine specific gravity, serum osmolarity and serum sodium as indicated or ordered  - Monitor response to interventions for patient's volume status, including labs, urine output, blood pressure (other measures as available)  - Encourage oral intake as appropriate  - Instruct patient on fluid and nutrition restrictions as appropriate  Outcome: Progressing     Problem: Impaired Functional Mobility  Goal: Achieve highest/safest level of mobility/gait  Description: Interventions:  - Assess patient's functional ability and stability  - Promote increasing activity/tolerance for mobility and gait  - Educate and engage patient/family in tolerated activity level and precautions  - Recommend use of  RW for transfers and ambulation  - Recommend patient transfer to bedside  chair toward strongest side  - When transferring patient, block weaker knee for safety  - Recommend use of chair position in bed 3 times per day  Outcome: Progressing     Problem: Patient Centered Care  Goal: Patient preferences are identified and integrated in the patient's plan of care  Description: Interventions:  - What would you like us to know as we care for you? From home with grandson  - Provide timely, complete, and accurate information to patient/family  - Incorporate patient and family knowledge, values, beliefs, and cultural backgrounds into the planning and delivery of care  - Encourage patient/family to participate in care and decision-making at the level they choose  - Honor patient and family perspectives and choices  Outcome: Progressing     Problem: SKIN/TISSUE INTEGRITY - ADULT  Goal: Skin integrity remains intact  Description: INTERVENTIONS  - Assess and document risk factors for pressure ulcer development  - Assess and document skin integrity  - Monitor for areas of redness and/or skin breakdown  - Initiate interventions, skin care algorithm/standards of care as needed  Outcome: Progressing     Problem: MUSCULOSKELETAL - ADULT  Goal: Return mobility to safest level of function  Description: INTERVENTIONS:  - Assess patient stability and activity tolerance for standing, transferring and ambulating w/ or w/o assistive devices  - Assist with transfers and ambulation using safe patient handling equipment as needed  - Ensure adequate protection for wounds/incisions during mobilization  - Obtain PT/OT consults as needed  - Advance activity as appropriate  - Communicate ordered activity level and limitations with patient/family  Outcome: Progressing     Problem: Impaired Activities of Daily Living  Goal: Achieve highest/safest level of independence in self care  Description: Interventions:  - Assess ability and encourage patient to participate in ADLs to maximize function  - Promote sitting position  while performing ADLs such as feeding, grooming, and bathing  - Educate and encourage patient/family in tolerated functional activity level and precautions during self-care  Outcome: Progressing     Problem: CARDIOVASCULAR - ADULT  Goal: Maintains optimal cardiac output and hemodynamic stability  Description: INTERVENTIONS:  - Monitor vital signs, rhythm, and trends  - Monitor for bleeding, hypotension and signs of decreased cardiac output  - Evaluate effectiveness of vasoactive medications to optimize hemodynamic stability  - Monitor arterial and/or venous puncture sites for bleeding and/or hematoma  - Assess quality of pulses, skin color and temperature  - Assess for signs of decreased coronary artery perfusion - ex. Angina  - Evaluate fluid balance, assess for edema, trend weights  Outcome: Progressing  Goal: Absence of cardiac arrhythmias or at baseline  Description: INTERVENTIONS:  - Continuous cardiac monitoring, monitor vital signs, obtain 12 lead EKG if indicated  - Evaluate effectiveness of antiarrhythmic and heart rate control medications as ordered  - Initiate emergency measures for life threatening arrhythmias  - Monitor electrolytes and administer replacement therapy as ordered  Outcome: Progressing

## 2024-02-08 NOTE — PROGRESS NOTES
[facility]    Cardiology Progress Note    Helen Espana Patient Status:  Inpatient    1940 MRN [id number]   Location [facility] 3W/SW Attending Davion Grove MD   Hosp Day # 9 PCP Shannon Bahena MD       Impression/Plan:  83 year old female presenting with:     Retained food  PAF/SSS (in the past, tachy when in AF, bruce when in SR)  ESRD on HD  HTN, now on midodrine for low BP  HL, on statin  Chronic diastolic CHF, controlled with HD  Very mild CAD at cath 2016     - Cont eliquis, statin, beta blocker (beta blocker dose reduced 2/2 hypotension, now with elevated VR in afib.  Will try to increase back to home dose as tolerated). Will tolerate mildly elevated HR. Do not think digoxin or amiodarone use is justified at this time.  - Cont midodrine for BP support; dose recently increased  - HD as per nephrology  - Monitor on tele       Subjective:     Did not sleep well last night. No chest pain, palpitations, dyspnea, lightheadedness. Discussed with daughter at bedside.          Patient Active Problem List   Diagnosis    BENIGN HYPERTENSION    Polycystic kidney, autosomal dominant    Mixed hyperlipidemia    Paroxysmal atrial fibrillation (HCC)    Left knee DJD    Spondylosis of lumbar region without myelopathy or radiculopathy    Age-related nuclear cataract of both eyes    Vitreous floaters of both eyes    Chorioretinal scar of right eye    Osteoporosis of multiple sites associated with endocrine disorder    Complete tear of right rotator cuff    Scoliosis of lumbosacral spine, unspecified scoliosis type    Chronic left-sided low back pain with left-sided sciatica    Hemodialysis access site with arteriovenous graft (HCC)    Gastroesophageal reflux disease with esophagitis    ESRD on hemodialysis (HCC)    AVF (arteriovenous fistula) (HCC)    Malignant neoplasm of upper-outer quadrant of left breast in female, estrogen receptor positive  (HCC)    Squamous blepharitis of upper  and lower eyelids of both eyes    Internal derangement of left knee    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    Seasonal allergic rhinitis due to pollen    ESRD (end stage renal disease) ([facility])    Symptomatic bradycardia    Inability to walk    Osteoarthritis of hip    Anemia due to chronic kidney disease, on chronic dialysis ([facility])    ESRD (end stage renal disease) on dialysis ([facility])    Palliative care by specialist       Objective:   Temp: 100.3 °F (37.9 °C)  Pulse: 111  Resp: 15  BP: 95/52    Intake/Output:     Intake/Output Summary (Last 24 hours) at 2/8/2024 0705  Last data filed at 2/7/2024 1600  Gross per 24 hour   Intake --   Output 2500 ml   Net -2500 ml       Last 3 Weights   02/08/24 0517 130 lb 8.2 oz (59.2 kg)   02/07/24 0218 129 lb 3 oz (58.6 kg)   02/05/24 0628 132 lb 0.9 oz (59.9 kg)   02/04/24 0546 130 lb 1.1 oz (59 kg)   02/03/24 0431 126 lb (57.2 kg)   02/01/24 0509 124 lb 9 oz (56.5 kg)   01/31/24 1517 129 lb (58.5 kg)   01/30/24 0400 129 lb 13.6 oz (58.9 kg)   01/29/24 0630 130 lb 15.3 oz (59.4 kg)   01/28/24 0551 127 lb 13.9 oz (58 kg)   01/27/24 0642 124 lb 12.5 oz (56.6 kg)   01/26/24 0246 135 lb 12.9 oz (61.6 kg)   01/25/24 2148 126 lb 12.2 oz (57.5 kg)   07/18/23 1000 133 lb 6.1 oz (60.5 kg)   07/16/23 1942 134 lb 12.8 oz (61.1 kg)   07/16/23 1203 135 lb (61.2 kg)   06/20/23 1508 135 lb (61.2 kg)       Tele: AF    Physical Exam:    GENERAL: ooks tired  SKIN: no rashes  HEENT: atraumatic, normocephalic, throat without erythema  NECK: supple, no bruits  LUNGS: clear to auscultation  CARDIO: irregular rhythm without murmur or S3   GI: soft, nontender  EXTREMITIES: no cyanosis, clubbing or edema  NEUROLOGY: alert  PSYCH: cooperative    Laboratory/Data:    Labs:           Recent Labs   Lab 02/03/24  0641 02/04/24  0618 02/05/24  0843 02/06/24  0708 02/07/24  0635   WBC 7.6 6.7 8.8 13.0* 11.8*   HGB 11.0* 11.2* 11.8* 11.2* 10.2*   MCV 95.8 96.2 93.7 95.9 95.3   .0 164.0 163.0 195.0 228.0        Recent Labs   Lab 02/03/24  0641 02/04/24  0618 02/05/24  0843 02/05/24  1327 02/06/24  0708 02/07/24  0635   * 133* 132* 134* 133* 130*   K 4.0 4.1 3.9 4.0 4.1 4.4   CL 97* 96* 97* 96* 97* 94*   CO2 29.0 30.0 28.0 31.0 29.0 28.0   BUN 26* 41* 36* 21 29* 43*   CREATSERUM 3.49* 4.43* 3.76* 3.04* 3.90* 5.06*   CA 7.5* 8.0* 8.1* 9.0 8.6* 8.5*   MG 1.8  --  1.7 1.9 1.8 1.7   PHOS  --   --   --  2.9 3.3 3.3   GLU 93 84 100* 100* 102* 108*       No results for input(s): \"ALT\", \"AST\", \"ALB\", \"AMYLASE\", \"LIPASE\", \"LDH\" in the last 168 hours.    Invalid input(s): \"ALPHOS\", \"TBIL\", \"DBIL\", \"TPROT\"    No results for input(s): \"TROP\" in the last 168 hours.      ECHO 7/23:  ECHO:  1. Study data: Atrial fibrillation   2. Left ventricle: The cavity size was normal. Wall thickness was normal.      Systolic function was normal. The estimated ejection fraction was 55-60%,      by biplane method of disks. Wall motion is normal; there are no regional      wall motion abnormalities. Doppler parameters are consistent with a      reversible restrictive pattern, indicative of decreased left ventricular      diastolic compliance and/or increased left atrial pressure - grade 3      diastolic dysfunction.   3. Left atrium: The atrium was markedly dilated.   4. Right atrium: The atrium was moderately to markedly dilated.   5. Mitral valve: There was mild regurgitation.   6. Tricuspid valve: There was mild-moderate regurgitation.   7. Pulmonary arteries: Systolic pressure was within the normal range,      estimated to be 30mm Hg.            Allergies:   Allergies   Allergen Reactions    Adhesive Tape SWELLING    Denosumab OTHER (SEE COMMENTS)     hypocalcemia  Lowered calcium level  Lowered calcium level  Lowered calcium level      Docosahexaenoic Acid, Dha HIVES    Ficus HIVES    Penicillins HIVES     Thinks she has tolerated penicillin in the past    Zithromax HIVES    Eicosapentaenoic Acid, Epa HIVES    Fig HIVES    Levofloxacin  KARTHIK Anton OTHER (SEE COMMENTS)     Lowered calcium level    Vitamin E HIVES       Medications:

## 2024-02-08 NOTE — CM/SW NOTE
Expected Discharge Plan:    Destination: Subacute Rehab:[facility]  Call for report to: Phone: [phone]  Transportation provider-[facility]     DC Time: 2:45 pm  Pt/Family aware of plan: Dtr Emily Garibay Staff aware of dc plan: Patient discussed in care rounds.  PCS form completed

## 2024-02-08 NOTE — PROGRESS NOTES
Palliative Care Progress Note    Helen Ann Malorie Patient Status:  Inpatient    1940 MRN [id number]   Location [facility] 3W/SW Attending Davion Grove MD   Hosp Day # 9 PCP Shannon Bahena MD     Date of Consult: 2024  Patient seen at: [facility]    Reason for Consultation: Consult requested for goals of care and care coordination.    Subjective     S: Got tizanadine, some sundowning last night. Alert today but persistent RLE pain.      Review of Systems: Palliative Care symptom needs assessed:     No dypsnea.   Denies cough or lightheadedness/dizziness.   current pain issues as above.   Normal appetite  No n/v and moved bowels last recently.   No constipation/diarrhea issues.   No depression or anxiety.      Palliative Care Social History:   Marital Status:    Children: Yes  Living Situation Prior to Admit: Home  Occupational History: Retired  Personal:     Spiritual  Helen Espana  NA     requested: No    Medical History: obtained from [facility]  Past Medical History:   Diagnosis Date    Arrhythmia     Afib    AVF (arteriovenous fistula) ([facility]) 10/12/2017    Biceps rupture, proximal, right, initial encounter 2018    Breast cancer ([facility])     Breast cancer in female ([facility]) 2018    CANCER 1989    right breast mastectomy    Cataract     OU    Clostridioides difficile infection     Cup to disc asymmetry     OU    Dialysis patient ([facility])     HD M, W, F    Essential hypertension     Floaters     OU    Hearing impairment     hearing aides    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 Giordano @ [facility]    CATARACT EXTRACTION W/  INTRAOCULAR LENS IMPLANT Left 2021    Dr. Giordano @ [facility]    CHOLECYSTECTOMY      laparoscopic    COLONOSCOPY      c. diff colitis-Ali    ECHO 2D DP/CLRFL, CARDIO (INTERNAL)   [facility] estee    mod MR/LAE; border systolic LVEF    ECHO 2D, CARDIO (DMG)  2020    [facility] cardio: no change except mod mitral regurg mild now    HX BREAST CANCER      LEXISCAN NUC STRESS TST, CARDIO (INTERNAL)  [facility] ; EF 54%    LEXISCAN NUC STRESS TST, CARDIO (INTERNAL)  2021    [facility]; EF57%; normal wall motion; fixed perfusion defect inf. wall    LUMPECTOMY RIGHT      MASTECTOMY PARTIAL  2018    u [facility]: left breaswt seed loc partial mastectomy     MASTECTOMY RIGHT            OTHER SURGICAL HISTORY  12    cysto-Dr. Allred    OTHER SURGICAL HISTORY  1/21/15     lap sigmoid colectomy     OTHER SURGICAL HISTORY  16    Right Hemicolectomy by Dr. Chaudhry    PERITONEAL DIALYSIS SYSTEM      2009       Family History: obtained from [facility]  Family History   Problem Relation Age of Onset    Heart Disorder Father         MI    Diabetes Father     Kidney Disease Father         polycystic kidney disease    Hypertension Mother     Heart Disorder Mother         stroke    Cataracts Mother     Thyroid Disorder Daughter         thyroid    Other (Other) Sister         Cushing's disease    Diabetes Sister     Hypertension Sister     Cancer Sister 69        lung tob     Kidney Disease Son         pckd    Kidney Disease Brother         PCKD     Breast Cancer Maternal Aunt 50        x2 ages 85 and in her 50's    Macular degeneration Other         Aunt    Glaucoma Neg         family h/o       Allergies:  Allergies   Allergen Reactions    Adhesive Tape SWELLING    Denosumab OTHER (SEE COMMENTS)     hypocalcemia  Lowered calcium  level  Lowered calcium level  Lowered calcium level      Docosahexaenoic Acid, Dha HIVES    Ficus HIVES    Penicillins HIVES     Thinks she has tolerated penicillin in the past    Zithromax HIVES    Eicosapentaenoic Acid, Epa HIVES    Fig HIVES    Levofloxacin HIVES    Prolia OTHER (SEE COMMENTS)     Lowered calcium level    Vitamin E HIVES       Medications:     Current Facility-Administered Medications:     HYDROcodone-acetaminophen (Norco) 5-325 MG per tab 0.5 tablet, 0.5 tablet, Oral, Q6H PRN    [START ON 2/9/2024] pregabalin (Lyrica) cap 25 mg, 25 mg, Oral, Q MWF    sodium chloride 0.9 % IV bolus 100 mL, 100 mL, Intravenous, Q30 Min PRN **AND** albumin human (Albumin) 25% injection 25 g, 25 g, Intravenous, PRN Dialysis    acetaminophen (Tylenol) tab 650 mg, 650 mg, Oral, q6h    midodrine (ProAmatine) tab 10 mg, 10 mg, Oral, TID    metoprolol tartrate (Lopressor) tab 25 mg, 25 mg, Oral, 3 times per day on Sunday Tuesday Thursday Saturday    metoprolol tartrate (Lopressor) tab 25 mg, 25 mg, Oral, 2 times per day on Monday Wednesday Friday    ondansetron (Zofran) 4 MG/2ML injection 4 mg, 4 mg, Intravenous, Q6H PRN    lansoprazole (Prevacid Solutab) disintegrating tab 30 mg, 30 mg, Oral, BID AC    calcium carbonate (Tums) chewable tab 1,000 mg, 1,000 mg, Oral, Q8H PRN    amLODIPine (Norvasc) tab 5 mg, 5 mg, Oral, Daily PRN    apixaban (Eliquis) tab 2.5 mg, 2.5 mg, Oral, 2 times per day    montelukast (Singulair) tab 10 mg, 10 mg, Oral, Nightly    sevelamer carbonate (Renvela) tab 2,400 mg, 2,400 mg, Oral, TID    polyethylene glycol (PEG 3350) (Miralax) 17 g oral packet 17 g, 17 g, Oral, Daily PRN    bisacodyl (Dulcolax) 10 MG rectal suppository 10 mg, 10 mg, Rectal, Daily PRN    fleet enema (Fleet) 7-19 GM/118ML rectal enema 133 mL, 1 enema, Rectal, Once PRN    lidocaine-menthol 4-1 % patch 1 patch, 1 patch, Transdermal, Daily    sennosides (Senokot) tab 17.2 mg, 17.2 mg, Oral, Nightly    atorvastatin (Lipitor) tab  5 mg, 5 mg, Oral, Nightly  Prior to Admission Medications   Medication Sig    HYDROcodone-acetaminophen  MG Oral Tab Take 1 tablet by mouth every 4 (four) hours as needed for Pain.    gabapentin 100 MG Oral Cap Take 1 capsule (100 mg total) by mouth 2 (two) times daily.         Palliative Performance Scale: 50 %  % Ambulation Activity Level Self-Care Intake Consciousness   100 Full  Normal  No Disease Full Normal Full   90 Full  Normal  Some Disease Full Normal Full   80 Full  Normal w/effort  Some Disease Full Normal or reduced Full   70 Reduced  Can't Perform Job  Some Disease Full Normal or reduced Full   60 Reduced  Can't Perform Hobby   Significant Disease Occ Assist Normal or reduced Full or confused   50 Mainly sit/lie Can't do any work  Extensive Disease Partial Assist Normal or reduced Full or confused   40 Mainly in bed Can't do any work  Extensive Disease Mainly Assist Normal or reduced Full or confused   30 Bed Bound Can't do any work  Extensive Disease Max Assist  Total Care Reduced  Drowsy/confused   20 Bed Bound Can't do any work  Extensive Disease Max Assist  Total Care Minimal  Drowsy/confused   10 Bed Bound Can't do any work  Extensive Disease Max Assist  Total Care Mouth Care  Drowsy/confused   0 Death        Objective      Vital Signs:  Blood pressure (!) 88/51, pulse 111, temperature 99.2 °F (37.3 °C), temperature source Oral, resp. rate 20, height 5' 3\" (1.6 m), weight 130 lb 8.2 oz (59.2 kg), SpO2 98%, not currently breastfeeding.  Body mass index is 23.12 kg/m².  Non-verbal signs of pain present: No    Physical Exam:  General: Alert, awake and in no  apparent respiratory distress.  HEENT: AT/NC. No focal deficits.   Cardiac: RRR  Lungs: Normal effort on RA  Abdomen: Soft, non-tender, non-distended, normal bowel sounds X 4 quadrants   Extremities: FROM of all limbs, no  Edema present  Skin: Warm and dry.    Hematology:  Lab Results   Component Value Date    WBC 10.4 02/08/2024    HGB  10.2 (L) 02/08/2024    HCT 32.9 (L) 02/08/2024    .0 02/08/2024       Coags:  Lab Results   Component Value Date    INR 1.0 11/06/2018    PTT 23.6 (L) 11/06/2018       Chemistry:  Lab Results   Component Value Date    CREATSERUM 3.71 (H) 02/08/2024    BUN 31 (H) 02/08/2024     02/08/2024    K 4.2 02/08/2024     02/08/2024    CO2 27.0 02/08/2024     (H) 02/08/2024    CA 8.6 (L) 02/08/2024    ALB 3.1 (L) 07/19/2023    ALKPHO 203 (H) 07/18/2023    BILT 0.8 07/18/2023    TP 5.7 (L) 07/18/2023    AST 18 07/18/2023    ALT 15 07/18/2023    MG 1.8 02/08/2024    PHOS 2.7 02/08/2024    TROP <0.045 02/01/2021       Imaging:  No results found.    Assessment and Recommendations        Inability to walk    Osteoarthritis of hip    Anemia due to chronic kidney disease, on chronic dialysis (HCC)    ESRD (end stage renal disease) on dialysis ([facility])      Symptom Management      Pain:  -radicular pain, worsening, has followed with ortho  -unfortunately limited options for analgesia  -switch to renal dose Lyrica 25mg MWF after HD  -schedule acetaminophen 650mg q6hrs  -stop tizanadine   -agree with dose reduction of Norco to 2.5mg. Per daughter, it snowed her and she did not think it had much if any analgesic effect  -opioid options limited in ESRD but could also try low dose hydromorphone  -anesthesia pain vs ortho service may be able to contribute with injection?    Prevention of Constipation:    - Recommend Senna-S 8.6mg (2) tabs BID Scheduled   - Recommend Dulcolax suppository daily PRN    2.     Serious Illness Conversation:   Code Status: Full  POA: Daughter Joi  I spoke with Helen and her daughter. Daughter lives in [place] but patient has two sons that are local and a nephew who is staying with her.   I asked how we should take care of Helen if rehab did not allow her to return to her baseline (she was independent, gardening and driving herself to dialysis MW)  Joi said she would not want her  mom to be placed in a SNF and so would attempt to bring her into her home or make other arrangements for caregiving rather than a facility.   I did not directly address code status but will follow along and discuss tomorrow.       Palliative Care Follow Up: Palliative care team will Continue to follow while inpatient    Thank you for allowing Palliative Care services to participate in the care of Helen Espana.    A total of 25 minutes were spent on this visit, which included all of the following: direct face to face contact, history taking, physical examination, and >50% was spent counseling and coordinating care.    Sammy Zaldivar MD  Palliative Care Services  [facility] [phone]    Note to patient:  The 21st Century Cures Act makes medical notes like these available to patients in the interest of transparency. However, be advised this is a medical document. It is intended as peer to peer communication. It is written in medical language and may contain abbreviations or verbiage that are unfamiliar. It may appear blunt or direct. Medical documents are intended to carry relevant information, facts as evident, and the clinical opinion of the practitioner.

## 2024-03-19 ENCOUNTER — APPOINTMENT (OUTPATIENT)
Dept: CARDIOLOGY | Age: 84
End: 2024-03-19

## 2024-11-20 NOTE — ED INITIAL ASSESSMENT (HPI)
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  Past medical history significant for arthritis, prostate cancer, hard of hearing.      Patient presented to [facility] ER with complaints of 6 weeks of progressively worsening shortness of breath. Also with palpitations. No chest pain, lower extremity edema, nausea, vomiting. No cough, fever, chills. Typically has good endurance and use exercise bicycle daily. His breathing has been getting worse and worse with exertion and also when he lays flat.      Patient evaluated. Found to have atrial fibrillation with RVR 130s. Hemodynamically stable. His troponin negative. NT proBNP 2681. Potassium, magnesium, TSH normal. CXR with mild congestion. Given IV metoprolol and lovenox in ER. He was admitted for ongoing evaluation and treatment. Echo with severe reduced EF at 25%. Patient recommended for ischemic cardiac evaluation. His rates improved and also received diuretic for volume overload. Patient stable and transferred to [facility] for further cardiac evaluation last PM 11/17/24.      He has  undergone cardiac catheterization showing normal coronaries.     Hospital Course:   patient was admitted hospital, seen by Cardiology, cardiac catheterization as mentioned above, he was upper the diuretics and started on amiodarone high-dose loading followed by maintenance dose per Cardiology as outpatient:  Patient was having low blood pressure, felt not a good candidate for beta-blockers or other diet and remain therapies:  Likely has viral dilated cardiomyopathy given history of COVID-19 many months ago:  He had SALOME done which was negative, followed by DCCV shock    Patient was of her life vest which he and his wife declined  Will continue Eliquis    Patient remains high re-admission risk despite the steps taken to avoid the same as documented above.     Consults: cardiology    Significant Diagnostic Studies:   Echocardiogram 11/13/24:  Final Impressions    * Mildly increased left ventricular wall thickness.    * Severely increased left ventricular chamber size.    * Severely globally decreased left ventricular systolic function.    * Left ventricular ejection fraction; 25 %.    * Abnormal LV Global longitudinal strain -6.4 %.    * Left atrial volume index of 156 ml/m2.    * Moderately increased right ventricular chamber size.    * Moderately decreased right ventricular systolic function.    * Reduced RV longitudinal function, TAPSE = 1.3 cm.    * Dilated IVC with decreased respiratory variation.    * Right ventricular systolic pressure; 39 mmHg.    * No previous echocardiogram available for comparison.     Cardiac catheterization 11/18/24:  Normal coronaries  Normal LVEDP  Severely Dilated LV with severe systolic dysfunction persists consistent with non ischemic cardiomyopathy. The symptoms started 1 year ago after COVID infection suggestive of post viral cardiomyopathy.          Recent Labs   Lab 11/20/24  0012 11/19/24  0408 11/15/24  0649 11/14/24  0619   WBC  --  9.2 8.2 7.6   HCT  --  47.4 48.6 48.3   HGB   --  15.5 15.6 15.7   PLT  --  304 303 320   SODIUM  --  142 140 140   POTASSIUM 4.2 4.1 3.7 4.8   CHLORIDE  --  109 106 109   CO2  --  26 27 26   CALCIUM  --  9.1 9.3 9.6   GLUCOSE  --  111* 118* 125*   BUN  --  21* 23* 16   CREATININE  --  0.88 1.08 0.97   AST  --  29 30 31   GPT  --  56 64* 60   ALKPT  --  58 58 56   BILIRUBIN  --  1.0 0.8 0.8   ALBUMIN  --  3.8 3.9 4.0       Laboratory values: No results found    Lab Results   Component Value Date    TSH 2.345 11/13/2024    HGBA1C 6.0 (H) 11/15/2024        Lab Results   Component Value Date    CHOLESTEROL 172 11/15/2024    HDL 56 11/15/2024    CALCLDL 96 11/15/2024        Lab Results   Component Value Date    USPG 1.007 11/29/2013    UPROT NEGATIVE 11/29/2013    UWBC NEGATIVE 11/29/2013    URBC NEGATIVE 11/29/2013    UNITR NEGATIVE 11/29/2013    UPH 7.0 11/29/2013       No results found           Summary of your Discharge Medications        Take these Medications        Details   amiodarone 400 MG tablet  Commonly known as: PACERONE   Take 1 tablet by mouth in the morning and 1 tablet at noon and 1 tablet in the evening.     apixaBAN 5 MG Tab  Commonly known as: ELIQUIS   Take 1 tablet by mouth every 12 hours.     aspirin 81 MG chewable tablet  Start taking on: November 21, 2024   Chew 1 tablet by mouth daily. Begin taking on November 21, 2024.     [employer] GLUCOS-CHONDROIT-MSM TS PO   Take 1 tablet by mouth daily.     ferrous sulfate 325 (65 FE) MG tablet  Start taking on: November 21, 2024   Take 1 tablet by mouth daily (with breakfast).     midodrine 5 MG tablet  Commonly known as: PROAMATINE   Take 1 tablet by mouth 3 times daily (before meals).     spironolactone 25 MG tablet  Commonly known as: ALDACTONE  Start taking on: November 21, 2024   Take 1 tablet by mouth daily.     torsemide 10 MG tablet  Commonly known as: DEMADEX  Start taking on: November 21, 2024   Take 1 tablet by mouth daily.              Discharge Exam:  Weight    11/17/24 2020 11/18/24 0559  11/20/24 0648   Weight: 93.6 kg (206 lb 4.8 oz) 91.7 kg (202 lb 2.6 oz) 92.1 kg (203 lb 0.7 oz)     Alert awake, very nice and pleasant, generalized weakness:   Very hard of hearing, uses hearing aids  Up in recliner:   Head is atraumatic.    Neck is supple no thyromegaly.    Chest unremarkable   Lungs clear  Heart is irregular systolic murmur  Abdomen benign nontender, no organomegaly  Extremities show no edema or calf tenderness  Neuro-alert awake, generalized weakness: Nonfocal  No cyanosis or clubbing    Disposition: Home            Follow-up with Jose Ziegler MD in 1-2 weeks.  Follow-up with cardiology in 1 weeks.  Reassess amiodarone dose per Cardiology as outpatient    Although discharge and follow-up plans were discussed with patient and his wife in details:          Greater than 32 minutes spent in the evaluation and discharge instruction and documentation of this patient.     Please copy to Jose Ziegler MD.    Serge Smith MD  11/20/2024  2:38 PM

## (undated) DEVICE — SUTURE PROLENE 7-0 BV-1

## (undated) DEVICE — SUTURE PROLENE 5-0 C-1

## (undated) DEVICE — SUTURE ETHILON 3-0 669H

## (undated) DEVICE — BANDAGE ROLL,100% COTTON, 6 PLY, LARGE: Brand: KERLIX

## (undated) DEVICE — SUTURE SILK 4-0 SA63H

## (undated) DEVICE — GAMMEX® PI HYBRID SIZE 8, STERILE POWDER-FREE SURGICAL GLOVE, POLYISOPRENE AND NEOPRENE BLEND: Brand: GAMMEX

## (undated) DEVICE — SOL  .9 1000ML BTL

## (undated) DEVICE — DRAPE,EXTREMITY,89X128,STERILE: Brand: MEDLINE

## (undated) DEVICE — ABSORBABLE HEMOSTAT (OXIDIZED REGENERATED CELLULOSE, U.S.P.): Brand: SURGICEL

## (undated) DEVICE — CHLORAPREP 26ML APPLICATOR

## (undated) DEVICE — SOL  .9 1000ML BAG

## (undated) DEVICE — SUTURE PROLENE 6-0 C-1

## (undated) DEVICE — SUCTION CANISTER, 3000CC,SAFELINER: Brand: DEROYAL

## (undated) DEVICE — GEL AQUASONIC 100 20GR

## (undated) DEVICE — SUCTION SARNS MICRO SUCKER

## (undated) DEVICE — 6 ML SYRINGE LUER-LOCK TIP: Brand: MONOJECT

## (undated) DEVICE — 3M™ TEGADERM™ TRANSPARENT FILM DRESSING, 1626W, 4 IN X 4-3/4 IN (10 CM X 12 CM), 50 EACH/CARTON, 4 CARTON/CASE: Brand: 3M™ TEGADERM™

## (undated) DEVICE — MEDI-VAC NON-CONDUCTIVE SUCTION TUBING: Brand: CARDINAL HEALTH

## (undated) DEVICE — Device: Brand: JELCO

## (undated) DEVICE — KIT ENDO ORCAPOD 160/180/190

## (undated) DEVICE — INDICATED FOR SURGICAL CLAMPING DURING CARDIOVASCULAR PERIPHERAL VASCULAR, AND GENERAL SURGERY.: Brand: SOFT/FIBRA® SPRING CLIP

## (undated) DEVICE — CLIP SM W INTNL HRZN TI TPE LF

## (undated) DEVICE — SUTURE VICRYL 3-0 SH

## (undated) DEVICE — SUTURE SILK 2-0 SA65H

## (undated) DEVICE — COVER SGL STRL LGHT HNDL BLU

## (undated) DEVICE — STERILE TETRA-FLEX CF, ELASTIC BANDAGE, 4" X 5.5YD: Brand: TETRA-FLEX™CF

## (undated) DEVICE — DERMABOND LIQUID ADHESIVE

## (undated) DEVICE — CV: Brand: MEDLINE INDUSTRIES, INC.

## (undated) DEVICE — 60 ML SYRINGE REGULAR TIP: Brand: MONOJECT

## (undated) DEVICE — SINGLE-USE BIOPSY FORCEPS: Brand: RADIAL JAW 4

## (undated) DEVICE — Device: Brand: DUAL NARE NASAL CANNULAE FEMALE LUER CON 7FT O2 TUBE

## (undated) DEVICE — KIT CLEAN ENDOKIT 1.1OZ GOWNX2

## (undated) DEVICE — SUTURE PROLENE 6-0 BV-1

## (undated) DEVICE — MEDI-VAC NON-CONDUCTIVE SUCTION TUBING 6MM X 1.8M (6FT.) L: Brand: CARDINAL HEALTH

## (undated) DEVICE — CLIP SM INTNL HMCLP TNTLM ESCP

## (undated) DEVICE — CONMED SCOPE SAVER BITE BLOCK, 20X27 MM: Brand: SCOPE SAVER

## (undated) DEVICE — YANKAUER,BULB TIP,W/O VENT,RIGID,STERILE: Brand: MEDLINE

## (undated) DEVICE — EXOFIN TISSUE ADHESIVE 1.0ML

## (undated) DEVICE — X-RAY DETECTABLE SPONGES,16 PLY: Brand: VISTEC

## (undated) DEVICE — NEEDLE HPO 18GA 1.5IN ECLPS

## (undated) DEVICE — DRAPE SRG UNV 131X90IN LWR

## (undated) NOTE — Clinical Note
2/7/2017    Patient: Antonio Drake   MR Number: [id number]   YOB: 1940   Date of Visit: 2/7/2017   Physician: Rosalie Carmichael MD     Dear Medicare Patient:  Mike Arcos TO BENEFICIARY:  Please know that while a refraction is imp

## (undated) NOTE — [place] [address]  Authorization for Invasive Procedures  Date: 1/30/24           Time: 1600    I hereby authorize Dr Jacqueline Gusman, my physician and his/her assistants (if applicable), which may include medical students, residents, and/or fellows, to perform the following surgical operation/ procedure and administer such anesthesia as may be determined necessary by my physician: EGD with food bolus retrieval and possible dilation  on Helen Shikha Celler  2.   I recognize that during the surgical operation/procedure, unforeseen conditions may necessitate additional or different procedures than those listed above.  I, therefore, further authorize and request that the above-named surgeon, assistants, or designees perform such procedures as are, in their judgment, necessary and desirable.    3.   My surgeon/physician has discussed prior to my surgery the potential benefits, risks and side effects of this procedure; the likelihood of achieving goals; and potential problems that might occur during recuperation.  They also discussed reasonable alternatives to the procedure, including risks, benefits, and side effects related to the alternatives and risks related to not receiving this procedure.  I have had all my questions answered and I acknowledge that no guarantee has been made as to the result that may be obtained.    4.   Should the need arise during my operation/procedure, which includes change of level of care prior to discharge, I also consent to the administration of blood and/or blood products.  Further, I understand that despite careful testing and screening of blood or blood products by collecting agencies, I may still be subject to ill effects as a result of receiving a blood transfusion and/or blood products.  The following are some, but not all, of the potential risks that can occur: fever and allergic reactions, hemolytic reactions, transmission of diseases such as Hepatitis, AIDS  and Cytomegalovirus (CMV) and fluid overload.  In the event that I wish to have an autologous transfusion of my own blood, or a directed donor transfusion, I will discuss this with my physician.   Check only if Refusing Blood or Blood Products  I understand refusal of blood or blood products as deemed necessary by my physician may have serious consequences to my condition to include possible death. I hereby assume responsibility for my refusal and release the hospital, its personnel, and my physicians from any responsibility for the consequences of my refusal.         o  Refuse         5.   I authorize the use of any specimen, organs, tissues, body parts or foreign objects that may be removed from my body during the operation/procedure for diagnosis, research or teaching purposes and their subsequent disposal by hospital authorities.  I also authorize the release of specimen test results and/or written reports to my treating physician on the hospital medical staff or other referring or consulting physicians involved in my care, at the discretion of the Pathologist or my treating physician.    6.   I consent to the photographing or videotaping of the operations or procedures to be performed, including appropriate portions of my body for medical, scientific, or educational purposes, provided my identity is not revealed by the pictures or by descriptive texts accompanying them.  If the procedure has been photographed/videotaped, the surgeon will obtain the original picture, image, videotape or CD.  The hospital will not be responsible for storage, release or maintenance of the picture, image, tape or CD.    7.   I consent to the presence of a  or observers in the operating room as deemed necessary by my physician or their designees.    8.   I recognize that in the event my procedure results in extended X-Ray/fluoroscopy time, I may develop a skin reaction.    9. If I have a Do Not Attempt  Resuscitation (DNAR) order in place, that status will be suspended while in the operating room, procedural suite, and during the recovery period unless otherwise explicitly stated by me (or a person authorized to consent on my behalf). The surgeon or my attending physician will determine when the applicable recovery period ends for purposes of reinstating the DNAR order.  10. Patients having a sterilization procedure: I understand that if the procedure is successful the results will be permanent and it will therefore be impossible for me to inseminate, conceive, or bear children.  I also understand that the procedure is intended to result in sterility, although the result has not been guaranteed.   11. I acknowledge that my physician has explained sedation/analgesia administration to me including the risk and benefits I consent to the administration of sedation/analgesia as may be necessary or desirable in the judgment of my physician.    I CERTIFY THAT I HAVE READ AND FULLY UNDERSTAND THE ABOVE CONSENT TO OPERATION and/or OTHER PROCEDURE.        ____________________________________       _________________________________      ______________________________  Signature of Patient         Signature of Responsible Person        Printed Name of Responsible Person        ____________________________________      _________________________________      ______________________________       Signature of Witness          Relationship to Patient                       Date                                       Time    Patient Name: Helen Espana     : 1940                 Printed: 2024      Medical Record #: [id number]                      Page 1 of 2          STATEMENT OF PHYSICIAN My signature below affirms that prior to the time of the procedure; I have explained to the patient and/or his/her legal representative, the risks and benefits involved in the proposed treatment and any reasonable  alternative to the proposed treatment. I have also explained the risks and benefits involved in refusal of the proposed treatment and alternatives to the proposed treatment and have answered the patient's questions. If I have a significant financial interest in a co-management agreement or a significant financial interest in any product or implant, or other significant relationship used in this procedure/surgery, I have disclosed this and had a discussion with my patient.     _______________________________________________________________ _____________________________  (Signature of Physician)                                                                                         (Date)                                   (Time)    Patient Name: Helen Espana     : 1940                 Printed: 2024      Medical Record #: [id number]                      Page 2 of 2

## (undated) NOTE — MR AVS SNAPSHOT
[place]  [address]  [phone]               Thank you for choosing us for your health care visit with Valentín Chambers MD.  We are glad to serve you and happy to provide you with this summa A chalazion can take several weeks to grow. It can vary in size. It may appear on the inside or outside of the lid. In most cases, it occurs on the upper lid. The skin may be a normal color or may be red.  A chalazion is usually not painful, but it can caus Follow-up care  Follow up with your health care provider, or as advised.  If the chalazion does not heal in 4 weeks, you may be referred to a healthcare provider who specializes in eye care (an optometrist or ophthalmologist) for further evaluation and lucy Pcns [Penicillins] Hives    Prolia     Vitamin E Hives    Zithromax [Zithromax]                    Current Medications          This list is accurate as of: 1/19/17 10:47 AM.  Always use your most recent med list.                amiodarone HCl 200 MG Tabs view more details from this visit by going to [email or web address]. If you've recently had a stay at the Hospital you can access your discharge instructions in [employer] by going to Visits < Admission Summaries.  If you've been to the Emergency Depar

## (undated) NOTE — IP AVS SNAPSHOT
Patient Demographics     Address  [address] [place] [address] Phone  [phone] (Home)  [phone] (Mobile) *Preferred* E-mail Address  [email or web address]      Patient Contacts     Name Relation Home Work Mobile    Emily Jacobs Daughter   [phone]      Allergies as of 2/8/2024  Review status set to In Progress on 2/1/2024       Noted Reaction Type Reactions    Adhesive Tape 05/06/2020    SWELLING    Denosumab 09/21/2015    OTHER (SEE COMMENTS)    hypocalcemia  Lowered calcium level  Lowered calcium level  Lowered calcium level    Docosahexaenoic Acid, Dha 06/23/2015    HIVES    Ficus 06/23/2015    HIVES    DELETED: Omnicef [cefdinir] 05/29/2012   Intolerance     C DIFF; HOSPITALIZATION    Penicillins 04/12/2007    HIVES    Thinks she has tolerated penicillin in the past    Zithromax 04/12/2007    HIVES    DELETED: Amoxicillin-fd&c Red #40 Al Willis 04/12/2007        Eicosapentaenoic Acid, Epa 06/23/2015    HIVES    Fig 06/23/2015    HIVES    DELETED: Fish Oil 06/23/2015    HIVES    DELETED: Flavoring Agent 06/23/2015    HIVES    DELETED: Levaquin 01/05/2011    PAIN    abd pain    Levofloxacin 06/23/2015    HIVES    DELETED: Omega-3 Fatty Acids 06/23/2015    HIVES    Prolia 09/21/2015    OTHER (SEE COMMENTS)    Lowered calcium level    Vitamin E 06/23/2015    HIVES      Code Status Information     Code Status    Full Code        Patient Instructions       Sometimes managing your health at home requires assistance.  The [facility] team has recognized your preference to use [facility].  They can be reached by phone at [phone].  The fax number for your reference is [phone].  A representative from the home health agency will contact you or your family to schedule your first visit.          POST-UPPER ENDOSCOPY (EGD) DISCHARGE INSTRUCTIONS      PROCEDURE PERFORMED: EGD with biopsies    ENDOSCOPIST: Jacqueline Gusman MD    FINDINGS: Esophagitis (inflamation of  the esophagus lining) and food bolus and suspected esophageal dysmotility    MEDICATIONS: You may resume all other medications today    DIET: You may resume your regular diet today.    BIOPSIES: Biopsies were taken.  They will be sent to pathology and results will be available in 7-10 days.    X-RAYS: No X-rays/Labs were ordered today        Activity for remainder of today:  REST TODAY  DO NOT drive or operate heavy machinery  DO NOT drink any alcoholic beverages  DO NOT sign any legal documents or make any important decisions    After your procedure(s):  It is not unusual to feel bloated or gassy .  Passing gas and belching is encouraged. Lying on your left side with your knees flexed may relieve the discomfort. A hot pack to the abdomen may also help. If you had an upper endoscopy (EGD), you may experience a slight sore throat which will subside. Throat lozenges or salt water gargle can be used.    FOLLOW-UP:  Contact the office at [phone] if a follow-up appointment is needed or if you develop any of the following:    Severe abdominal pain/discomfort   Excessive bleeding  Black tarry stool  Difficulty breathing/swallowing  Persistent nausea/vomiting    Fever above 100 degrees or chills                   Follow-up Information     Shannon Bahena MD Follow up.    Specialties: Internal Medicine, PEDIATRICS  Why: within 1 week of discharge from rehab  Contact information:  [address] [place] [address] [phone]                        Your Home Meds List      TAKE these medications       Instructions Authorizing Provider Morning Afternoon Evening As Needed   atorvastatin 10 MG Tabs  Commonly known as: Lipitor  Next dose due: Tomorrow morning      Take 0.5 tablets (5 mg total) by mouth daily. Every other day as stated by pt**          Eliquis 2.5 MG Tabs  Generic drug: apixaban  Next dose due: Tonight       Take 1 tablet (2.5 mg total) by mouth Q12H.          Ergocalciferol 50 MCG (2000  UT) Tabs  Next dose due: Tomorrow morning      Take 50 mcg by mouth daily.          Esomeprazole Magnesium 40 MG Cpdr  Commonly known as: NEXIUM  Next dose due: Tomorrow morning      TAKE 1 CAPSULE BY MOUTH DAILY. BEFORE MEAL   Shannon Bahena         HYDROcodone-acetaminophen 5-325 MG Tabs  Commonly known as: Norco      Take 0.5 tablets by mouth every 6 (six) hours as needed.   Davion Perfecto         loratadine 10 MG Tabs  Commonly known as: Claritin  Next dose due: Tomorrow morning      Take 1 tablet (10 mg total) by mouth.          metoprolol tartrate 25 MG Tabs  Commonly known as: Lopressor  Next dose due: Tonight       Take 1 tablet (25 mg total) by mouth 2 (two) times daily.   Davion Perfecto         midodrine 10 MG Tabs  Commonly known as: ProAmatine  Next dose due: This evening      Take 1 tablet (10 mg total) by mouth in the morning and 1 tablet (10 mg total) at noon and 1 tablet (10 mg total) in the evening.   Davion Perfecto         montelukast 10 MG Tabs  Commonly known as: Singulair  Next dose due: Tonight       TAKE 1 TABLET BY MOUTH EVERY DAY AT NIGHT   Shannon Bahena         pregabalin 25 MG Caps  Commonly known as: Lyrica  Start taking on: February 9, 2024  Next dose due: Tomorrow morning      Take 1 capsule (25 mg total) by mouth Every Monday, Wednesday, and Friday.   Davion Perfecto         sevelamer carbonate 800 MG Tabs  Commonly known as: Renvela  Next dose due: This evening      TAKE 3 TABLETS BY MOUTH 3 TIMES DAILY   Sammy MANTILLA OR  Next dose due: Resume home schedule      Take by mouth.          Vitamin D 50 MCG (2000 UT) Tabs  Next dose due: Tomorrow morning      Take 2 tablets by mouth daily.   Shannon Bahena               Where to Get Your Medications      These medications were sent to [id number] - [place], [place] - [address] [phone], [phone]  [address] [address]    Phone: [phone]   metoprolol tartrate 25 MG Tabs  midodrine 10  MG Tabs     Please  your prescriptions at the location directed by your doctor or nurse    Bring a paper prescription for each of these medications  HYDROcodone-acetaminophen 5-325 MG Tabs  pregabalin 25 MG Caps           [phone] - MAR ACTION REPORT  (last 48 hrs)    ** SITE UNKNOWN **     Order ID Medication Name Action Time Action Reason Comments    [id number] acetaminophen (Tylenol) tab 650 mg 02/07/24 1647 Given      [id number] acetaminophen (Tylenol) tab 650 mg 02/07/24 2142 Given      [id number] acetaminophen (Tylenol) tab 650 mg 02/08/24 0412 Given      [id number] acetaminophen (Tylenol) tab 650 mg 02/08/24 0927 Given      [id number] albumin human (Albumin) 25% injection 25 g (And Linked Group #1) 02/07/24 1647 New Bag      [id number] apixaban (Eliquis) tab 2.5 mg 02/06/24 1815 Given      [id number] apixaban (Eliquis) tab 2.5 mg 02/07/24 0621 Given      [id number] apixaban (Eliquis) tab 2.5 mg 02/07/24 1813 Given      [id number] apixaban (Eliquis) tab 2.5 mg 02/08/24 0609 Given      [id number] atorvastatin (Lipitor) tab 5 mg 02/06/24 2203 Given      [id number] atorvastatin (Lipitor) tab 5 mg 02/07/24 2151 Given      [id number] lansoprazole (Prevacid Solutab) disintegrating tab 30 mg 02/06/24 1815 Given      [id number] lansoprazole (Prevacid Solutab) disintegrating tab 30 mg 02/07/24 0621 Given      [id number] lansoprazole (Prevacid Solutab) disintegrating tab 30 mg 02/07/24 1647 Given      [id number] lansoprazole (Prevacid Solutab) disintegrating tab 30 mg 02/08/24 0613 Given      [id number] metoprolol tartrate (Lopressor) tab 25 mg 02/08/24 0610 Given      [id number] metoprolol tartrate (Lopressor) tab 25 mg 02/07/24 0956 Given      [id number] metoprolol tartrate (Lopressor) tab 25 mg 02/07/24 2151 Given      [id number] midodrine (ProAmatine) tab 10 mg 02/06/24 1815 Given      [id number] midodrine (ProAmatine) tab 10 mg 02/07/24 0621 Given      [id number] midodrine (ProAmatine) tab 10 mg 02/07/24 1150 Given       [id number] midodrine (ProAmatine) tab 10 mg 02/07/24 1647 Given      [id number] midodrine (ProAmatine) tab 10 mg 02/08/24 0609 Given      [id number] midodrine (ProAmatine) tab 10 mg 02/08/24 1210 Given      [id number] montelukast (Singulair) tab 10 mg 02/06/24 2203 Given      [id number] montelukast (Singulair) tab 10 mg 02/07/24 2143 Given      [id number] sennosides (Senokot) tab 17.2 mg 02/06/24 2203 Given      [id number] sennosides (Senokot) tab 17.2 mg 02/07/24 2147 Given      [id number] sevelamer carbonate (Renvela) tab 2,400 mg 02/06/24 2203 Given      [id number] sevelamer carbonate (Renvela) tab 2,400 mg 02/07/24 0956 Given      [id number] sevelamer carbonate (Renvela) tab 2,400 mg 02/07/24 1647 Given      [id number] sevelamer carbonate (Renvela) tab 2,400 mg 02/07/24 2142 Given      [id number] sevelamer carbonate (Renvela) tab 2,400 mg 02/08/24 0927 Given            LEFT LEG     Order ID Medication Name Action Time Action Reason Comments    [id number] lidocaine-menthol 4-1 % patch 1 patch 02/07/24 0930 Patch Applied      [id number] lidocaine-menthol 4-1 % patch 1 patch 02/08/24 0927 Patch Applied              Recent Vital Signs    Flowsheet Row Most Recent Value   BP 88/51 Filed at 02/08/2024 0900   Pulse 111 Filed at 02/08/2024 0417   Resp 20 Filed at 02/08/2024 0900   Temp 99.2 °F (37.3 °C) Filed at 02/08/2024 0900   SpO2 98 % Filed at 02/08/2024 0900      Patient's Most Recent Weight    Flowsheet Row Most Recent Value   Patient Weight 59.2 kg (130 lb 8.2 oz)         Lab Results Last 24 Hours      Basic Metabolic Panel (8) [[id number]] (Abnormal)  Resulted: 02/08/24 0825, Result status: Final result   Ordering provider: Davion Grove MD  02/07/24 2300 Resulting lab: [facility] ([facility])    Specimen Information    Type Source Collected On   Blood — 02/08/24 0736          Components    Component Value Reference Range Flag Lab   Glucose 103 70 - 99 mg/dL [facility] ([facility])   Sodium 139 136 - 145  mmol/L — [facility])   Potassium 4.2 3.5 - 5.1 mmol/L — [facility])   Chloride 103 98 - 112 mmol/L — [facility])   CO2 27.0 21.0 - 32.0 mmol/L — [facility])   Anion Gap 9 0 - 18 mmol/L — [facility])   BUN 31 9 - 23 mg/dL [facility])   Creatinine 3.71 0.55 - 1.02 mg/dL [facility])   BUN/CREA Ratio 8.4 10.0 - 20.0 L [facility])   Calcium, Total 8.6 8.7 - 10.4 mg/dL L [facility])   Calculated Osmolality 295 275 - 295 mOsm/kg — [facility] ([facility])   eGFR-Cr 12 >=60 mL/min/1.73m2 L [facility])            Magnesium [[id number]] (Normal)  Resulted: 02/08/24 0825, Result status: Final result   Ordering provider: Davion Grove MD  02/07/24 2300 Resulting lab: [facility] ([facility])    Specimen Information    Type Source Collected On   Blood — 02/08/24 0736          Components    Component Value Reference Range Flag Lab   Magnesium 1.8 1.6 - 2.6 mg/dL — [facility])            Phosphorus [[id number]] (Normal)  Resulted: 02/08/24 0825, Result status: Final result   Ordering provider: Davion Grove MD  02/07/24 2300 Resulting lab: [facility] ([facility])    Specimen Information    Type Source Collected On   Blood — 02/08/24 0736          Components    Component Value Reference Range Flag Lab   Phosphorus 2.7 2.4 - 5.1 mg/dL — [facility])            CBC With Differential With Platelet [[id number]] (Abnormal)  Resulted: 02/08/24 0803, Result status: Final result   Ordering provider: Davion Grove MD  02/07/24 2300 Resulting lab: [facility] ([facility])   Narrative:  The following orders were created for panel order CBC With Differential With Platelet.  Procedure                               Abnormality         Status                     ---------                               -----------         ------                     CBC W/ DIFFERENTIAL[[id number]]          Abnormal             Final result                 Please view results for these tests on the individual orders.    Specimen Information    Type Source Collected On   Blood — 02/08/24 0736            Testing Performed By     Lab - Abbreviation Name Director Address Valid Date Range    162 - [facility] ([facility]) [facility] ([facility]) Taiwo Yan M.D. [address] [facility] [address] 03/19/20 1442 - Present            Microbiology Results (All)     None         H&P - H&P Note      H&P signed by Miguel Corral MD at 1/26/2024  8:08 AM  Version 1 of 1    Author: Miguel Corral MD Service: Hospitalist Author Type: Physician    Filed: 1/26/2024  8:08 AM Date of Service: 1/26/2024  7:29 AM Status: Signed    : Miguel Corral MD (Physician)         [facility] Hospitalist H&P       CC:   Chief Complaint   Patient presents with    Groin Pain    Leg Pain        PCP: Shannon Bahena MD    History of Present Illness:   Ms. Cook is an 83-year-old female with a history of ESRD, paroxysmal atrial fibrillation, carotid stenosis, hypertension, hyperlipidemia, diastolic congestive heart failure, presents to the hospital for evaluation of left leg and hip pain.  Patient states she has had pain in her left hip for the past 2 months, she has been followed in the outpatient setting with orthopedic surgery, and has had steroid injections, MRIs, has recently been on a course of oral steroids, as well as Tylenol 3's, varying degrees of improvement, however pain has gotten progressively worse in the last few days, yesterday she was unable to get up out of the chair after being out shopping.  Denies any trauma, no falls, no fevers chills, no nausea or vomiting, no chest pain or shortness of breath.  She does note pain in the left leg, going down to her knee, and at times going down to her lower leg.  Any low back pain, no bowel or bladder incontinence, no saddle anesthesia.      PMH  Past Medical History:    Diagnosis Date    Arrhythmia     Afib    AVF (arteriovenous fistula) ([facility]ly [facility]) 10/12/2017    Biceps rupture, proximal, right, initial encounter 2018    Breast cancer ([facility]ly [facility])     Breast cancer in female (Formerly [facility]) 2018    CANCER     right breast mastectomy    Cataract     OU    Clostridioides difficile infection     Cup to disc asymmetry     OU    Dialysis patient (Formerly [facility])     HD M, W, F    Essential hypertension     Floaters     OU    Hearing impairment     hearing aides    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 Giordano @ [facility]    CATARACT EXTRACTION W/  INTRAOCULAR LENS IMPLANT Left 2021    Dr. Giordano @ [facility]    CHOLECYSTECTOMY      laparoscopic    COLONOSCOPY      c. diff colitis-Ali    ECHO 2D DP/CLRFL, CARDIO (INTERNAL)   [facility] estee    mod MR/LAE; border systolic LVEF    ECHO 2D, CARDIO ([facility])  2020    [facility] cardio: no change except mod mitral regurg mild now    HX BREAST CANCER      LEXISCAN NUC STRESS TST, CARDIO (INTERNAL)  05/15/2018    [place] [facility]; normal ; EF 54%    LEXISCAN NUC STRESS TST, CARDIO (INTERNAL)  2021    [place] [facility]; EF57%; normal wall motion; fixed perfusion defect inf. wall    LUMPECTOMY RIGHT      MASTECTOMY PARTIAL  2018    u [facility]: left breaswt seed loc partial mastectomy     MASTECTOMY RIGHT            OTHER SURGICAL HISTORY  12    cysto-Dr. Allred    OTHER SURGICAL HISTORY  1/21/15     lap sigmoid colectomy     OTHER SURGICAL HISTORY  16    Right Hemicolectomy by Dr. Chaudhry    PERITONEAL DIALYSIS SYSTEM       8/2009        ALL:  Allergies   Allergen Reactions    Adhesive Tape SWELLING    Denosumab OTHER (SEE COMMENTS)     hypocalcemia  Lowered calcium level  Lowered calcium level  Lowered calcium level      Docosahexaenoic Acid, Dha HIVES    Ficus HIVES    Penicillins HIVES     Thinks she has tolerated penicillin in the past    Zithromax HIVES    Eicosapentaenoic Acid, Epa HIVES    Fig HIVES    Levofloxacin HIVES    Prolia OTHER (SEE COMMENTS)     Lowered calcium level    Vitamin E HIVES        Home Medications:  Outpatient Medications Marked as Taking for the 1/25/24 encounter (Hospital Encounter)   Medication Sig Dispense Refill    metoprolol tartrate 50 MG Oral Tab Take 1 tablet (50 mg total) by mouth 2x Daily(Beta Blocker). 60 tablet 0    midodrine 5 MG Oral Tab Take 1 tablet (5 mg total) by mouth in the morning and 1 tablet (5 mg total) at noon and 1 tablet (5 mg total) in the evening. 90 tablet 0    ELIQUIS 2.5 MG Oral Tab Take 1 tablet (2.5 mg total) by mouth Q12H.      amLODIPine 5 MG Oral Tab Take 1 tablet (5 mg total) by mouth daily as needed (for HTN).      SEVELAMER 800 MG Oral Tab TAKE 3 TABLETS BY MOUTH 3 TIMES DAILY 810 tablet 4    ESOMEPRAZOLE MAGNESIUM 40 MG Oral Capsule Delayed Release TAKE 1 CAPSULE BY MOUTH DAILY. BEFORE MEAL 90 capsule 0    MONTELUKAST 10 MG Oral Tab TAKE 1 TABLET BY MOUTH EVERY DAY AT NIGHT 90 tablet 1    TURMERIC OR Take by mouth.      atorvastatin 10 MG Oral Tab Take 0.5 tablets (5 mg total) by mouth daily. Every other day as stated by pt**      loratadine 10 MG Oral Tab Take 1 tablet (10 mg total) by mouth.      Cholecalciferol (VITAMIN D) 2000 UNITS Oral Tab Take 2 tablets by mouth daily. 30 tablet 11         Soc Hx  Social History     Tobacco Use    Smoking status: Never    Smokeless tobacco: Never   Substance Use Topics    Alcohol use: No        Fam Hx  Family History   Problem Relation Age of Onset    Heart Disorder Father         MI    Diabetes Father     Kidney Disease  Father         polycystic kidney disease    Hypertension Mother     Heart Disorder Mother         stroke    Cataracts Mother     Thyroid Disorder Daughter         thyroid    Other (Other) Sister         Cushing's disease    Diabetes Sister     Hypertension Sister     Cancer Sister 69        lung tob     Kidney Disease Son         pckd    Kidney Disease Brother         PCKD     Breast Cancer Maternal Aunt 50        x2 ages 85 and in her 50's    Macular degeneration Other         Aunt    Glaucoma Neg         family h/o       Review of Systems  Comprehensive ROS reviewed and negative except for what's stated above.     OBJECTIVE:  /80 (BP Location: Right arm)   Pulse 99   Temp 98 °F (36.7 °C) (Oral)   Resp 19   Ht 5' 3\" (1.6 m)   Wt 135 lb 12.9 oz (61.6 kg)   SpO2 91%   BMI 24.06 kg/m²   General:  Alert, no distress   Head:  Normocephalic, without obvious abnormality, atraumatic.   Eyes:  Sclera anicteric, No conjunctival pallor,   Nose: Nares normal. Septum midline. Mucosa normal. No drainage.   Throat: Lips, mucosa, and tongue normal. Teeth and gums normal.   Neck: Supple,    Lungs:   Clear to auscultation bilaterally. Normal effort   Chest wall:  No tenderness or deformity.   Heart:  Regular rate and rhythm, S1, S2 normal,     Abdomen:   Soft, non-tender. Bowel sounds normal. No masses,  No organomegaly. Non distended   Extremities: Extremities normal, no swelling or erythema of the left hip, decreased ROM of left hip   Skin: Skin color, texture, turgor normal. No rashes or lesions.    Neurologic: Normal strength, no focal deficit appreciated     Diagnostic Data:    CBC/Chem  Recent Labs   Lab 01/26/24 0132   WBC 8.8   HGB 10.8*   MCV 96.3   .0*       Recent Labs   Lab 01/26/24 0132      K 4.1      CO2 24.0   BUN 46*   CREATSERUM 5.67*   *   CA 7.3*       No results for input(s): \"ALT\", \"AST\", \"ALB\", \"AMYLASE\", \"LIPASE\", \"LDH\" in the last 168 hours.    Invalid input(s):  \"ALPHOS\", \"TBIL\", \"DBIL\", \"TPROT\"    No results for input(s): \"TROP\" in the last 168 hours.      Radiology: No results found.      ASSESSMENT / PLAN:   Ms. Cook is an 83-year-old female with a history of ESRD, paroxysmal atrial fibrillation, carotid stenosis, hypertension, hyperlipidemia, diastolic congestive heart failure, presents to the hospital for evaluation of left leg and hip pain.       Left hip/ Leg pain  Left hip OA  - pre romero xray neg for fracture  - outpatient MRI with Left hip OA and trets of hamstring tendon and gluteus minimus  - pain does appear to radiate to her lower leg today  - will check lumbar spine xray's  - give lidoderm, patch start low dose gabapentin  - add norco and tylenol for pain  - PT/OT  - recently had steroid injection with ortho, therefore not candidate for at least 1-2 more months     ESRD  -Nephrology consulted  -MWF HD     Paroxysmal atrial fibrillation  -Follows with [facility]  -She is on Eliquis 2.5 mg twice daily  -Normally on metoprolol and diltiazem,     Chronic HFpEF  - euvolemic  - volume management per HD    Endometrial wall thickening  - noted on outpatient MRI and US  - needs outpatient GYN follow up     Hypertension  -She takes a Norvasc as needed but not daily  -resume lopressor    FN:  - IVF:none  - Diet: adv    DVT Prophy:scd, eliquis  Lines: PIV    Dispo: pending clinical course    Outpatient records or previous hospital records reviewed.     Further recommendations pending patient's clinical course.  [facility] hospitalist to continue to follow patient while in house    Patient and/or patient's family given opportunity to ask questions and note understanding and agreeing with therapeutic plan as outlined    Thank You,  Miguel Corral MD    [facility] Service number: [phone]      Electronically signed by Miguel Corral MD on 1/26/2024  8:08 AM              Consults - MD Consult Notes      Consults signed by Kayley  MD Sammy at 2024  3:07 PM     Author: Sammy Zaldivar MD Service: Palliative Care Author Type: Physician    Filed: 2024  3:07 PM Date of Service: 2024  2:57 PM Status: Signed    : Sammy Zaldivar MD (Physician)     Consult Orders    1. Consult Palliative Care [[id number]] ordered by Davion Grove MD at 24 1850               Palliative Care Initial Inpatient Consult    Helen Espana Patient Status:  Inpatient    1940 MRN [id number]   Location [facility] 3W/SW Attending Davion Grove MD   Hosp Day # 8 PCP Shannon Bahena MD     Date of Consult: 2024  Patient seen at: [facility]    Reason for Consultation: Consult requested for goals of care and care coordination.    Subjective     History of Present Illness: Ms. Cook is an 83-year-old female with a history of ESRD, paroxysmal atrial fibrillation, carotid stenosis, hypertension, hyperlipidemia, diastolic congestive heart failure, presents to the hospital for evaluation of left leg and hip pain, likely radicular pain fro L4-L5 disc herniation, course complicated by food bolus, EGD on  with resolution, diet advanced, reinitiated PT OT, still having pain but overall improved, functionally weak, now with intermittent hypotension. Plans for RADHA on dc. Patient states she has had pain in her left hip for the past 2 months, she has been followed in the outpatient setting with orthopedic surgery, and has had steroid injections, MRIs, has recently been on a course of oral steroids, as well as Tylenol 3's, varying degrees of improvement, however pain has gotten progressively worse in the last few days prior to presentation.  History was obtained from Epic and patient interview.  Our palliative care service was asked to evaluate the patient for a goals of care discussion and symptom management.      Review of Systems: Palliative Care symptom needs assessed:     No dypsnea.   Denies cough or  lightheadedness/dizziness.   current pain issues as above.   Normal appetite  No n/v and moved bowels last recently.   No constipation/diarrhea issues.   No depression or anxiety.      Palliative Care Social History:   Marital Status:    Children: Yes  Living Situation Prior to Admit: Home  Occupational History: Retired  Personal:     Spiritual  Helen YEPEZ     requested: No    Medical History: obtained from [facility]  Past Medical History:   Diagnosis Date    Arrhythmia     Afib    AVF (arteriovenous fistula) ([facility]) 10/12/2017    Biceps rupture, proximal, right, initial encounter 02/20/2018    Breast cancer ([facility]) 1998    Breast cancer in female ([facility]) 12/20/2018    CANCER 1989    right breast mastectomy    Cataract 2001    OU    Clostridioides difficile infection     Cup to disc asymmetry 2001    OU    Dialysis patient ([facility])     HD M, W, F    Essential hypertension     Floaters 2001    OU    Hearing impairment     hearing aides    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 Giordano @ [facility]    CATARACT EXTRACTION W/  INTRAOCULAR LENS IMPLANT Left 12/28/2021    Dr. Giordano @ [facility]    CHOLECYSTECTOMY  12/04    laparoscopic    COLONOSCOPY  6/07    c. diff colitis-Ali    ECHO 2D DP/CLRFL, CARDIO (INTERNAL)  2016 [facility] estee    mod MR/LAE; border systolic LVEF    ECHO 2D, CARDIO (DMG)  06/30/2020    [facility] cardio: no change except mod mitral regurg mild now    HX BREAST CANCER      LEXISCAN NUC STRESS TST, CARDIO (INTERNAL)  [facility] ; EF  54%    LEXISCAN NUC STRESS TST, CARDIO [facility]%; normal wall motion; fixed perfusion defect inf. wall    LUMPECTOMY RIGHT  2016    MASTECTOMY PARTIAL  2018    [facility]: left breaswt seed loc partial mastectomy     MASTECTOMY RIGHT            OTHER SURGICAL HISTORY  12    cysto-Dr. Allred    OTHER SURGICAL HISTORY  1/21/15     lap sigmoid colectomy     OTHER SURGICAL HISTORY  16    Right Hemicolectomy by Dr. Chaudhry    PERITONEAL DIALYSIS SYSTEM      2009       Family History: obtained from [facility]  Family History   Problem Relation Age of Onset    Heart Disorder Father         MI    Diabetes Father     Kidney Disease Father         polycystic kidney disease    Hypertension Mother     Heart Disorder Mother         stroke    Cataracts Mother     Thyroid Disorder Daughter         thyroid    Other (Other) Sister         Cushing's disease    Diabetes Sister     Hypertension Sister     Cancer Sister 69        lung tob     Kidney Disease Son         pckd    Kidney Disease Brother         PCKD     Breast Cancer Maternal Aunt 50        x2 ages 85 and in her 50's    Macular degeneration Other         Aunt    Glaucoma Neg         family h/o       Allergies:  Allergies   Allergen Reactions    Adhesive Tape SWELLING    Denosumab OTHER (SEE COMMENTS)     hypocalcemia  Lowered calcium level  Lowered calcium level  Lowered calcium level      Docosahexaenoic Acid, Dha HIVES    Ficus HIVES    Penicillins HIVES     Thinks she has tolerated penicillin in the past    Zithromax HIVES    Eicosapentaenoic Acid, Epa HIVES    Fig HIVES    Levofloxacin HIVES    Prolia OTHER (SEE COMMENTS)     Lowered calcium level    Vitamin E HIVES       Medications:     Current Facility-Administered Medications:     HYDROcodone-acetaminophen (Norco) 5-325 MG per tab 1 tablet, 1 tablet, Oral, Q6H PRN    sodium chloride 0.9 % IV bolus 100 mL, 100 mL, Intravenous, Q30 Min PRN **AND** albumin  human (Albumin) 25% injection 25 g, 25 g, Intravenous, PRN Dialysis    acetaminophen (Tylenol) tab 650 mg, 650 mg, Oral, q6h    tiZANidine (Zanaflex) partial tab 1 mg, 1 mg, Oral, Daily PRN    midodrine (ProAmatine) tab 10 mg, 10 mg, Oral, TID    metoprolol tartrate (Lopressor) tab 25 mg, 25 mg, Oral, 3 times per day on Sunday Tuesday Thursday Saturday    metoprolol tartrate (Lopressor) tab 25 mg, 25 mg, Oral, 2 times per day on Monday Wednesday Friday    ondansetron (Zofran) 4 MG/2ML injection 4 mg, 4 mg, Intravenous, Q6H PRN    lansoprazole (Prevacid Solutab) disintegrating tab 30 mg, 30 mg, Oral, BID AC    gabapentin (Neurontin) cap 100 mg, 100 mg, Oral, BID    calcium carbonate (Tums) chewable tab 1,000 mg, 1,000 mg, Oral, Q8H PRN    amLODIPine (Norvasc) tab 5 mg, 5 mg, Oral, Daily PRN    apixaban (Eliquis) tab 2.5 mg, 2.5 mg, Oral, 2 times per day    montelukast (Singulair) tab 10 mg, 10 mg, Oral, Nightly    sevelamer carbonate (Renvela) tab 2,400 mg, 2,400 mg, Oral, TID    polyethylene glycol (PEG 3350) (Miralax) 17 g oral packet 17 g, 17 g, Oral, Daily PRN    bisacodyl (Dulcolax) 10 MG rectal suppository 10 mg, 10 mg, Rectal, Daily PRN    fleet enema (Fleet) 7-19 GM/118ML rectal enema 133 mL, 1 enema, Rectal, Once PRN    lidocaine-menthol 4-1 % patch 1 patch, 1 patch, Transdermal, Daily    sennosides (Senokot) tab 17.2 mg, 17.2 mg, Oral, Nightly    atorvastatin (Lipitor) tab 5 mg, 5 mg, Oral, Nightly  Prior to Admission Medications   Medication Sig    HYDROcodone-acetaminophen  MG Oral Tab Take 1 tablet by mouth every 4 (four) hours as needed for Pain.    gabapentin 100 MG Oral Cap Take 1 capsule (100 mg total) by mouth 2 (two) times daily.         Palliative Performance Scale: 50 %  % Ambulation Activity Level Self-Care Intake Consciousness   100 Full  Normal  No Disease Full Normal Full   90 Full  Normal  Some Disease Full Normal Full   80 Full  Normal w/effort  Some Disease Full Normal or reduced  Full   70 Reduced  Can't Perform Job  Some Disease Full Normal or reduced Full   60 Reduced  Can't Perform Hobby   Significant Disease Occ Assist Normal or reduced Full or confused   50 Mainly sit/lie Can't do any work  Extensive Disease Partial Assist Normal or reduced Full or confused   40 Mainly in bed Can't do any work  Extensive Disease Mainly Assist Normal or reduced Full or confused   30 Bed Bound Can't do any work  Extensive Disease Max Assist  Total Care Reduced  Drowsy/confused   20 Bed Bound Can't do any work  Extensive Disease Max Assist  Total Care Minimal  Drowsy/confused   10 Bed Bound Can't do any work  Extensive Disease Max Assist  Total Care Mouth Care  Drowsy/confused   0 Death        Objective      Vital Signs:  Blood pressure 92/54, pulse 115, temperature 99 °F (37.2 °C), temperature source Oral, resp. rate 20, height 5' 3\" (1.6 m), weight 129 lb 3 oz (58.6 kg), SpO2 91%, not currently breastfeeding.  Body mass index is 22.88 kg/m².  Non-verbal signs of pain present: No    Physical Exam:  General: Alert, awake and in no  apparent respiratory distress.  HEENT: AT/NC. No focal deficits.   Cardiac: RRR  Lungs: Normal effort on RA  Abdomen: Soft, non-tender, non-distended, normal bowel sounds X 4 quadrants   Extremities: FROM of all limbs, no  Edema present  Skin: Warm and dry.    Hematology:  Lab Results   Component Value Date    WBC 11.8 (H) 02/07/2024    HGB 10.2 (L) 02/07/2024    HCT 32.4 (L) 02/07/2024    .0 02/07/2024       Coags:  Lab Results   Component Value Date    INR 1.0 11/06/2018    PTT 23.6 (L) 11/06/2018       Chemistry:  Lab Results   Component Value Date    CREATSERUM 5.06 (H) 02/07/2024    BUN 43 (H) 02/07/2024     (L) 02/07/2024    K 4.4 02/07/2024    CL 94 (L) 02/07/2024    CO2 28.0 02/07/2024     (H) 02/07/2024    CA 8.5 (L) 02/07/2024    ALB 3.1 (L) 07/19/2023    ALKPHO 203 (H) 07/18/2023    BILT 0.8 07/18/2023    TP 5.7 (L) 07/18/2023    AST 18 07/18/2023     ALT 15 07/18/2023    MG 1.7 02/07/2024    PHOS 3.3 02/07/2024    TROP <0.045 02/01/2021       Imaging:  No results found.    Assessment and Recommendations        Inability to walk    Osteoarthritis of hip    Anemia due to chronic kidney disease, on chronic dialysis (HCC)    ESRD (end stage renal disease) on dialysis ([facility])      Symptom Management      Pain:  -radicular pain, worsening, has followed with ortho  -unfortunately limited options for analgesia  -agree with renal dose gabapentin  -schedule acetaminophen 650mg q6hrs  -start tizanadine 1mg qdaily as needed  -agree with dose reduction of Norco to 5mg. Per daughter, it snowed her and she did not think it had much if any analgesic effect  -opioid options limited in ESRD but could also try low dose hydromorphone  -anesthesia pain service may be able to contribute with injection?    Prevention of Constipation:    - Recommend Senna-S 8.6mg (2) tabs BID Scheduled   - Recommend Dulcolax suppository daily PRN    2.     Serious Illness Conversation:   Code Status: Full  POA: Daughter Joi  I spoke with Helen and her daughter. Daughter lives in [place] but patient has two sons that are local and a nephew who is staying with her.   I asked how we should take care of Helen if rehab did not allow her to return to her baseline (she was independent, gardening and driving herself to dialysis MWF)  Joi said she would not want her mom to be placed in a SNF and so would attempt to bring her into her home or make other arrangements for caregiving rather than a facility.   I did not directly address code status but will follow along and discuss tomorrow.         Palliative Care Follow Up: Palliative care team will Continue to follow while inpatient    Thank you for allowing Palliative Care services to participate in the care of Helen Espana.    A total of 50 minutes were spent on this visit, which included all of the following: direct face to face contact,  history taking, physical examination, and >50% was spent counseling and coordinating care.    Sammy Zaldivar MD  2/7/2024  2:57 PM  Palliative Care Services  [facility] [phone]    Note to patient:  The 21st Century Cures Act makes medical notes like these available to patients in the interest of transparency. However, be advised this is a medical document. It is intended as peer to peer communication. It is written in medical language and may contain abbreviations or verbiage that are unfamiliar. It may appear blunt or direct. Medical documents are intended to carry relevant information, facts as evident, and the clinical opinion of the practitioner.        Electronically signed by Sammy Zaldivar MD on 2/7/2024  3:07 PM              Discharge Summary - D/C Summary      Discharge Summary signed by Davion Grove MD at 2/8/2024  1:21 PM  Version 1 of 1    Author: Davion Grove MD Service: Hospitalist Author Type: Physician    Filed: 2/8/2024  1:21 PM Date of Service: 2/8/2024 11:08 AM Status: Signed    : Davion Grove MD (Physician)       General Medicine Discharge Summary     Patient ID:  Helen Espana  83 year old  2/13/1940    Admit date: 1/25/2024    Discharge date and time: 2/8/24    Attending Physician: Davion Grove MD     Consults: IP CONSULT TO NEPHROLOGY  IP CONSULT TO SOCIAL WORK  IP CONSULT TO GASTROENTEROLOGY  IP CONSULT TO SOCIAL WORK  IP CONSULT TO CARDIOLOGY  IP CONSULT TO VASCULAR ACCESS TEAM  IP CONSULT TO VASCULAR ACCESS TEAM  IP CONSULT PALLIATIVE CARE    Primary Care Physician: Shannon Bahena MD     Reason for admission:   Left hip/ Leg pain  Left hip OA    Risk For Readmission: high     Discharge Diagnoses: Inability to walk [R26.2]  See Additional Discharge Diagnoses in Hospital Course    Discharged Condition: stable    Follow-up with labs/images appointments: PCP    Exam  Gen: No acute distress, alert and oriented x3   Heent: NC AT, neck supple  Pulm:  Lungs clear, normal respiratory effort  CV: Heart with regular rate and rhythm   Abd: Abdomen soft, nontender, nondistended   MSK: nimisha appearance (chronic for patient) warm well perfused  Skin: no rashes or lesions    HPI: per chart  Ms. Cook is an 83-year-old female with a history of ESRD, paroxysmal atrial fibrillation, carotid stenosis, hypertension, hyperlipidemia, diastolic congestive heart failure, presents to the hospital for evaluation of left leg and hip pain.  Patient states she has had pain in her left hip for the past 2 months, she has been followed in the outpatient setting with orthopedic surgery, and has had steroid injections, MRIs, has recently been on a course of oral steroids, as well as Tylenol 3's, varying degrees of improvement, however pain has gotten progressively worse in the last few days, yesterday she was unable to get up out of the chair after being out shopping.  Denies any trauma, no falls, no fevers chills, no nausea or vomiting, no chest pain or shortness of breath.  She does note pain in the left leg, going down to her knee, and at times going down to her lower leg.  Any low back pain, no bowel or bladder incontinence, no saddle anesthesia.     Hospital Course:   Ms. Cook is an 83-year-old female with a history of ESRD, paroxysmal atrial fibrillation, carotid stenosis, hypertension, hyperlipidemia, diastolic congestive heart failure, presents to the hospital for evaluation of left leg and hip pain, likely radicular pain fro L4-L5 disc herniation, course complicated by food bolus, EGD on 2/1 with resolution, diet advanced, reinitiated PT OT, still having pain but overall improved, functionally weak, now with intermittent hypotension with metoprolol dose reduced and midodrine does increased. Seen by palliative and pain medications adjusted.  Refused PT several times on admission. Plans for [facility] on dc.      Metabolic status   Concern for sundowning   - ammonia 18  - lactic acid  1.6  - TSH 1.7  - discussed sleep hygiene      Left hip/ Leg pain  Left hip OA  Spinal stenosis, L4-L5 disc extrusion  - xray neg for fracture  - outpatient MRI with Left hip OA and trets of hamstring tendon and gluteus minimus  - pain does appear to radiate to her lower leg   - lumbar spine with DDD  - continue lidoderm patch start low dose gabapentin  - continue norco    - PT/OT for RADHA  - recently had steroid injection in hip with ortho, therefore not candidate for at least 1-2 more months  - Lumbar spine MRI with L4-L5 disc herniation  - continue PT/OT for RADHA on DC   - resume oral pain meds     Food bolus  - pt with nausea, vomiting, globus sensation on 1/29  - CT neck reviewed, showed patulous esophagus with multifocal areas of retained ingested material  - had EGD in 03/[facility] at [facility] for globus sensation, per report esophageal hypomotility diagnosed but no achalasia, she did have a very dry oropharynx, pathology results were negative  - GI consulted  - EGD on 2/1 food bolus removed  - feeling better, tolerating diet, adv per GI     ESRD  - Nephrology consulted  - MWF HD     Hypotension   - No signs of sepsis or infection  - Likely volume depletion, from limited oral intake (with food bolus) then fluid removal with dialysis  - on 2/3 gave some albumin, renal ok with one time extra dose of midodrine   - Recurrent on 2/4 in a.m., will give extra dose of midodrine, and add 1 more dose of albumin  - will decrease metoprolol 25mg  - midodrine 10mg TID      Paroxysmal atrial fibrillation  Afib with RVR  Tachycardic   - Follows with [facility]  - She is on Eliquis 2.5 mg twice daily,   - Normally on metoprolol, 50mg TID on non HD days and 50mg twice daily onHD days  - Afib with RVR on 1/31, on dilt drip, now improved  - cardiology consulted, ok for EGD    - will decrease metoprolol to 25mg bid given intermittent hypotension     Chronic HFpEF  - euvolemic  - volume management per HD     Endometrial wall  thickening  - noted on outpatient MRI and US  - needs outpatient GYN follow up     Hypertension  - She takes a Norvasc as needed but not daily  - resume lopressor    Operative Procedures: Procedure(s) (LRB):  ESOPHAGOGASTRODUODENOSCOPY (EGD) (N/A)     Imaging: No results found.    Disposition: [facility]    Activity: as tolerated   Diet: general   Wound Care: no needs  Code Status: Full Code  O2: no needs    Home Medication Changes: as below   All discharge medications have been reconciled with current medication list.     Med list     Medication List        START taking these medications      HYDROcodone-acetaminophen 5-325 MG Tabs  Commonly known as: Norco  Take 0.5 tablets by mouth every 6 (six) hours as needed.     pregabalin 25 MG Caps  Commonly known as: Lyrica  Take 1 capsule (25 mg total) by mouth Every Monday, Wednesday, and Friday.  Start taking on: February 9, 2024            CHANGE how you take these medications      metoprolol tartrate 25 MG Tabs  Commonly known as: Lopressor  Take 1 tablet (25 mg total) by mouth 2 (two) times daily.  What changed:   medication strength  how much to take  when to take this     midodrine 10 MG Tabs  Commonly known as: ProAmatine  Take 1 tablet (10 mg total) by mouth in the morning and 1 tablet (10 mg total) at noon and 1 tablet (10 mg total) in the evening.  What changed:   medication strength  how much to take            CONTINUE taking these medications      atorvastatin 10 MG Tabs  Commonly known as: Lipitor     Eliquis 2.5 MG Tabs  Generic drug: apixaban     Ergocalciferol 50 MCG (2000 UT) Tabs     Esomeprazole Magnesium 40 MG Cpdr  Commonly known as: NEXIUM  TAKE 1 CAPSULE BY MOUTH DAILY. BEFORE MEAL     loratadine 10 MG Tabs  Commonly known as: Claritin     montelukast 10 MG Tabs  Commonly known as: Singulair  TAKE 1 TABLET BY MOUTH EVERY DAY AT NIGHT     sevelamer carbonate 800 MG Tabs  Commonly known as: Renvela  TAKE 3 TABLETS BY MOUTH 3 TIMES DAILY     TURMERIC OR      Vitamin D 50 MCG (2000 UT) Tabs            STOP taking these medications      amLODIPine 5 MG Tabs  Commonly known as: Norvasc               Where to Get Your Medications        These medications were sent to [id number] - [place], [place] - [address] [phone], [phone]  [address] [address]      Phone: [phone]   metoprolol tartrate 25 MG Tabs  midodrine 10 MG Tabs       You can get these medications from any pharmacy    Bring a paper prescription for each of these medications  HYDROcodone-acetaminophen 5-325 MG Tabs  pregabalin 25 MG Caps         FU   Follow-up Information       Shannon Bahena MD Follow up.    Specialties: Internal Medicine, PEDIATRICS  Why: within 1 week of discharge from rehab  Contact information:  [address]  [address]  [place] [address] [phone]                             DC instructions:      Other Discharge Instructions:         Sometimes managing your health at home requires assistance.  The [facility] team has recognized your preference to use [facility].  They can be reached by phone at [phone].  The fax number for your reference is [phone].  A representative from the home health agency will contact you or your family to schedule your first visit.          POST-UPPER ENDOSCOPY (EGD) DISCHARGE INSTRUCTIONS      PROCEDURE PERFORMED: EGD with biopsies    ENDOSCOPIST: Jacqueline Gusman MD    FINDINGS: Esophagitis (inflamation of the esophagus lining) and food bolus and suspected esophageal dysmotility    MEDICATIONS: You may resume all other medications today    DIET: You may resume your regular diet today.    BIOPSIES: Biopsies were taken.  They will be sent to pathology and results will be available in 7-10 days.    X-RAYS: No X-rays/Labs were ordered today        Activity for remainder of today:  REST TODAY  DO NOT drive or operate heavy machinery  DO NOT drink any alcoholic beverages  DO NOT sign any  legal documents or make any important decisions    After your procedure(s):  It is not unusual to feel bloated or gassy .  Passing gas and belching is encouraged. Lying on your left side with your knees flexed may relieve the discomfort. A hot pack to the abdomen may also help. If you had an upper endoscopy (EGD), you may experience a slight sore throat which will subside. Throat lozenges or salt water gargle can be used.    FOLLOW-UP:  Contact the office at [phone] if a follow-up appointment is needed or if you develop any of the following:    Severe abdominal pain/discomfort   Excessive bleeding  Black tarry stool  Difficulty breathing/swallowing  Persistent nausea/vomiting    Fever above 100 degrees or chills    Patient had opportunity to ask questions and state understand and agree with therapeutic plan as outlined    Thank You,    Davion Grove M.D.  [facility]      Electronically signed by Davion Grove MD on 2/8/2024  1:21 PM              Physical Therapy Notes (last 72 hours)      Physical Therapy Note signed by Artemio Metcalf PTA at 2/8/2024 12:52 PM  Version 1 of 1    Author: Artemio Metcalf PTA Service: Rehab Author Type: Physical Therapy Assistant    Filed: 2/8/2024 12:52 PM Date of Service: 2/8/2024 12:51 PM Status: Signed    : Artemio Metcalf PTA (Physical Therapy Assistant)       Chart reviewed and attempted therapy. Pt declined at this time due to just returned back to the bed with nursing staff and was to fatigued at this time. Will follow up tomorrow as appropriate. RN aware.         Physical Therapy Note signed by More Hayes PT at 2/6/2024 10:06 AM  Version 1 of 1    Author: More Hayes PT Service: Rehab Author Type: Physical Therapist    Filed: 2/6/2024 10:06 AM Date of Service: 2/6/2024 10:01 AM Status: Signed    : More Hayes PT (Physical Therapist)       PHYSICAL THERAPY TREATMENT NOTE - INPATIENT     Room Number: 309/309-A        Presenting Problem: inability to walk       Problem List  Principal Problem:    Inability to walk  Active Problems:    Osteoarthritis of hip    Anemia due to chronic kidney disease, on chronic dialysis ([facility])    ESRD (end stage renal disease) on dialysis ([facility])      PHYSICAL THERAPY ASSESSMENT   Patient demonstrates fair progress this session, goals  remain in progress.    Patient continues to function below baseline with bed mobility, transfers, gait, and stair negotiation.  Contributing factors to remaining limitations include decreased functional strength, decreased endurance/aerobic capacity, pain, and limited left LE ROM.  Next session anticipate patient to progress bed mobility and transfers.  Physical Therapy will continue to follow patient for duration of hospitalization.    Patient continues to benefit from continued skilled PT services: to promote return to prior level of function and safety with continuous assistance and gradual rehabilitative therapy .    PLAN  PT Treatment Plan: Bed mobility;Body mechanics;Coordination;Endurance;Patient education;Gait training;Strengthening;Transfer training;Balance training  Frequency (Obs): 5x/week    SUBJECTIVE  Pt reporting left hip and thigh pain, unable to quantify, unable to bear wt on left LE    OBJECTIVE  Precautions: Limb alert - right (per MD: HR <120's bpm)    WEIGHT BEARING RESTRICTION                PAIN ASSESSMENT   Rating: Unable to rate  Location: left hip, tight  Management Techniques: Activity promotion    BALANCE  Static Sitting: Fair  Dynamic Sitting: Fair -  Static Standing: Poor -  Dynamic Standing: Poor -    ACTIVITY TOLERANCE  Pulse: 102 (to 115bpm with activity)  Heart Rate Source: Monitor                    O2 WALK  Oxygen Therapy  SPO2% on Oxygen at Rest: 99  At rest oxygen flow (liters per minute): 2    AM-PAC '6-Clicks' INPATIENT SHORT FORM - BASIC MOBILITY  How much difficulty does the patient currently have...  Patient Difficulty:  Turning over in bed (including adjusting bedclothes, sheets and blankets)?: A Lot   Patient Difficulty: Sitting down on and standing up from a chair with arms (e.g., wheelchair, bedside commode, etc.): A Lot   Patient Difficulty: Moving from lying on back to sitting on the side of the bed?: A Lot   How much help from another person does the patient currently need...   Help from Another: Moving to and from a bed to a chair (including a wheelchair)?: A Lot   Help from Another: Need to walk in hospital room?: Total   Help from Another: Climbing 3-5 steps with a railing?: Total     AM-PAC Score:  Raw Score: 10   Approx Degree of Impairment: 76.75%   Standardized Score (AM-PAC Scale): 32.29   CMS Modifier (G-Code): CL    FUNCTIONAL ABILITY STATUS  Functional Mobility/Gait Assessment  Gait Assistance: Maximum assistance  Distance (ft): stand pivot bed to chair, pt unable to stand without full assist  Assistive Device:  (na, pt uanble to stand to rw this session)  Pattern:  (max a of two stand pivot bed to chair)  Rolling: maximum assist  Supine to Sit: maximum assist  Sit to Stand: maximum assist and via stand pivot transfer, assist of two, unable to stand to rw despite attempts from bed or chair    The patient's Approx Degree of Impairment: 76.75% has been calculated based on documentation in the [facility] '6 clicks' Inpatient Daily Activity Short Form.  Research supports that patients with this level of impairment may benefit from RADHA.  Final disposition will be made by interdisciplinary medical team.    THERAPEUTIC EXERCISES  Lower Extremity Ankle pumps  Heel raises     Position Supine       Patient End of Session: Up in chair;Needs met;Call light within reach;RN aware of session/findings;All patient questions and concerns addressed;Family present    CURRENT GOALS   Goals to be met by: 2/7/24  Patient Goal Patient's self-stated goal is: go home   Goal #1 Patient is able to demonstrate supine - sit EOB @ level: modified  independent      Goal #1   Current Status Max A x2   Goal #2 Patient is able to demonstrate transfers Sit to/from Stand at assistance level: modified independent with walker - rolling      Goal #2  Current Status Mod A x2 with RW x2 reps   Goal #3 Patient is able to ambulate 100 feet with assist device: walker - rolling at assistance level: supervision   Goal #3   Current Status Mod A x 1 with RW SPT    Goal #4 Patient will negotiate 7 stairs/one curb w/ assistive device and supervision   Goal #4   Current Status NT    Goal #5 Patient to demonstrate independence with home activity/exercise instructions provided to patient in preparation for discharge.   Goal #5   Current Status In progress     Therapeutic Activity: 30 minutes                  Occupational Therapy Notes (last 72 hours)      Occupational Therapy Note signed by Yin Pelaez OT at 2/8/2024  1:52 PM  Version 1 of 1    Author: Yin Pelaez OT Service: — Author Type: Occupational Therapist    Filed: 2/8/2024  1:52 PM Date of Service: 2/8/2024 11:10 AM Status: Signed    : Yin Pelaez OT (Occupational Therapist)       Attempted to see pt for OT today. Pt is refusing 2/2 fatigue and just returned to bed with the RN staff. Will re-attempt when pt is agreeable.    Yin Pelaez OTR/KASSANDRA               Video Swallow Study Notes    No notes of this type exist for this encounter.     SLP Notes    No notes of this type exist for this encounter.     Immunizations     Name Date      Covid-19 Pfizer 10/30/21     Covid-19 Pfizer 03/02/21     Covid-19 Pfizer 02/09/21     Depo-Medrol 40mg Inj 09/11/20     Depo-Medrol 40mg Inj 05/22/19     Depo-Medrol 40mg Inj 01/22/13     Depo-Medrol 40mg Inj 01/31/12     INFLUENZA 10/15/19     INFLUENZA 10/15/19     INFLUENZA 09/27/18     INFLUENZA 09/27/18     INFLUENZA 09/27/18     INFLUENZA 09/27/18     INFLUENZA 09/08/14     INFLUENZA 09/08/14     INFLUENZA 10/10/13     INFLUENZA 10/13/08     INFLUENZA 10/13/08      INFLUENZA 11/09/07     INFLUENZA 11/13/06     INFLUENZA 11/17/05     Kenalog Per 10mg Inj 02/09/22     Kenalog Per 10mg Inj 06/29/21     Kenalog Per 10mg Inj 06/29/21     Kenalog Per 10mg Inj 06/04/21     Kenalog Per 10mg Inj 01/19/21     Kenalog Per 10mg Inj 01/19/21     Kenalog Per 10mg Inj 07/01/20     Kenalog Per 10mg Inj 07/01/20     Kenalog Per 10mg Inj 12/27/19     Kenalog Per 10mg Inj 10/08/19     Kenalog Per 10mg Inj 07/30/19     Kenalog Per 10mg Inj 02/25/19     Kenalog Per 10mg Inj 09/17/18     Kenalog Per 10mg Inj 04/16/18     Kenalog Per 10mg Inj 12/11/17     Kenalog Per 10mg Inj 08/18/17     Kenalog Per 10mg Inj 11/29/16     Kenalog Per 10mg Inj 05/06/16     Kenalog Per 10mg Inj 04/04/16     Kenalog Per 10mg Inj 02/22/16     Kenalog Per 10mg Inj 11/02/15     Kenalog Per 10mg Inj 06/09/15     Kenalog Per 10mg Inj 12/16/14     Pneumococcal (Prevnar 13) 02/07/14     Pneumovax 23 07/13/17     Pneumovax 23 12/08/08     Prolia Im Inj, Up To 60 Mg 04/16/15     Zoster Vaccine Live (Zostavax) 07/25/11       Multidisciplinary Problems     Active Goals     Not on file          Resolved Goals        Problem: Patient/Family Goals    Goal Priority Disciplines Outcome Interventions   Patient/Family Long Term Goal   (Resolved)     Interdisciplinary Adequate for Discharge    Description: Patient's Long Term Goal: Discharge from the hospital    Interventions:  - Monitor vital signs  - Monitor appropriate labs  - Pain management  - Administer medications per order  - Follow MD orders  - Diagnostics per order  - Update / inform patient and family on plan of care  - Discharge planning  - See additional Care Plan goals for specific interventions   Patient/Family Short Term Goal   (Resolved)     Interdisciplinary Adequate for Discharge    Description: Patient's Short Term Goal: Manage pain    Interventions:   - Monitor vital signs  - Monitor appropriate labs  - Pain management  - Administer medications per order  - Follow MD  orders  - Diagnostics per order  - Update / inform patient and family on plan of care  - See additional Care Plan goals for specific interventions               Discharge Treatment Preferences    Flowsheet Row Most Recent Value   Preferences    PASRR Level 1 Submitted Yes      Inpatient Dialysis Orders (From admission, onward)     Start       Ordered    02/07/24 0918  Hemodialysis inpatient  Once        Question Answer Comment   K 3 mEq    Ca++ 2.5 mEq    Bicarb 30 mEq    Na 140 mEq    Na+ Modeling No    Dialyzer F160    Dialysate Temperature (C) 37    BFR-As tolerated to a maximum of: 400 ml/min     mL/min    Duration of Treatment 3 Hours    Dry weight (kg) or fluid removal (L) 1.2 liters    Access Site AVF        02/07/24 0918    02/05/24 0000  Hemodialysis inpatient  Once        Question Answer Comment   K 2 mEq    Ca++ 2.5 mg    Bicarb 35 meq    Na 138 meq    Na+ Modeling No    Dialyzer F160    Dialysate Temperature (C) 37    BFR-As tolerated to a maximum of: 400 ml/min     mL/min    Duration of Treatment 3.5 Hours    Dry weight (kg) or fluid removal (L) 1-1.5    Access Site AVF    Release to patient Immediate        02/04/24 1330    02/02/24 0000  Hemodialysis inpatient  Once        Question Answer Comment   K 2 mEq    Ca++ 2.5 mg    Bicarb 35 meq    Na 138 meq    Na+ Modeling No    Dialyzer F160    Dialysate Temperature (C) 37    BFR-As tolerated to a maximum of: 400 ml/min     mL/min    Duration of Treatment 3.5 Hours    Dry weight (kg) or fluid removal (L) 2-3    Access Site AVF    Release to patient Immediate        02/01/24 1139    01/31/24 0000  Hemodialysis inpatient  Once        Question Answer Comment   K 2 mEq    Ca++ 2.5 mg    Bicarb 35 meq    Na 138 meq    Na+ Modeling No    Dialyzer F160    Dialysate Temperature (C) 37    BFR-As tolerated to a maximum of: 400 ml/min     mL/min    Duration of Treatment 3.5 Hours    Dry weight (kg) or fluid removal (L) 2.5-3.5    Access  Site AVF    Release to patient Immediate        01/30/24 1442    01/29/24 0000  Hemodialysis inpatient  Tomorrow        Question Answer Comment   K 2 mEq    Ca++ 2.5 mEq    Bicarb 35 mEq    Na 140 mEq    Na+ Modeling No    Dialyzer F160    Dialysate Temperature (C) 36    BFR-As tolerated to a maximum of: 400 ml/min     mL/min    Duration of Treatment 3 Hours    Dry weight (kg) or fluid removal (L) 3-4    Access Site AVF        01/28/24 1227    01/26/24 1247  Hemodialysis inpatient  Once        Question Answer Comment   K 3 mEq    Ca++ 2.5 mEq    Bicarb 30 mEq    Na 140 mEq    Na+ Modeling No    Dialyzer F160    Dialysate Temperature (C) 37    BFR-As tolerated to a maximum of: 400 ml/min     mL/min    Duration of Treatment 3.5 Hours    Dry weight (kg) or fluid removal (L) 2.5 liters    Access Site AVF        01/26/24 1247            Dialysis LDA's     Active Dialysis LDA's        Hemodialysis Catheter Arteriovenous fistula    Placement date —  Site —     Placement time —  Days —    Assessments     Row Name 02/08/24 0900 02/07/24 2013 02/07/24 1600 02/07/24 0948 02/06/24 2200    Site Assessment —  —  —  —  —     Reason for Continuing Central Line —  —  —  —  —     AV Fistula —  —  —  —  —     Status —  —  —  —  —     CHG Impregnated Disc —  —  —  —  —     If No, Reason Why? —  —  —  —  —     Dressing Status —  —  —  —  —     Site Condition —  —  —  —  —     Dressing —  —  —  —  —     Dressing Change Due —  —  —  —  —     Drainage Description —  —  —  —  —     HD Output —  —  —  —  —     Row Name 02/06/24 0900 02/05/24 2112 02/05/24 1200 02/05/24 0900 02/04/24 2115    Site Assessment —  —  —  —  —     Reason for Continuing Central Line —  —  —  —  —     AV Fistula —  —  —  —  —     Status —  —  —  —  —     CHG Impregnated Disc —  —  —  —  —     If No, Reason Why? —  —  —  —  —     Dressing Status —  —  —  —  —     Site Condition —  —  —  —  —     Dressing —  —  —  —  —     Dressing Change Due —  —  —  —   —     Drainage Description —  —  —  —  —     HD Output —  —  —  —  —     Row Name 02/04/24 0930 02/03/24 2049 02/03/24 0912 02/02/24 2155 02/02/24 1345    Site Assessment —  —  —  —  —     Reason for Continuing Central Line —  —  —  —  —     AV Fistula —  —  —  —  —     Status —  —  —  —  —     CHG Impregnated Disc —  —  —  —  —     If No, Reason Why? —  —  —  —  —     Dressing Status —  —  —  —  —     Site Condition —  —  —  —  —     Dressing —  —  —  —  —     Dressing Change Due —  —  —  —  —     Drainage Description —  —  —  —  —     HD Output —  —  —  —  —     Row Name 02/02/24 0911 02/01/24 2300 02/01/24 0907 01/31/24 2100 01/31/24 1141    Site Assessment —  —  —  —  —     Reason for Continuing Central Line —  —  —  —  —     AV Fistula —  —  —  —  —     Status —  —  —  —  —     CHG Impregnated Disc —  —  —  —  —     If No, Reason Why? —  —  —  —  —     Dressing Status —  —  —  —  —     Site Condition —  —  —  —  —     Dressing —  —  —  —  —     Dressing Change Due —  —  —  —  —     Drainage Description —  —  —  —  —     HD Output —  —  —  —  —     Row Name 01/30/24 2142 01/30/24 1200 01/29/24 2013 01/29/24 1100 01/28/24 2022    Site Assessment —  —  —  —  —     Reason for Continuing Central Line —  —  —  —  —     AV Fistula —  —  —  —  —     Status —  —  —  —  —     CHG Impregnated Disc —  —  —  —  —     If No, Reason Why? —  —  —  —  —     Dressing Status —  —  —  —  —     Site Condition —  —  —  —  —     Dressing —  —  —  —  —     Dressing Change Due —  —  —  —  —     Drainage Description —  —  —  —  —     HD Output —  —  —  —  —     Row Name 01/28/24 0800 01/27/24 2053 01/27/24 1100 01/26/24 2107 01/26/24 1440    Site Assessment —  —  —  —  —     Reason for Continuing Central Line —  —  —  —  —     AV Fistula —  —  —  —  —     Status —  —  —  —  —     CHG Impregnated Disc —  —  —  —  —     If No, Reason Why? —  —  —  —  —     Dressing Status —  —  —  —  —     Site Condition —  —  —  —  —     Dressing —  —  —   —  —     Dressing Change Due —  —  —  —  —     Drainage Description —  —  —  —  —     HD Output —  —  —  —  —     Row Name 01/26/24 1400 12/24/20 1056 05/06/20 1300 05/05/20 2135 05/05/20 1430    Site Assessment —  Dry;Intact  —  Intact  Intact     Reason for Continuing Central Line —  —  —  Hemodialysis  Hemodialysis     AV Fistula —  Present;Thrill;Bruit  —  Thrill;Bruit  Thrill;Bruit     Status —  Deaccessed;Other (Comment)  —  —  —     CHG Impregnated Disc —  —  —  —  —     If No, Reason Why? —  —  —  —  —     Dressing Status —  Dry;Intact  —  —  —     Site Condition —  —  —  —  —     Dressing —  Gauze  —  —  —     Dressing Change Due —  —  —  —  —     Drainage Description —  —  —  —  —     HD Output —  —  1000 mL  —  —     Row Name 05/05/20 0355 05/04/20 1200 05/03/20 1939 05/03/20 1000 05/03/20 0300    Site Assessment Intact  Intact  Intact  —  —     Reason for Continuing Central Line Hemodialysis  Hemodialysis  Hemodialysis  Hemodialysis  Hemodialysis     AV Fistula Thrill;Bruit;Present  Thrill;Bruit;Present  Present;Thrill;Bruit  Present;Thrill;Bruit  Present;Thrill;Bruit     Status —  —  Deaccessed  Deaccessed  Deaccessed     CHG Impregnated Disc —  —  —  —  —     If No, Reason Why? —  —  —  —  —     Dressing Status —  Intact;Dry;Clean  —  —  —     Site Condition —  No complications  No complications  No complications  No complications     Dressing —  Occlusive  —  —  —     Dressing Change Due —  —  —  —  —     Drainage Description —  —  —  —  —     HD Output —  0 mL  —  —  —              Hemodialysis Catheter Right Forearm    Placement date 01/26/24  Site Forearm     Placement time 1119  Days 13    Assessments     Row Name 02/08/24 0900 02/07/24 2013 02/07/24 1600 02/07/24 0948 02/06/24 2200    Site Assessment Dry;Clean;Intact  Dry;Clean;Intact  —  Intact;Dry;Clean  Clean;Dry;Intact     Reason for Continuing Central Line Hemodialysis  Hemodialysis  —  Hemodialysis  Hemodialysis     AV Fistula  Present;Thrill;Bruit  Present;Thrill;Bruit  —  Present;Bruit;Thrill  Present;Thrill     Status —  Deaccessed  —  Deaccessed  Deaccessed     CHG Impregnated Disc —  —  —  —  —     If No, Reason Why? —  —  —  —  —     Dressing Status —  —  —  —  —     Site Condition No complications  No complications  —  No complications  No complications     Dressing —  Gauze  —  Open to air (None)  Open to air (None)     Dressing Change Due —  —  —  —  —     Drainage Description —  —  —  —  —     HD Output —  —  2500 mL  —  —     Row Name 02/06/24 0900 02/05/24 2112 02/05/24 1200 02/05/24 0900 02/04/24 2115    Site Assessment Clean;Dry;Intact  Clean;Dry;Intact  —  Clean;Dry;Intact  Clean;Dry;Intact     Reason for Continuing Central Line Hemodialysis  Hemodialysis  —  Hemodialysis  Hemodialysis     AV Fistula Present;Thrill  Present;Thrill;Bruit  —  Thrill;Present  Present;Thrill;Bruit     Status Deaccessed  Deaccessed  —  Deaccessed  Deaccessed     CHG Impregnated Disc —  —  —  —  —     If No, Reason Why? —  —  —  —  —     Dressing Status —  —  —  —  —     Site Condition No complications  No complications  —  No complications  No complications     Dressing Open to air (None)  Open to air (None)  —  Open to air (None)  Open to air (None)     Dressing Change Due —  —  —  —  —     Drainage Description —  —  —  —  —     HD Output —  —  1500 mL  —  —     Row Name 02/04/24 0930 02/03/24 2049 02/03/24 0912 02/02/24 2155 02/02/24 1345    Site Assessment Clean;Dry;Intact  Clean;Dry;Intact  Clean;Dry;Intact  Clean;Dry;Intact  —     Reason for Continuing Central Line Hemodialysis  Hemodialysis  Hemodialysis  Hemodialysis  —     AV Fistula Thrill;Bruit  Thrill;Bruit  Thrill;Bruit  Thrill;Bruit  —     Status Deaccessed  Deaccessed  Deaccessed  Deaccessed  —     CHG Impregnated Disc —  —  —  —  —     If No, Reason Why? —  —  —  —  —     Dressing Status —  Clean;Dry;Intact  Clean;Dry;Intact  Clean;Dry;Intact  —     Site Condition No complications   No complications  No complications  No complications  —     Dressing Open to air (None)  Open to air (None)  Open to air (None)  Open to air (None)  —     Dressing Change Due —  —  —  —  —     Drainage Description —  —  —  —  —     HD Output —  —  —  —  2500 mL     Row Name 02/02/24 0911 02/01/24 2300 02/01/24 0907 01/31/24 2100 01/31/24 1141    Site Assessment Clean;Dry;Intact  Clean;Dry;Intact  Clean;Dry;Intact  Clean;Dry;Intact  Clean;Dry;Intact     Reason for Continuing Central Line Hemodialysis  Hemodynamic monitoring  Hemodialysis  Hemodialysis  Hemodialysis     AV Fistula Thrill;Bruit  Thrill;Bruit  Thrill;Bruit  Thrill;Bruit  Present;Thrill;Bruit     Status Deaccessed  Deaccessed  Deaccessed  Deaccessed  Deaccessed     CHG Impregnated Disc —  —  —  —  —     If No, Reason Why? —  —  —  —  —     Dressing Status Clean;Dry;Intact  Clean;Dry;Intact  Clean;Dry;Intact  Clean;Dry;Intact  Clean;Dry;Intact     Site Condition No complications  No complications  No complications  No complications  No complications     Dressing Open to air (None)  Open to air (None)  Open to air (None)  Open to air (None)  Occlusive     Dressing Change Due —  —  —  —  —     Drainage Description —  —  —  —  —     HD Output —  —  —  —  2500 mL     Row Name 01/30/24 2142 01/30/24 1200 01/29/24 2013 01/29/24 1100 01/28/24 2022    Site Assessment Clean;Dry;Intact  Clean;Dry;Intact  Clean;Dry;Intact  Clean;Dry;Intact  Clean;Dry;Intact     Reason for Continuing Central Line Hemodialysis  Hemodialysis  Hemodialysis  Hemodialysis  Hemodialysis     AV Fistula Present;Thrill;Bruit  Present;Thrill;Bruit  Present;Thrill;Bruit  Present;Thrill;Bruit  Present;Thrill;Bruit     Status Deaccessed  Deaccessed  Deaccessed  Deaccessed  Deaccessed     CHG Impregnated Disc —  —  —  —  —     If No, Reason Why? —  —  —  —  —     Dressing Status Clean;Dry;Intact  Clean;Dry;Intact  Clean;Dry;Intact  Clean;Dry;Intact  Clean;Dry;Intact     Site Condition No  complications  No complications  No complications  No complications  No complications     Dressing Open to air (None)  Open to air (None)  Open to air (None)  Open to air (None)  Open to air (None)     Dressing Change Due —  —  —  —  —     Drainage Description —  —  —  —  —     HD Output —  —  —  3000 mL  —     Row Name 01/28/24 0800 01/27/24 2053 01/27/24 1100 01/26/24 2107 01/26/24 1440    Site Assessment Clean;Dry;Intact  Clean;Dry;Intact  Clean;Dry;Intact  Clean;Dry;Intact  —     Reason for Continuing Central Line Hemodialysis  Hemodialysis  Hemodialysis  Hemodialysis  —     AV Fistula Present;Thrill;Bruit  Present;Thrill;Bruit  Present  Present  —     Status Deaccessed  Deaccessed  Deaccessed  Deaccessed  —     CHG Impregnated Disc —  —  —  —  —     If No, Reason Why? —  —  —  —  —     Dressing Status Clean;Dry;Intact  Clean;Dry;Intact  Clean;Dry;Intact  Clean;Dry;Intact  —     Site Condition No complications  No complications  No complications  No complications  —     Dressing Open to air (None)  Open to air (None)  —  Gauze  —     Dressing Change Due —  —  —  —  —     Drainage Description —  —  —  —  —     HD Output —  —  —  —  2500 mL     Row Name 01/26/24 1400 12/24/20 1056 05/06/20 1300 05/05/20 2135 05/05/20 1430    Site Assessment Clean;Dry;Intact  —  —  —  —     Reason for Continuing Central Line Hemodialysis  —  —  —  —     AV Fistula Present  —  —  —  —     Status Deaccessed  —  —  —  —     CHG Impregnated Disc —  —  —  —  —     If No, Reason Why? —  —  —  —  —     Dressing Status Clean;Dry;Intact  —  —  —  —     Site Condition No complications  —  —  —  —     Dressing Gauze  —  —  —  —     Dressing Change Due —  —  —  —  —     Drainage Description —  —  —  —  —     HD Output —  —  —  —  —     Row Name 05/05/20 0355 05/04/20 1200 05/03/20 1939 05/03/20 1000 05/03/20 0300    Site Assessment —  —  —  —  —     Reason for Continuing Central Line —  —  —  —  —     AV Fistula —  —  —  —  —     Status —  —   —  —  —     CHG Impregnated Disc —  —  —  —  —     If No, Reason Why? —  —  —  —  —     Dressing Status —  —  —  —  —     Site Condition —  —  —  —  —     Dressing —  —  —  —  —     Dressing Change Due —  —  —  —  —     Drainage Description —  —  —  —  —     HD Output —  —  —  —  —

## (undated) NOTE — IP AVS SNAPSHOT
[address] [facility]  [address] [facility] ~ [phone]                Discharge Summary   3/14/2017    [place]           Admission Information        Provider Department    3/14/2017 Effie Crowell MD [facility] 3/17/17                   aspirin 81 MG Tabs        Take 81 mg by mouth daily. 3/17/17                   Biotin 10 MG Tabs        Take 10 mg by mouth daily.          3/17/17                   EPOGEN 24720 UNIT/ML Soln   Generic drug:  epoetin jp Make follow-up appointment with primary care doctor in 1-2 weeks    Follow-up Information     Follow up with Vallarie Koyanagi, MD In 1 week.     Specialties:  Internal Medicine, PEDIATRICS    Contact information:    [address] [place] [address] Metabolic Lab Results  (Last result in the past 90 days)    HgbA1C Glucose BUN Creatinine Calcium Alkaline Phosph AST    -- (03/16/17)  88 (03/16/17)  37 (H) (03/16/17)  6.43 (H) (03/16/17)  8.5 (03/15/17)  67 (03/15/17)  24      Metabolic Lab Results  ([place] office, you can view your past visit information in flipClass by going to Visits < Visit Summaries. flipClass questions? Call [phone] for help. flipClass is NOT to be used for urgent needs.   For medical emergencies, dial 911.             _____________ constipation           Salicylates     Salicylates    aspirin 81 MG Oral Tab         Use:  “Thinning” of blood to prevent clotting within blood vessels, Pain relief   Most common side effects:  Bleeding, stomach upset   What to report to your healthcare te

## (undated) NOTE — MR AVS SNAPSHOT
[place]  [address]  [phone]               Thank you for choosing us for your health care visit with Rut Mata MD.  We are glad to serve you and happy to provide you with this summa 1. Redness, irritation, and tearing of your eyelids  2. Swollen, tender eyelids  3. Blurred vision  4. Itching around your eyelashes  5. Greasy flakes or scales around your eyelashes  6. Hard crusts at the base of your eyelashes.  These crusts may cause you © 4662-9277 The [address] [place] [address], [address] [place]. All rights reserved. This information is not intended as a substitute for professional medical care. Always follow your healthcare professional's instructions.              Fo Take 50 mg by mouth 2 (two) times daily. Commonly known as:  LOPRESSOR           RENVELA 800 MG Tabs   Generic drug:  Sevelamer Carbonate   TAKE 3 TABLETS WITH MEALS THREE TIMES DAILY           TYLENOL OR   as needed.            Vitamin D 2000 units Tabs

## (undated) NOTE — ED AVS SNAPSHOT
[facility] Brain   MRN: [id number]    Department:  [facility] Emergency Department   Date of Visit:  12/2/2017           Disclosure     Insurance plans vary and the physician(s) referred by the ER may not be covered by your plan.  Please contact within the next three months to obtain basic health screening including reassessment of your blood pressure.     IF THERE IS ANY CHANGE OR WORSENING OF YOUR CONDITION, CALL YOUR PRIMARY CARE PHYSICIAN AT ONCE OR RETURN IMMEDIATELY TO THE EMERGENCY DEPARTMEN

## (undated) NOTE — LETTER
[facility]  [address], [place], [place]  Authorization for Surgical Operation and Procedure                                                                                           I hereby authorize Jacqueline Gusman MD, my physician and his/her assistants (if applicable), which may include medical students, residents, and/or fellows, to perform the following surgical operation/ procedure and administer such anesthesia as may be determined necessary by my physician: Operation/Procedure name (s) ESOPHAGOGASTRODUODENOSCOPY (EGD) on Helen Espana   2.   I recognize that during the surgical operation/procedure, unforeseen conditions may necessitate additional or different procedures than those listed above.  I, therefore, further authorize and request that the above-named surgeon, assistants, or designees perform such procedures as are, in their judgment, necessary and desirable.    3.   My surgeon/physician has discussed prior to my surgery the potential benefits, risks and side effects of this procedure; the likelihood of achieving goals; and potential problems that might occur during recuperation.  They also discussed reasonable alternatives to the procedure, including risks, benefits, and side effects related to the alternatives and risks related to not receiving this procedure.  I have had all my questions answered and I acknowledge that no guarantee has been made as to the result that may be obtained.    4.   Should the need arise during my operation/procedure, which includes change of level of care prior to discharge, I also consent to the administration of blood and/or blood products.  Further, I understand that despite careful testing and screening of blood or blood products by collecting agencies, I may still be subject to ill effects as a result of receiving a blood transfusion and/or blood products.  The following are some, but not all, of the potential risks that can occur: fever  and allergic reactions, hemolytic reactions, transmission of diseases such as Hepatitis, AIDS and Cytomegalovirus (CMV) and fluid overload.  In the event that I wish to have an autologous transfusion of my own blood, or a directed donor transfusion, I will discuss this with my physician.  Check only if Refusing Blood or Blood Products  I understand refusal of blood or blood products as deemed necessary by my physician may have serious consequences to my condition to include possible death. I hereby assume responsibility for my refusal and release the hospital, its personnel, and my physicians from any responsibility for the consequences of my refusal.    o  Refuse   5.   I authorize the use of any specimen, organs, tissues, body parts or foreign objects that may be removed from my body during the operation/procedure for diagnosis, research or teaching purposes and their subsequent disposal by hospital authorities.  I also authorize the release of specimen test results and/or written reports to my treating physician on the hospital medical staff or other referring or consulting physicians involved in my care, at the discretion of the Pathologist or my treating physician.    6.   I consent to the photographing or videotaping of the operations or procedures to be performed, including appropriate portions of my body for medical, scientific, or educational purposes, provided my identity is not revealed by the pictures or by descriptive texts accompanying them.  If the procedure has been photographed/videotaped, the surgeon will obtain the original picture, image, videotape or CD.  The hospital will not be responsible for storage, release or maintenance of the picture, image, tape or CD.    7.   I consent to the presence of a  or observers in the operating room as deemed necessary by my physician or their designees.    8.   I recognize that in the event my procedure results in extended X-Ray/fluoroscopy  time, I may develop a skin reaction.    9. If I have a Do Not Attempt Resuscitation (DNAR) order in place, that status will be suspended while in the operating room, procedural suite, and during the recovery period unless otherwise explicitly stated by me (or a person authorized to consent on my behalf). The surgeon or my attending physician will determine when the applicable recovery period ends for purposes of reinstating the DNAR order.  10. Patients having a sterilization procedure: I understand that if the procedure is successful the results will be permanent and it will therefore be impossible for me to inseminate, conceive, or bear children.  I also understand that the procedure is intended to result in sterility, although the result has not been guaranteed.   11. I acknowledge that my physician has explained sedation/analgesia administration to me including the risk and benefits I consent to the administration of sedation/analgesia as may be necessary or desirable in the judgment of my physician.    I CERTIFY THAT I HAVE READ AND FULLY UNDERSTAND THE ABOVE CONSENT TO OPERATION and/or OTHER PROCEDURE.     _________________________________________ _________________________________     ___________________________________  Signature of Patient     Signature of Responsible Person                   Printed Name of Responsible Person                              _________________________________________ ______________________________        ___________________________________  Signature of Witness         Date  Time         Relationship to Patient    STATEMENT OF PHYSICIAN My signature below affirms that prior to the time of the procedure; I have explained to the patient and/or his/her legal representative, the risks and benefits involved in the proposed treatment and any reasonable alternative to the proposed treatment. I have also explained the risks and benefits involved in refusal of the proposed treatment and  alternatives to the proposed treatment and have answered the patient's questions. If I have a significant financial interest in a co-management agreement or a significant financial interest in any product or implant, or other significant relationship used in this procedure/surgery, I have disclosed this and had a discussion with my patient.     _______________________________________________________________ _____________________________  (Signature of Physician)                                                                                         (Date)                                   (Time)  Patient Name: Helen Espana    : 1940   Printed: 2024      Medical Record #: [id number]                                              Page 1 of

## (undated) NOTE — LETTER
5/8/2018    Patient: Vedia Lesch   MR Number: [id number]   YOB: 1940   Date of Visit: 5/8/2018   Physician: Sukhwinder Moore MD     Dear Medicare Patient:  Anat Stewart TO BENEFICIARY:  Please know that while a refraction is imp

## (undated) NOTE — LETTER
[facility]  Administration of Anesthesia  I, Helen Espana agree to be cared for by a physician anesthesiologist alone and/or with a nurse anesthetist, who is specially trained to monitor me and give me medicine to put me to sleep or keep me comfortable during my procedure    I understand that my anesthesiologist and/or anesthetist is not an employee or agent of [facility] or [employer]. He or she works for [facility]    As the patient asking for anesthesia services, I agree to:  Allow the anesthesiologist (anesthesia doctor) to give me medicine and do additional procedures as necessary. Some examples are: Starting or using an “IV” to give me medicine, fluids or blood during my procedure, and having a breathing tube placed to help me breathe when I’m asleep (intubation). In the event that my heart stops working properly, I understand that my anesthesiologist will make every effort to sustain my life, unless otherwise directed by [facility] Do Not Resuscitate documents.  Tell my anesthesia doctor before my procedure:  If I am pregnant.  The last time that I ate or drank.  iii. All of the medicines I take (including prescriptions, herbal supplements, and pills I can buy without a prescription (including street drugs/illegal medications). Failure to inform my anesthesiologist about these medicines may increase my risk of anesthetic complications.  iv.If I am allergic to anything or have had a reaction to anesthesia before.  I understand how the anesthesia medicine will help me (benefits).  I understand that with any type of anesthesia medicine there are risks:  The most common risks are: nausea, vomiting, sore throat, muscle soreness, damage to my eyes, mouth, or teeth (from breathing tube placement).  Rare risks include: remembering what happened during my procedure, allergic reactions to medications, injury to my airway, heart, lungs, vision, nerves, or  muscles and in extremely rare instances death.  My doctor has explained to me other choices available to me for my care (alternatives).  Pregnant Patients (“epidural”):  I understand that the risks of having an epidural (medicine given into my back to help control pain during labor), include itching, low blood pressure, difficulty urinating, headache or slowing of the baby’s heart. Very rare risks include infection, bleeding, seizure, irregular heart rhythms and nerve injury.  Regional Anesthesia (“spinal”, “epidural”, & “nerve blocks”):  I understand that rare but potential complications include headache, bleeding, infection, seizure, irregular heart rhythms, and nerve injury.    _____________________________________________________________________________  Patient (or Representative) Signature/Relationship to Patient  Date   Time    _____________________________________________________________________________   Name (if used)    Language/Organization   Time    _____________________________________________________________________________  Nurse Anesthetist Signature     Date   Time  _____________________________________________________________________________  Anesthesiologist Signature     Date   Time  I have discussed the procedure and information above with the patient (or patient’s representative) and answered their questions. The patient or their representative has agreed to have anesthesia services.    _____________________________________________________________________________  Witness        Date   Time  I have verified that the signature is that of the patient or patient’s representative, and that it was signed before the procedure  Patient Name: Helen Espana     : 1940                 Printed: 2024 at 11:05 AM    Medical Record #: [id number]                                            Page 1 of 1  ----------ANESTHESIA CONSENT----------

## (undated) NOTE — LETTER
5/20/2020    Patient: Antonio Drake   MR Number: [id number]   YOB: 1940   Date of Visit: 5/20/2020   Physician: Rosalie Carmichael MD     Dear Medicare Patient:  Mike Arcos TO BENEFICIARY:  Please know that while a refraction is i

## (undated) NOTE — ED AVS SNAPSHOT
Bradley Hines   MRN: [id number]    Department:  [facility] Emergency Department   Date of Visit:  2/18/2018           Disclosure     Insurance plans vary and the physician(s) referred by the ER may not be covered by your plan.  Please contact within the next three months to obtain basic health screening including reassessment of your blood pressure.     IF THERE IS ANY CHANGE OR WORSENING OF YOUR CONDITION, CALL YOUR PRIMARY CARE PHYSICIAN AT ONCE OR RETURN IMMEDIATELY TO THE EMERGENCY DEPARTMEN